# Patient Record
Sex: MALE | Race: WHITE | NOT HISPANIC OR LATINO | Employment: OTHER | URBAN - METROPOLITAN AREA
[De-identification: names, ages, dates, MRNs, and addresses within clinical notes are randomized per-mention and may not be internally consistent; named-entity substitution may affect disease eponyms.]

---

## 2022-08-09 ENCOUNTER — APPOINTMENT (OUTPATIENT)
Dept: GENERAL RADIOLOGY | Facility: HOSPITAL | Age: 57
End: 2022-08-09

## 2022-08-09 ENCOUNTER — HOSPITAL ENCOUNTER (EMERGENCY)
Facility: HOSPITAL | Age: 57
Discharge: HOME | End: 2022-08-09
Attending: EMERGENCY MEDICINE | Admitting: EMERGENCY MEDICINE

## 2022-08-09 VITALS
WEIGHT: 211.64 LBS | OXYGEN SATURATION: 92 % | DIASTOLIC BLOOD PRESSURE: 90 MMHG | BODY MASS INDEX: 33.22 KG/M2 | RESPIRATION RATE: 30 BRPM | TEMPERATURE: 98 F | HEART RATE: 114 BPM | HEIGHT: 67 IN | SYSTOLIC BLOOD PRESSURE: 179 MMHG

## 2022-08-09 DIAGNOSIS — J45.901 EXACERBATION OF ASTHMA, UNSPECIFIED ASTHMA SEVERITY, UNSPECIFIED WHETHER PERSISTENT: Primary | ICD-10-CM

## 2022-08-09 LAB
ALBUMIN SERPL-MCNC: 4.5 G/DL (ref 3.5–5.2)
ALBUMIN/GLOB SERPL: 1.4 G/DL
ALP SERPL-CCNC: 131 U/L (ref 39–117)
ALT SERPL W P-5'-P-CCNC: 31 U/L (ref 1–41)
ANION GAP SERPL CALCULATED.3IONS-SCNC: 10.2 MMOL/L (ref 5–15)
AST SERPL-CCNC: 25 U/L (ref 1–40)
BASOPHILS # BLD AUTO: 0.08 10*3/MM3 (ref 0–0.2)
BASOPHILS NFR BLD AUTO: 0.7 % (ref 0–1.5)
BILIRUB SERPL-MCNC: 0.4 MG/DL (ref 0–1.2)
BUN SERPL-MCNC: 21 MG/DL (ref 6–20)
BUN/CREAT SERPL: 16.9 (ref 7–25)
CALCIUM SPEC-SCNC: 9.6 MG/DL (ref 8.6–10.5)
CHLORIDE SERPL-SCNC: 106 MMOL/L (ref 98–107)
CO2 SERPL-SCNC: 24.8 MMOL/L (ref 22–29)
CREAT SERPL-MCNC: 1.24 MG/DL (ref 0.76–1.27)
DEPRECATED RDW RBC AUTO: 48.6 FL (ref 37–54)
EGFRCR SERPLBLD CKD-EPI 2021: 68.7 ML/MIN/1.73
EOSINOPHIL # BLD AUTO: 0.4 10*3/MM3 (ref 0–0.4)
EOSINOPHIL NFR BLD AUTO: 3.6 % (ref 0.3–6.2)
ERYTHROCYTE [DISTWIDTH] IN BLOOD BY AUTOMATED COUNT: 14.7 % (ref 12.3–15.4)
GLOBULIN UR ELPH-MCNC: 3.3 GM/DL
GLUCOSE SERPL-MCNC: 108 MG/DL (ref 65–99)
HCT VFR BLD AUTO: 46.8 % (ref 37.5–51)
HGB BLD-MCNC: 15.6 G/DL (ref 13–17.7)
HOLD SPECIMEN: NORMAL
HOLD SPECIMEN: NORMAL
IMM GRANULOCYTES # BLD AUTO: 0.04 10*3/MM3 (ref 0–0.05)
IMM GRANULOCYTES NFR BLD AUTO: 0.4 % (ref 0–0.5)
LYMPHOCYTES # BLD AUTO: 2.21 10*3/MM3 (ref 0.7–3.1)
LYMPHOCYTES NFR BLD AUTO: 20.1 % (ref 19.6–45.3)
MCH RBC QN AUTO: 30.3 PG (ref 26.6–33)
MCHC RBC AUTO-ENTMCNC: 33.3 G/DL (ref 31.5–35.7)
MCV RBC AUTO: 90.9 FL (ref 79–97)
MONOCYTES # BLD AUTO: 0.82 10*3/MM3 (ref 0.1–0.9)
MONOCYTES NFR BLD AUTO: 7.4 % (ref 5–12)
NEUTROPHILS NFR BLD AUTO: 67.8 % (ref 42.7–76)
NEUTROPHILS NFR BLD AUTO: 7.46 10*3/MM3 (ref 1.7–7)
NRBC BLD AUTO-RTO: 0 /100 WBC (ref 0–0.2)
NT-PROBNP SERPL-MCNC: 705 PG/ML (ref 0–900)
PLATELET # BLD AUTO: 273 10*3/MM3 (ref 140–450)
PMV BLD AUTO: 10.3 FL (ref 6–12)
POTASSIUM SERPL-SCNC: 4.3 MMOL/L (ref 3.5–5.2)
PROT SERPL-MCNC: 7.8 G/DL (ref 6–8.5)
QT INTERVAL: 348 MS
RBC # BLD AUTO: 5.15 10*6/MM3 (ref 4.14–5.8)
SODIUM SERPL-SCNC: 141 MMOL/L (ref 136–145)
TROPONIN T SERPL-MCNC: <0.01 NG/ML (ref 0–0.03)
WBC NRBC COR # BLD: 11.01 10*3/MM3 (ref 3.4–10.8)
WHOLE BLOOD HOLD COAG: NORMAL
WHOLE BLOOD HOLD SPECIMEN: NORMAL

## 2022-08-09 PROCEDURE — 85025 COMPLETE CBC W/AUTO DIFF WBC: CPT | Performed by: EMERGENCY MEDICINE

## 2022-08-09 PROCEDURE — 94799 UNLISTED PULMONARY SVC/PX: CPT

## 2022-08-09 PROCEDURE — 25010000002 METHYLPREDNISOLONE PER 125 MG

## 2022-08-09 PROCEDURE — 93010 ELECTROCARDIOGRAM REPORT: CPT | Performed by: INTERNAL MEDICINE

## 2022-08-09 PROCEDURE — 80053 COMPREHEN METABOLIC PANEL: CPT | Performed by: EMERGENCY MEDICINE

## 2022-08-09 PROCEDURE — 93005 ELECTROCARDIOGRAM TRACING: CPT | Performed by: EMERGENCY MEDICINE

## 2022-08-09 PROCEDURE — 71045 X-RAY EXAM CHEST 1 VIEW: CPT

## 2022-08-09 PROCEDURE — 36415 COLL VENOUS BLD VENIPUNCTURE: CPT | Performed by: EMERGENCY MEDICINE

## 2022-08-09 PROCEDURE — 96374 THER/PROPH/DIAG INJ IV PUSH: CPT

## 2022-08-09 PROCEDURE — 93005 ELECTROCARDIOGRAM TRACING: CPT

## 2022-08-09 PROCEDURE — 83880 ASSAY OF NATRIURETIC PEPTIDE: CPT | Performed by: EMERGENCY MEDICINE

## 2022-08-09 PROCEDURE — 84484 ASSAY OF TROPONIN QUANT: CPT | Performed by: EMERGENCY MEDICINE

## 2022-08-09 PROCEDURE — 94640 AIRWAY INHALATION TREATMENT: CPT

## 2022-08-09 PROCEDURE — 99283 EMERGENCY DEPT VISIT LOW MDM: CPT

## 2022-08-09 RX ORDER — IPRATROPIUM BROMIDE AND ALBUTEROL SULFATE 2.5; .5 MG/3ML; MG/3ML
3 SOLUTION RESPIRATORY (INHALATION)
Status: COMPLETED | OUTPATIENT
Start: 2022-08-09 | End: 2022-08-09

## 2022-08-09 RX ORDER — IPRATROPIUM BROMIDE AND ALBUTEROL SULFATE 2.5; .5 MG/3ML; MG/3ML
3 SOLUTION RESPIRATORY (INHALATION) ONCE
Status: DISCONTINUED | OUTPATIENT
Start: 2022-08-09 | End: 2022-08-09 | Stop reason: HOSPADM

## 2022-08-09 RX ORDER — ALBUTEROL SULFATE 90 UG/1
2 AEROSOL, METERED RESPIRATORY (INHALATION) EVERY 4 HOURS PRN
Qty: 90 G | Refills: 0 | Status: SHIPPED | OUTPATIENT
Start: 2022-08-09 | End: 2022-08-09 | Stop reason: SDUPTHER

## 2022-08-09 RX ORDER — METHYLPREDNISOLONE SODIUM SUCCINATE 125 MG/2ML
125 INJECTION, POWDER, LYOPHILIZED, FOR SOLUTION INTRAMUSCULAR; INTRAVENOUS ONCE
Status: COMPLETED | OUTPATIENT
Start: 2022-08-09 | End: 2022-08-09

## 2022-08-09 RX ORDER — CLONIDINE HYDROCHLORIDE 0.1 MG/1
0.1 TABLET ORAL ONCE
Status: COMPLETED | OUTPATIENT
Start: 2022-08-09 | End: 2022-08-09

## 2022-08-09 RX ORDER — ALBUTEROL SULFATE 0.63 MG/3ML
1 SOLUTION RESPIRATORY (INHALATION) EVERY 6 HOURS PRN
Qty: 30 EACH | Refills: 0 | Status: SHIPPED | OUTPATIENT
Start: 2022-08-09 | End: 2022-10-03

## 2022-08-09 RX ORDER — ALBUTEROL SULFATE 90 UG/1
2 AEROSOL, METERED RESPIRATORY (INHALATION) EVERY 4 HOURS PRN
Qty: 90 G | Refills: 0 | Status: SHIPPED | OUTPATIENT
Start: 2022-08-09 | End: 2022-10-03

## 2022-08-09 RX ORDER — SODIUM CHLORIDE 0.9 % (FLUSH) 0.9 %
10 SYRINGE (ML) INJECTION AS NEEDED
Status: DISCONTINUED | OUTPATIENT
Start: 2022-08-09 | End: 2022-08-09 | Stop reason: HOSPADM

## 2022-08-09 RX ORDER — ALBUTEROL SULFATE 0.63 MG/3ML
1 SOLUTION RESPIRATORY (INHALATION) EVERY 6 HOURS PRN
Qty: 30 EACH | Refills: 0 | Status: SHIPPED | OUTPATIENT
Start: 2022-08-09 | End: 2022-08-09 | Stop reason: SDUPTHER

## 2022-08-09 RX ORDER — PREDNISONE 20 MG/1
20 TABLET ORAL 2 TIMES DAILY
Qty: 10 TABLET | Refills: 0 | Status: SHIPPED | OUTPATIENT
Start: 2022-08-09 | End: 2022-08-14

## 2022-08-09 RX ORDER — PREDNISONE 20 MG/1
20 TABLET ORAL 2 TIMES DAILY
Qty: 10 TABLET | Refills: 0 | Status: SHIPPED | OUTPATIENT
Start: 2022-08-09 | End: 2022-08-09 | Stop reason: SDUPTHER

## 2022-08-09 RX ORDER — ALBUTEROL SULFATE 90 UG/1
2 AEROSOL, METERED RESPIRATORY (INHALATION) EVERY 4 HOURS PRN
COMMUNITY
End: 2022-08-09

## 2022-08-09 RX ADMIN — IPRATROPIUM BROMIDE AND ALBUTEROL SULFATE 3 ML: 2.5; .5 SOLUTION RESPIRATORY (INHALATION) at 11:35

## 2022-08-09 RX ADMIN — IPRATROPIUM BROMIDE AND ALBUTEROL SULFATE 3 ML: 2.5; .5 SOLUTION RESPIRATORY (INHALATION) at 11:30

## 2022-08-09 RX ADMIN — METHYLPREDNISOLONE SODIUM SUCCINATE 125 MG: 125 INJECTION, POWDER, FOR SOLUTION INTRAMUSCULAR; INTRAVENOUS at 11:49

## 2022-08-09 RX ADMIN — CLONIDINE HYDROCHLORIDE 0.1 MG: 0.1 TABLET ORAL at 14:24

## 2022-08-09 RX ADMIN — IPRATROPIUM BROMIDE AND ALBUTEROL SULFATE 3 ML: 2.5; .5 SOLUTION RESPIRATORY (INHALATION) at 11:40

## 2022-08-09 NOTE — DISCHARGE INSTRUCTIONS
Please follow-up with your primary care provider if you continue to feel ill and have wheezing.  Please return to the ER immediately if you develop any difficulty breathing, chest pain, chest tightness, short of air, or any other concerns surrounding your asthma.  Is also equally important that you take your blood pressure medication daily as prescribed.

## 2022-08-09 NOTE — ED PROVIDER NOTES
"Time: 11:19 AM EDT  Arrived by: SERGO  Chief Complaint: Asthma exacerbation  History provided by: Patient  History is limited by: N/A     History of Present Illness:  Patient is a 57 y.o. year old male who presents to the emergency department with shortness of air and wheezing secondary to asthma exacerbation.  States that approximately 2 days ago he started having some difficulty breathing.  He however has not had any of his asthma medications because he cannot locate them due to recently moving to this area.  He moved to [place] from [place] approximately 1-1/2 weeks ago.  He denies any pain, fever, nausea, vomiting, or diarrhea.    HPI    Similar Symptoms Previously: Yes  Recently seen: Unknown.      Patient Care Team  Primary Care Provider: Provider, No Known    Past Medical History:     No Known Allergies  Past Medical History:   Diagnosis Date   • Asthma      History reviewed. No pertinent surgical history.  History reviewed. No pertinent family history.    Home Medications:  Prior to Admission medications    Not on File        Social History:          Review of Systems:  Review of Systems   Constitutional: Negative for chills and fever.   HENT: Negative for congestion, ear pain and sore throat.    Eyes: Negative for pain.   Respiratory: Positive for shortness of breath and wheezing. Negative for cough and chest tightness.    Cardiovascular: Negative for chest pain.   Gastrointestinal: Negative for abdominal pain, diarrhea, nausea and vomiting.   Genitourinary: Negative for flank pain and hematuria.   Musculoskeletal: Negative for joint swelling.   Skin: Negative for pallor.   Neurological: Negative for seizures and headaches.   All other systems reviewed and are negative.       Physical Exam:  /90   Pulse 114   Temp 98 °F (36.7 °C)   Resp (!) 30   Ht 170.2 cm (67\")   Wt 96 kg (211 lb 10.3 oz)   SpO2 92%   BMI 33.15 kg/m²     Physical Exam  Vitals and nursing note reviewed.   Constitutional:       " General: He is not in acute distress.     Appearance: Normal appearance. He is not ill-appearing, toxic-appearing or diaphoretic.   HENT:      Head: Normocephalic and atraumatic.      Mouth/Throat:      Mouth: Mucous membranes are moist.   Eyes:      General: No scleral icterus.  Cardiovascular:      Rate and Rhythm: Regular rhythm. Tachycardia present.      Pulses: Normal pulses.      Heart sounds: Normal heart sounds. No murmur heard.  Pulmonary:      Effort: Pulmonary effort is normal. No respiratory distress.      Breath sounds: No stridor. Wheezing and rhonchi present. No rales.      Comments: There is some accessory muscle use noted and work of breathing is increased.  Patient can speak in full sentences.  There are no signs of cyanosis.  Abdominal:      General: Abdomen is flat.      Palpations: Abdomen is soft.      Tenderness: There is no abdominal tenderness.   Genitourinary:     Prostate: Normal.   Musculoskeletal:         General: No tenderness. Normal range of motion.      Cervical back: Normal range of motion and neck supple. No rigidity.   Skin:     General: Skin is warm and dry.      Coloration: Skin is not jaundiced or pale.      Findings: No bruising, erythema, lesion or rash.   Neurological:      Mental Status: He is alert and oriented to person, place, and time. Mental status is at baseline.      Cranial Nerves: No cranial nerve deficit.      Sensory: No sensory deficit.   Psychiatric:         Mood and Affect: Mood normal.         Behavior: Behavior normal.         Thought Content: Thought content normal.         Judgment: Judgment normal.                Medications in the Emergency Department:  Medications   sodium chloride 0.9 % flush 10 mL (has no administration in time range)   ipratropium-albuterol (DUO-NEB) nebulizer solution 3 mL (3 mL Nebulization Not Given 8/9/22 1130)   ipratropium-albuterol (DUO-NEB) nebulizer solution 3 mL (3 mL Nebulization Given 8/9/22 1140)   methylPREDNISolone  sodium succinate (SOLU-Medrol) injection 125 mg (125 mg Intravenous Given 8/9/22 1149)   cloNIDine (CATAPRES) tablet 0.1 mg (0.1 mg Oral Given 8/9/22 1424)        Labs  Lab Results (last 24 hours)     Procedure Component Value Units Date/Time    CBC & Differential [[id number]]  (Abnormal) Collected: 08/09/22 1045    Specimen: Blood Updated: 08/09/22 1101    Narrative:      The following orders were created for panel order CBC & Differential.  Procedure                               Abnormality         Status                     ---------                               -----------         ------                     CBC Auto Differential[[id number]]        Abnormal            Final result                 Please view results for these tests on the individual orders.    Comprehensive Metabolic Panel [[id number]]  (Abnormal) Collected: 08/09/22 1045    Specimen: Blood Updated: 08/09/22 1124     Glucose 108 mg/dL      BUN 21 mg/dL      Creatinine 1.24 mg/dL      Sodium 141 mmol/L      Potassium 4.3 mmol/L      Chloride 106 mmol/L      CO2 24.8 mmol/L      Calcium 9.6 mg/dL      Total Protein 7.8 g/dL      Albumin 4.50 g/dL      ALT (SGPT) 31 U/L      AST (SGOT) 25 U/L      Alkaline Phosphatase 131 U/L      Total Bilirubin 0.4 mg/dL      Globulin 3.3 gm/dL      A/G Ratio 1.4 g/dL      BUN/Creatinine Ratio 16.9     Anion Gap 10.2 mmol/L      eGFR 68.7 mL/min/1.73      Comment: National Kidney Foundation and American Society of Nephrology (ASN) Task Force recommended calculation based on the Chronic Kidney Disease Epidemiology Collaboration (CKD-EPI) equation refit without adjustment for race.       Narrative:      GFR Normal >60  Chronic Kidney Disease <60  Kidney Failure <15      BNP [[id number]]  (Normal) Collected: 08/09/22 1045    Specimen: Blood Updated: 08/09/22 1121     proBNP 705.0 pg/mL     Narrative:      Among patients with dyspnea, NT-proBNP is highly sensitive for the detection of acute congestive heart failure. In  addition NT-proBNP of <300 pg/ml effectively rules out acute congestive heart failure with 99% negative predictive value.    Results may be falsely decreased if patient taking Biotin.      Troponin [[id number]]  (Normal) Collected: 08/09/22 1045    Specimen: Blood Updated: 08/09/22 1124     Troponin T <0.010 ng/mL     Narrative:      Troponin T Reference Range:  <= 0.03 ng/mL-   Negative for AMI  >0.03 ng/mL-     Abnormal for myocardial necrosis.  Clinicians would have to utilize clinical acumen, EKG, Troponin and serial changes to determine if it is an Acute Myocardial Infarction or myocardial injury due to an underlying chronic condition.       Results may be falsely decreased if patient taking Biotin.      CBC Auto Differential [[id number]]  (Abnormal) Collected: 08/09/22 1045    Specimen: Blood Updated: 08/09/22 1101     WBC 11.01 10*3/mm3      RBC 5.15 10*6/mm3      Hemoglobin 15.6 g/dL      Hematocrit 46.8 %      MCV 90.9 fL      MCH 30.3 pg      MCHC 33.3 g/dL      RDW 14.7 %      RDW-SD 48.6 fl      MPV 10.3 fL      Platelets 273 10*3/mm3      Neutrophil % 67.8 %      Lymphocyte % 20.1 %      Monocyte % 7.4 %      Eosinophil % 3.6 %      Basophil % 0.7 %      Immature Grans % 0.4 %      Neutrophils, Absolute 7.46 10*3/mm3      Lymphocytes, Absolute 2.21 10*3/mm3      Monocytes, Absolute 0.82 10*3/mm3      Eosinophils, Absolute 0.40 10*3/mm3      Basophils, Absolute 0.08 10*3/mm3      Immature Grans, Absolute 0.04 10*3/mm3      nRBC 0.0 /100 WBC            Imaging:  XR Chest 1 View    Result Date: 8/9/2022  PROCEDURE: XR CHEST 1 VW  COMPARISON: None  INDICATIONS: asthma, short of breath  FINDINGS:  No focal or diffuse infiltrate is identified. No pleural effusion or pneumothorax. Heart size and mediastinal contour appear within normal limits.         No radiographic findings of acute cardiopulmonary abnormality.       ROBBY OSPINA MD       Electronically Signed and Approved By: ROBBY OSPINA MD on 8/09/2022 at  11:03               Procedures:  Procedures    Progress  ED Course as of 08/09/22 1645   Tue Aug 09, 2022   1129 Pharmacy tech was given patient's previous pharmacy info in [place].  They will be calling to get patient's medications. [MS]   1359 Patient states that he is feeling much better and can breathe better.  His breath sounds have improved.  He still has scattered wheezing however much better than initial assessment.  Patient states he is ready to go home.  His O2 sats are running between 92-93% on room air and he reports that that is his normal. Patient's blood pressure is noted to be elevated.  He states that he has not had his blood pressure medication today. [MS]      ED Course User Index  [MS] Sudha Santillan, PONCE                            Medical Decision Making:  MDM  Number of Diagnoses or Management Options  Exacerbation of asthma, unspecified asthma severity, unspecified whether persistent: new and does not require workup  Diagnosis management comments: Patient presented in mild respiratory distress.  He has a history of asthma but has not been able to locate his medications since moving to [place] 9 days ago.  He developed shortness of air starting 2 days ago that is progressively gotten worse.  Upon arrival accessory muscle use was noted and her breathing was slightly increased.  There was significant wheezing and rhonchi throughout.  Patient was given 3 breathing treatments and IV steroids and greatly improved.  Verbalized stating now I cannot breathe.  His chest x-ray was relatively within normal limits.  At time of discharge his sats were 92 to 93% patient confirms that that is what his sats normally are.  He was given prescriptions for an inhaler as well as neb treatments, and oral steroids.  Patient was given strict return instructions and verbalized understanding.  I have spoke with the patient and I have explained the patient´s condition, diagnoses and treatment plan based on the  information available to me at this time. I have answered all questions and addressed any concerns. The patient has a good understanding of the patient´s diagnosis, condition, and treatment plan as can be expected at this point. The vital signs have been stable. The patient´s condition is stable and appropriate for discharge from the emergency department.      The patient will pursue further outpatient evaluation with the primary care physician or other designated or consulting physician as outlined in the discharge instructions. They are agreeable to this plan of care and follow-up instructions have been explained in detail. The patient has received these instructions in written format and have expressed an understanding of the discharge instructions. The patient is aware that any significant change in condition or worsening of symptoms should prompt an immediate return to this or the closest emergency department or call to 911.         Amount and/or Complexity of Data Reviewed  Clinical lab tests: ordered and reviewed  Tests in the radiology section of CPT®: ordered and reviewed  Review and summarize past medical records: yes (I have attempted review patient's previous medical encounters however there are none available within the epic charting system.  )    Risk of Complications, Morbidity, and/or Mortality  Presenting problems: moderate  Diagnostic procedures: low  Management options: low         Final diagnoses:   Exacerbation of asthma, unspecified asthma severity, unspecified whether persistent        Disposition:  ED Disposition     ED Disposition   Discharge    Condition   Stable    Comment   --             This medical record created using voice recognition software.           Sudha Santillan, APRN  08/09/22 7567

## 2022-10-03 ENCOUNTER — APPOINTMENT (OUTPATIENT)
Dept: GENERAL RADIOLOGY | Facility: HOSPITAL | Age: 57
End: 2022-10-03

## 2022-10-03 ENCOUNTER — HOSPITAL ENCOUNTER (EMERGENCY)
Facility: HOSPITAL | Age: 57
Discharge: LEFT BEFORE DISCHARGE | End: 2022-10-03
Attending: STUDENT IN AN ORGANIZED HEALTH CARE EDUCATION/TRAINING PROGRAM | Admitting: STUDENT IN AN ORGANIZED HEALTH CARE EDUCATION/TRAINING PROGRAM

## 2022-10-03 VITALS
RESPIRATION RATE: 20 BRPM | DIASTOLIC BLOOD PRESSURE: 126 MMHG | SYSTOLIC BLOOD PRESSURE: 190 MMHG | TEMPERATURE: 98.7 F | BODY MASS INDEX: 36.49 KG/M2 | HEART RATE: 104 BPM | HEIGHT: 66 IN | WEIGHT: 227.07 LBS | OXYGEN SATURATION: 99 %

## 2022-10-03 DIAGNOSIS — J45.901 EXACERBATION OF ASTHMA, UNSPECIFIED ASTHMA SEVERITY, UNSPECIFIED WHETHER PERSISTENT: Primary | ICD-10-CM

## 2022-10-03 DIAGNOSIS — I10 HYPERTENSION, UNSPECIFIED TYPE: ICD-10-CM

## 2022-10-03 LAB
ALBUMIN SERPL-MCNC: 4.3 G/DL (ref 3.5–5.2)
ALBUMIN/GLOB SERPL: 1.5 G/DL
ALP SERPL-CCNC: 121 U/L (ref 39–117)
ALT SERPL W P-5'-P-CCNC: 27 U/L (ref 1–41)
ANION GAP SERPL CALCULATED.3IONS-SCNC: 7.7 MMOL/L (ref 5–15)
AST SERPL-CCNC: 27 U/L (ref 1–40)
BASOPHILS # BLD AUTO: 0.06 10*3/MM3 (ref 0–0.2)
BASOPHILS NFR BLD AUTO: 0.7 % (ref 0–1.5)
BILIRUB SERPL-MCNC: 0.2 MG/DL (ref 0–1.2)
BUN SERPL-MCNC: 22 MG/DL (ref 6–20)
BUN/CREAT SERPL: 15.5 (ref 7–25)
CALCIUM SPEC-SCNC: 9.5 MG/DL (ref 8.6–10.5)
CHLORIDE SERPL-SCNC: 102 MMOL/L (ref 98–107)
CO2 SERPL-SCNC: 29.3 MMOL/L (ref 22–29)
CREAT SERPL-MCNC: 1.42 MG/DL (ref 0.76–1.27)
DEPRECATED RDW RBC AUTO: 50.4 FL (ref 37–54)
EGFRCR SERPLBLD CKD-EPI 2021: 58.4 ML/MIN/1.73
EOSINOPHIL # BLD AUTO: 0.47 10*3/MM3 (ref 0–0.4)
EOSINOPHIL NFR BLD AUTO: 5.8 % (ref 0.3–6.2)
ERYTHROCYTE [DISTWIDTH] IN BLOOD BY AUTOMATED COUNT: 14.7 % (ref 12.3–15.4)
GLOBULIN UR ELPH-MCNC: 2.9 GM/DL
GLUCOSE SERPL-MCNC: 104 MG/DL (ref 65–99)
HCT VFR BLD AUTO: 44.5 % (ref 37.5–51)
HGB BLD-MCNC: 14.7 G/DL (ref 13–17.7)
HOLD SPECIMEN: NORMAL
HOLD SPECIMEN: NORMAL
IMM GRANULOCYTES # BLD AUTO: 0.04 10*3/MM3 (ref 0–0.05)
IMM GRANULOCYTES NFR BLD AUTO: 0.5 % (ref 0–0.5)
LYMPHOCYTES # BLD AUTO: 1.85 10*3/MM3 (ref 0.7–3.1)
LYMPHOCYTES NFR BLD AUTO: 22.8 % (ref 19.6–45.3)
MCH RBC QN AUTO: 30.7 PG (ref 26.6–33)
MCHC RBC AUTO-ENTMCNC: 33 G/DL (ref 31.5–35.7)
MCV RBC AUTO: 92.9 FL (ref 79–97)
MONOCYTES # BLD AUTO: 0.66 10*3/MM3 (ref 0.1–0.9)
MONOCYTES NFR BLD AUTO: 8.1 % (ref 5–12)
NEUTROPHILS NFR BLD AUTO: 5.04 10*3/MM3 (ref 1.7–7)
NEUTROPHILS NFR BLD AUTO: 62.1 % (ref 42.7–76)
NRBC BLD AUTO-RTO: 0 /100 WBC (ref 0–0.2)
NT-PROBNP SERPL-MCNC: 494.5 PG/ML (ref 0–900)
PLATELET # BLD AUTO: 222 10*3/MM3 (ref 140–450)
PMV BLD AUTO: 10.6 FL (ref 6–12)
POTASSIUM SERPL-SCNC: 4.4 MMOL/L (ref 3.5–5.2)
PROT SERPL-MCNC: 7.2 G/DL (ref 6–8.5)
RBC # BLD AUTO: 4.79 10*6/MM3 (ref 4.14–5.8)
SODIUM SERPL-SCNC: 139 MMOL/L (ref 136–145)
TROPONIN T SERPL-MCNC: <0.01 NG/ML (ref 0–0.03)
WBC NRBC COR # BLD: 8.12 10*3/MM3 (ref 3.4–10.8)
WHOLE BLOOD HOLD COAG: NORMAL
WHOLE BLOOD HOLD SPECIMEN: NORMAL

## 2022-10-03 PROCEDURE — 83880 ASSAY OF NATRIURETIC PEPTIDE: CPT

## 2022-10-03 PROCEDURE — 96374 THER/PROPH/DIAG INJ IV PUSH: CPT

## 2022-10-03 PROCEDURE — 99283 EMERGENCY DEPT VISIT LOW MDM: CPT

## 2022-10-03 PROCEDURE — 99284 EMERGENCY DEPT VISIT MOD MDM: CPT

## 2022-10-03 PROCEDURE — 85025 COMPLETE CBC W/AUTO DIFF WBC: CPT

## 2022-10-03 PROCEDURE — 71045 X-RAY EXAM CHEST 1 VIEW: CPT

## 2022-10-03 PROCEDURE — 93005 ELECTROCARDIOGRAM TRACING: CPT

## 2022-10-03 PROCEDURE — 93005 ELECTROCARDIOGRAM TRACING: CPT | Performed by: STUDENT IN AN ORGANIZED HEALTH CARE EDUCATION/TRAINING PROGRAM

## 2022-10-03 PROCEDURE — 84484 ASSAY OF TROPONIN QUANT: CPT

## 2022-10-03 PROCEDURE — 94799 UNLISTED PULMONARY SVC/PX: CPT

## 2022-10-03 PROCEDURE — 80053 COMPREHEN METABOLIC PANEL: CPT

## 2022-10-03 PROCEDURE — 25010000002 METHYLPREDNISOLONE PER 125 MG: Performed by: EMERGENCY MEDICINE

## 2022-10-03 PROCEDURE — 93010 ELECTROCARDIOGRAM REPORT: CPT | Performed by: INTERNAL MEDICINE

## 2022-10-03 PROCEDURE — 94640 AIRWAY INHALATION TREATMENT: CPT

## 2022-10-03 RX ORDER — SODIUM CHLORIDE 0.9 % (FLUSH) 0.9 %
10 SYRINGE (ML) INJECTION AS NEEDED
Status: DISCONTINUED | OUTPATIENT
Start: 2022-10-03 | End: 2022-10-03 | Stop reason: HOSPADM

## 2022-10-03 RX ORDER — IPRATROPIUM BROMIDE AND ALBUTEROL SULFATE 2.5; .5 MG/3ML; MG/3ML
6 SOLUTION RESPIRATORY (INHALATION) CONTINUOUS
Status: DISCONTINUED | OUTPATIENT
Start: 2022-10-03 | End: 2022-10-03 | Stop reason: HOSPADM

## 2022-10-03 RX ORDER — METHYLPREDNISOLONE SODIUM SUCCINATE 125 MG/2ML
125 INJECTION, POWDER, LYOPHILIZED, FOR SOLUTION INTRAMUSCULAR; INTRAVENOUS ONCE
Status: COMPLETED | OUTPATIENT
Start: 2022-10-03 | End: 2022-10-03

## 2022-10-03 RX ORDER — ALBUTEROL SULFATE 2.5 MG/3ML
2.5 SOLUTION RESPIRATORY (INHALATION) EVERY 4 HOURS PRN
Qty: 60 EACH | Refills: 0 | Status: SHIPPED | OUTPATIENT
Start: 2022-10-03 | End: 2022-11-02

## 2022-10-03 RX ORDER — IPRATROPIUM BROMIDE AND ALBUTEROL SULFATE 2.5; .5 MG/3ML; MG/3ML
3 SOLUTION RESPIRATORY (INHALATION) ONCE
Status: COMPLETED | OUTPATIENT
Start: 2022-10-03 | End: 2022-10-03

## 2022-10-03 RX ORDER — LISINOPRIL 10 MG/1
20 TABLET ORAL ONCE
Status: COMPLETED | OUTPATIENT
Start: 2022-10-03 | End: 2022-10-03

## 2022-10-03 RX ORDER — LISINOPRIL 20 MG/1
20 TABLET ORAL DAILY
Qty: 60 TABLET | Refills: 0 | Status: SHIPPED | OUTPATIENT
Start: 2022-10-03 | End: 2022-10-27 | Stop reason: SDUPTHER

## 2022-10-03 RX ORDER — PREDNISONE 20 MG/1
40 TABLET ORAL DAILY
Qty: 10 TABLET | Refills: 0 | Status: SHIPPED | OUTPATIENT
Start: 2022-10-03 | End: 2022-10-08

## 2022-10-03 RX ORDER — ALBUTEROL SULFATE 90 UG/1
2 AEROSOL, METERED RESPIRATORY (INHALATION) EVERY 4 HOURS PRN
Qty: 8 G | Refills: 0 | Status: SHIPPED | OUTPATIENT
Start: 2022-10-03 | End: 2022-10-27

## 2022-10-03 RX ADMIN — METHYLPREDNISOLONE SODIUM SUCCINATE 125 MG: 125 INJECTION, POWDER, FOR SOLUTION INTRAMUSCULAR; INTRAVENOUS at 13:05

## 2022-10-03 RX ADMIN — IPRATROPIUM BROMIDE AND ALBUTEROL SULFATE 3 ML: 2.5; .5 SOLUTION RESPIRATORY (INHALATION) at 13:04

## 2022-10-03 RX ADMIN — IPRATROPIUM BROMIDE AND ALBUTEROL SULFATE 6 ML: 2.5; .5 SOLUTION RESPIRATORY (INHALATION) at 14:20

## 2022-10-03 RX ADMIN — LISINOPRIL 20 MG: 10 TABLET ORAL at 14:35

## 2022-10-03 NOTE — DISCHARGE INSTRUCTIONS
Follow-up with your primary care doctor to have your asthma and high blood pressure better controlled.  Return to the emergency department if you feel lightheaded, passout, or have other concerning symptoms.

## 2022-10-03 NOTE — ED PROVIDER NOTES
Time: 1:45 PM EDT  Arrived by: private car  Chief Complaint: SOB and HTN  History provided by: patient  History is limited by: N/A     History of Present Illness:  Patient is a 57 y.o. year old male who presents to the emergency department with SOB and HTN. Pt reports SOB started last night. Pt has a history of asthma. Pt does not have an inhaler at home. Pt reports smoking occationally. Pt has not been on steroids. Pt has had one breathing treatment here at the ER today. Pt has been on Lisinopril for 6 months on and off but he has not been taking his medications recently.       Similar Symptoms Previously: no  Recently seen: no      Patient Care Team  Primary Care Provider: Provider, No Known    Past Medical History:     No Known Allergies  Past Medical History:   Diagnosis Date   • Asthma      No past surgical history on file.  No family history on file.    Home Medications:  Prior to Admission medications    Medication Sig Start Date End Date Taking? Authorizing Provider   albuterol (ACCUNEB) 0.63 MG/3ML nebulizer solution Take 3 mL by nebulization Every 6 (Six) Hours As Needed for Wheezing. 8/9/22   Sudha Santillan APRN   albuterol sulfate  (90 Base) MCG/ACT inhaler Inhale 2 puffs Every 4 (Four) Hours As Needed for Wheezing. 8/9/22   Sudha Santillan APRN        Social History:      Recent travel: not applicable     Review of Systems:  Review of Systems   Constitutional: Negative for chills and fever.   HENT: Negative for congestion, rhinorrhea and sore throat.    Eyes: Negative for pain and visual disturbance.   Respiratory: Positive for shortness of breath. Negative for apnea, cough and chest tightness.    Cardiovascular: Negative for chest pain and palpitations.   Gastrointestinal: Negative for abdominal pain, diarrhea, nausea and vomiting.   Genitourinary: Negative for difficulty urinating and dysuria.   Musculoskeletal: Negative for joint swelling and myalgias.   Skin: Negative for color  "change.   Neurological: Negative for seizures and headaches.   Psychiatric/Behavioral: Negative.    All other systems reviewed and are negative.       Physical Exam:  BP (!) 190/126   Pulse 104   Temp 98.7 °F (37.1 °C) (Oral)   Resp 20   Ht 167.6 cm (66\")   Wt 103 kg (227 lb 1.2 oz)   SpO2 99%   BMI 36.65 kg/m²     Physical Exam  Vitals and nursing note reviewed.   Constitutional:       General: He is not in acute distress.     Appearance: Normal appearance. He is not toxic-appearing.   HENT:      Head: Normocephalic and atraumatic.      Jaw: There is normal jaw occlusion.   Eyes:      General: Lids are normal.      Extraocular Movements: Extraocular movements intact.      Conjunctiva/sclera: Conjunctivae normal.      Pupils: Pupils are equal, round, and reactive to light.   Cardiovascular:      Rate and Rhythm: Normal rate and regular rhythm.      Pulses: Normal pulses.      Heart sounds: Normal heart sounds.   Pulmonary:      Effort: Pulmonary effort is normal. No respiratory distress.      Breath sounds: Wheezing (Severe) present. No rhonchi.      Comments: Slight conversational dyspnea.  Abdominal:      General: Abdomen is flat.      Palpations: Abdomen is soft.      Tenderness: There is no abdominal tenderness. There is no guarding or rebound.   Musculoskeletal:         General: Normal range of motion.      Cervical back: Normal range of motion and neck supple.      Right lower leg: No edema.      Left lower leg: No edema.   Skin:     General: Skin is warm and dry.   Neurological:      Mental Status: He is alert and oriented to person, place, and time. Mental status is at baseline.   Psychiatric:         Mood and Affect: Mood normal.                Medications in the Emergency Department:  Medications   sodium chloride 0.9 % flush 10 mL (has no administration in time range)   ipratropium-albuterol (DUO-NEB) nebulizer solution 6 mL (0 mL Nebulization Stopped 10/3/22 4319)   ipratropium-albuterol (DUO-NEB) " nebulizer solution 3 mL (3 mL Nebulization Given 10/3/22 1304)   methylPREDNISolone sodium succinate (SOLU-Medrol) injection 125 mg (125 mg Intravenous Given 10/3/22 1305)   lisinopril (PRINIVIL,ZESTRIL) tablet 20 mg (20 mg Oral Given 10/3/22 1435)        Labs  Lab Results (last 24 hours)     Procedure Component Value Units Date/Time    CBC & Differential [[id number]]  (Abnormal) Collected: 10/03/22 1225    Specimen: Blood Updated: 10/03/22 1234    Narrative:      The following orders were created for panel order CBC & Differential.  Procedure                               Abnormality         Status                     ---------                               -----------         ------                     CBC Auto Differential[[id number]]        Abnormal            Final result                 Please view results for these tests on the individual orders.    Comprehensive Metabolic Panel [[id number]]  (Abnormal) Collected: 10/03/22 1225    Specimen: Blood Updated: 10/03/22 1256     Glucose 104 mg/dL      BUN 22 mg/dL      Creatinine 1.42 mg/dL      Sodium 139 mmol/L      Potassium 4.4 mmol/L      Chloride 102 mmol/L      CO2 29.3 mmol/L      Calcium 9.5 mg/dL      Total Protein 7.2 g/dL      Albumin 4.30 g/dL      ALT (SGPT) 27 U/L      AST (SGOT) 27 U/L      Alkaline Phosphatase 121 U/L      Total Bilirubin 0.2 mg/dL      Globulin 2.9 gm/dL      A/G Ratio 1.5 g/dL      BUN/Creatinine Ratio 15.5     Anion Gap 7.7 mmol/L      eGFR 58.4 mL/min/1.73      Comment: National Kidney Foundation and American Society of Nephrology (ASN) Task Force recommended calculation based on the Chronic Kidney Disease Epidemiology Collaboration (CKD-EPI) equation refit without adjustment for race.       Narrative:      GFR Normal >60  Chronic Kidney Disease <60  Kidney Failure <15      BNP [[id number]]  (Normal) Collected: 10/03/22 1225    Specimen: Blood Updated: 10/03/22 1251     proBNP 494.5 pg/mL     Narrative:      Among patients with  dyspnea, NT-proBNP is highly sensitive for the detection of acute congestive heart failure. In addition NT-proBNP of <300 pg/ml effectively rules out acute congestive heart failure with 99% negative predictive value.    Results may be falsely decreased if patient taking Biotin.      Troponin [[id number]]  (Normal) Collected: 10/03/22 1225    Specimen: Blood Updated: 10/03/22 1254     Troponin T <0.010 ng/mL     Narrative:      Troponin T Reference Range:  <= 0.03 ng/mL-   Negative for AMI  >0.03 ng/mL-     Abnormal for myocardial necrosis.  Clinicians would have to utilize clinical acumen, EKG, Troponin and serial changes to determine if it is an Acute Myocardial Infarction or myocardial injury due to an underlying chronic condition.       Results may be falsely decreased if patient taking Biotin.      CBC Auto Differential [[id number]]  (Abnormal) Collected: 10/03/22 1225    Specimen: Blood Updated: 10/03/22 1234     WBC 8.12 10*3/mm3      RBC 4.79 10*6/mm3      Hemoglobin 14.7 g/dL      Hematocrit 44.5 %      MCV 92.9 fL      MCH 30.7 pg      MCHC 33.0 g/dL      RDW 14.7 %      RDW-SD 50.4 fl      MPV 10.6 fL      Platelets 222 10*3/mm3      Neutrophil % 62.1 %      Lymphocyte % 22.8 %      Monocyte % 8.1 %      Eosinophil % 5.8 %      Basophil % 0.7 %      Immature Grans % 0.5 %      Neutrophils, Absolute 5.04 10*3/mm3      Lymphocytes, Absolute 1.85 10*3/mm3      Monocytes, Absolute 0.66 10*3/mm3      Eosinophils, Absolute 0.47 10*3/mm3      Basophils, Absolute 0.06 10*3/mm3      Immature Grans, Absolute 0.04 10*3/mm3      nRBC 0.0 /100 WBC            Imaging:  XR Chest 1 View    Result Date: 10/3/2022  PROCEDURE: XR CHEST 1 VW  COMPARISON: [facility], , XR CHEST 1 VW, 8/09/2022, 10:56.  INDICATIONS: shortness of air  FINDINGS:  The heart is normal in size.  The lungs are well-expanded and free of infiltrates.  Bony structures appear intact.  Healed fracture of the right posterior 8th rib is again  seen.       No active disease is seen.       MIKAL HOPE MD       Electronically Signed and Approved By: MIKAL HOPE MD on 10/03/2022 at 12:47               Procedures:  Procedures    Progress  ED Course as of 10/03/22 1529   Mon Oct 03, 2022   1526 I reevaluated the patient.  Occasionally his oxygenation is dipping into the high 80s.  He still has significant wheezing and having some conversational dyspnea.  Patient's renal function is slightly worse as well.  Patient was written his lisinopril.  I did discuss with him that I would rather do more observation and breathing treatments but he would like to leave and understands the risk [LQ]      ED Course User Index  [LQ] Sabrina Simpsno MD                            Medical Decision Making:  MDM  Number of Diagnoses or Management Options  Exacerbation of asthma, unspecified asthma severity, unspecified whether persistent  Hypertension, unspecified type  Diagnosis management comments: ddx: Pneumonia, pneumothorax, ACS, PE, asthma, COPD    Patient has known asthma.  Chest x-ray shows no infiltrate.  Patient has CKD likely from his untreated hypertension.  Patient's breathing did improve with treatment though he ultimately did leave AGAINST MEDICAL ADVICE as he did not want a continue treatment and still was not oxygenating as well as I would have liked him to.  Patient was sent home with steroids albuterol treatments inhaler and also his lisinopril refilled.  We discussed follow-up and return precautions       Amount and/or Complexity of Data Reviewed  Clinical lab tests: reviewed  Tests in the radiology section of CPT®: reviewed  Tests in the medicine section of CPT®: reviewed         Final diagnoses:   Exacerbation of asthma, unspecified asthma severity, unspecified whether persistent   Hypertension, unspecified type        Disposition:  ED Disposition     ED Disposition   AMA    Condition   --    Comment   --             This medical record created using voice  recognition software.        Documentation assistance provided by Allison Godfrey acting as scribe for Sabrina Simpson MD. Information recorded by the scribe was done at my direction and has been verified and validated by me.          Allison Godfrey  10/03/22 1404       Allison Godfrey  10/03/22 1521       Sabrina Simpson MD  10/03/22 1530

## 2022-10-06 LAB — QT INTERVAL: 381 MS

## 2022-10-27 ENCOUNTER — OFFICE VISIT (OUTPATIENT)
Dept: INTERNAL MEDICINE | Facility: CLINIC | Age: 57
End: 2022-10-27

## 2022-10-27 VITALS
TEMPERATURE: 97.9 F | HEIGHT: 66 IN | BODY MASS INDEX: 37.32 KG/M2 | SYSTOLIC BLOOD PRESSURE: 142 MMHG | DIASTOLIC BLOOD PRESSURE: 70 MMHG | OXYGEN SATURATION: 95 % | WEIGHT: 232.2 LBS | HEART RATE: 112 BPM | RESPIRATION RATE: 18 BRPM

## 2022-10-27 DIAGNOSIS — Z12.5 SCREENING FOR PROSTATE CANCER: ICD-10-CM

## 2022-10-27 DIAGNOSIS — I10 ESSENTIAL (PRIMARY) HYPERTENSION: ICD-10-CM

## 2022-10-27 DIAGNOSIS — Z76.89 ENCOUNTER TO ESTABLISH CARE: Primary | ICD-10-CM

## 2022-10-27 DIAGNOSIS — F51.04 PSYCHOPHYSIOLOGICAL INSOMNIA: ICD-10-CM

## 2022-10-27 DIAGNOSIS — Z79.899 CONTROLLED SUBSTANCE AGREEMENT SIGNED: ICD-10-CM

## 2022-10-27 DIAGNOSIS — Z11.59 NEED FOR HEPATITIS C SCREENING TEST: ICD-10-CM

## 2022-10-27 DIAGNOSIS — J45.41 MODERATE PERSISTENT ASTHMA WITH ACUTE EXACERBATION: ICD-10-CM

## 2022-10-27 DIAGNOSIS — M25.50 POLYARTHRALGIA: ICD-10-CM

## 2022-10-27 DIAGNOSIS — Z13.220 SCREENING FOR LIPID DISORDERS: ICD-10-CM

## 2022-10-27 DIAGNOSIS — Z01.89 ROUTINE LAB DRAW: ICD-10-CM

## 2022-10-27 DIAGNOSIS — Z12.11 ENCOUNTER FOR SCREENING COLONOSCOPY: ICD-10-CM

## 2022-10-27 DIAGNOSIS — Z12.11 SCREENING FOR COLON CANCER: ICD-10-CM

## 2022-10-27 DIAGNOSIS — J30.1 SEASONAL ALLERGIC RHINITIS DUE TO POLLEN: ICD-10-CM

## 2022-10-27 DIAGNOSIS — F41.1 GAD (GENERALIZED ANXIETY DISORDER): ICD-10-CM

## 2022-10-27 PROCEDURE — 80305 DRUG TEST PRSMV DIR OPT OBS: CPT

## 2022-10-27 PROCEDURE — 99204 OFFICE O/P NEW MOD 45 MIN: CPT

## 2022-10-27 RX ORDER — CETIRIZINE HYDROCHLORIDE 10 MG/1
10 TABLET ORAL DAILY
Qty: 90 TABLET | Refills: 1 | Status: SHIPPED | OUTPATIENT
Start: 2022-10-27

## 2022-10-27 RX ORDER — FLUTICASONE FUROATE, UMECLIDINIUM BROMIDE AND VILANTEROL TRIFENATATE 200; 62.5; 25 UG/1; UG/1; UG/1
200 POWDER RESPIRATORY (INHALATION) DAILY
Qty: 28 EACH | Refills: 0 | COMMUNITY
Start: 2022-10-27 | End: 2022-12-06 | Stop reason: SDUPTHER

## 2022-10-27 RX ORDER — MONTELUKAST SODIUM 10 MG/1
10 TABLET ORAL NIGHTLY
Qty: 90 TABLET | Refills: 1 | Status: SHIPPED | OUTPATIENT
Start: 2022-10-27 | End: 2023-03-28

## 2022-10-27 RX ORDER — LISINOPRIL 30 MG/1
30 TABLET ORAL DAILY
Qty: 90 TABLET | Refills: 1 | Status: SHIPPED | OUTPATIENT
Start: 2022-10-27 | End: 2022-12-06 | Stop reason: SDUPTHER

## 2022-10-27 RX ORDER — PREDNISONE 20 MG/1
TABLET ORAL
Qty: 11 TABLET | Refills: 0 | Status: SHIPPED | OUTPATIENT
Start: 2022-10-27 | End: 2022-12-06

## 2022-10-27 RX ORDER — ALBUTEROL SULFATE 90 UG/1
2 AEROSOL, METERED RESPIRATORY (INHALATION) EVERY 4 HOURS PRN
Qty: 18 G | Refills: 5 | Status: SHIPPED | OUTPATIENT
Start: 2022-10-27 | End: 2023-02-08

## 2022-10-27 RX ORDER — QUETIAPINE FUMARATE 50 MG/1
50 TABLET, FILM COATED ORAL NIGHTLY
Qty: 60 TABLET | Refills: 1 | Status: SHIPPED | OUTPATIENT
Start: 2022-10-27 | End: 2022-12-07 | Stop reason: SDUPTHER

## 2022-10-27 NOTE — ASSESSMENT & PLAN NOTE
Patient's blood pressure has been elevated and his last couple of visits to the emergency room.  Patient is on lisinopril 20 mg daily.  Blood pressure is 142/70 today in the office.  Plan: We will check labs and increase patient's lisinopril to 30 mg daily.

## 2022-10-27 NOTE — PROGRESS NOTES
Chief Complaint  Establish Care (Pt states that he is being seen to establish care with Jailyn. ) and Medication Problem (Pt is wanting to see if he can be on another inhaler for his breathing. )    History of Present Illness  SUBJECTIVE  Asad Meyer presents to [facility] to establish care.    Medical problems include: HTN, Asthma.    Pt states that he just moved here a couple months ago.     Pt complains arthralgias.   Pt would like to see psych. Pt has lost his sister and one of his sons. Pt states he used to psychiatrist and would like to follow back up with one. Patient states he has a hard time sleeping at night, he states he is dreaming quite a bit. Pt states he used to be on seroquel. He has been on xanax for several years. He has been out of it.     He has been on several antidepressants and trazodone.      Patient was recently seen in the emergency department and given lisinopril for hypertension.     He has had a couple of asthma exacerbations as well. Recently moved from [place]. He is currently on albuterol for that.  Pt has been on symbicort and advair.      Colonoscopy-needs  COVID-19 vaccine-declines  Pneumonia vaccine-declines  Flu vaccine-counseled  Tobacco use-socially  Alcohol use-occasional    Pt is a brick/.    Denies cp, palpitations, nausea, vomiting, diarrhea. Admits to soa, wheezing    Past Medical History:   Diagnosis Date   • Asthma       History reviewed. No pertinent family history.   History reviewed. No pertinent surgical history.     Current Outpatient Medications:   •  albuterol (PROVENTIL) (2.5 MG/3ML) 0.083% nebulizer solution, Take 2.5 mg by nebulization Every 4 (Four) Hours As Needed for Wheezing for up to 30 days., Disp: 60 each, Rfl: 0  •  lisinopril (PRINIVIL,ZESTRIL) 30 MG tablet, Take 1 tablet by mouth Daily for 90 days., Disp: 90 tablet, Rfl: 1  •  albuterol sulfate  (90 Base) MCG/ACT inhaler, Inhale 2 puffs Every 4  "(Four) Hours As Needed for Wheezing or Shortness of Air., Disp: 18 g, Rfl: 5  •  cetirizine (zyrTEC) 10 MG tablet, Take 1 tablet by mouth Daily., Disp: 90 tablet, Rfl: 1  •  Fluticasone-Umeclidin-Vilant (Trelegy Ellipta) 200-62.5-25 MCG/ACT aerosol powder , Inhale 200 mcg Daily., Disp: 28 each, Rfl: 0  •  montelukast (Singulair) 10 MG tablet, Take 1 tablet by mouth Every Night., Disp: 90 tablet, Rfl: 1  •  predniSONE (DELTASONE) 20 MG tablet, Take 2 tablets daily for 3 days, then take 1 tablet daily for 3 days, then take 1/2 tablet daily for 4 days, Disp: 11 tablet, Rfl: 0  •  QUEtiapine (SEROquel) 50 MG tablet, Take 1 tablet by mouth Every Night., Disp: 60 tablet, Rfl: 1    OBJECTIVE  Vital Signs:   /70 (BP Location: Left arm)   Pulse 112   Temp 97.9 °F (36.6 °C) (Temporal)   Resp 18   Ht 167.6 cm (65.98\")   Wt 105 kg (232 lb 3.2 oz)   SpO2 95%   BMI 37.50 kg/m²    Estimated body mass index is 37.5 kg/m² as calculated from the following:    Height as of this encounter: 167.6 cm (65.98\").    Weight as of this encounter: 105 kg (232 lb 3.2 oz).     Wt Readings from Last 3 Encounters:   10/27/22 105 kg (232 lb 3.2 oz)   10/03/22 103 kg (227 lb 1.2 oz)   08/09/22 96 kg (211 lb 10.3 oz)     BP Readings from Last 3 Encounters:   10/27/22 142/70   10/03/22 (!) 190/126   08/09/22 179/90       Physical Exam  Vitals and nursing note reviewed.   Constitutional:       Appearance: Normal appearance.   HENT:      Head: Normocephalic.      Right Ear: A middle ear effusion is present.      Left Ear: A middle ear effusion is present.   Eyes:      Extraocular Movements: Extraocular movements intact.      Conjunctiva/sclera: Conjunctivae normal.   Cardiovascular:      Rate and Rhythm: Normal rate and regular rhythm.      Heart sounds: Normal heart sounds. No murmur heard.  Pulmonary:      Effort: Pulmonary effort is normal.      Breath sounds: Wheezing and rhonchi present. No rales.   Abdominal:      General: Bowel " sounds are normal. There is no distension.      Palpations: Abdomen is soft. There is no mass.      Tenderness: There is no abdominal tenderness. There is no right CVA tenderness, left CVA tenderness, guarding or rebound.      Hernia: No hernia is present.   Musculoskeletal:         General: No swelling. Normal range of motion.      Cervical back: Normal range of motion and neck supple.   Skin:     General: Skin is warm and dry.   Neurological:      General: No focal deficit present.      Mental Status: He is alert and oriented to person, place, and time. Mental status is at baseline.   Psychiatric:         Mood and Affect: Mood is anxious.         Behavior: Behavior normal.         Thought Content: Thought content normal.         Judgment: Judgment normal.          Result Review      XR Chest 1 View    Result Date: 10/3/2022   No active disease is seen.       MIKAL HOPE MD       Electronically Signed and Approved By: MIKAL HOPE MD on 10/03/2022 at 12:47             XR Chest 1 View    Result Date: 8/9/2022    No radiographic findings of acute cardiopulmonary abnormality.       ROBBY OSPINA MD       Electronically Signed and Approved By: ROBBY OSPINA MD on 8/09/2022 at 11:03                The above data has been reviewed by PONCE Valadez 10/27/2022 12:40 EDT.          Patient Care Team:  Jailyn Shepard APRN as PCP - General (Internal Medicine)        ASSESSMENT & PLAN    Diagnoses and all orders for this visit:    1. Encounter to establish care (Primary)    2. Moderate persistent asthma with acute exacerbation  Assessment & Plan:  Asthma is worsening.  The patient is experiencing frequent daytime asthma symptoms. He is experiencing frequent nighttime asthma symptoms.  Patient does have a history of asthma.  Patient does admit to some shortness of breath and wheezing.  Patient states since he has moved to [place] his asthma has worsened quite a bit.  Patient is on albuterol inhaler.  Patient states that  he has been on Symbicort and Advair in the past did not work well for him.  He has been in the emergency department twice in the last couple of months for asthma exacerbations.  Lungs, wheezes and rhonchi.  Will get patient started on Trelegy.  Also continue albuterol as needed.  Patient will start on Zyrtec and Dulera as well.  Prednisone taper also ordered.        3. Essential (primary) hypertension  Assessment & Plan:  Patient's blood pressure has been elevated and his last couple of visits to the emergency room.  Patient is on lisinopril 20 mg daily.  Blood pressure is 142/70 today in the office.  Plan: We will check labs and increase patient's lisinopril to 30 mg daily.      4. GREG (generalized anxiety disorder)  Assessment & Plan:  Patient admits to anxiety and insomnia.  Patient states that he did lose his sister and his oldest son and still struggles with that at times.  Patient reports that he has been seen by psych when he lived in [place].  Patient reports that he has been on multiple antipsychotics, antidepressants, and anxiolytics and they did not help seem to help him sleep.  Patient states that he was given Xanax to take 3 times a day as needed by his previous psychiatrist.  Patient states he has been out of it for a while.  He denies any depression or suicidal thoughts.  Patient is pretty jittery today in the office.  Patient states that he would just take the Xanax and Seroquel to help him sleep.  Plan: We will get patient set back up with psychiatry.  I will go ahead and refill the Seroquel to see if we can help patient's sleep. CARLOS reviewed; however, he has not lived in [place] long.       10/28/2022-After attempting to reach patients previous pharmacies, [facility] and [facility], there were no prescriptions for xanax filled for the patient. This was discussed with the patient. Also, we are awaiting medical records from previous pcp. UDS was completely negative for benzos. Pt denies suicidal ideations. At  this point, pt to follow up with psych for medications.        5. Psychophysiological insomnia  Assessment & Plan:  Patient states that he is having wild dreams and is having issues with sleeping.  Patient states that he used to be on medications for sleep but has been out since he moved to [place].  Patient has been here for couple of months.  Patient states that he has been on several different medications and the best thing that is worked for him would be Xanax and Seroquel.  Patient has been out of it and has had a lot of issues with sleep.  Plan: refer to plan under anxiety.       Orders:  -     Ambulatory Referral to Psychiatry    6. Polyarthralgia  Assessment & Plan:  Patient complains of multiple joint pain.  Patient does have osteoarthritis of multiple joints.  Plan: We will check labs if renal function is stable we will get patient started on something like Celebrex or Mobic.    Orders:  -     Sedimentation Rate; Future  -     C-reactive protein; Future  -     Uric acid; Future    7. Seasonal allergic rhinitis due to pollen    8. Screening for prostate cancer  -     PSA SCREENING; Future    9. Routine lab draw  -     CBC & Differential; Future  -     Comprehensive Metabolic Panel; Future  -     Lipid Panel; Future  -     TSH; Future  -     Urinalysis With Microscopic - Urine, Clean Catch; Future    10. Screening for lipid disorders  -     Lipid Panel; Future    11. Need for hepatitis C screening test  -     Hepatitis C antibody; Future    12. Encounter for screening colonoscopy    13. Screening for colon cancer  -     Ambulatory Referral For Screening Colonoscopy    14. Controlled substance agreement signed  -     POC Urine Drug Screen [facility]    Other orders  -     albuterol sulfate  (90 Base) MCG/ACT inhaler; Inhale 2 puffs Every 4 (Four) Hours As Needed for Wheezing or Shortness of Air.  Dispense: 18 g; Refill: 5  -     QUEtiapine (SEROquel) 50 MG tablet; Take 1 tablet by mouth Every  Night.  Dispense: 60 tablet; Refill: 1  -     predniSONE (DELTASONE) 20 MG tablet; Take 2 tablets daily for 3 days, then take 1 tablet daily for 3 days, then take 1/2 tablet daily for 4 days  Dispense: 11 tablet; Refill: 0  -     cetirizine (zyrTEC) 10 MG tablet; Take 1 tablet by mouth Daily.  Dispense: 90 tablet; Refill: 1  -     montelukast (Singulair) 10 MG tablet; Take 1 tablet by mouth Every Night.  Dispense: 90 tablet; Refill: 1  -     lisinopril (PRINIVIL,ZESTRIL) 30 MG tablet; Take 1 tablet by mouth Daily for 90 days.  Dispense: 90 tablet; Refill: 1  -     Fluticasone-Umeclidin-Vilant (Trelegy Ellipta) 200-62.5-25 MCG/ACT aerosol powder ; Inhale 200 mcg Daily.  Dispense: 28 each; Refill: 0       Tobacco Use: High Risk   • Smoking Tobacco Use: Every Day   • Smokeless Tobacco Use: Current   • Passive Exposure: Not on file       Follow Up     Return in about 2 weeks (around 11/10/2022) for Annual physical.    Please note that portions of this note were completed with a voice recognition program.    Patient was given instructions and counseling regarding his condition or for health maintenance advice. Please see specific information pulled into the AVS if appropriate.   I have reviewed information obtained and documented by others and I have confirmed the accuracy of this documented note.    PONCE Valadez

## 2022-10-27 NOTE — ASSESSMENT & PLAN NOTE
Asthma is worsening.  The patient is experiencing frequent daytime asthma symptoms. He is experiencing frequent nighttime asthma symptoms.  Patient does have a history of asthma.  Patient does admit to some shortness of breath and wheezing.  Patient states since he has moved to [place] his asthma has worsened quite a bit.  Patient is on albuterol inhaler.  Patient states that he has been on Symbicort and Advair in the past did not work well for him.  He has been in the emergency department twice in the last couple of months for asthma exacerbations.  Lungs, wheezes and rhonchi.  Will get patient started on Trelegy.  Also continue albuterol as needed.  Patient will start on Zyrtec and Dulera as well.  Prednisone taper also ordered.

## 2022-10-27 NOTE — ASSESSMENT & PLAN NOTE
Patient states that he is having wild dreams and is having issues with sleeping.  Patient states that he used to be on medications for sleep but has been out since he moved to [place].  Patient has been here for couple of months.  Patient states that he has been on several different medications and the best thing that is worked for him would be Xanax and Seroquel.  Patient has been out of it and has had a lot of issues with sleep.  Plan: refer to plan under anxiety.

## 2022-10-27 NOTE — ASSESSMENT & PLAN NOTE
Patient complains of multiple joint pain.  Patient does have osteoarthritis of multiple joints.  Plan: We will check labs if renal function is stable we will get patient started on something like Celebrex or Mobic.

## 2022-10-27 NOTE — ASSESSMENT & PLAN NOTE
Patient admits to anxiety and insomnia.  Patient states that he did lose his sister and his oldest son and still struggles with that at times.  Patient reports that he has been seen by psych when he lived in [place].  Patient reports that he has been on multiple antipsychotics, antidepressants, and anxiolytics and they did not help seem to help him sleep.  Patient states that he was given Xanax to take 3 times a day as needed by his previous psychiatrist.  Patient states he has been out of it for a while.  He denies any depression or suicidal thoughts.  Patient is pretty jittery today in the office.  Patient states that he would just take the Xanax and Seroquel to help him sleep.  Plan: We will get patient set back up with psychiatry.  I will go ahead and refill the Seroquel to see if we can help patient's sleep. CARLOS reviewed; however, he has not lived in [place] long.       10/28/2022-After attempting to reach patients previous pharmacies, [facility] and [facility], there were no prescriptions for xanax filled for the patient. This was discussed with the patient. Also, we are awaiting medical records from previous pcp. UDS was completely negative for benzos. Pt denies suicidal ideations. At this point, pt to follow up with psych for medications.

## 2022-10-28 RX ORDER — ALPRAZOLAM 0.5 MG/1
0.5 TABLET ORAL 2 TIMES DAILY PRN
Qty: 30 TABLET | Refills: 0 | Status: CANCELLED | OUTPATIENT
Start: 2022-10-28

## 2022-12-06 ENCOUNTER — OFFICE VISIT (OUTPATIENT)
Dept: INTERNAL MEDICINE | Facility: CLINIC | Age: 57
End: 2022-12-06

## 2022-12-06 VITALS
BODY MASS INDEX: 36.67 KG/M2 | OXYGEN SATURATION: 96 % | DIASTOLIC BLOOD PRESSURE: 149 MMHG | WEIGHT: 228.2 LBS | SYSTOLIC BLOOD PRESSURE: 207 MMHG | RESPIRATION RATE: 18 BRPM | HEART RATE: 95 BPM | HEIGHT: 66 IN | TEMPERATURE: 99.6 F

## 2022-12-06 DIAGNOSIS — J44.9 CHRONIC OBSTRUCTIVE PULMONARY DISEASE, UNSPECIFIED COPD TYPE: ICD-10-CM

## 2022-12-06 DIAGNOSIS — Z13.220 SCREENING FOR LIPID DISORDERS: ICD-10-CM

## 2022-12-06 DIAGNOSIS — Z01.89 ROUTINE LAB DRAW: ICD-10-CM

## 2022-12-06 DIAGNOSIS — G89.29 OTHER CHRONIC PAIN: ICD-10-CM

## 2022-12-06 DIAGNOSIS — M25.50 POLYARTHRALGIA: ICD-10-CM

## 2022-12-06 DIAGNOSIS — Z72.0 TOBACCO USE: ICD-10-CM

## 2022-12-06 DIAGNOSIS — Z12.5 SCREENING FOR PROSTATE CANCER: ICD-10-CM

## 2022-12-06 DIAGNOSIS — J45.41 MODERATE PERSISTENT ASTHMA WITH ACUTE EXACERBATION: ICD-10-CM

## 2022-12-06 DIAGNOSIS — I10 ESSENTIAL (PRIMARY) HYPERTENSION: ICD-10-CM

## 2022-12-06 DIAGNOSIS — F41.1 GAD (GENERALIZED ANXIETY DISORDER): ICD-10-CM

## 2022-12-06 DIAGNOSIS — F51.04 PSYCHOPHYSIOLOGICAL INSOMNIA: Primary | ICD-10-CM

## 2022-12-06 DIAGNOSIS — Z11.59 NEED FOR HEPATITIS C SCREENING TEST: ICD-10-CM

## 2022-12-06 LAB
ALBUMIN SERPL-MCNC: 4.4 G/DL (ref 3.5–5.2)
ALBUMIN/GLOB SERPL: 1.5 G/DL
ALP SERPL-CCNC: 133 U/L (ref 39–117)
ALT SERPL W P-5'-P-CCNC: 34 U/L (ref 1–41)
ANION GAP SERPL CALCULATED.3IONS-SCNC: 12.7 MMOL/L (ref 5–15)
AST SERPL-CCNC: 30 U/L (ref 1–40)
BASOPHILS # BLD AUTO: 0.1 10*3/MM3 (ref 0–0.2)
BASOPHILS NFR BLD AUTO: 0.9 % (ref 0–1.5)
BILIRUB SERPL-MCNC: 0.2 MG/DL (ref 0–1.2)
BUN SERPL-MCNC: 20 MG/DL (ref 6–20)
BUN/CREAT SERPL: 16.4 (ref 7–25)
CALCIUM SPEC-SCNC: 9.8 MG/DL (ref 8.6–10.5)
CHLORIDE SERPL-SCNC: 100 MMOL/L (ref 98–107)
CHOLEST SERPL-MCNC: 243 MG/DL (ref 0–200)
CO2 SERPL-SCNC: 26.3 MMOL/L (ref 22–29)
CREAT SERPL-MCNC: 1.22 MG/DL (ref 0.76–1.27)
CRP SERPL-MCNC: 0.43 MG/DL (ref 0–0.5)
DEPRECATED RDW RBC AUTO: 41.3 FL (ref 37–54)
EGFRCR SERPLBLD CKD-EPI 2021: 70 ML/MIN/1.73
EOSINOPHIL # BLD AUTO: 0.47 10*3/MM3 (ref 0–0.4)
EOSINOPHIL NFR BLD AUTO: 4.4 % (ref 0.3–6.2)
ERYTHROCYTE [DISTWIDTH] IN BLOOD BY AUTOMATED COUNT: 13 % (ref 12.3–15.4)
ERYTHROCYTE [SEDIMENTATION RATE] IN BLOOD: 24 MM/HR (ref 0–20)
GLOBULIN UR ELPH-MCNC: 2.9 GM/DL
GLUCOSE SERPL-MCNC: 85 MG/DL (ref 65–99)
HCT VFR BLD AUTO: 46.2 % (ref 37.5–51)
HCV AB SER DONR QL: NORMAL
HDLC SERPL-MCNC: 44 MG/DL (ref 40–60)
HGB BLD-MCNC: 16.2 G/DL (ref 13–17.7)
IMM GRANULOCYTES # BLD AUTO: 0.08 10*3/MM3 (ref 0–0.05)
IMM GRANULOCYTES NFR BLD AUTO: 0.8 % (ref 0–0.5)
LDLC SERPL CALC-MCNC: 150 MG/DL (ref 0–100)
LDLC/HDLC SERPL: 3.3 {RATIO}
LYMPHOCYTES # BLD AUTO: 2.77 10*3/MM3 (ref 0.7–3.1)
LYMPHOCYTES NFR BLD AUTO: 26.1 % (ref 19.6–45.3)
MCH RBC QN AUTO: 30.7 PG (ref 26.6–33)
MCHC RBC AUTO-ENTMCNC: 35.1 G/DL (ref 31.5–35.7)
MCV RBC AUTO: 87.7 FL (ref 79–97)
MONOCYTES # BLD AUTO: 0.9 10*3/MM3 (ref 0.1–0.9)
MONOCYTES NFR BLD AUTO: 8.5 % (ref 5–12)
NEUTROPHILS NFR BLD AUTO: 59.3 % (ref 42.7–76)
NEUTROPHILS NFR BLD AUTO: 6.3 10*3/MM3 (ref 1.7–7)
NRBC BLD AUTO-RTO: 0 /100 WBC (ref 0–0.2)
PLATELET # BLD AUTO: 260 10*3/MM3 (ref 140–450)
PMV BLD AUTO: 11.8 FL (ref 6–12)
POTASSIUM SERPL-SCNC: 4.3 MMOL/L (ref 3.5–5.2)
PROT SERPL-MCNC: 7.3 G/DL (ref 6–8.5)
PSA SERPL-MCNC: 0.62 NG/ML (ref 0–4)
RBC # BLD AUTO: 5.27 10*6/MM3 (ref 4.14–5.8)
SODIUM SERPL-SCNC: 139 MMOL/L (ref 136–145)
TRIGL SERPL-MCNC: 268 MG/DL (ref 0–150)
TSH SERPL DL<=0.05 MIU/L-ACNC: 1.71 UIU/ML (ref 0.27–4.2)
URATE SERPL-MCNC: 5 MG/DL (ref 3.4–7)
VLDLC SERPL-MCNC: 49 MG/DL (ref 5–40)
WBC NRBC COR # BLD: 10.62 10*3/MM3 (ref 3.4–10.8)

## 2022-12-06 PROCEDURE — 80053 COMPREHEN METABOLIC PANEL: CPT

## 2022-12-06 PROCEDURE — 80061 LIPID PANEL: CPT

## 2022-12-06 PROCEDURE — 85652 RBC SED RATE AUTOMATED: CPT

## 2022-12-06 PROCEDURE — 99214 OFFICE O/P EST MOD 30 MIN: CPT

## 2022-12-06 PROCEDURE — 86140 C-REACTIVE PROTEIN: CPT

## 2022-12-06 PROCEDURE — 84443 ASSAY THYROID STIM HORMONE: CPT

## 2022-12-06 PROCEDURE — 85025 COMPLETE CBC W/AUTO DIFF WBC: CPT

## 2022-12-06 PROCEDURE — 86803 HEPATITIS C AB TEST: CPT

## 2022-12-06 PROCEDURE — G0103 PSA SCREENING: HCPCS

## 2022-12-06 PROCEDURE — 84550 ASSAY OF BLOOD/URIC ACID: CPT

## 2022-12-06 RX ORDER — LISINOPRIL 40 MG/1
40 TABLET ORAL DAILY
Qty: 90 TABLET | Refills: 0 | Status: SHIPPED | OUTPATIENT
Start: 2022-12-06 | End: 2023-01-26

## 2022-12-06 RX ORDER — ALBUTEROL SULFATE 0.63 MG/3ML
3 SOLUTION RESPIRATORY (INHALATION) EVERY 6 HOURS PRN
COMMUNITY
Start: 2022-10-29 | End: 2023-03-28 | Stop reason: SDUPTHER

## 2022-12-06 RX ORDER — FLUTICASONE FUROATE, UMECLIDINIUM BROMIDE AND VILANTEROL TRIFENATATE 200; 62.5; 25 UG/1; UG/1; UG/1
200 POWDER RESPIRATORY (INHALATION) DAILY
Qty: 1 EACH | Refills: 5 | Status: SHIPPED | OUTPATIENT
Start: 2022-12-06 | End: 2022-12-28

## 2022-12-06 RX ORDER — AMLODIPINE BESYLATE 5 MG/1
5 TABLET ORAL DAILY
Qty: 30 TABLET | Refills: 1 | Status: SHIPPED | OUTPATIENT
Start: 2022-12-06 | End: 2023-01-05

## 2022-12-06 NOTE — PROGRESS NOTES
Chief Complaint  Follow-up (Pt states that he is being seen for a follow up and states overall he is doing okay. He is wanting a sample again of Trelegy. )    History of Present Illness  SUBJECTIVE  Asad Meyer presents to [facility] follow up.   Patient did not get labs completed yet.  Pt did really well on the trelegy.     Pt would like to schedule with Page Jesus for psych.  She denies any chest pain, nausea, vomiting, diarrhea, issues with bowel or bladder function.  Declines flu vaccine    Past Medical History:   Diagnosis Date   • Asthma       History reviewed. No pertinent family history.   History reviewed. No pertinent surgical history.     Current Outpatient Medications:   •  albuterol (ACCUNEB) 0.63 MG/3ML nebulizer solution, Take 3 mL by nebulization Every 6 (Six) Hours As Needed., Disp: , Rfl:   •  albuterol sulfate  (90 Base) MCG/ACT inhaler, Inhale 2 puffs Every 4 (Four) Hours As Needed for Wheezing or Shortness of Air., Disp: 18 g, Rfl: 5  •  cetirizine (zyrTEC) 10 MG tablet, Take 1 tablet by mouth Daily., Disp: 90 tablet, Rfl: 1  •  Fluticasone-Umeclidin-Vilant (Trelegy Ellipta) 200-62.5-25 MCG/ACT aerosol powder , Inhale 200 mcg Daily., Disp: 1 each, Rfl: 5  •  lisinopril (PRINIVIL,ZESTRIL) 40 MG tablet, Take 1 tablet by mouth Daily for 90 days., Disp: 90 tablet, Rfl: 0  •  montelukast (Singulair) 10 MG tablet, Take 1 tablet by mouth Every Night., Disp: 90 tablet, Rfl: 1  •  amLODIPine (NORVASC) 5 MG tablet, Take 1 tablet by mouth Daily., Disp: 30 tablet, Rfl: 1  •  atorvastatin (Lipitor) 10 MG tablet, Take 1 tablet by mouth Daily., Disp: 90 tablet, Rfl: 0  •  meloxicam (Mobic) 7.5 MG tablet, Take 1 tablet by mouth Daily., Disp: 30 tablet, Rfl: 0  •  QUEtiapine (SEROquel) 50 MG tablet, Take 1 tablet by mouth Every Night., Disp: 60 tablet, Rfl: 1    OBJECTIVE  Vital Signs:   BP (!) 207/149 (BP Location: Right arm)   Pulse 95   Temp 99.6 °F (37.6 °C)  "(Temporal)   Resp 18   Ht 167.6 cm (65.98\")   Wt 104 kg (228 lb 3.2 oz)   SpO2 96%   BMI 36.85 kg/m²    Estimated body mass index is 36.85 kg/m² as calculated from the following:    Height as of this encounter: 167.6 cm (65.98\").    Weight as of this encounter: 104 kg (228 lb 3.2 oz).     Wt Readings from Last 3 Encounters:   12/06/22 104 kg (228 lb 3.2 oz)   10/27/22 105 kg (232 lb 3.2 oz)   10/03/22 103 kg (227 lb 1.2 oz)     BP Readings from Last 3 Encounters:   12/06/22 (!) 207/149   10/27/22 142/70   10/03/22 (!) 190/126       Physical Exam  Constitutional:       Appearance: Normal appearance.   Eyes:      Extraocular Movements: Extraocular movements intact.      Conjunctiva/sclera: Conjunctivae normal.   Cardiovascular:      Rate and Rhythm: Normal rate.      Pulses: Normal pulses.      Heart sounds: Normal heart sounds.   Pulmonary:      Effort: Pulmonary effort is normal.      Breath sounds: Wheezing present.   Abdominal:      General: Bowel sounds are normal.      Palpations: Abdomen is soft.   Musculoskeletal:         General: Normal range of motion.      Cervical back: Normal range of motion and neck supple.   Skin:     General: Skin is warm and dry.   Neurological:      Mental Status: He is alert and oriented to person, place, and time.   Psychiatric:         Mood and Affect: Mood is anxious.         Behavior: Behavior normal.         Thought Content: Thought content normal.         Judgment: Judgment normal.          Result Review        XR Chest 1 View    Result Date: 10/3/2022   No active disease is seen.       MIKAL HOPE MD       Electronically Signed and Approved By: MIKAL HOPE MD on 10/03/2022 at 12:47             XR Chest 1 View    Result Date: 8/9/2022    No radiographic findings of acute cardiopulmonary abnormality.       ROBBY OSPINA MD       Electronically Signed and Approved By: ROBBY OSPINA MD on 8/09/2022 at 11:03                The above data has been reviewed by Jailyn " PONCE Shepard 12/06/2022 14:00 EST.          Patient Care Team:  Jailyn Shepard APRN as PCP - General (Internal Medicine)       ASSESSMENT & PLAN    Diagnoses and all orders for this visit:    1. Psychophysiological insomnia (Primary)  -     Ambulatory Referral to Psychiatry    2. Essential (primary) hypertension  Assessment & Plan:  Patient states that he is taking his lisinopril.  His blood pressure is uncontrolled.  Blood pressure when checked manually by myself was 170/100.  Patient does admit to smoking.  Plan: Increase lisinopril to milligrams daily and added amlodipine 5 mg daily.  Risk benefits discussed with patient.  Patient instructed to keep eat a low-sodium diet, monitor his blood pressures at home and follow-up with me in 2 weeks.  Discussed with patient the importance of getting blood pressure under control and the risks associated with not controlled blood pressure.  Patient acknowledges understanding.  Patient is also very anxious.  If patient notices any higher pressure readings at home he is to go to the emergency room.    Orders:  -     lisinopril (PRINIVIL,ZESTRIL) 40 MG tablet; Take 1 tablet by mouth Daily for 90 days.  Dispense: 90 tablet; Refill: 0    3. GREG (generalized anxiety disorder)  Assessment & Plan:  12/6/2022-patient is still pretty anxious.  He is currently on Seroquel.  Patient missed psychiatry appointment.  He would like to go back to see Page Lee and possibly get on something for his anxiety.  He was on Xanax before.  I was unable to corroborate this after calling pharmacies, reviewed and [facility] reports.  I will let him discuss this with psychiatry.  Patient denies any SI at this time.  He states he has been on several medications in the past.  We will hold off on adding any new medications at this time until patient sees psych.      10/30/2022-Patient admits to anxiety and insomnia.  Patient states that he did lose his sister and his oldest son and still struggles with  that at times.  Patient reports that he has been seen by psych when he lived in [place].  Patient reports that he has been on multiple antipsychotics, antidepressants, and anxiolytics and they did not help seem to help him sleep.  Patient states that he was given Xanax to take 3 times a day as needed by his previous psychiatrist.  Patient states he has been out of it for a while.  He denies any depression or suicidal thoughts.  Patient is pretty jittery today in the office.  Patient states that he would just take the Xanax and Seroquel to help him sleep.  Plan: We will get patient set back up with psychiatry.  I will go ahead and refill the Seroquel to see if we can help patient's sleep. CARLOS reviewed; however, he has not lived in [place] long.   10/28/2022-After attempting to reach patients previous pharmacies, [facility], there were no prescriptions for xanax filled for the patient. This was discussed with the patient. Also, we are awaiting medical records from previous pcp. UDS was completely negative for benzos. Pt denies suicidal ideations. At this point, pt to follow up with psych for medications.       Orders:  -     Ambulatory Referral to Psychiatry    4. Moderate persistent asthma with acute exacerbation  Assessment & Plan:  Patient states asthma was much improved with the Trelegy.  I will refill the Trelegy today.  Continue allergy regimen.  Sample of Trelegy given in the office and refills ordered.  Patient does admit to occasional wheezing.  He denies any chest pain.        5. Chronic obstructive pulmonary disease, unspecified COPD type (HCC)  -      CT Chest Low Dose Cancer Screening WO; Future  -     Ambulatory Referral to Pulmonology    6. Tobacco use  -      CT Chest Low Dose Cancer Screening WO; Future  -     Ambulatory Referral to Pulmonology    7. Polyarthralgia  -     Uric acid  -     C-reactive protein  -     Sedimentation Rate  -     meloxicam (Mobic) 7.5 MG tablet; Take 1 tablet by mouth  Daily.  Dispense: 30 tablet; Refill: 0    8. Screening for prostate cancer  -     PSA SCREENING    9. Need for hepatitis C screening test  -     Hepatitis C antibody    10. Routine lab draw  -     TSH  -     Lipid Panel  -     Comprehensive Metabolic Panel  -     CBC & Differential    11. Screening for lipid disorders  -     Lipid Panel    Other orders  -     Fluticasone-Umeclidin-Vilant (Trelegy Ellipta) 200-62.5-25 MCG/ACT aerosol powder ; Inhale 200 mcg Daily.  Dispense: 1 each; Refill: 5  -     amLODIPine (NORVASC) 5 MG tablet; Take 1 tablet by mouth Daily.  Dispense: 30 tablet; Refill: 1  -     atorvastatin (Lipitor) 10 MG tablet; Take 1 tablet by mouth Daily.  Dispense: 90 tablet; Refill: 0       Tobacco Use: High Risk   • Smoking Tobacco Use: Every Day   • Smokeless Tobacco Use: Current   • Passive Exposure: Not on file       Follow Up     Return in about 2 weeks (around 12/20/2022) for Recheck blood pressure.    Please note that portions of this note were completed with a voice recognition program.    Patient was given instructions and counseling regarding his condition or for health maintenance advice. Please see specific information pulled into the AVS if appropriate.   I have reviewed information obtained and documented by others and I have confirmed the accuracy of this documented note.    PONCE Valadez

## 2022-12-07 ENCOUNTER — TELEPHONE (OUTPATIENT)
Dept: INTERNAL MEDICINE | Facility: CLINIC | Age: 57
End: 2022-12-07

## 2022-12-07 RX ORDER — ATORVASTATIN CALCIUM 10 MG/1
10 TABLET, FILM COATED ORAL DAILY
Qty: 90 TABLET | Refills: 0 | Status: SHIPPED | OUTPATIENT
Start: 2022-12-07 | End: 2023-03-06

## 2022-12-07 RX ORDER — QUETIAPINE FUMARATE 50 MG/1
50 TABLET, FILM COATED ORAL NIGHTLY
Qty: 60 TABLET | Refills: 1 | Status: SHIPPED | OUTPATIENT
Start: 2022-12-07 | End: 2023-01-13

## 2022-12-07 RX ORDER — FLUTICASONE FUROATE, UMECLIDINIUM BROMIDE AND VILANTEROL TRIFENATATE 200; 62.5; 25 UG/1; UG/1; UG/1
200 POWDER RESPIRATORY (INHALATION) DAILY
Qty: 1 EACH | Refills: 5 | OUTPATIENT
Start: 2022-12-07

## 2022-12-07 NOTE — TELEPHONE ENCOUNTER
Caller: Asad Meyer    Relationship: Self    Best call back number: [phone]    What is the medical concern/diagnosis: PTSD, MAJOR DEPRESSION    What specialty or service is being requested: REFERRAL TO PSYCHIATRY    Any additional details: PATIENT IS REQUESTING A REFERRAL TO PSYCHIATRY. RONNA SOLIS IS HIS REQUESTED PROVIDER. PLEASE CALL TO ADVISE.

## 2022-12-07 NOTE — TELEPHONE ENCOUNTER
Caller: Asad Meyer    Relationship: Self    Best call back number: [phone]    Requested Prescriptions:   Requested Prescriptions     Pending Prescriptions Disp Refills   • Fluticasone-Umeclidin-Vilant (Trelegy Ellipta) 200-62.5-25 MCG/ACT aerosol powder  1 each 5     Sig: Inhale 200 mcg Daily.   • QUEtiapine (SEROquel) 50 MG tablet 60 tablet 1     Sig: Take 1 tablet by mouth Every Night.        Pharmacy where request should be sent: [id number] - [place], [place] - [address] AT [facility] [address] [address] /[facility] & [place] - [phone] [facility] [phone] FX     Additional details provided by patient: PATIENT STATED HE WAS ADVISED BY PHARMACY THE QUETIAPINE NEEDS TO BE AUTHORIZED BY PRESCRIBER    Does the patient have less than a 3 day supply:  [x] Yes  [] No    Would you like a call back once the refill request has been completed: [x] Yes [] No    If the office needs to give you a call back, can they leave a voicemail: [x] Yes [] No    Frieda Pearce Rep   12/07/22 11:46 EST

## 2022-12-08 RX ORDER — MELOXICAM 7.5 MG/1
7.5 TABLET ORAL DAILY
Qty: 30 TABLET | Refills: 0 | Status: SHIPPED | OUTPATIENT
Start: 2022-12-08 | End: 2023-01-04

## 2022-12-08 NOTE — ASSESSMENT & PLAN NOTE
12/6/2022-patient is still pretty anxious.  He is currently on Seroquel.  Patient missed psychiatry appointment.  He would like to go back to see Page Jesus and possibly get on something for his anxiety.  He was on Xanax before.  I was unable to corroborate this after calling pharmacies, reviewed and [facility] reports.  I will let him discuss this with psychiatry.  Patient denies any SI at this time.  He states he has been on several medications in the past.  We will hold off on adding any new medications at this time until patient sees psych.      10/30/2022-Patient admits to anxiety and insomnia.  Patient states that he did lose his sister and his oldest son and still struggles with that at times.  Patient reports that he has been seen by psych when he lived in [place].  Patient reports that he has been on multiple antipsychotics, antidepressants, and anxiolytics and they did not help seem to help him sleep.  Patient states that he was given Xanax to take 3 times a day as needed by his previous psychiatrist.  Patient states he has been out of it for a while.  He denies any depression or suicidal thoughts.  Patient is pretty jittery today in the office.  Patient states that he would just take the Xanax and Seroquel to help him sleep.  Plan: We will get patient set back up with psychiatry.  I will go ahead and refill the Seroquel to see if we can help patient's sleep. PETERSON reviewed; however, he has not lived in [place] long.   10/28/2022-After attempting to reach patients previous pharmacies, [facility] and [facility], there were no prescriptions for xanax filled for the patient. This was discussed with the patient. Also, we are awaiting medical records from previous pcp. UDS was completely negative for benzos. Pt denies suicidal ideations. At this point, pt to follow up with psych for medications.

## 2022-12-08 NOTE — ASSESSMENT & PLAN NOTE
Patient states that he is taking his lisinopril.  His blood pressure is uncontrolled.  Blood pressure when checked manually by myself was 170/100.  Patient does admit to smoking.  Plan: Increase lisinopril to milligrams daily and added amlodipine 5 mg daily.  Risk benefits discussed with patient.  Patient instructed to keep eat a low-sodium diet, monitor his blood pressures at home and follow-up with me in 2 weeks.  Discussed with patient the importance of getting blood pressure under control and the risks associated with not controlled blood pressure.  Patient acknowledges understanding.  Patient is also very anxious.  If patient notices any higher pressure readings at home he is to go to the emergency room.

## 2022-12-08 NOTE — ASSESSMENT & PLAN NOTE
Patient states asthma was much improved with the Trelegy.  I will refill the Trelegy today.  Continue allergy regimen.  Sample of Trelegy given in the office and refills ordered.  Patient does admit to occasional wheezing.  He denies any chest pain.

## 2022-12-28 ENCOUNTER — OFFICE VISIT (OUTPATIENT)
Dept: PULMONOLOGY | Facility: CLINIC | Age: 57
End: 2022-12-28

## 2022-12-28 VITALS
TEMPERATURE: 100.2 F | OXYGEN SATURATION: 93 % | SYSTOLIC BLOOD PRESSURE: 188 MMHG | RESPIRATION RATE: 18 BRPM | DIASTOLIC BLOOD PRESSURE: 119 MMHG | HEART RATE: 136 BPM | BODY MASS INDEX: 36.16 KG/M2 | WEIGHT: 225 LBS | HEIGHT: 66 IN

## 2022-12-28 DIAGNOSIS — J45.40 MODERATE PERSISTENT ASTHMA WITHOUT COMPLICATION: ICD-10-CM

## 2022-12-28 DIAGNOSIS — Z72.0 TOBACCO ABUSE: ICD-10-CM

## 2022-12-28 DIAGNOSIS — J44.1 COPD WITH ACUTE EXACERBATION: Primary | ICD-10-CM

## 2022-12-28 PROCEDURE — 99203 OFFICE O/P NEW LOW 30 MIN: CPT | Performed by: INTERNAL MEDICINE

## 2022-12-28 RX ORDER — PREDNISONE 20 MG/1
40 TABLET ORAL DAILY
Qty: 14 TABLET | Refills: 0 | Status: SHIPPED | OUTPATIENT
Start: 2022-12-28 | End: 2023-01-13

## 2022-12-28 RX ORDER — FLUTICASONE PROPIONATE AND SALMETEROL 250; 50 UG/1; UG/1
1 POWDER RESPIRATORY (INHALATION)
Qty: 60 EACH | Refills: 11 | Status: SHIPPED | OUTPATIENT
Start: 2022-12-28 | End: 2023-01-26

## 2022-12-28 RX ORDER — AMOXICILLIN 500 MG/1
500 CAPSULE ORAL 3 TIMES DAILY
Qty: 21 CAPSULE | Refills: 0 | Status: SHIPPED | OUTPATIENT
Start: 2022-12-28 | End: 2023-01-04

## 2022-12-28 NOTE — PROGRESS NOTES
Pulmonary Consultation    Jailyn Shepard APRN,    Thank you for asking me to see Asad Meyer for   Chief Complaint   Patient presents with   • COPD   .      History of Present Illness  Asad Meyer is a 57 y.o. male with a PMH significant for bronchial asthma and tobacco abuse presents for evaluation on account of breathlessness and cough with wheeze and mucopurulent sputum over the past 5 to 6 years  Patient denies any chest pain fever or hemoptysis  He presently takes albuterol inhaler along with nebulizer treatments and took some Trelegy but has ran out of it       Tobacco use history:  Type: cigarettes  Amount: 0.5 ppd  Duration: 30 years  Cessation: n   Willing to quit: Yes      Review of Systems: History obtained from chart review and the patient.  Review of Systems   Respiratory: Positive for cough, shortness of breath and wheezing.    All other systems reviewed and are negative.    As described in the HPI. Otherwise, remainder of ROS (14 systems) were negative.    Patient Active Problem List   Diagnosis   • Asthma   • Essential (primary) hypertension   • Psychophysiological insomnia   • GREG (generalized anxiety disorder)   • Polyarthralgia   • Chronic obstructive pulmonary disease (HCC)         Current Outpatient Medications:   •  albuterol (ACCUNEB) 0.63 MG/3ML nebulizer solution, Take 3 mL by nebulization Every 6 (Six) Hours As Needed., Disp: , Rfl:   •  albuterol sulfate  (90 Base) MCG/ACT inhaler, Inhale 2 puffs Every 4 (Four) Hours As Needed for Wheezing or Shortness of Air., Disp: 18 g, Rfl: 5  •  amLODIPine (NORVASC) 5 MG tablet, Take 1 tablet by mouth Daily., Disp: 30 tablet, Rfl: 1  •  atorvastatin (Lipitor) 10 MG tablet, Take 1 tablet by mouth Daily., Disp: 90 tablet, Rfl: 0  •  cetirizine (zyrTEC) 10 MG tablet, Take 1 tablet by mouth Daily., Disp: 90 tablet, Rfl: 1  •  lisinopril (PRINIVIL,ZESTRIL) 40 MG tablet, Take 1 tablet by mouth Daily for 90 days., Disp: 90 tablet, Rfl: 0  •   "meloxicam (Mobic) 7.5 MG tablet, Take 1 tablet by mouth Daily., Disp: 30 tablet, Rfl: 0  •  montelukast (Singulair) 10 MG tablet, Take 1 tablet by mouth Every Night., Disp: 90 tablet, Rfl: 1  •  QUEtiapine (SEROquel) 50 MG tablet, Take 1 tablet by mouth Every Night., Disp: 60 tablet, Rfl: 1  •  amoxicillin (AMOXIL) 500 MG capsule, Take 1 capsule by mouth 3 (Three) Times a Day for 7 days., Disp: 21 capsule, Rfl: 0  •  Fluticasone-Salmeterol (ADVAIR/WIXELA) 250-50 MCG/ACT DISKUS, Inhale 1 puff 2 (Two) Times a Day., Disp: 60 each, Rfl: 11  •  predniSONE (DELTASONE) 20 MG tablet, Take 2 tablets by mouth Daily., Disp: 14 tablet, Rfl: 0  •  tiotropium (SPIRIVA) 18 MCG per inhalation capsule, Place 1 capsule into inhaler and inhale Daily., Disp: 90 capsule, Rfl: 3    No Known Allergies    Past Medical History:   Diagnosis Date   • Asthma      History reviewed. No pertinent surgical history.  Social History     Socioeconomic History   • Marital status: Single   Tobacco Use   • Smoking status: Every Day     Packs/day: 0.25     Types: Cigarettes   • Smokeless tobacco: Current     Types: Snuff   Vaping Use   • Vaping Use: Never used   Substance and Sexual Activity   • Alcohol use: Yes     Comment: socially     History reviewed. No pertinent family history.    XR Chest 1 View    Result Date: 10/3/2022   No active disease is seen.       MIKAL HOPE MD       Electronically Signed and Approved By: MIKAL HOPE MD on 10/03/2022 at 12:47                   Objective     Blood pressure (!) 188/119, pulse (!) 136, temperature 100.2 °F (37.9 °C), temperature source Temporal, resp. rate 18, height 167.6 cm (66\"), weight 102 kg (225 lb), SpO2 93 %.  Physical Exam  Vitals and nursing note reviewed.   Constitutional:       Appearance: He is obese. He is ill-appearing.   HENT:      Head: Normocephalic and atraumatic.      Nose: Nose normal.      Mouth/Throat:      Mouth: Mucous membranes are moist.      Pharynx: Oropharynx is clear. "   Eyes:      Conjunctiva/sclera: Conjunctivae normal.      Pupils: Pupils are equal, round, and reactive to light.   Cardiovascular:      Rate and Rhythm: Normal rate and regular rhythm.      Pulses: Normal pulses.      Heart sounds: Normal heart sounds.   Pulmonary:      Effort: Pulmonary effort is normal.      Breath sounds: Wheezing and rhonchi present.   Abdominal:      General: Abdomen is flat. Bowel sounds are normal.      Palpations: Abdomen is soft.   Musculoskeletal:         General: Normal range of motion.      Cervical back: Normal range of motion and neck supple.   Skin:     General: Skin is warm.      Capillary Refill: Capillary refill takes less than 2 seconds.   Neurological:      General: No focal deficit present.      Mental Status: He is alert and oriented to person, place, and time.   Psychiatric:         Mood and Affect: Mood normal.         There is no immunization history on file for this patient.         Assessment & Plan     Diagnoses and all orders for this visit:    1. COPD with acute exacerbation (HCC) (Primary)  -     Full Pulmonary Function Test With Bronchodilator & ABG; Future  -     Alpha - 1 - Antitrypsin; Future  -     IgE Level; Future    2. Moderate persistent asthma without complication  -     Full Pulmonary Function Test With Bronchodilator & ABG; Future  -     Alpha - 1 - Antitrypsin; Future  -     IgE Level; Future    3. Tobacco abuse  -     Full Pulmonary Function Test With Bronchodilator & ABG; Future  -     Alpha - 1 - Antitrypsin; Future  -     IgE Level; Future    Other orders  -     tiotropium (SPIRIVA) 18 MCG per inhalation capsule; Place 1 capsule into inhaler and inhale Daily.  Dispense: 90 capsule; Refill: 3  -     Fluticasone-Salmeterol (ADVAIR/WIXELA) 250-50 MCG/ACT DISKUS; Inhale 1 puff 2 (Two) Times a Day.  Dispense: 60 each; Refill: 11  -     amoxicillin (AMOXIL) 500 MG capsule; Take 1 capsule by mouth 3 (Three) Times a Day for 7 days.  Dispense: 21 capsule;  Refill: 0  -     predniSONE (DELTASONE) 20 MG tablet; Take 2 tablets by mouth Daily.  Dispense: 14 tablet; Refill: 0         Discussion/ Recommendations:   Patient has COPD exacerbation with bronchial asthma and tobacco abuse  Patient is encouraged to quit smoking  Patient is advised PFT and IgE level along with alpha-1 antitrypsin level  Blood gas  Chest x-ray reviewed no infiltrates noted  Will start him on Advair along with Spiriva  Albuterol inhaler as needed  Amoxil 500 mg every 8 hours for 1 week  Prednisone 20 mg daily for 1 week  Advised to reduce weight  Discussed vaccination and recommended    Class 2 Severe Obesity (BMI >=35 and <=39.9). Obesity-related health conditions include the following: none and hypertension. Obesity is unchanged. BMI is is above average; BMI management plan is completed. We discussed low calorie, low carb based diet program, portion control and increasing exercise.           Return in about 3 months (around 3/28/2023).      Thank you for allowing me to participate in the care of Asad Meyer. Please do not hesitate to contact me with any questions.         This document has been electronically signed by Michael Haile MD on December 28, 2022 10:52 EST

## 2023-01-04 DIAGNOSIS — M25.50 POLYARTHRALGIA: ICD-10-CM

## 2023-01-04 RX ORDER — MELOXICAM 7.5 MG/1
7.5 TABLET ORAL DAILY
Qty: 30 TABLET | Refills: 0 | Status: SHIPPED | OUTPATIENT
Start: 2023-01-04 | End: 2023-01-17 | Stop reason: SDUPTHER

## 2023-01-05 RX ORDER — AMLODIPINE BESYLATE 5 MG/1
5 TABLET ORAL DAILY
Qty: 30 TABLET | Refills: 1 | Status: SHIPPED | OUTPATIENT
Start: 2023-01-05 | End: 2023-01-17 | Stop reason: SDUPTHER

## 2023-01-09 ENCOUNTER — HOSPITAL ENCOUNTER (OUTPATIENT)
Dept: RESPIRATORY THERAPY | Facility: HOSPITAL | Age: 58
Discharge: HOME | End: 2023-01-09
Admitting: INTERNAL MEDICINE
Payer: MEDICAID

## 2023-01-09 DIAGNOSIS — J44.1 COPD WITH ACUTE EXACERBATION: ICD-10-CM

## 2023-01-09 DIAGNOSIS — J45.40 MODERATE PERSISTENT ASTHMA WITHOUT COMPLICATION: ICD-10-CM

## 2023-01-09 DIAGNOSIS — Z72.0 TOBACCO ABUSE: ICD-10-CM

## 2023-01-09 PROCEDURE — 94726 PLETHYSMOGRAPHY LUNG VOLUMES: CPT | Performed by: INTERNAL MEDICINE

## 2023-01-09 PROCEDURE — 94729 DIFFUSING CAPACITY: CPT | Performed by: INTERNAL MEDICINE

## 2023-01-09 PROCEDURE — 94060 EVALUATION OF WHEEZING: CPT

## 2023-01-09 PROCEDURE — 94726 PLETHYSMOGRAPHY LUNG VOLUMES: CPT

## 2023-01-09 PROCEDURE — 94729 DIFFUSING CAPACITY: CPT

## 2023-01-09 PROCEDURE — 94060 EVALUATION OF WHEEZING: CPT | Performed by: INTERNAL MEDICINE

## 2023-01-09 RX ORDER — ALBUTEROL SULFATE 2.5 MG/3ML
2.5 SOLUTION RESPIRATORY (INHALATION) ONCE
Status: COMPLETED | OUTPATIENT
Start: 2023-01-09 | End: 2023-01-09

## 2023-01-09 RX ADMIN — ALBUTEROL SULFATE 2.5 MG: 2.5 SOLUTION RESPIRATORY (INHALATION) at 16:56

## 2023-01-11 ENCOUNTER — TELEPHONE (OUTPATIENT)
Dept: INTERNAL MEDICINE | Facility: CLINIC | Age: 58
End: 2023-01-11

## 2023-01-11 ENCOUNTER — TELEPHONE (OUTPATIENT)
Dept: SURGERY | Facility: CLINIC | Age: 58
End: 2023-01-11
Payer: MEDICAID

## 2023-01-11 NOTE — TELEPHONE ENCOUNTER
SPOKE TO RIGO AT [facility]'S OFFICE TO INFORM PT CX NEW PT APPT FROM 1/10 WITH Gabbie ONEAL/MS

## 2023-01-13 ENCOUNTER — OFFICE VISIT (OUTPATIENT)
Dept: BEHAVIORAL HEALTH | Facility: CLINIC | Age: 58
End: 2023-01-13
Payer: MEDICAID

## 2023-01-13 DIAGNOSIS — G47.00 INSOMNIA, UNSPECIFIED TYPE: ICD-10-CM

## 2023-01-13 DIAGNOSIS — F43.10 POST TRAUMATIC STRESS DISORDER (PTSD): ICD-10-CM

## 2023-01-13 DIAGNOSIS — F41.0 PANIC DISORDER: ICD-10-CM

## 2023-01-13 DIAGNOSIS — F41.1 GENERALIZED ANXIETY DISORDER: ICD-10-CM

## 2023-01-13 DIAGNOSIS — F51.5 NIGHTMARES: ICD-10-CM

## 2023-01-13 DIAGNOSIS — F33.2 SEVERE EPISODE OF RECURRENT MAJOR DEPRESSIVE DISORDER, WITHOUT PSYCHOTIC FEATURES: Primary | ICD-10-CM

## 2023-01-13 PROCEDURE — 90792 PSYCH DIAG EVAL W/MED SRVCS: CPT | Performed by: PHYSICIAN ASSISTANT

## 2023-01-13 RX ORDER — PRAZOSIN HYDROCHLORIDE 1 MG/1
1 CAPSULE ORAL NIGHTLY
Qty: 30 CAPSULE | Refills: 1 | Status: SHIPPED | OUTPATIENT
Start: 2023-01-13 | End: 2023-02-10

## 2023-01-13 RX ORDER — ZOLPIDEM TARTRATE 10 MG/1
TABLET ORAL
Qty: 30 TABLET | Refills: 0 | Status: SHIPPED | OUTPATIENT
Start: 2023-01-13 | End: 2023-02-10

## 2023-01-13 RX ORDER — ALPRAZOLAM 1 MG/1
1 TABLET ORAL 2 TIMES DAILY PRN
Qty: 60 TABLET | Refills: 0 | Status: SHIPPED | OUTPATIENT
Start: 2023-01-13 | End: 2023-02-10 | Stop reason: SDUPTHER

## 2023-01-13 RX ORDER — ARIPIPRAZOLE 2 MG/1
TABLET ORAL
Qty: 30 TABLET | Refills: 1 | Status: SHIPPED | OUTPATIENT
Start: 2023-01-13 | End: 2023-01-26 | Stop reason: SDDI

## 2023-01-13 NOTE — PROGRESS NOTES
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 tablet; Refill: 1    5. Insomnia, unspecified type  -     zolpidem (AMBIEN) 10 MG tablet; Take 0.5 to 1 tab PO QHS PRN sleep  Dispense: 30 tablet; Refill: 0    6. Nightmares  -     prazosin (MINIPRESS) 1 MG capsule; Take 1 capsule by mouth Every Night for 30 days.  Dispense: 30 capsule; Refill: 1    Presentation seems most consistent with MDD, GREG, panic disorder, PTSD, insomnia, nightmares.  We will discontinue Seroquel and start patient on Abilify for management depression, anxiety, and overall mood.  We will start patient on Ambien for management of sleep as needed.  We will start patient on prazosin for management of nightmares.  We will start patient on Xanax to take only as needed for severe anxiety/panic attacks.  I extensively counseled the patient on the risks including addiction, dependence, and misuse.  Counseled the patient to avoid taking this within 4 to 6 hours of Ambien.  Discussed that this will be decreased once obtaining further improvement of mood on maintenance medications.  Patient verbalized understanding and is agreeable to the plan.  Follow-up in 1 month.  Addressed all questions and concerns.    Visit Diagnoses:    ICD-10-CM ICD-9-CM   1. Severe episode of recurrent major depressive disorder, without psychotic features ([facility])  F33.2 296.33   2.  Generalized anxiety disorder  F41.1 300.02   3. Panic disorder  F41.0 300.01   4. Post traumatic stress disorder (PTSD)  F43.10 309.81   5. Insomnia, unspecified type  G47.00 780.52   6. Nightmares  F51.5 307.47       PLAN:  1. Safety: No acute safety concerns at this time.  2. Therapy: Patient declines referral for psychotherapy.  3. Risk Assessment: Risk of self-harm acutely is moderate to severe.  Risk factors include anxiety disorder, mood disorder, h/o suicide attempt in the past, and recent psychosocial stressors (pandemic). Protective factors include no family history, denies access to guns/weapons, no present SI, no history of self-harm in the past, minimal AODA, healthcare seeking, future orientation, willingness to engage in care.  Risk of self-harm chronically is also moderate to severe, but could be further elevated in the event of treatment noncompliance and/or AODA.  4. Labs/Diagnostics Ordered:   No orders of the defined types were placed in this encounter.    5. Medications:   New Medications Ordered This Visit   Medications   • ALPRAZolam (Xanax) 1 MG tablet     Sig: Take 1 tablet by mouth 2 (Two) Times a Day As Needed for Anxiety for up to 30 days.     Dispense:  60 tablet     Refill:  0   • zolpidem (AMBIEN) 10 MG tablet     Sig: Take 0.5 to 1 tab PO QHS PRN sleep     Dispense:  30 tablet     Refill:  0   • ARIPiprazole (Abilify) 2 MG tablet     Sig: Take 0.5 tab PO QD x 4 days, if tolerated take 1 tab PO QD     Dispense:  30 tablet     Refill:  1   • prazosin (MINIPRESS) 1 MG capsule     Sig: Take 1 capsule by mouth Every Night for 30 days.     Dispense:  30 capsule     Refill:  1       Discussed all risks, benefits, alternatives, and side effects of Aripiprazole, including but not limited to GI upset, headache, activating/sedating effects, dyslipidemia, extrapyramidal symptoms (dystonia, drug-induced parkinsonism, akathisia, tardive dyskinesia), lowering of seizure threshold, hematologic  abnormalities, hyperglycemia, impulse control disorders, increased mortality in elderly patients with dementia-related psychosis, neuroleptic malignant syndrome, sexual dysfunction, orthostatic hypotension, falls risk in older adults, and temperature dysregulation. Pt instructed to avoid driving and doing other tasks or actions that require to be alert until knowing how the drug affects them.  Pt educated on the need to practice safe sex while taking this med. Discussed the need for pt to immediately call the office for any new or worsening symptoms, such as worsening depression; feeling nervous or restless; suicidal thoughts or actions; or other changes changes in mood or behavior, and all other concerns. Pt educated on med compliance and the risks of suddenly stopping this medication or missing doses. Pt verbalized understanding and is agreeable to taking Aripiprazole. Addressed all questions and concerns.     Discussed all risks, benefits, alternatives, and side effects of Zolpidem including but not limited to headache, drowsiness, dizziness, ataxia, dose-dependent amnesia, hyperexcitability, nervousness, hallucinations, development of dependence or tolerance, risk of complex sleep behaviors, and GI upset.  Pt educated on the need to practice safe sex while taking this med. Instructed pt to avoid driving and doing all other tasks or actions after taking the med. Discussed the need for pt to immediately call the office for any new or worsening symptoms, such as complex sleep behavior, and all other concerns. Pt educated on med compliance. Pt verbalized understanding and is agreeable to taking Zolpidem. Addressed all questions and concerns.     Prazosin, Risks, benefits, alternatives, and side effects discussed with patient including sedation, dizziness/falls risk, hypotension, GI upset. After discussion of these risks and benefits, the patient voiced understanding and agreed to proceed.    Discussed all risks,  benefits, alternatives, and side effects of Xanax including but not limited to risks of abuse, misuse, and addiction, which can lead to overdose or death; risks of dependence and withdrawal reactions; drowsiness, sedation, fatigue, depression, dizziness, ataxia, weakness, confusion, forgetfulness, hypotension, falls risk, respiratory depression, anterograde amnesia, paradoxical reactions such as hyperactivity or aggressive behavior; and hallucinations. Pt educated on the need to practice safe sex while taking this med. Instructed pt to avoid performing tasks that require mental alertness such as driving or operating machinery. Discussed the need for pt to immediately call the office for any new or worsening symptoms, such as changes in mood or behavior, and all other concerns. Pt educated on med compliance, including the proper use and monitoring for signs and symptoms of abuse, misuse, and addiction. Pt verbalized understanding and is agreeable to taking Xanax. CARLOS obtained and USD ordered. Controlled substances agreement verbally signed. Addressed all questions and concerns.     6. Follow up:   F/u in 1 month.    TREATMENT PLAN/GOALS: Continue supportive psychotherapy efforts and medications as indicated. Treatment and medication options discussed during today's visit. Patient ackowledged and verbally consented to continue with current treatment plan and was educated on the importance of compliance with treatment and follow-up appointments.    MEDICATION ISSUES:  CARLOS reviewed as expected.  Discussed medication options and treatment plan of prescribed medication as well as the risks, benefits, and side effects including potential falls, possible impaired driving and metabolic adversities among others. Patient is agreeable to call the office with any worsening of symptoms or onset of side effects. Patient is agreeable to call 911 or go to the nearest ER should he/she begin having SI/HI. No medication side  effects or related complaints today.            This document has been electronically signed by Page Lee PA-C  January 13, 2023 16:52 EST      Part of this note may be an electronic transcription/translation of spoken language to printed text using the Dragon Dictation System.

## 2023-01-17 ENCOUNTER — TELEPHONE (OUTPATIENT)
Dept: INTERNAL MEDICINE | Facility: CLINIC | Age: 58
End: 2023-01-17
Payer: MEDICAID

## 2023-01-17 DIAGNOSIS — M25.50 POLYARTHRALGIA: ICD-10-CM

## 2023-01-17 RX ORDER — QUETIAPINE FUMARATE 50 MG/1
50 TABLET, FILM COATED ORAL NIGHTLY
Qty: 60 TABLET | Refills: 1 | Status: SHIPPED | OUTPATIENT
Start: 2023-01-17 | End: 2023-01-26 | Stop reason: SDUPTHER

## 2023-01-17 RX ORDER — MELOXICAM 7.5 MG/1
7.5 TABLET ORAL DAILY
Qty: 30 TABLET | Refills: 0 | Status: SHIPPED | OUTPATIENT
Start: 2023-01-17

## 2023-01-17 RX ORDER — AMLODIPINE BESYLATE 5 MG/1
5 TABLET ORAL DAILY
Qty: 30 TABLET | Refills: 1 | Status: SHIPPED | OUTPATIENT
Start: 2023-01-17 | End: 2023-02-24 | Stop reason: SDUPTHER

## 2023-01-17 NOTE — TELEPHONE ENCOUNTER
Caller: LARON MAHAN    Relationship: Emergency Contact    Best call back number: [phone]    Requested Prescriptions:   Requested Prescriptions     Pending Prescriptions Disp Refills   • meloxicam (MOBIC) 7.5 MG tablet 30 tablet 0     Sig: Take 1 tablet by mouth Daily.   • amLODIPine (NORVASC) 5 MG tablet 30 tablet 1     Sig: Take 1 tablet by mouth Daily.   • QUEtiapine (SEROquel) 50 MG tablet 60 tablet 1     Sig: Take 1 tablet by mouth Every Night.        Pharmacy where request should be sent: [id number] - [place], [place] - [address] AT [address] & [place] - [phone] [facility] [phone] FX       Does the patient have less than a 3 day supply:  [x] Yes  [] No    Would you like a call back once the refill request has been completed: [x] Yes [] No    If the office needs to give you a call back, can they leave a voicemail: [x] Yes [] No    Frieda Loco Rep   01/17/23 14:18 EST

## 2023-01-19 ENCOUNTER — TELEPHONE (OUTPATIENT)
Dept: PSYCHIATRY | Facility: CLINIC | Age: 58
End: 2023-01-19
Payer: MEDICAID

## 2023-01-20 NOTE — TELEPHONE ENCOUNTER
PRIOR AUTHORIZATION APPROVAL FOR ABILIFY 2MG FROM 01/18/2023 TO 01/17/2024.    BEHAVIORAL HEALTH - SCAN - [facility] APPROV[facility] (01/20/2023)

## 2023-01-26 ENCOUNTER — OFFICE VISIT (OUTPATIENT)
Dept: INTERNAL MEDICINE | Facility: CLINIC | Age: 58
End: 2023-01-26
Payer: MEDICAID

## 2023-01-26 VITALS
HEIGHT: 66 IN | BODY MASS INDEX: 38.54 KG/M2 | RESPIRATION RATE: 18 BRPM | SYSTOLIC BLOOD PRESSURE: 191 MMHG | TEMPERATURE: 98.6 F | WEIGHT: 239.8 LBS | OXYGEN SATURATION: 96 % | HEART RATE: 112 BPM | DIASTOLIC BLOOD PRESSURE: 126 MMHG

## 2023-01-26 DIAGNOSIS — F41.1 GAD (GENERALIZED ANXIETY DISORDER): ICD-10-CM

## 2023-01-26 DIAGNOSIS — E78.2 MIXED HYPERLIPIDEMIA: ICD-10-CM

## 2023-01-26 DIAGNOSIS — J44.9 CHRONIC OBSTRUCTIVE PULMONARY DISEASE, UNSPECIFIED COPD TYPE: ICD-10-CM

## 2023-01-26 DIAGNOSIS — F51.04 PSYCHOPHYSIOLOGICAL INSOMNIA: ICD-10-CM

## 2023-01-26 DIAGNOSIS — I10 ESSENTIAL (PRIMARY) HYPERTENSION: Primary | ICD-10-CM

## 2023-01-26 PROCEDURE — 99214 OFFICE O/P EST MOD 30 MIN: CPT

## 2023-01-26 RX ORDER — LISINOPRIL 30 MG/1
30 TABLET ORAL DAILY
COMMUNITY
Start: 2023-01-22 | End: 2023-01-26

## 2023-01-26 RX ORDER — AMLODIPINE BESYLATE AND BENAZEPRIL HYDROCHLORIDE 10; 40 MG/1; MG/1
1 CAPSULE ORAL DAILY
Qty: 30 CAPSULE | Refills: 1 | Status: SHIPPED | OUTPATIENT
Start: 2023-01-26 | End: 2023-03-22

## 2023-01-26 RX ORDER — QUETIAPINE FUMARATE 100 MG/1
100 TABLET, FILM COATED ORAL NIGHTLY
Qty: 90 TABLET | Refills: 1 | Status: SHIPPED | OUTPATIENT
Start: 2023-01-26 | End: 2023-02-10

## 2023-01-26 NOTE — ASSESSMENT & PLAN NOTE
Pressure is very elevated.  Patient states he is taking his medications as prescribed.  Patient is not taking the Minipress that was given to him by psych.  Plan: We will switch patient over to Lotrel 10-40 mg daily and patient was encouraged to also take the Minipress.  I discussed with him the importance of getting his blood pressure under control.  Discussed the importance of smoking cessation, healthier diet and exercise, sodium restriction to bring down his blood pressure.  Discussed with patient the adverse effects of not controlling blood pressure eluding but not limited to CVA or MI..  Denies any chest pain, headache or blurred vision.  We will follow-up with patient in 2 weeks to follow-up on blood pressure.  Patient was instructed to keep blood pressure log.

## 2023-01-26 NOTE — ASSESSMENT & PLAN NOTE
1/26/2023-patient was able to see psych.  He is currently on Xanax twice a day.  Patient states he ran out of Seroquel so he has not been taking it.  Patient would like to get back on that as it helps. Patient states he did not like the way Abilify made him take so he quit taking it.  We will refill his Seroquel.   It is improving.  Denies any SI.  Patient to discuss further with psych.    12/6/2022-patient is still pretty anxious.  He is currently on Seroquel.  Patient missed psychiatry appointment.  He would like to go back to see Page Lee and possibly get on something for his anxiety.  He was on Xanax before.  I was unable to corroborate this after calling pharmacies, reviewed and Carlos reports.  I will let him discuss this with psychiatry.  Patient denies any SI at this time.  He states he has been on several medications in the past.  We will hold off on adding any new medications at this time until patient sees psych.        10/30/2022-Patient admits to anxiety and insomnia.  Patient states that he did lose his sister and his oldest son and still struggles with that at times.  Patient reports that he has been seen by psych when he lived in [place].  Patient reports that he has been on multiple antipsychotics, antidepressants, and anxiolytics and they did not help seem to help him sleep.  Patient states that he was given Xanax to take 3 times a day as needed by his previous psychiatrist.  Patient states he has been out of it for a while.  He denies any depression or suicidal thoughts.  Patient is pretty jittery today in the office.  Patient states that he would just take the Xanax and Seroquel to help him sleep.  Plan: We will get patient set back up with psychiatry.  I will go ahead and refill the Seroquel to see if we can help patient's sleep. CARLOS reviewed; however, he has not lived in [place] long.   10/28/2022-After attempting to reach patients previous pharmacies, [facility] and [facility], there were no  prescriptions for xanax filled for the patient. This was discussed with the patient. Also, we are awaiting medical records from previous pcp. UDS was completely negative for benzos. Pt denies suicidal ideations. At this point, pt to follow up with psych for medications.

## 2023-01-26 NOTE — PROGRESS NOTES
Chief Complaint  Follow-up (Pt states that he is being seen for a follow up and is overall doing very well. )    History of Present Illness  SUBJECTIVE  Asad Meyer presents to [facility] follow up on labs.  Pt is doing well overall.    Patient is seeing pulm, pain management and psych.    Pt did not tolerate abilify. Would like refill on seroquel.     Pt states the trelegy was really helping.       Past Medical History:   Diagnosis Date   • Anxiety    • Asthma    • Chronic pain disorder    • Depression    • PTSD (post-traumatic stress disorder)    • Schizoaffective disorder (HCC)    • Self-injurious behavior    • Suicide attempt (HCC)       Family History   Problem Relation Age of Onset   • Alcohol abuse Father       Past Surgical History:   Procedure Laterality Date   • ANKLE SURGERY Right         Current Outpatient Medications:   •  albuterol (ACCUNEB) 0.63 MG/3ML nebulizer solution, Take 3 mL by nebulization Every 6 (Six) Hours As Needed., Disp: , Rfl:   •  albuterol sulfate  (90 Base) MCG/ACT inhaler, Inhale 2 puffs Every 4 (Four) Hours As Needed for Wheezing or Shortness of Air., Disp: 18 g, Rfl: 5  •  ALPRAZolam (Xanax) 1 MG tablet, Take 1 tablet by mouth 2 (Two) Times a Day As Needed for Anxiety for up to 30 days., Disp: 60 tablet, Rfl: 0  •  amLODIPine (NORVASC) 5 MG tablet, Take 1 tablet by mouth Daily., Disp: 30 tablet, Rfl: 1  •  atorvastatin (Lipitor) 10 MG tablet, Take 1 tablet by mouth Daily., Disp: 90 tablet, Rfl: 0  •  cetirizine (zyrTEC) 10 MG tablet, Take 1 tablet by mouth Daily., Disp: 90 tablet, Rfl: 1  •  meloxicam (MOBIC) 7.5 MG tablet, Take 1 tablet by mouth Daily., Disp: 30 tablet, Rfl: 0  •  montelukast (Singulair) 10 MG tablet, Take 1 tablet by mouth Every Night., Disp: 90 tablet, Rfl: 1  •  prazosin (MINIPRESS) 1 MG capsule, Take 1 capsule by mouth Every Night for 30 days., Disp: 30 capsule, Rfl: 1  •  QUEtiapine (SEROquel) 100 MG tablet, Take 1  "tablet by mouth Every Night., Disp: 90 tablet, Rfl: 1  •  tiotropium (SPIRIVA) 18 MCG per inhalation capsule, Place 1 capsule into inhaler and inhale Daily., Disp: 90 capsule, Rfl: 3  •  zolpidem (AMBIEN) 10 MG tablet, Take 0.5 to 1 tab PO QHS PRN sleep, Disp: 30 tablet, Rfl: 0  •  amLODIPine-benazepril (Lotrel) 10-40 MG per capsule, Take 1 capsule by mouth Daily., Disp: 30 capsule, Rfl: 1  •  Fluticasone-Umeclidin-Vilant 200-62.5-25 MCG/ACT aerosol powder , Inhale 200 mg Daily., Disp: 60 each, Rfl: 2    OBJECTIVE  Vital Signs:   BP (!) 191/126 (BP Location: Right arm)   Pulse 112   Temp 98.6 °F (37 °C) (Temporal)   Resp 18   Ht 167.6 cm (65.98\")   Wt 109 kg (239 lb 12.8 oz)   SpO2 96%   BMI 38.72 kg/m²    Estimated body mass index is 38.72 kg/m² as calculated from the following:    Height as of this encounter: 167.6 cm (65.98\").    Weight as of this encounter: 109 kg (239 lb 12.8 oz).     Wt Readings from Last 3 Encounters:   01/26/23 109 kg (239 lb 12.8 oz)   12/28/22 102 kg (225 lb)   12/06/22 104 kg (228 lb 3.2 oz)     BP Readings from Last 3 Encounters:   01/26/23 (!) 191/126   12/28/22 (!) 188/119   12/06/22 (!) 207/149       Physical Exam  Vitals and nursing note reviewed.   Constitutional:       Appearance: Normal appearance. He is obese.   HENT:      Head: Normocephalic.   Eyes:      Extraocular Movements: Extraocular movements intact.      Conjunctiva/sclera: Conjunctivae normal.   Cardiovascular:      Rate and Rhythm: Normal rate and regular rhythm.      Heart sounds: Normal heart sounds. No murmur heard.  Pulmonary:      Effort: Pulmonary effort is normal.      Breath sounds: Wheezing present.   Abdominal:      General: Bowel sounds are normal.      Palpations: Abdomen is soft.      Tenderness: There is no abdominal tenderness. There is no guarding.   Musculoskeletal:         General: No swelling. Normal range of motion.   Skin:     General: Skin is warm and dry.   Neurological:      General: No " focal deficit present.      Mental Status: He is alert. Mental status is at baseline.   Psychiatric:         Mood and Affect: Mood normal.         Behavior: Behavior normal.         Thought Content: Thought content normal.         Judgment: Judgment normal.          Result Review    CMP    CMP 8/9/22 10/3/22 12/6/22   Glucose 108 (A) 104 (A) 85   BUN 21 (A) 22 (A) 20   Creatinine 1.24 1.42 (A) 1.22   eGFR 68.7 58.4 (A) 70.0   Sodium 141 139 139   Potassium 4.3 4.4 4.3   Chloride 106 102 100   Calcium 9.6 9.5 9.8   Total Protein 7.8 7.2 7.3   Albumin 4.50 4.30 4.40   Globulin 3.3 2.9 2.9   Total Bilirubin 0.4 0.2 0.2   Alkaline Phosphatase 131 (A) 121 (A) 133 (A)   AST (SGOT) 25 27 30   ALT (SGPT) 31 27 34   Albumin/Globulin Ratio 1.4 1.5 1.5   BUN/Creatinine Ratio 16.9 15.5 16.4   Anion Gap 10.2 7.7 12.7   (A) Abnormal value       Comments are available for some flowsheets but are not being displayed.           CBC w/diff    CBC w/Diff 8/9/22 10/3/22 12/6/22   WBC 11.01 (A) 8.12 10.62   RBC 5.15 4.79 5.27   Hemoglobin 15.6 14.7 16.2   Hematocrit 46.8 44.5 46.2   MCV 90.9 92.9 87.7   MCH 30.3 30.7 30.7   MCHC 33.3 33.0 35.1   RDW 14.7 14.7 13.0   Platelets 273 222 260   Neutrophil Rel % 67.8 62.1 59.3   Immature Granulocyte Rel % 0.4 0.5 0.8 (A)   Lymphocyte Rel % 20.1 22.8 26.1   Monocyte Rel % 7.4 8.1 8.5   Eosinophil Rel % 3.6 5.8 4.4   Basophil Rel % 0.7 0.7 0.9   (A) Abnormal value            Lipid Panel    Lipid Panel 12/6/22   Total Cholesterol 243 (A)   Triglycerides 268 (A)   HDL Cholesterol 44   VLDL Cholesterol 49 (A)   LDL Cholesterol  150 (A)   LDL/HDL Ratio 3.30   (A) Abnormal value            TSH    TSH 12/6/22   TSH 1.710                        No results found for: RBCUA, WBCUA, BACTERIA, SQUAMEPIUA, HYALCASTU, METHODOLOGY, COLORU, CLARITYU, PHUR, SPECGRAVUR, GLUCOSEU, KETONESU, BILIRUBINUR, BLOODU, PROTEINUA, LEUKOCYTESUR, NITRITEU, UROBILINOGEN        XR Chest 1 View    Result Date: 10/3/2022   No  active disease is seen.       MIKAL HOPE MD       Electronically Signed and Approved By: MIKAL HOPE MD on 10/03/2022 at 12:47                The above data has been reviewed by PONCE Valadez 01/26/2023 11:59 EST.          Patient Care Team:  Jailyn Shepard APRN as PCP - General (Internal Medicine)      ASSESSMENT & PLAN    Diagnoses and all orders for this visit:    1. Essential (primary) hypertension (Primary)  Assessment & Plan:  Pressure is very elevated.  Patient states he is taking his medications as prescribed.  Patient is not taking the Minipress that was given to him by psych.  Plan: We will switch patient over to Lotrel 10-40 mg daily and patient was encouraged to also take the Minipress.  I discussed with him the importance of getting his blood pressure under control.  Discussed the importance of smoking cessation, healthier diet and exercise, sodium restriction to bring down his blood pressure.  Discussed with patient the adverse effects of not controlling blood pressure eluding but not limited to CVA or MI..  Denies any chest pain, headache or blurred vision.  We will follow-up with patient in 2 weeks to follow-up on blood pressure.  Patient was instructed to keep blood pressure log.    Orders:  -     CBC & Differential; Future  -     Comprehensive Metabolic Panel; Future  -     Lipid Panel; Future    2. Psychophysiological insomnia    3. GREG (generalized anxiety disorder)  Assessment & Plan:  1/26/2023-patient was able to see psych.  He is currently on Xanax twice a day.  Patient states he ran out of Seroquel so he has not been taking it.  Patient would like to get back on that as it helps. Patient states he did not like the way Abilify made him take so he quit taking it.  We will refill his Seroquel.   It is improving.  Denies any SI.  Patient to discuss further with psych.    12/6/2022-patient is still pretty anxious.  He is currently on Seroquel.  Patient missed psychiatry appointment.  He  would like to go back to see Page Lee and possibly get on something for his anxiety.  He was on Xanax before.  I was unable to corroborate this after calling pharmacies, reviewed and Carlos reports.  I will let him discuss this with psychiatry.  Patient denies any SI at this time.  He states he has been on several medications in the past.  We will hold off on adding any new medications at this time until patient sees psych.        10/30/2022-Patient admits to anxiety and insomnia.  Patient states that he did lose his sister and his oldest son and still struggles with that at times.  Patient reports that he has been seen by psych when he lived in [place].  Patient reports that he has been on multiple antipsychotics, antidepressants, and anxiolytics and they did not help seem to help him sleep.  Patient states that he was given Xanax to take 3 times a day as needed by his previous psychiatrist.  Patient states he has been out of it for a while.  He denies any depression or suicidal thoughts.  Patient is pretty jittery today in the office.  Patient states that he would just take the Xanax and Seroquel to help him sleep.  Plan: We will get patient set back up with psychiatry.  I will go ahead and refill the Seroquel to see if we can help patient's sleep. CAROLS reviewed; however, he has not lived in [place] long.   10/28/2022-After attempting to reach patients previous pharmacies, [facility] and [facility], there were no prescriptions for xanax filled for the patient. This was discussed with the patient. Also, we are awaiting medical records from previous pcp. UDS was completely negative for benzos. Pt denies suicidal ideations. At this point, pt to follow up with psych for medications.      4. Chronic obstructive pulmonary disease, unspecified COPD type ([facility])  Assessment & Plan:  Patient states his COPD was much improved with Trelegy.  He reports he was switched to Advair and Spiriva.  Patient states that that does not work  well for him at all and he is having to use his albuterol inhaler frequently.  Patient does have wheezes present to auscultation.  Patient was given a sample of Trelegy today.      5. Mixed hyperlipidemia  Assessment & Plan:  Cholesterol is elevated on last labs.  Patient states he is taking his cholesterol pill.  Continue extra medications and recheck labs in 3 months.        Other orders  -     amLODIPine-benazepril (Lotrel) 10-40 MG per capsule; Take 1 capsule by mouth Daily.  Dispense: 30 capsule; Refill: 1  -     QUEtiapine (SEROquel) 100 MG tablet; Take 1 tablet by mouth Every Night.  Dispense: 90 tablet; Refill: 1  -     Fluticasone-Umeclidin-Vilant 200-62.5-25 MCG/ACT aerosol powder ; Inhale 200 mg Daily.  Dispense: 60 each; Refill: 2       Tobacco Use: High Risk   • Smoking Tobacco Use: Every Day   • Smokeless Tobacco Use: Current   • Passive Exposure: Not on file       Follow Up     Return in about 2 weeks (around 2/9/2023).    Please note that portions of this note were completed with a voice recognition program.    Patient was given instructions and counseling regarding his condition or for health maintenance advice. Please see specific information pulled into the AVS if appropriate.   I have reviewed information obtained and documented by others and I have confirmed the accuracy of this documented note.    PONCE Valadez

## 2023-01-26 NOTE — ASSESSMENT & PLAN NOTE
Patient states his COPD was much improved with Trelegy.  He reports he was switched to Advair and Spiriva.  Patient states that that does not work well for him at all and he is having to use his albuterol inhaler frequently.  Patient does have wheezes present to auscultation.  Patient was given a sample of Trelegy today.

## 2023-01-26 NOTE — ASSESSMENT & PLAN NOTE
Cholesterol is elevated on last labs.  Patient states he is taking his cholesterol pill.  Continue extra medications and recheck labs in 3 months.

## 2023-01-27 ENCOUNTER — HOSPITAL ENCOUNTER (OUTPATIENT)
Dept: CT IMAGING | Facility: HOSPITAL | Age: 58
Discharge: HOME | End: 2023-01-27
Payer: MEDICAID

## 2023-01-27 DIAGNOSIS — Z72.0 TOBACCO USE: ICD-10-CM

## 2023-01-27 DIAGNOSIS — J44.9 CHRONIC OBSTRUCTIVE PULMONARY DISEASE, UNSPECIFIED COPD TYPE: ICD-10-CM

## 2023-01-27 PROCEDURE — 71271 CT THORAX LUNG CANCER SCR C-: CPT

## 2023-01-30 ENCOUNTER — TRANSCRIBE ORDERS (OUTPATIENT)
Dept: ADMINISTRATIVE | Facility: HOSPITAL | Age: 58
End: 2023-01-30
Payer: MEDICAID

## 2023-01-30 ENCOUNTER — TELEPHONE (OUTPATIENT)
Dept: INTERNAL MEDICINE | Facility: CLINIC | Age: 58
End: 2023-01-30
Payer: MEDICAID

## 2023-01-30 ENCOUNTER — HOSPITAL ENCOUNTER (OUTPATIENT)
Dept: GENERAL RADIOLOGY | Facility: HOSPITAL | Age: 58
Discharge: HOME | End: 2023-01-30
Payer: MEDICAID

## 2023-01-30 DIAGNOSIS — M54.16 LUMBAR RADICULOPATHY: ICD-10-CM

## 2023-01-30 DIAGNOSIS — N28.89 RENAL MASS: Primary | ICD-10-CM

## 2023-01-30 DIAGNOSIS — M16.9 HIP ARTHROSIS: ICD-10-CM

## 2023-01-30 DIAGNOSIS — M17.0 OSTEOARTHRITIS OF BOTH KNEES, UNSPECIFIED OSTEOARTHRITIS TYPE: ICD-10-CM

## 2023-01-30 DIAGNOSIS — R93.89 ABNORMAL CHEST CT: ICD-10-CM

## 2023-01-30 DIAGNOSIS — Z72.0 TOBACCO USE: ICD-10-CM

## 2023-01-30 DIAGNOSIS — M17.0 OSTEOARTHRITIS OF BOTH KNEES, UNSPECIFIED OSTEOARTHRITIS TYPE: Primary | ICD-10-CM

## 2023-01-30 PROCEDURE — 73562 X-RAY EXAM OF KNEE 3: CPT

## 2023-01-30 PROCEDURE — 73522 X-RAY EXAM HIPS BI 3-4 VIEWS: CPT

## 2023-01-30 PROCEDURE — 72100 X-RAY EXAM L-S SPINE 2/3 VWS: CPT

## 2023-01-31 DIAGNOSIS — M25.50 POLYARTHRALGIA: ICD-10-CM

## 2023-01-31 RX ORDER — MELOXICAM 7.5 MG/1
7.5 TABLET ORAL DAILY
Qty: 30 TABLET | Refills: 0 | OUTPATIENT
Start: 2023-01-31

## 2023-02-08 RX ORDER — ALBUTEROL SULFATE 90 UG/1
AEROSOL, METERED RESPIRATORY (INHALATION)
Qty: 18 G | Refills: 5 | Status: SHIPPED | OUTPATIENT
Start: 2023-02-08 | End: 2023-03-28

## 2023-02-09 ENCOUNTER — TELEMEDICINE (OUTPATIENT)
Dept: INTERNAL MEDICINE | Facility: CLINIC | Age: 58
End: 2023-02-09
Payer: MEDICAID

## 2023-02-09 DIAGNOSIS — J44.9 CHRONIC OBSTRUCTIVE PULMONARY DISEASE, UNSPECIFIED COPD TYPE: ICD-10-CM

## 2023-02-09 DIAGNOSIS — I10 ESSENTIAL (PRIMARY) HYPERTENSION: Primary | ICD-10-CM

## 2023-02-09 PROCEDURE — 99213 OFFICE O/P EST LOW 20 MIN: CPT

## 2023-02-09 RX ORDER — QUETIAPINE FUMARATE 50 MG/1
50 TABLET, FILM COATED ORAL NIGHTLY
COMMUNITY
Start: 2023-02-04 | End: 2023-02-10

## 2023-02-09 NOTE — PROGRESS NOTES
Mode of Visit: Video  Location of patient: home  You have chosen to receive care through a telehealth visit.  Does the patient consent to use a video/audio connection for your medical care today? Yes  The visit included audio and video interaction. No technical issues occurred during this visit.     Chief Complaint  Follow-up (Pt states that he is being seen for a follow up and is overall doing well. He states that the inhaler is working great for him. )    Subjective          Asad Meyer presents to [facility]  History of Present Illness   Follow up on chronic disease management.    Patient is doing well overall.    No issues or complaints at this time.    Patient has a follow-up with psych tomorrow, pulmonology next month and has an appointment with pain management in a couple of weeks.  Objective   Vital Signs:   There were no vitals taken for this visit.    Virtual Visit Physical Exam  Result Review :                 Assessment and Plan    Diagnoses and all orders for this visit:    1. Essential (primary) hypertension (Primary)  Assessment & Plan:  2/9/2023-patient has been to follow-up on blood pressure today.  Patient admits to not taking his blood pressure or treatment eating a lot like he was supposed to.  He is taking his blood pressure medications.  I discussed with patient the importance of monitoring his blood pressure.  He is post to keep a log and bring it in to the office within 2 weeks.  I reiterated the possible adverse effects of not controlling blood pressure including but not limited to a stroke or heart attack.  Patient knowledges understanding.  Patient denies any chest pain, headaches or changes in vision.  Also encourage smoking cessation healthier diet and exercise.    1/26/2023-Pressure is very elevated.  Patient states he is taking his medications as prescribed.  Patient is not taking the Minipress that was given to him by psych.  Plan: We will switch  patient over to Lotrel 10-40 mg daily and patient was encouraged to also take the Minipress.  I discussed with him the importance of getting his blood pressure under control.  Discussed the importance of smoking cessation, healthier diet and exercise, sodium restriction to bring down his blood pressure.  Discussed with patient the adverse effects of not controlling blood pressure eluding but not limited to CVA or MI..  Denies any chest pain, headache or blurred vision.  We will follow-up with patient in 2 weeks to follow-up on blood pressure.  Patient was instructed to keep blood pressure log.      2. Chronic obstructive pulmonary disease, unspecified COPD type (HCC)  Assessment & Plan:  Breathing is very good per patient.  He really likes the Trelegy.  He states that he does not have to use his albuterol as often.  We will continue Trelegy.  Follow-up with pulmonology.          Follow Up   Return in about 2 weeks (around 2/23/2023).  Patient was given instructions and counseling regarding his condition or for health maintenance advice. Please see specific information pulled into the AVS if appropriate.

## 2023-02-09 NOTE — ASSESSMENT & PLAN NOTE
2/9/2023-patient has been to follow-up on blood pressure today.  Patient admits to not taking his blood pressure or treatment eating a lot like he was supposed to.  He is taking his blood pressure medications.  I discussed with patient the importance of monitoring his blood pressure.  He is post to keep a log and bring it in to the office within 2 weeks.  I reiterated the possible adverse effects of not controlling blood pressure including but not limited to a stroke or heart attack.  Patient knowledges understanding.  Patient denies any chest pain, headaches or changes in vision.  Also encourage smoking cessation healthier diet and exercise.    1/26/2023-Pressure is very elevated.  Patient states he is taking his medications as prescribed.  Patient is not taking the Minipress that was given to him by psych.  Plan: We will switch patient over to Lotrel 10-40 mg daily and patient was encouraged to also take the Minipress.  I discussed with him the importance of getting his blood pressure under control.  Discussed the importance of smoking cessation, healthier diet and exercise, sodium restriction to bring down his blood pressure.  Discussed with patient the adverse effects of not controlling blood pressure eluding but not limited to CVA or MI..  Denies any chest pain, headache or blurred vision.  We will follow-up with patient in 2 weeks to follow-up on blood pressure.  Patient was instructed to keep blood pressure log.

## 2023-02-09 NOTE — ASSESSMENT & PLAN NOTE
Breathing is very good per patient.  He really likes the Trelegy.  He states that he does not have to use his albuterol as often.  We will continue Trelegy.  Follow-up with pulmonology.

## 2023-02-10 ENCOUNTER — OFFICE VISIT (OUTPATIENT)
Dept: BEHAVIORAL HEALTH | Facility: CLINIC | Age: 58
End: 2023-02-10
Payer: MEDICAID

## 2023-02-10 VITALS
HEART RATE: 90 BPM | HEIGHT: 66 IN | BODY MASS INDEX: 38.41 KG/M2 | DIASTOLIC BLOOD PRESSURE: 92 MMHG | SYSTOLIC BLOOD PRESSURE: 142 MMHG | WEIGHT: 239 LBS

## 2023-02-10 DIAGNOSIS — F41.1 GENERALIZED ANXIETY DISORDER: ICD-10-CM

## 2023-02-10 DIAGNOSIS — G47.00 INSOMNIA, UNSPECIFIED TYPE: ICD-10-CM

## 2023-02-10 DIAGNOSIS — F33.2 SEVERE EPISODE OF RECURRENT MAJOR DEPRESSIVE DISORDER, WITHOUT PSYCHOTIC FEATURES: Primary | ICD-10-CM

## 2023-02-10 DIAGNOSIS — F43.10 POST TRAUMATIC STRESS DISORDER (PTSD): ICD-10-CM

## 2023-02-10 DIAGNOSIS — F41.0 PANIC DISORDER: ICD-10-CM

## 2023-02-10 DIAGNOSIS — F51.5 NIGHTMARES: ICD-10-CM

## 2023-02-10 PROCEDURE — 99214 OFFICE O/P EST MOD 30 MIN: CPT | Performed by: PHYSICIAN ASSISTANT

## 2023-02-10 RX ORDER — CLONIDINE HYDROCHLORIDE 0.1 MG/1
0.1 TABLET ORAL NIGHTLY
Qty: 30 TABLET | Refills: 1 | Status: SHIPPED | OUTPATIENT
Start: 2023-02-10 | End: 2023-02-24 | Stop reason: SDUPTHER

## 2023-02-10 RX ORDER — ARIPIPRAZOLE 5 MG/1
5 TABLET ORAL DAILY
Qty: 30 TABLET | Refills: 1 | Status: SHIPPED | OUTPATIENT
Start: 2023-02-10 | End: 2023-03-13

## 2023-02-10 RX ORDER — ZOLPIDEM TARTRATE 12.5 MG/1
12.5 TABLET, FILM COATED, EXTENDED RELEASE ORAL NIGHTLY PRN
Qty: 5 TABLET | Refills: 0 | Status: SHIPPED | OUTPATIENT
Start: 2023-02-10 | End: 2023-02-15

## 2023-02-10 RX ORDER — ALPRAZOLAM 1 MG/1
1 TABLET ORAL 2 TIMES DAILY PRN
Qty: 60 TABLET | Refills: 0 | Status: SHIPPED | OUTPATIENT
Start: 2023-02-10 | End: 2023-03-13 | Stop reason: SDUPTHER

## 2023-02-10 NOTE — PROGRESS NOTES
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 by mouth Every Night for 30 days.  Dispense: 30 tablet; Refill: 1    Presentation seems most consistent with MDD, GREG, panic disorder, PTSD, insomnia, nightmares.  Extensively discussed the need to taper and discontinue off Seroquel as previously instructed due to drug interactions and risks with being on too antipsychotics.  Patient verbalized understanding and is agreeable to the plan.  We will discontinue Seroquel.  Will increase Abilify for management depression, anxiety, and overall mood.  We will discontinue Ambien 10 mg and start on Ambien 12.5 mg controlled release for sleep as needed.  We will discontinue prazosin and start on clonidine for nightmares.  We will continue Xanax only as needed for severe panic attacks.  Extensively counseled the patient on the risks including addiction, dependence, misuse.  Counseled the patient on the need to avoid taking this medication within 4 to 6 hours of Ambien.  Patient verbalized understanding and is agreeable to the plan.  Follow-up in 1 month.  Addressed all questions and concerns.    Visit Diagnoses:    ICD-10-CM ICD-9-CM   1. Severe episode of recurrent major depressive disorder, without psychotic features (HCC)  F33.2 296.33   2. Panic disorder  F41.0 300.01   3. Generalized anxiety disorder  F41.1 300.02   4. Post traumatic stress disorder (PTSD)  F43.10  309.81   5. Insomnia, unspecified type  G47.00 780.52   6. Nightmares  F51.5 307.47       PLAN:  1. Safety: No acute safety concerns at this time.  2. Therapy: Patient declines referral for psychotherapy.  3. Risk Assessment: Risk of self-harm acutely is moderate to severe.  Risk factors include anxiety disorder, mood disorder, h/o suicide attempt in the past, and recent psychosocial stressors (pandemic). Protective factors include no family history, denies access to guns/weapons, no present SI, no history of self-harm in the past, minimal AODA, healthcare seeking, future orientation, willingness to engage in care.  Risk of self-harm chronically is also moderate to severe, but could be further elevated in the event of treatment noncompliance and/or AODA.  4. Labs/Diagnostics Ordered:   No orders of the defined types were placed in this encounter.    5. Medications:   New Medications Ordered This Visit   Medications   • zolpidem CR (Ambien CR) 12.5 MG CR tablet     Sig: Take 1 tablet by mouth At Night As Needed for Sleep for up to 5 days.     Dispense:  5 tablet     Refill:  0   • ARIPiprazole (ABILIFY) 5 MG tablet     Sig: Take 1 tablet by mouth Daily for 30 days.     Dispense:  30 tablet     Refill:  1   • cloNIDine (Catapres) 0.1 MG tablet     Sig: Take 1 tablet by mouth Every Night for 30 days.     Dispense:  30 tablet     Refill:  1   • ALPRAZolam (Xanax) 1 MG tablet     Sig: Take 1 tablet by mouth 2 (Two) Times a Day As Needed for Anxiety for up to 30 days.     Dispense:  60 tablet     Refill:  0       Discussed all risks, benefits, alternatives, and side effects of Aripiprazole, including but not limited to GI upset, headache, activating/sedating effects, dyslipidemia, extrapyramidal symptoms (dystonia, drug-induced parkinsonism, akathisia, tardive dyskinesia), lowering of seizure threshold, hematologic abnormalities, hyperglycemia, impulse control disorders, increased mortality in elderly patients with  dementia-related psychosis, neuroleptic malignant syndrome, sexual dysfunction, orthostatic hypotension, falls risk in older adults, and temperature dysregulation. Pt instructed to avoid driving and doing other tasks or actions that require to be alert until knowing how the drug affects them.  Pt educated on the need to practice safe sex while taking this med. Discussed the need for pt to immediately call the office for any new or worsening symptoms, such as worsening depression; feeling nervous or restless; suicidal thoughts or actions; or other changes changes in mood or behavior, and all other concerns. Pt educated on med compliance and the risks of suddenly stopping this medication or missing doses. Pt verbalized understanding and is agreeable to taking Aripiprazole. Addressed all questions and concerns.     Discussed all risks, benefits, alternatives, and side effects of Zolpidem including but not limited to headache, drowsiness, dizziness, ataxia, dose-dependent amnesia, hyperexcitability, nervousness, hallucinations, development of dependence or tolerance, risk of complex sleep behaviors, and GI upset.  Pt educated on the need to practice safe sex while taking this med. Instructed pt to avoid driving and doing all other tasks or actions after taking the med. Discussed the need for pt to immediately call the office for any new or worsening symptoms, such as complex sleep behavior, and all other concerns. Pt educated on med compliance. Pt verbalized understanding and is agreeable to taking Zolpidem. Addressed all questions and concerns.     Clonidine, Risks, benefits, alternatives, and side effects discussed with patient including sedation, dizziness/falls risk, hypotension, GI upset. After discussion of these risks and benefits, the patient voiced understanding and agreed to proceed.    Discussed all risks, benefits, alternatives, and side effects of Xanax including but not limited to risks of abuse, misuse,  and addiction, which can lead to overdose or death; risks of dependence and withdrawal reactions; drowsiness, sedation, fatigue, depression, dizziness, ataxia, weakness, confusion, forgetfulness, hypotension, falls risk, respiratory depression, anterograde amnesia, paradoxical reactions such as hyperactivity or aggressive behavior; and hallucinations. Pt educated on the need to practice safe sex while taking this med. Instructed pt to avoid performing tasks that require mental alertness such as driving or operating machinery. Discussed the need for pt to immediately call the office for any new or worsening symptoms, such as changes in mood or behavior, and all other concerns. Pt educated on med compliance, including the proper use and monitoring for signs and symptoms of abuse, misuse, and addiction. Pt verbalized understanding and is agreeable to taking Xanax. CARLOS obtained and USD ordered. Controlled substances agreement verbally signed. Addressed all questions and concerns.     6. Follow up:   F/u in 1 month.    TREATMENT PLAN/GOALS: Continue supportive psychotherapy efforts and medications as indicated. Treatment and medication options discussed during today's visit. Patient ackowledged and verbally consented to continue with current treatment plan and was educated on the importance of compliance with treatment and follow-up appointments.    MEDICATION ISSUES:  CARLOS reviewed as expected.  Discussed medication options and treatment plan of prescribed medication as well as the risks, benefits, and side effects including potential falls, possible impaired driving and metabolic adversities among others. Patient is agreeable to call the office with any worsening of symptoms or onset of side effects. Patient is agreeable to call 911 or go to the nearest ER should he/she begin having SI/HI. No medication side effects or related complaints today.            This document has been electronically signed by Page  AMANDO Lee  February 10, 2023 10:37 EST      Part of this note may be an electronic transcription/translation of spoken language to printed text using the Dragon Dictation System.

## 2023-02-15 ENCOUNTER — PRIOR AUTHORIZATION (OUTPATIENT)
Dept: PSYCHIATRY | Facility: CLINIC | Age: 58
End: 2023-02-15
Payer: MEDICAID

## 2023-02-15 NOTE — TELEPHONE ENCOUNTER
Prior authorization for ZOLPIDEM TARTRATE ER 12.5MG processed and DENIED.     BEHAVIORAL HEALTH - SCAN - ZOLPIDEM TRACI ROSS, 02/14/2023 (02/14/2023)    Please review and advise.

## 2023-02-24 ENCOUNTER — OFFICE VISIT (OUTPATIENT)
Dept: INTERNAL MEDICINE | Facility: CLINIC | Age: 58
End: 2023-02-24
Payer: MEDICAID

## 2023-02-24 VITALS
OXYGEN SATURATION: 95 % | TEMPERATURE: 99.1 F | HEART RATE: 102 BPM | DIASTOLIC BLOOD PRESSURE: 124 MMHG | BODY MASS INDEX: 38.89 KG/M2 | SYSTOLIC BLOOD PRESSURE: 187 MMHG | WEIGHT: 242 LBS | HEIGHT: 66 IN | RESPIRATION RATE: 18 BRPM

## 2023-02-24 DIAGNOSIS — F51.5 NIGHTMARES: ICD-10-CM

## 2023-02-24 DIAGNOSIS — M51.35 DDD (DEGENERATIVE DISC DISEASE), THORACOLUMBAR: ICD-10-CM

## 2023-02-24 DIAGNOSIS — I10 ESSENTIAL (PRIMARY) HYPERTENSION: Primary | ICD-10-CM

## 2023-02-24 DIAGNOSIS — M15.9 OSTEOARTHRITIS OF MULTIPLE JOINTS, UNSPECIFIED OSTEOARTHRITIS TYPE: ICD-10-CM

## 2023-02-24 DIAGNOSIS — Z79.899 CONTROLLED SUBSTANCE AGREEMENT SIGNED: ICD-10-CM

## 2023-02-24 LAB
AMPHET+METHAMPHET UR QL: NEGATIVE
AMPHETAMINE INTERNAL CONTROL: ABNORMAL
AMPHETAMINES UR QL: NEGATIVE
BARBITURATE INTERNAL CONTROL: ABNORMAL
BARBITURATES UR QL SCN: NEGATIVE
BENZODIAZ UR QL SCN: POSITIVE
BENZODIAZEPINE INTERNAL CONTROL: ABNORMAL
BUPRENORPHINE INTERNAL CONTROL: ABNORMAL
BUPRENORPHINE SERPL-MCNC: NEGATIVE NG/ML
CANNABINOIDS SERPL QL: NEGATIVE
COCAINE INTERNAL CONTROL: ABNORMAL
COCAINE UR QL: NEGATIVE
EXPIRATION DATE: ABNORMAL
Lab: ABNORMAL
MDMA (ECSTASY) INTERNAL CONTROL: ABNORMAL
MDMA UR QL SCN: NEGATIVE
METHADONE INTERNAL CONTROL: ABNORMAL
METHADONE UR QL SCN: NEGATIVE
METHAMPHETAMINE INTERNAL CONTROL: ABNORMAL
OPIATES INTERNAL CONTROL: ABNORMAL
OPIATES UR QL: NEGATIVE
OXYCODONE INTERNAL CONTROL: ABNORMAL
OXYCODONE UR QL SCN: NEGATIVE
PCP UR QL SCN: NEGATIVE
PHENCYCLIDINE INTERNAL CONTROL: ABNORMAL
THC INTERNAL CONTROL: ABNORMAL

## 2023-02-24 PROCEDURE — 99214 OFFICE O/P EST MOD 30 MIN: CPT

## 2023-02-24 PROCEDURE — 96372 THER/PROPH/DIAG INJ SC/IM: CPT

## 2023-02-24 PROCEDURE — 80305 DRUG TEST PRSMV DIR OPT OBS: CPT

## 2023-02-24 RX ORDER — FLUTICASONE PROPIONATE AND SALMETEROL 50; 250 UG/1; UG/1
2 POWDER RESPIRATORY (INHALATION) AS NEEDED
COMMUNITY
Start: 2023-02-23 | End: 2023-03-28

## 2023-02-24 RX ORDER — AMLODIPINE BESYLATE 10 MG/1
10 TABLET ORAL DAILY
Qty: 90 TABLET | Refills: 1 | Status: SHIPPED | OUTPATIENT
Start: 2023-02-24 | End: 2023-02-24

## 2023-02-24 RX ORDER — CLONIDINE HYDROCHLORIDE 0.1 MG/1
0.1 TABLET ORAL NIGHTLY
Qty: 30 TABLET | Refills: 1 | Status: SHIPPED | OUTPATIENT
Start: 2023-02-24 | End: 2023-03-13

## 2023-02-24 RX ORDER — KETOROLAC TROMETHAMINE 30 MG/ML
30 INJECTION, SOLUTION INTRAMUSCULAR; INTRAVENOUS EVERY 6 HOURS PRN
Status: SHIPPED | OUTPATIENT
Start: 2023-02-24 | End: 2023-03-01

## 2023-02-24 RX ADMIN — KETOROLAC TROMETHAMINE 30 MG: 30 INJECTION, SOLUTION INTRAMUSCULAR; INTRAVENOUS at 13:16

## 2023-02-24 NOTE — ASSESSMENT & PLAN NOTE
Does not want to see pain management.  Patient states he has had issues with [facility].  Patient is in quite a bit of pain.  He is a  and has pretty significant arthritis.  We will see if we can get patient set up with a different pain specialist.  Discussed with him that I typically would defer pain management to pain management specialist.  We will give him some tramadol to try.

## 2023-02-24 NOTE — ASSESSMENT & PLAN NOTE
2/24/2023-patient's blood pressure still elevated.  Patient states he is taking his medications as prescribed.  I discussed with patient that I want him to take Lotrel and the clonidine.  He has not been taking amlodipine.  He has not been taking clonidine from psych either.  We will have patient start taking both of those.  Patient is in quite a bit of pain and discomfort.  Patient declines cardiology referral.  Discussed with him the importance of getting blood pressure under control.  Discussed the importance of weight loss, healthy diet and increased exercise to help bring down his blood pressure.    2/9/2023-patient has been to follow-up on blood pressure today.  Patient admits to not taking his blood pressure or treatment eating a lot like he was supposed to.  He is taking his blood pressure medications.  I discussed with patient the importance of monitoring his blood pressure.  He is post to keep a log and bring it in to the office within 2 weeks.  I reiterated the possible adverse effects of not controlling blood pressure including but not limited to a stroke or heart attack.  Patient knowledges understanding.  Patient denies any chest pain, headaches or changes in vision.  Also encourage smoking cessation healthier diet and exercise.     1/26/2023-Pressure is very elevated.  Patient states he is taking his medications as prescribed.  Patient is not taking the Minipress that was given to him by psych.  Plan: We will switch patient over to Lotrel 10-40 mg daily and patient was encouraged to also take the Minipress.  I discussed with him the importance of getting his blood pressure under control.  Discussed the importance of smoking cessation, healthier diet and exercise, sodium restriction to bring down his blood pressure.  Discussed with patient the adverse effects of not controlling blood pressure eluding but not limited to CVA or MI..  Denies any chest pain, headache or blurred vision.  We will follow-up  with patient in 2 weeks to follow-up on blood pressure.  Patient was instructed to keep blood pressure log

## 2023-02-24 NOTE — PROGRESS NOTES
Chief Complaint  Follow-up (Pt states that he is being seen for a follow up and states that he is in a lot of Pain. He states that he went to [facility] and they did not do anything for him. )    History of Present Illness  SUBJECTIVE  Asad Meyer presents to [facility]  Follow up on chronic pain and hypertension.   Patient went to [facility]. Patient states that he did not have good experience at [facility].      Denies any chest pain, worsening shortness of breath, nausea, vomiting, diarrhea, issues with bowel or bladder function.  Patient is doing really well with Trelegy.      Past Medical History:   Diagnosis Date   • Anxiety    • Asthma    • Chronic pain disorder    • Depression    • PTSD (post-traumatic stress disorder)    • Schizoaffective disorder (HCC)    • Self-injurious behavior    • Suicide attempt (HCC)       Family History   Problem Relation Age of Onset   • Alcohol abuse Father       Past Surgical History:   Procedure Laterality Date   • ANKLE SURGERY Right         Current Outpatient Medications:   •  Advair Diskus 250-50 MCG/ACT DISKUS, Inhale 2 puffs As Needed., Disp: , Rfl:   •  albuterol (ACCUNEB) 0.63 MG/3ML nebulizer solution, Take 3 mL by nebulization Every 6 (Six) Hours As Needed., Disp: , Rfl:   •  ALPRAZolam (Xanax) 1 MG tablet, Take 1 tablet by mouth 2 (Two) Times a Day As Needed for Anxiety for up to 30 days., Disp: 60 tablet, Rfl: 0  •  amLODIPine-benazepril (Lotrel) 10-40 MG per capsule, Take 1 capsule by mouth Daily., Disp: 30 capsule, Rfl: 1  •  ARIPiprazole (ABILIFY) 5 MG tablet, Take 1 tablet by mouth Daily for 30 days., Disp: 30 tablet, Rfl: 1  •  atorvastatin (Lipitor) 10 MG tablet, Take 1 tablet by mouth Daily., Disp: 90 tablet, Rfl: 0  •  cetirizine (zyrTEC) 10 MG tablet, Take 1 tablet by mouth Daily., Disp: 90 tablet, Rfl: 1  •  cloNIDine (Catapres) 0.1 MG tablet, Take 1 tablet by mouth Every Night for 30 days.,  "Disp: 30 tablet, Rfl: 1  •  Fluticasone-Umeclidin-Vilant 200-62.5-25 MCG/ACT aerosol powder , Inhale 200 mg Daily., Disp: 60 each, Rfl: 2  •  meloxicam (MOBIC) 7.5 MG tablet, Take 1 tablet by mouth Daily., Disp: 30 tablet, Rfl: 0  •  montelukast (Singulair) 10 MG tablet, Take 1 tablet by mouth Every Night., Disp: 90 tablet, Rfl: 1  •  tiotropium (SPIRIVA) 18 MCG per inhalation capsule, Place 1 capsule into inhaler and inhale Daily., Disp: 90 capsule, Rfl: 3  •  Ventolin  (90 Base) MCG/ACT inhaler, INHALE 2 PUFFS BY MOUTH EVERY 4 HOURS AS NEEDED FOR WHEEZING OR SHORTNESS OF AIR, Disp: 18 g, Rfl: 5  •  traMADol-acetaminophen (Ultracet) 37.5-325 MG per tablet, Take 1 tablet by mouth Every 6 (Six) Hours As Needed for Moderate Pain., Disp: 60 tablet, Rfl: 0    Current Facility-Administered Medications:   •  ketorolac (TORADOL) injection 30 mg, 30 mg, Intramuscular, Q6H PRN, [place], Jailyn, APRN, 30 mg at 02/24/23 1316    OBJECTIVE  Vital Signs:   BP (!) 187/124 (BP Location: Left arm)   Pulse 102   Temp 99.1 °F (37.3 °C) (Temporal)   Resp 18   Ht 167.6 cm (65.98\")   Wt 110 kg (242 lb)   SpO2 95%   BMI 39.08 kg/m²    Estimated body mass index is 39.08 kg/m² as calculated from the following:    Height as of this encounter: 167.6 cm (65.98\").    Weight as of this encounter: 110 kg (242 lb).     Wt Readings from Last 3 Encounters:   02/24/23 110 kg (242 lb)   02/10/23 108 kg (239 lb)   01/26/23 109 kg (239 lb 12.8 oz)     BP Readings from Last 3 Encounters:   02/24/23 (!) 187/124   02/10/23 142/92   01/26/23 (!) 191/126       Physical Exam  Vitals and nursing note reviewed.   Constitutional:       Appearance: Normal appearance.   HENT:      Head: Normocephalic and atraumatic.   Eyes:      Extraocular Movements: Extraocular movements intact.      Conjunctiva/sclera: Conjunctivae normal.   Cardiovascular:      Rate and Rhythm: Normal rate and regular rhythm.      Heart sounds: Normal heart sounds.   Pulmonary:      " Effort: Pulmonary effort is normal.      Breath sounds: Wheezing present.   Abdominal:      General: Abdomen is flat. Bowel sounds are normal.      Palpations: Abdomen is soft.   Musculoskeletal:         General: Normal range of motion.      Cervical back: Normal range of motion and neck supple.   Skin:     General: Skin is warm and dry.   Neurological:      General: No focal deficit present.      Mental Status: He is alert and oriented to person, place, and time. Mental status is at baseline.   Psychiatric:         Mood and Affect: Mood normal.         Behavior: Behavior normal.         Thought Content: Thought content normal.         Judgment: Judgment normal.          Result Review        XR Spine Lumbar 2 or 3 View    Result Date: 1/30/2023   Mild lumbar spondylosis without acute osseous abnormality.     SANDY NAVARRO MD       Electronically Signed and Approved By: SANDY NAVARRO MD on 1/30/2023 at 16:42             XR Knee 3 View Bilateral    Result Date: 1/30/2023    Left knee.  Moderate tricompartmental osteoarthritis.  Right knee.  Minimal patellofemoral degenerative change.      SANDY NAVARRO MD       Electronically Signed and Approved By: SANDY NAVARRO MD on 1/30/2023 at 17:18             XR Hips Bilateral With or Without Pelvis 3-4 View    Result Date: 1/30/2023   Mild degenerative changes of both hips, right slightly greater than left.      SANDY NAVARRO MD       Electronically Signed and Approved By: SANDY NAVARRO MD on 1/30/2023 at 17:17                The above data has been reviewed by PONCE Valadez 02/24/2023 11:33 EST.          Patient Care Team:  Jailyn Shepard APRN as PCP - General (Internal Medicine)        ASSESSMENT & PLAN    Diagnoses and all orders for this visit:    1. Essential (primary) hypertension (Primary)  Assessment & Plan:  2/24/2023-patient's blood pressure still elevated.  Patient states he is taking his medications as prescribed.  I discussed with patient that I want him  to take Lotrel and the clonidine.  He has not been taking amlodipine.  He has not been taking clonidine from psych either.  We will have patient start taking both of those.  Patient is in quite a bit of pain and discomfort.  Patient declines cardiology referral.  Discussed with him the importance of getting blood pressure under control.  Discussed the importance of weight loss, healthy diet and increased exercise to help bring down his blood pressure.    2/9/2023-patient has been to follow-up on blood pressure today.  Patient admits to not taking his blood pressure or treatment eating a lot like he was supposed to.  He is taking his blood pressure medications.  I discussed with patient the importance of monitoring his blood pressure.  He is post to keep a log and bring it in to the office within 2 weeks.  I reiterated the possible adverse effects of not controlling blood pressure including but not limited to a stroke or heart attack.  Patient knowledges understanding.  Patient denies any chest pain, headaches or changes in vision.  Also encourage smoking cessation healthier diet and exercise.     1/26/2023-Pressure is very elevated.  Patient states he is taking his medications as prescribed.  Patient is not taking the Minipress that was given to him by psych.  Plan: We will switch patient over to Lotrel 10-40 mg daily and patient was encouraged to also take the Minipress.  I discussed with him the importance of getting his blood pressure under control.  Discussed the importance of smoking cessation, healthier diet and exercise, sodium restriction to bring down his blood pressure.  Discussed with patient the adverse effects of not controlling blood pressure eluding but not limited to CVA or MI..  Denies any chest pain, headache or blurred vision.  We will follow-up with patient in 2 weeks to follow-up on blood pressure.  Patient was instructed to keep blood pressure log      2. Nightmares  -     cloNIDine (Catapres)  0.1 MG tablet; Take 1 tablet by mouth Every Night for 30 days.  Dispense: 30 tablet; Refill: 1    3. DDD (degenerative disc disease), thoracolumbar  -     traMADol-acetaminophen (Ultracet) 37.5-325 MG per tablet; Take 1 tablet by mouth Every 6 (Six) Hours As Needed for Moderate Pain.  Dispense: 60 tablet; Refill: 0  -     ketorolac (TORADOL) injection 30 mg    4. Osteoarthritis of multiple joints, unspecified osteoarthritis type  Assessment & Plan:  Does not want to see pain management.  Patient states he has had issues with [employer].  Patient is in quite a bit of pain.  He is a  and has pretty significant arthritis.  We will see if we can get patient set up with a different pain specialist.  Discussed with him that I typically would defer pain management to pain management specialist.  We will give him some tramadol to try.    Orders:  -     traMADol-acetaminophen (Ultracet) 37.5-325 MG per tablet; Take 1 tablet by mouth Every 6 (Six) Hours As Needed for Moderate Pain.  Dispense: 60 tablet; Refill: 0  -     ketorolac (TORADOL) injection 30 mg    5. Controlled substance agreement signed  -     POC Urine Drug Screen [facility]    Other orders  -     Discontinue: amLODIPine (NORVASC) 10 MG tablet; Take 1 tablet by mouth Daily.  Dispense: 90 tablet; Refill: 1       Tobacco Use: High Risk   • Smoking Tobacco Use: Every Day   • Smokeless Tobacco Use: Current   • Passive Exposure: Not on file       Follow Up     Return in about 3 months (around 5/24/2023).    Please note that portions of this note were completed with a voice recognition program.    Patient was given instructions and counseling regarding his condition or for health maintenance advice. Please see specific information pulled into the AVS if appropriate.   I have reviewed information obtained and documented by others and I have confirmed the accuracy of this documented note.    PONCE Valadez

## 2023-02-28 ENCOUNTER — HOSPITAL ENCOUNTER (OUTPATIENT)
Dept: ULTRASOUND IMAGING | Facility: HOSPITAL | Age: 58
Discharge: HOME | End: 2023-02-28
Payer: MEDICAID

## 2023-02-28 ENCOUNTER — HOSPITAL ENCOUNTER (OUTPATIENT)
Dept: CT IMAGING | Facility: HOSPITAL | Age: 58
Discharge: HOME | End: 2023-02-28
Payer: MEDICAID

## 2023-02-28 DIAGNOSIS — Z72.0 TOBACCO USE: ICD-10-CM

## 2023-02-28 DIAGNOSIS — N28.89 RENAL MASS: ICD-10-CM

## 2023-02-28 DIAGNOSIS — R93.89 ABNORMAL CHEST CT: ICD-10-CM

## 2023-02-28 LAB
CREAT BLDA-MCNC: 1.3 MG/DL
EGFRCR SERPLBLD CKD-EPI 2021: 64.9 ML/MIN/1.73

## 2023-02-28 PROCEDURE — 0 IOHEXOL 300 MG/ML SOLUTION

## 2023-02-28 PROCEDURE — 82565 ASSAY OF CREATININE: CPT

## 2023-02-28 PROCEDURE — 71260 CT THORAX DX C+: CPT

## 2023-02-28 PROCEDURE — 76775 US EXAM ABDO BACK WALL LIM: CPT

## 2023-02-28 RX ADMIN — IOHEXOL 50 ML: 300 INJECTION, SOLUTION INTRAVENOUS at 09:10

## 2023-03-04 DIAGNOSIS — I10 ESSENTIAL (PRIMARY) HYPERTENSION: ICD-10-CM

## 2023-03-06 RX ORDER — ATORVASTATIN CALCIUM 10 MG/1
10 TABLET, FILM COATED ORAL DAILY
Qty: 90 TABLET | Refills: 0 | Status: SHIPPED | OUTPATIENT
Start: 2023-03-06

## 2023-03-06 RX ORDER — LISINOPRIL 40 MG/1
TABLET ORAL
Qty: 90 TABLET | Refills: 0 | Status: SHIPPED | OUTPATIENT
Start: 2023-03-06

## 2023-03-09 DIAGNOSIS — F51.5 NIGHTMARES: ICD-10-CM

## 2023-03-09 RX ORDER — PRAZOSIN HYDROCHLORIDE 1 MG/1
CAPSULE ORAL
Qty: 30 CAPSULE | Refills: 1 | Status: SHIPPED | OUTPATIENT
Start: 2023-03-09 | End: 2023-03-13

## 2023-03-13 ENCOUNTER — OFFICE VISIT (OUTPATIENT)
Dept: BEHAVIORAL HEALTH | Facility: CLINIC | Age: 58
End: 2023-03-13
Payer: MEDICAID

## 2023-03-13 VITALS
WEIGHT: 235.6 LBS | BODY MASS INDEX: 37.86 KG/M2 | SYSTOLIC BLOOD PRESSURE: 186 MMHG | HEART RATE: 99 BPM | DIASTOLIC BLOOD PRESSURE: 120 MMHG | HEIGHT: 66 IN

## 2023-03-13 DIAGNOSIS — F43.10 POST TRAUMATIC STRESS DISORDER (PTSD): ICD-10-CM

## 2023-03-13 DIAGNOSIS — F41.0 PANIC DISORDER: ICD-10-CM

## 2023-03-13 DIAGNOSIS — F41.1 GENERALIZED ANXIETY DISORDER: ICD-10-CM

## 2023-03-13 DIAGNOSIS — F51.5 NIGHTMARES: ICD-10-CM

## 2023-03-13 DIAGNOSIS — G47.00 INSOMNIA, UNSPECIFIED TYPE: ICD-10-CM

## 2023-03-13 DIAGNOSIS — F33.2 SEVERE EPISODE OF RECURRENT MAJOR DEPRESSIVE DISORDER, WITHOUT PSYCHOTIC FEATURES: Primary | ICD-10-CM

## 2023-03-13 PROCEDURE — 3080F DIAST BP >= 90 MM HG: CPT | Performed by: PHYSICIAN ASSISTANT

## 2023-03-13 PROCEDURE — 1160F RVW MEDS BY RX/DR IN RCRD: CPT | Performed by: PHYSICIAN ASSISTANT

## 2023-03-13 PROCEDURE — 1159F MED LIST DOCD IN RCRD: CPT | Performed by: PHYSICIAN ASSISTANT

## 2023-03-13 PROCEDURE — 99214 OFFICE O/P EST MOD 30 MIN: CPT | Performed by: PHYSICIAN ASSISTANT

## 2023-03-13 PROCEDURE — 3077F SYST BP >= 140 MM HG: CPT | Performed by: PHYSICIAN ASSISTANT

## 2023-03-13 RX ORDER — ARIPIPRAZOLE 2 MG/1
TABLET ORAL
COMMUNITY
Start: 2023-02-17 | End: 2023-03-13

## 2023-03-13 RX ORDER — CLONIDINE HYDROCHLORIDE 0.2 MG/1
0.2 TABLET ORAL
Qty: 30 TABLET | Refills: 1 | Status: SHIPPED | OUTPATIENT
Start: 2023-03-13 | End: 2023-04-12

## 2023-03-13 RX ORDER — PREGABALIN 75 MG/1
75 CAPSULE ORAL 3 TIMES DAILY
Qty: 90 CAPSULE | Refills: 1 | Status: SHIPPED | OUTPATIENT
Start: 2023-03-13 | End: 2023-03-17

## 2023-03-13 RX ORDER — ALPRAZOLAM 1 MG/1
1 TABLET ORAL 2 TIMES DAILY PRN
Qty: 60 TABLET | Refills: 0 | Status: SHIPPED | OUTPATIENT
Start: 2023-03-13 | End: 2023-04-12

## 2023-03-13 RX ORDER — QUETIAPINE FUMARATE 200 MG/1
TABLET, FILM COATED ORAL
Qty: 45 TABLET | Refills: 1 | Status: SHIPPED | OUTPATIENT
Start: 2023-03-13

## 2023-03-13 RX ORDER — PREGABALIN 100 MG/1
100 CAPSULE ORAL 3 TIMES DAILY
Qty: 90 CAPSULE | Refills: 2 | Status: CANCELLED | OUTPATIENT
Start: 2023-03-13 | End: 2024-03-12

## 2023-03-13 RX ORDER — AMLODIPINE BESYLATE 10 MG/1
1 TABLET ORAL DAILY
COMMUNITY
Start: 2023-02-24 | End: 2023-03-30

## 2023-03-13 RX ORDER — QUETIAPINE FUMARATE 100 MG/1
100 TABLET, FILM COATED ORAL NIGHTLY
Status: CANCELLED | OUTPATIENT
Start: 2023-03-13

## 2023-03-13 NOTE — PROGRESS NOTES
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 08/09/2022 2.21  0.70 - 3.10 10*3/mm3 Final   • Monocytes, Absolute 08/09/2022 0.82  0.10 - 0.90 10*3/mm3 Final   • Eosinophils, Absolute 08/09/2022 0.40  0.00 - 0.40 10*3/mm3 Final   • Basophils, Absolute 08/09/2022 0.08  0.00 - 0.20 10*3/mm3 Final   • Immature Grans, Absolute 08/09/2022 0.04  0.00 - 0.05 10*3/mm3 Final   • nRBC 08/09/2022 0.0  0.0 - 0.2 /100 WBC Final       EKG Results:  No orders to display       Imaging Results:  XR Chest 1 View    Result Date: 10/3/2022   No active disease is seen.       MIKAL HOPE MD       Electronically Signed and Approved By: MIKAL HOPE MD on 10/03/2022 at 12:47                 Assessment & Plan   Diagnoses and all orders for this visit:    1. Severe episode of recurrent major depressive disorder, without psychotic features (HCC) (Primary)  -     QUEtiapine (SEROquel) 200 MG tablet; Take 1 to 1.5 tab PO QHS PRN sleep  Dispense: 45 tablet; Refill: 1    2. Panic disorder  -     ALPRAZolam (Xanax) 1 MG tablet; Take 1 tablet by mouth 2 (Two) Times a Day As Needed for Anxiety for up to 30 days.  Dispense: 60 tablet; Refill: 0  -     pregabalin (Lyrica) 75 MG capsule; Take 1 capsule by mouth 3 (Three) Times a Day for 30 days.  Dispense: 90 capsule; Refill: 1  -     QUEtiapine (SEROquel) 200 MG tablet; Take 1 to 1.5 tab PO QHS PRN sleep  Dispense: 45 tablet; Refill:  1    3. Generalized anxiety disorder  -     pregabalin (Lyrica) 75 MG capsule; Take 1 capsule by mouth 3 (Three) Times a Day for 30 days.  Dispense: 90 capsule; Refill: 1  -     QUEtiapine (SEROquel) 200 MG tablet; Take 1 to 1.5 tab PO QHS PRN sleep  Dispense: 45 tablet; Refill: 1    4. Post traumatic stress disorder (PTSD)  -     QUEtiapine (SEROquel) 200 MG tablet; Take 1 to 1.5 tab PO QHS PRN sleep  Dispense: 45 tablet; Refill: 1    5. Insomnia, unspecified type  -     QUEtiapine (SEROquel) 200 MG tablet; Take 1 to 1.5 tab PO QHS PRN sleep  Dispense: 45 tablet; Refill: 1    6. Nightmares  -     cloNIDine (Catapres) 0.2 MG tablet; Take 1 tablet by mouth every night at bedtime for 30 days.  Dispense: 30 tablet; Refill: 1    Presentation seems most consistent with MDD, GREG, panic disorder, PTSD, insomnia, nightmares.  Patient has continued taking Seroquel, we will discontinue Abilify.  We will increase Seroquel for management of anxiety, depression, insomnia, and overall mood.  We will increase clonidine for management of nightmares.  We will continue Xanax only as needed for severe panic attacks.  I extensively counseled the patient on the risks including addiction, dependence, and misuse.  Patient has tried and failed gabapentin we will start on Lyrica for management of anxiety and overall mood.  Follow-up in 1 month.  Addressed all questions and concerns.    Visit Diagnoses:    ICD-10-CM ICD-9-CM   1. Severe episode of recurrent major depressive disorder, without psychotic features (HCC)  F33.2 296.33   2. Panic disorder  F41.0 300.01   3. Generalized anxiety disorder  F41.1 300.02   4. Post traumatic stress disorder (PTSD)  F43.10 309.81   5. Insomnia, unspecified type  G47.00 780.52   6. Nightmares  F51.5 307.47       PLAN:  1. Safety: No acute safety concerns at this time.  2. Therapy: Patient declines referral for psychotherapy.  3. Risk Assessment: Risk of self-harm acutely is moderate to severe.  Risk  factors include anxiety disorder, mood disorder, h/o suicide attempt in the past, and recent psychosocial stressors (pandemic). Protective factors include no family history, denies access to guns/weapons, no present SI, no history of self-harm in the past, minimal AODA, healthcare seeking, future orientation, willingness to engage in care.  Risk of self-harm chronically is also moderate to severe, but could be further elevated in the event of treatment noncompliance and/or AODA.  4. Labs/Diagnostics Ordered:   No orders of the defined types were placed in this encounter.    5. Medications:   New Medications Ordered This Visit   Medications   • ALPRAZolam (Xanax) 1 MG tablet     Sig: Take 1 tablet by mouth 2 (Two) Times a Day As Needed for Anxiety for up to 30 days.     Dispense:  60 tablet     Refill:  0   • cloNIDine (Catapres) 0.2 MG tablet     Sig: Take 1 tablet by mouth every night at bedtime for 30 days.     Dispense:  30 tablet     Refill:  1   • pregabalin (Lyrica) 75 MG capsule     Sig: Take 1 capsule by mouth 3 (Three) Times a Day for 30 days.     Dispense:  90 capsule     Refill:  1   • QUEtiapine (SEROquel) 200 MG tablet     Sig: Take 1 to 1.5 tab PO QHS PRN sleep     Dispense:  45 tablet     Refill:  1       Clonidine, Risks, benefits, alternatives, and side effects discussed with patient including sedation, dizziness/falls risk, hypotension, GI upset. After discussion of these risks and benefits, the patient voiced understanding and agreed to proceed.    Discussed all risks, benefits, alternatives, and side effects of Xanax including but not limited to risks of abuse, misuse, and addiction, which can lead to overdose or death; risks of dependence and withdrawal reactions; drowsiness, sedation, fatigue, depression, dizziness, ataxia, weakness, confusion, forgetfulness, hypotension, falls risk, respiratory depression, anterograde amnesia, paradoxical reactions such as hyperactivity or aggressive behavior;  and hallucinations. Pt educated on the need to practice safe sex while taking this med. Instructed pt to avoid performing tasks that require mental alertness such as driving or operating machinery. Discussed the need for pt to immediately call the office for any new or worsening symptoms, such as changes in mood or behavior, and all other concerns. Pt educated on med compliance, including the proper use and monitoring for signs and symptoms of abuse, misuse, and addiction. Pt verbalized understanding and is agreeable to taking Xanax. CARLOS obtained and USD ordered. Controlled substances agreement verbally signed. Addressed all questions and concerns.     Discussed all risks, benefits, alternatives, and side effects of Lyrica including but not limited to sedation, dizziness, GI upset, dry mouth, and weight gain. Pt instructed to avoid driving and doing other tasks or actions that require to be alert until knowing how the drug affects them.  Pt educated on the need to practice safe sex while taking this med. Discussed the need for pt to immediately call the office for any new or worsening symptoms, such as worsening depression; feeling nervous or restless; suicidal thoughts or actions; or other changes changes in mood or behavior, and all other concerns. Pt educated on med compliance and the risks of suddenly stopping this medication or missing doses. Pt verbalized understanding and is agreeable to taking Lyrica. Addressed all questions and concerns.  Will order UDS and obtain CARLOS report. Pt verbally signed controlled substances agreement.    6. Follow up:   F/u in 1 month.    TREATMENT PLAN/GOALS: Continue supportive psychotherapy efforts and medications as indicated. Treatment and medication options discussed during today's visit. Patient ackowledged and verbally consented to continue with current treatment plan and was educated on the importance of compliance with treatment and follow-up  appointments.    MEDICATION ISSUES:  CARLOS reviewed as expected.  Discussed medication options and treatment plan of prescribed medication as well as the risks, benefits, and side effects including potential falls, possible impaired driving and metabolic adversities among others. Patient is agreeable to call the office with any worsening of symptoms or onset of side effects. Patient is agreeable to call 911 or go to the nearest ER should he/she begin having SI/HI. No medication side effects or related complaints today.            This document has been electronically signed by Page Lee PA-C  March 13, 2023 11:48 EDT      Part of this note may be an electronic transcription/translation of spoken language to printed text using the Dragon Dictation System.

## 2023-03-14 ENCOUNTER — PRIOR AUTHORIZATION (OUTPATIENT)
Dept: PSYCHIATRY | Facility: CLINIC | Age: 58
End: 2023-03-14
Payer: MEDICAID

## 2023-03-14 DIAGNOSIS — F41.1 GENERALIZED ANXIETY DISORDER: Primary | ICD-10-CM

## 2023-03-14 DIAGNOSIS — F41.0 PANIC DISORDER: ICD-10-CM

## 2023-03-14 NOTE — TELEPHONE ENCOUNTER
Prior authorization for PREGABALIN 75MG CAPSULES initiated and processed. Waiting for response from insurance.     Processed via [facility]  Key: [id number]

## 2023-03-15 NOTE — TELEPHONE ENCOUNTER
Prior authorization for PREGABALIN 75MG CAPSULES processed and DENIED.     BEHAVIORAL HEALTH - SCAN - PREGABALIN [facility], 03/14/2023 (03/15/2023)    Please review and advise.

## 2023-03-17 RX ORDER — GABAPENTIN 400 MG/1
400 CAPSULE ORAL 3 TIMES DAILY
Qty: 90 CAPSULE | Refills: 1 | Status: SHIPPED | OUTPATIENT
Start: 2023-03-17 | End: 2023-04-16

## 2023-03-17 NOTE — TELEPHONE ENCOUNTER
Attempted to reach pt again, no answer but lvm to give the office a call back about his medications at [phone]

## 2023-03-21 ENCOUNTER — TELEPHONE (OUTPATIENT)
Dept: PSYCHIATRY | Facility: CLINIC | Age: 58
End: 2023-03-21
Payer: MEDICAID

## 2023-03-21 NOTE — TELEPHONE ENCOUNTER
ATTEMPTED TO CONTACT PT(PATIENT)      NO ANSWER      LEFT VOICEMAIL WITH INSTRUCTIONS TO RETURN CALL TO OFFICE AT [phone]

## 2023-03-21 NOTE — TELEPHONE ENCOUNTER
Patient called and stated that when he last seen his provider that his medication was not sent in.     Patient is requesting medication be sent in.

## 2023-03-22 DIAGNOSIS — F43.10 POST TRAUMATIC STRESS DISORDER (PTSD): ICD-10-CM

## 2023-03-22 DIAGNOSIS — F41.1 GENERALIZED ANXIETY DISORDER: ICD-10-CM

## 2023-03-22 DIAGNOSIS — F41.0 PANIC DISORDER: ICD-10-CM

## 2023-03-22 DIAGNOSIS — F33.2 SEVERE EPISODE OF RECURRENT MAJOR DEPRESSIVE DISORDER, WITHOUT PSYCHOTIC FEATURES: ICD-10-CM

## 2023-03-22 RX ORDER — ARIPIPRAZOLE 2 MG/1
TABLET ORAL
Qty: 30 TABLET | Refills: 1 | OUTPATIENT
Start: 2023-03-22

## 2023-03-22 RX ORDER — AMLODIPINE BESYLATE AND BENAZEPRIL HYDROCHLORIDE 10; 40 MG/1; MG/1
1 CAPSULE ORAL DAILY
Qty: 30 CAPSULE | Refills: 1 | Status: SHIPPED | OUTPATIENT
Start: 2023-03-22 | End: 2024-03-21

## 2023-03-22 NOTE — TELEPHONE ENCOUNTER
Requested: ARIPIPRAZOLE 2MG TABLETS      The original prescription was discontinued on 1/26/2023 by Jailyn Shepard APRN for the following reason: Non-compliance. Renewing this prescription may not be appropriate.    NEXT APPT 04/14/2023    LAST REFILL: 02/17/2023

## 2023-03-23 ENCOUNTER — TELEPHONE (OUTPATIENT)
Dept: PSYCHIATRY | Facility: CLINIC | Age: 58
End: 2023-03-23
Payer: MEDICAID

## 2023-03-23 NOTE — TELEPHONE ENCOUNTER
Called and spoke with pt to clarify on which medication he was needing refilled, He states he never got to  his prescription for the XANAX.   Called pt's pharmacy the last time the prescription was filled was on 02/14.  Please advise..

## 2023-03-23 NOTE — TELEPHONE ENCOUNTER
"Pt left message with [facility] to report \"NEEDING A REFILL ON THEIR PRILOZAM; REQ CALL BACK\"    Pt appears to be taking Alprazolam 1mg  ALPRAZolam (Xanax) 1 MG tablet (03/13/2023)    Pt does not need a refill on this medication at this time, unless pt was trying to specify a different medication.     Please call pt back to notify refill last sent on 03/13, or clarify which medication he needs refill on.   "

## 2023-03-28 ENCOUNTER — LAB (OUTPATIENT)
Dept: LAB | Facility: HOSPITAL | Age: 58
End: 2023-03-28
Payer: MEDICAID

## 2023-03-28 ENCOUNTER — HOSPITAL ENCOUNTER (OUTPATIENT)
Dept: GENERAL RADIOLOGY | Facility: HOSPITAL | Age: 58
Discharge: HOME | End: 2023-03-28
Payer: MEDICAID

## 2023-03-28 ENCOUNTER — OFFICE VISIT (OUTPATIENT)
Dept: PULMONOLOGY | Facility: CLINIC | Age: 58
End: 2023-03-28
Payer: MEDICAID

## 2023-03-28 VITALS
RESPIRATION RATE: 19 BRPM | SYSTOLIC BLOOD PRESSURE: 142 MMHG | HEART RATE: 87 BPM | WEIGHT: 246 LBS | BODY MASS INDEX: 37.28 KG/M2 | TEMPERATURE: 98.9 F | OXYGEN SATURATION: 88 % | DIASTOLIC BLOOD PRESSURE: 86 MMHG | HEIGHT: 68 IN

## 2023-03-28 DIAGNOSIS — J44.1 COPD WITH ACUTE EXACERBATION: Primary | ICD-10-CM

## 2023-03-28 DIAGNOSIS — J44.1 COPD WITH ACUTE EXACERBATION: ICD-10-CM

## 2023-03-28 DIAGNOSIS — Z72.0 TOBACCO ABUSE: ICD-10-CM

## 2023-03-28 DIAGNOSIS — J18.9 PNEUMONIA OF RIGHT LOWER LOBE DUE TO INFECTIOUS ORGANISM: ICD-10-CM

## 2023-03-28 LAB
BASOPHILS # BLD AUTO: 0.06 10*3/MM3 (ref 0–0.2)
BASOPHILS NFR BLD AUTO: 0.6 % (ref 0–1.5)
DEPRECATED RDW RBC AUTO: 45.3 FL (ref 37–54)
EOSINOPHIL # BLD AUTO: 0.28 10*3/MM3 (ref 0–0.4)
EOSINOPHIL NFR BLD AUTO: 2.8 % (ref 0.3–6.2)
ERYTHROCYTE [DISTWIDTH] IN BLOOD BY AUTOMATED COUNT: 13.4 % (ref 12.3–15.4)
HCT VFR BLD AUTO: 42.2 % (ref 37.5–51)
HGB BLD-MCNC: 14.4 G/DL (ref 13–17.7)
IMM GRANULOCYTES # BLD AUTO: 0.06 10*3/MM3 (ref 0–0.05)
IMM GRANULOCYTES NFR BLD AUTO: 0.6 % (ref 0–0.5)
LYMPHOCYTES # BLD AUTO: 1.97 10*3/MM3 (ref 0.7–3.1)
LYMPHOCYTES NFR BLD AUTO: 19.6 % (ref 19.6–45.3)
MCH RBC QN AUTO: 31.2 PG (ref 26.6–33)
MCHC RBC AUTO-ENTMCNC: 34.1 G/DL (ref 31.5–35.7)
MCV RBC AUTO: 91.5 FL (ref 79–97)
MONOCYTES # BLD AUTO: 0.68 10*3/MM3 (ref 0.1–0.9)
MONOCYTES NFR BLD AUTO: 6.8 % (ref 5–12)
NEUTROPHILS NFR BLD AUTO: 6.98 10*3/MM3 (ref 1.7–7)
NEUTROPHILS NFR BLD AUTO: 69.6 % (ref 42.7–76)
NRBC BLD AUTO-RTO: 0 /100 WBC (ref 0–0.2)
PLATELET # BLD AUTO: 246 10*3/MM3 (ref 140–450)
PMV BLD AUTO: 11.6 FL (ref 6–12)
RBC # BLD AUTO: 4.61 10*6/MM3 (ref 4.14–5.8)
WBC NRBC COR # BLD: 10.03 10*3/MM3 (ref 3.4–10.8)

## 2023-03-28 PROCEDURE — 3077F SYST BP >= 140 MM HG: CPT | Performed by: INTERNAL MEDICINE

## 2023-03-28 PROCEDURE — 36415 COLL VENOUS BLD VENIPUNCTURE: CPT

## 2023-03-28 PROCEDURE — 3079F DIAST BP 80-89 MM HG: CPT | Performed by: INTERNAL MEDICINE

## 2023-03-28 PROCEDURE — 87205 SMEAR GRAM STAIN: CPT

## 2023-03-28 PROCEDURE — 71046 X-RAY EXAM CHEST 2 VIEWS: CPT

## 2023-03-28 PROCEDURE — 1160F RVW MEDS BY RX/DR IN RCRD: CPT | Performed by: INTERNAL MEDICINE

## 2023-03-28 PROCEDURE — 87070 CULTURE OTHR SPECIMN AEROBIC: CPT

## 2023-03-28 PROCEDURE — 1159F MED LIST DOCD IN RCRD: CPT | Performed by: INTERNAL MEDICINE

## 2023-03-28 PROCEDURE — 85025 COMPLETE CBC W/AUTO DIFF WBC: CPT

## 2023-03-28 PROCEDURE — 99214 OFFICE O/P EST MOD 30 MIN: CPT | Performed by: INTERNAL MEDICINE

## 2023-03-28 RX ORDER — AMOXICILLIN AND CLAVULANATE POTASSIUM 875; 125 MG/1; MG/1
1 TABLET, FILM COATED ORAL 2 TIMES DAILY
Qty: 14 TABLET | Refills: 0 | Status: SHIPPED | OUTPATIENT
Start: 2023-03-28 | End: 2023-04-04

## 2023-03-28 RX ORDER — PREDNISONE 10 MG/1
TABLET ORAL
Qty: 31 TABLET | Refills: 0 | Status: SHIPPED | OUTPATIENT
Start: 2023-03-28

## 2023-03-28 RX ORDER — ALBUTEROL SULFATE 0.63 MG/3ML
3 SOLUTION RESPIRATORY (INHALATION) EVERY 6 HOURS PRN
Qty: 300 EACH | Refills: 5 | Status: SHIPPED | OUTPATIENT
Start: 2023-03-28

## 2023-03-28 RX ORDER — ALBUTEROL SULFATE 90 UG/1
2 AEROSOL, METERED RESPIRATORY (INHALATION) EVERY 4 HOURS PRN
Qty: 1 G | Refills: 5 | Status: SHIPPED | OUTPATIENT
Start: 2023-03-28

## 2023-03-28 NOTE — H&P (VIEW-ONLY)
Pulmonary Office Follow-up    Subjective     Asad Meyer is seen today at the office for   Chief Complaint   Patient presents with   • COPD   • Asthma   • Follow-up     3 Month    • Shortness of Breath   • Wheezing     Some         HPI  Asad Meyer is a 58 y.o. male with a PMH significant for COPD presents for follow-up patient has been having cough with wheeze and chest congestion patient has been having mucopurulent sputum he continues to smoke but is cutting back patient denies any fever chest pain or hemoptysis      Tobacco use history:  Type: cigarettes  Amount: 1 ppd  Duration: 20 years  Cessation: n   Willing to quit: Yes      Patient Active Problem List   Diagnosis   • Asthma   • Essential (primary) hypertension   • Psychophysiological insomnia   • GREG (generalized anxiety disorder)   • Polyarthralgia   • Chronic obstructive pulmonary disease (HCC)   • Mixed hyperlipidemia   • DDD (degenerative disc disease), thoracolumbar   • Osteoarthritis of multiple joints       Review of Systems  Review of Systems   Respiratory: Positive for cough, shortness of breath and wheezing.    All other systems reviewed and are negative.    As described in the HPI. Otherwise, remainder of ROS (14 systems) were negative.    Medications, Allergies, Social, and Family Histories reviewed as per EMR.    Objective     Vitals:    03/28/23 1112   BP: 142/86   Pulse: 87   Resp: 19   Temp: 98.9 °F (37.2 °C)   SpO2: (!) 88%         03/28/23  1112   Weight: 112 kg (246 lb)       Physical Exam  Vitals and nursing note reviewed.   Constitutional:       Appearance: He is ill-appearing.   HENT:      Head: Normocephalic and atraumatic.      Nose: Nose normal.      Mouth/Throat:      Mouth: Mucous membranes are moist.   Eyes:      Conjunctiva/sclera: Conjunctivae normal.      Pupils: Pupils are equal, round, and reactive to light.   Cardiovascular:      Rate and Rhythm: Normal rate and regular rhythm.      Pulses: Normal pulses.      Heart  sounds: Normal heart sounds.   Pulmonary:      Effort: Pulmonary effort is normal.      Breath sounds: Wheezing and rhonchi present.   Abdominal:      General: Abdomen is flat. Bowel sounds are normal.      Palpations: Abdomen is soft.   Musculoskeletal:         General: Normal range of motion.      Cervical back: Normal range of motion and neck supple.   Skin:     General: Skin is warm.      Capillary Refill: Capillary refill takes less than 2 seconds.   Neurological:      General: No focal deficit present.      Mental Status: He is alert and oriented to person, place, and time.   Psychiatric:         Mood and Affect: Mood normal.         XR Spine Lumbar 2 or 3 View    Result Date: 1/30/2023   Mild lumbar spondylosis without acute osseous abnormality.     SANDY NAVARRO MD       Electronically Signed and Approved By: SANDY NAVARRO MD on 1/30/2023 at 16:42             CT Chest With Contrast Diagnostic    Result Date: 2/28/2023     1. Interval development of extensive peribronchial ground-glass opacities and reticular nodular densities throughout the right lung, greatest in the right upper lobe and right lower lobe, as well as peribronchial thickening.  The rapid interval development suggests an infectious or inflammatory process.  Correlate clinically.  Follow-up evaluation may be of value to document resolution.  2. Pulmonary emphysematous changes and scarring.  3. Previously seen partial opacification of left upper lobe and lower lobe bronchi is no longer evident, and may have been due to mucous plugging.     JERALD JALLOH MD       Electronically Signed and Approved By: JERALD JALLOH MD on 2/28/2023 at 13:09             XR Knee 3 View Bilateral    Result Date: 1/30/2023    Left knee.  Moderate tricompartmental osteoarthritis.  Right knee.  Minimal patellofemoral degenerative change.      SANDY NAVARRO MD       Electronically Signed and Approved By: SANDY NAVARRO MD on 1/30/2023 at 17:18             US Renal  Bilateral    Result Date: 2/28/2023    Bilateral renal ultrasound demonstrating benign-appearing renal cysts.  No solid renal mass is seen.      MIKAL HOPE MD       Electronically Signed and Approved By: MIKAL HOPE MD on 2/28/2023 at 10:58             XR Hips Bilateral With or Without Pelvis 3-4 View    Result Date: 1/30/2023   Mild degenerative changes of both hips, right slightly greater than left.      SANDY NAVARRO MD       Electronically Signed and Approved By: SANDY NAVARRO MD on 1/30/2023 at 17:17                Assessment & Plan     Diagnoses and all orders for this visit:    1. COPD with acute exacerbation (HCC) (Primary)  -     CBC & Differential; Future  -     Gram Stain With / Sputum Cult Rflx ([facility]) - Sputum, Trachea; Future    2. Pneumonia of right lower lobe due to infectious organism  -     CBC & Differential; Future  -     Gram Stain With / Sputum Cult Rflx ([facility]) - Sputum, Trachea; Future    3. Tobacco abuse  -     CBC & Differential; Future  -     Gram Stain With / Sputum Cult Rflx ([facility]) - Sputum, Trachea; Future    Other orders  -     amoxicillin-clavulanate (AUGMENTIN) 875-125 MG per tablet; Take 1 tablet by mouth 2 (Two) Times a Day for 7 days.  Dispense: 14 tablet; Refill: 0  -     predniSONE (DELTASONE) 10 MG tablet; Take 4 tabs daily x 3 days, then take 3 tabs daily x 3 days, then take 2 tabs daily x 3 days, then take 1 tab daily x 3 days  Dispense: 31 tablet; Refill: 0  -     albuterol (ACCUNEB) 0.63 MG/3ML nebulizer solution; Take 3 mL by nebulization Every 6 (Six) Hours As Needed for Wheezing.  Dispense: 300 each; Refill: 5  -     albuterol sulfate HFA (Ventolin HFA) 108 (90 Base) MCG/ACT inhaler; Inhale 2 puffs Every 4 (Four) Hours As Needed for Wheezing or Shortness of Air.  Dispense: 1 g; Refill: 5         Discussion/ Recommendations:   Patient is advised to stop smoking and he is going to work on it  CT scan reviewed shows evidence of interstitial and reticular  infiltrates right lung peribronchial we will start him on Augmentin along with prednisone  Advised to continue Trelegy daily  Albuterol inhaler and neb treatments as needed  Follow-up in 1 month with a repeat chest x-ray  Vaccinations discussed and recommended    Class 2 Severe Obesity (BMI >=35 and <=39.9). Obesity-related health conditions include the following: none. Obesity is unchanged. BMI is is above average; BMI management plan is completed. We discussed low calorie, low carb based diet program, portion control and increasing exercise.        Return in about 1 month (around 4/28/2023).          This document has been electronically signed by Michael Haile MD on March 28, 2023 11:23 EDT

## 2023-03-30 ENCOUNTER — OFFICE VISIT (OUTPATIENT)
Dept: INTERNAL MEDICINE | Facility: CLINIC | Age: 58
End: 2023-03-30
Payer: MEDICAID

## 2023-03-30 ENCOUNTER — TRANSCRIBE ORDERS (OUTPATIENT)
Dept: ADMINISTRATIVE | Facility: HOSPITAL | Age: 58
End: 2023-03-30
Payer: MEDICAID

## 2023-03-30 VITALS
HEART RATE: 90 BPM | TEMPERATURE: 98.8 F | HEIGHT: 68 IN | BODY MASS INDEX: 37.31 KG/M2 | DIASTOLIC BLOOD PRESSURE: 118 MMHG | SYSTOLIC BLOOD PRESSURE: 189 MMHG | WEIGHT: 246.2 LBS | RESPIRATION RATE: 18 BRPM | OXYGEN SATURATION: 94 %

## 2023-03-30 DIAGNOSIS — I10 ESSENTIAL (PRIMARY) HYPERTENSION: Primary | ICD-10-CM

## 2023-03-30 DIAGNOSIS — M54.16 LUMBAR RADICULOPATHY: Primary | ICD-10-CM

## 2023-03-30 DIAGNOSIS — F17.210 CIGARETTE NICOTINE DEPENDENCE WITHOUT COMPLICATION: ICD-10-CM

## 2023-03-30 DIAGNOSIS — R60.9 FLUID RETENTION: ICD-10-CM

## 2023-03-30 DIAGNOSIS — J44.9 CHRONIC OBSTRUCTIVE PULMONARY DISEASE, UNSPECIFIED COPD TYPE: ICD-10-CM

## 2023-03-30 RX ORDER — FUROSEMIDE 20 MG/1
20 TABLET ORAL 2 TIMES DAILY
Qty: 60 TABLET | Refills: 0 | Status: SHIPPED | OUTPATIENT
Start: 2023-03-30

## 2023-03-30 RX ORDER — NICOTINE 21 MG/24HR
1 PATCH, TRANSDERMAL 24 HOURS TRANSDERMAL EVERY 24 HOURS
Qty: 28 EACH | Refills: 0 | Status: SHIPPED | OUTPATIENT
Start: 2023-03-30

## 2023-03-30 NOTE — ASSESSMENT & PLAN NOTE
Patient does admit to smoking.  He does have COPD.  Pneumonia currently.  Discussed with him the importance of smoking cessation.  He states he is willing to try the patches.  Discussed with patient that he cannot smoke with patches on.

## 2023-03-30 NOTE — ASSESSMENT & PLAN NOTE
Improving with Trelegy.  He is also being treated for pneumonia at this time.  Continue with Trelegy.    He has DuoNebs as well.  Recommend that he stop smoking.  Continue with current medication regimen.  Finish antibiotics and prednisone from pulmonology.

## 2023-03-30 NOTE — ASSESSMENT & PLAN NOTE
3/30/2023-patient is here today to discuss blood pressure medications.  Patient's blood pressure is elevated today in the office.  He has been sick and is being treated with steroids right now.  His blood pressure when he was at the pulmonology office was much better earlier this week.  Patient mitts drinking several beers last evening.  Patient does have bilateral lower extremity edema.  I discussed with him this could be the amlodipine.  I am going to add in Lasix 20 mg twice daily.  We will check BMP next week.  Also recommend sodium restriction.  Discussed with the patient the importance of getting his blood pressure down.  Smoking cessation and alcohol cessation highly encouraged.    2/24/2023-patient's blood pressure still elevated.  Patient states he is taking his medications as prescribed.  I discussed with patient that I want him to take Lotrel and the clonidine.  He has not been taking amlodipine.  He has not been taking clonidine from psych either.  We will have patient start taking both of those.  Patient is in quite a bit of pain and discomfort.  Patient declines cardiology referral.  Discussed with him the importance of getting blood pressure under control.  Discussed the importance of weight loss, healthy diet and increased exercise to help bring down his blood pressure.     2/9/2023-patient has been to follow-up on blood pressure today.  Patient admits to not taking his blood pressure or treatment eating a lot like he was supposed to.  He is taking his blood pressure medications.  I discussed with patient the importance of monitoring his blood pressure.  He is post to keep a log and bring it in to the office within 2 weeks.  I reiterated the possible adverse effects of not controlling blood pressure including but not limited to a stroke or heart attack.  Patient knowledges understanding.  Patient denies any chest pain, headaches or changes in vision.  Also encourage smoking cessation healthier diet  and exercise.     1/26/2023-Pressure is very elevated.  Patient states he is taking his medications as prescribed.  Patient is not taking the Minipress that was given to him by psych.  Plan: We will switch patient over to Lotrel 10-40 mg daily and patient was encouraged to also take the Minipress.  I discussed with him the importance of getting his blood pressure under control.  Discussed the importance of smoking cessation, healthier diet and exercise, sodium restriction to bring down his blood pressure.  Discussed with patient the adverse effects of not controlling blood pressure eluding but not limited to CVA or MI..  Denies any chest pain, headache or blurred vision.  We will follow-up with patient in 2 weeks to follow-up on blood pressure.  Patient was instructed to keep blood pressure log

## 2023-03-30 NOTE — PROGRESS NOTES
Chief Complaint  Follow-up (Pt states that he is being seen for a follow up and he states that he is retaining water. He is wanting to see about the right medication for him. He states that he does have to prop his feet up at night. )    History of Present Illness  SUBJECTIVE  Asad Meyer presents to [facility] to discuss fluid retention.     He is being treated for pneumonia. Feeling much better.    Patient states he is retaining quite a bit of fluid. He states that he is trying to lose weight and eating smaller meals.   He has noticed that he is retaining fluid. He states sometimes it is worse in the evening. Denies worsening shortness of breath.     Denies any chest pain, shortness of breath, palpitations, nausea, vomiting, diarrhea, issues with bowel or bladder function.       Past Medical History:   Diagnosis Date   • Anxiety    • Asthma    • Asthma, extrinsic    • Chronic pain disorder    • Depression    • Emphysema of lung ([facility]ly [facility])    • PTSD (post-traumatic stress disorder)    • Rheumatoid arthritis ([facility]ly [facility])    • Schizoaffective disorder (Formerly [facility])    • Self-injurious behavior    • Suicide attempt ([facility]ly [facility])       Family History   Problem Relation Age of Onset   • Alcohol abuse Father    • Asthma Father    • Emphysema Father    • Heart failure Father       Past Surgical History:   Procedure Laterality Date   • ANKLE SURGERY Right         Current Outpatient Medications:   •  albuterol (ACCUNEB) 0.63 MG/3ML nebulizer solution, Take 3 mL by nebulization Every 6 (Six) Hours As Needed for Wheezing., Disp: 300 each, Rfl: 5  •  albuterol sulfate HFA (Ventolin HFA) 108 (90 Base) MCG/ACT inhaler, Inhale 2 puffs Every 4 (Four) Hours As Needed for Wheezing or Shortness of Air., Disp: 1 g, Rfl: 5  •  ALPRAZolam (Xanax) 1 MG tablet, Take 1 tablet by mouth 2 (Two) Times a Day As Needed for Anxiety for up to 30 days., Disp: 60 tablet, Rfl: 0  •  amLODIPine-benazepril (LOTREL) 10-40 MG per capsule, TAKE  "1 CAPSULE BY MOUTH DAILY, Disp: 30 capsule, Rfl: 1  •  amoxicillin-clavulanate (AUGMENTIN) 875-125 MG per tablet, Take 1 tablet by mouth 2 (Two) Times a Day for 7 days., Disp: 14 tablet, Rfl: 0  •  atorvastatin (LIPITOR) 10 MG tablet, TAKE 1 TABLET BY MOUTH DAILY, Disp: 90 tablet, Rfl: 0  •  cetirizine (zyrTEC) 10 MG tablet, Take 1 tablet by mouth Daily., Disp: 90 tablet, Rfl: 1  •  cloNIDine (Catapres) 0.2 MG tablet, Take 1 tablet by mouth every night at bedtime for 30 days., Disp: 30 tablet, Rfl: 1  •  Fluticasone-Umeclidin-Vilant 200-62.5-25 MCG/ACT aerosol powder , Inhale 200 mg Daily., Disp: 60 each, Rfl: 2  •  gabapentin (Neurontin) 400 MG capsule, Take 1 capsule by mouth 3 (Three) Times a Day for 30 days., Disp: 90 capsule, Rfl: 1  •  lisinopril (PRINIVIL,ZESTRIL) 40 MG tablet, TAKE 1 TABLET BY MOUTH DAILY, Disp: 90 tablet, Rfl: 0  •  meloxicam (MOBIC) 7.5 MG tablet, Take 1 tablet by mouth Daily., Disp: 30 tablet, Rfl: 0  •  predniSONE (DELTASONE) 10 MG tablet, Take 4 tabs daily x 3 days, then take 3 tabs daily x 3 days, then take 2 tabs daily x 3 days, then take 1 tab daily x 3 days, Disp: 31 tablet, Rfl: 0  •  QUEtiapine (SEROquel) 200 MG tablet, Take 1 to 1.5 tab PO QHS PRN sleep, Disp: 45 tablet, Rfl: 1  •  tiotropium (SPIRIVA) 18 MCG per inhalation capsule, Place 1 capsule into inhaler and inhale Daily., Disp: 90 capsule, Rfl: 3  •  traMADol-acetaminophen (Ultracet) 37.5-325 MG per tablet, Take 1 tablet by mouth Every 6 (Six) Hours As Needed for Moderate Pain., Disp: 60 tablet, Rfl: 0  •  furosemide (LASIX) 20 MG tablet, Take 1 tablet by mouth 2 (Two) Times a Day., Disp: 60 tablet, Rfl: 0  •  nicotine (Nicoderm CQ) 21 MG/24HR patch, Place 1 patch on the skin as directed by provider Daily. Do not smoke with nicotine patches on, Disp: 28 each, Rfl: 0    OBJECTIVE  Vital Signs:   BP (!) 189/118 (BP Location: Right arm)   Pulse 90   Temp 98.8 °F (37.1 °C) (Temporal)   Resp 18   Ht 173.7 cm (68.39\")   Wt " "112 kg (246 lb 3.2 oz)   SpO2 94%   BMI 37.01 kg/m²    Estimated body mass index is 37.01 kg/m² as calculated from the following:    Height as of this encounter: 173.7 cm (68.39\").    Weight as of this encounter: 112 kg (246 lb 3.2 oz).     Wt Readings from Last 3 Encounters:   03/30/23 112 kg (246 lb 3.2 oz)   03/28/23 112 kg (246 lb)   03/13/23 107 kg (235 lb 9.6 oz)     BP Readings from Last 3 Encounters:   03/30/23 (!) 189/118   03/28/23 142/86   03/13/23 (!) 186/120       Physical Exam  Vitals and nursing note reviewed.   Constitutional:       Appearance: Normal appearance.   HENT:      Head: Normocephalic.   Eyes:      Extraocular Movements: Extraocular movements intact.      Conjunctiva/sclera: Conjunctivae normal.   Cardiovascular:      Rate and Rhythm: Normal rate and regular rhythm.      Heart sounds: Normal heart sounds. No murmur heard.  Pulmonary:      Effort: Pulmonary effort is normal.      Breath sounds: Wheezing and rhonchi present. No rales.   Abdominal:      General: Bowel sounds are normal.      Palpations: Abdomen is soft.      Tenderness: There is no abdominal tenderness. There is no guarding.   Musculoskeletal:         General: Swelling present. Normal range of motion.      Right lower leg: Edema (2+ pitting) present.      Left lower leg: Edema (2+ pitting) present.   Skin:     General: Skin is warm and dry.   Neurological:      General: No focal deficit present.      Mental Status: He is alert. Mental status is at baseline.   Psychiatric:         Mood and Affect: Mood normal.         Thought Content: Thought content normal.          Result Review        XR Chest 2 View    Result Date: 3/28/2023   Minimal airspace disease in the left lung base, most likely in the lingula.     Ayush Holbrook MD       Electronically Signed and Approved By: Ayush Holbrook MD on 3/28/2023 at 15:10             XR Spine Lumbar 2 or 3 View    Result Date: 1/30/2023   Mild lumbar spondylosis without acute osseous " abnormality.     SANDY NAVARRO MD       Electronically Signed and Approved By: SANDY NAVARRO MD on 1/30/2023 at 16:42             CT Chest With Contrast Diagnostic    Result Date: 2/28/2023     1. Interval development of extensive peribronchial ground-glass opacities and reticular nodular densities throughout the right lung, greatest in the right upper lobe and right lower lobe, as well as peribronchial thickening.  The rapid interval development suggests an infectious or inflammatory process.  Correlate clinically.  Follow-up evaluation may be of value to document resolution.  2. Pulmonary emphysematous changes and scarring.  3. Previously seen partial opacification of left upper lobe and lower lobe bronchi is no longer evident, and may have been due to mucous plugging.     JERALD JALLOH MD       Electronically Signed and Approved By: JERALD JALLOH MD on 2/28/2023 at 13:09             XR Knee 3 View Bilateral    Result Date: 1/30/2023    Left knee.  Moderate tricompartmental osteoarthritis.  Right knee.  Minimal patellofemoral degenerative change.      SANDY NAVARRO MD       Electronically Signed and Approved By: SNADY NAVARRO MD on 1/30/2023 at 17:18             US Renal Bilateral    Result Date: 2/28/2023    Bilateral renal ultrasound demonstrating benign-appearing renal cysts.  No solid renal mass is seen.      MIKAL HOPE MD       Electronically Signed and Approved By: MIKAL HOPE MD on 2/28/2023 at 10:58             XR Hips Bilateral With or Without Pelvis 3-4 View    Result Date: 1/30/2023   Mild degenerative changes of both hips, right slightly greater than left.      SANDY NAVARRO MD       Electronically Signed and Approved By: SANDY NAVARRO MD on 1/30/2023 at 17:17                The above data has been reviewed by PONCE Valadez 03/30/2023 11:05 EDT.          Patient Care Team:  Jailyn Shepard APRN as PCP - General (Internal Medicine)       ASSESSMENT & PLAN    Diagnoses and all orders for this  visit:    1. Essential (primary) hypertension (Primary)  Assessment & Plan:  3/30/2023-patient is here today to discuss blood pressure medications.  Patient's blood pressure is elevated today in the office.  He has been sick and is being treated with steroids right now.  His blood pressure when he was at the pulmonology office was much better earlier this week.  Patient mitts drinking several beers last evening.  Patient does have bilateral lower extremity edema.  I discussed with him this could be the amlodipine.  I am going to add in Lasix 20 mg twice daily.  We will check BMP next week.  Also recommend sodium restriction.  Discussed with the patient the importance of getting his blood pressure down.  Smoking cessation and alcohol cessation highly encouraged.    2/24/2023-patient's blood pressure still elevated.  Patient states he is taking his medications as prescribed.  I discussed with patient that I want him to take Lotrel and the clonidine.  He has not been taking amlodipine.  He has not been taking clonidine from psych either.  We will have patient start taking both of those.  Patient is in quite a bit of pain and discomfort.  Patient declines cardiology referral.  Discussed with him the importance of getting blood pressure under control.  Discussed the importance of weight loss, healthy diet and increased exercise to help bring down his blood pressure.     2/9/2023-patient has been to follow-up on blood pressure today.  Patient admits to not taking his blood pressure or treatment eating a lot like he was supposed to.  He is taking his blood pressure medications.  I discussed with patient the importance of monitoring his blood pressure.  He is post to keep a log and bring it in to the office within 2 weeks.  I reiterated the possible adverse effects of not controlling blood pressure including but not limited to a stroke or heart attack.  Patient knowledges understanding.  Patient denies any chest pain,  headaches or changes in vision.  Also encourage smoking cessation healthier diet and exercise.     1/26/2023-Pressure is very elevated.  Patient states he is taking his medications as prescribed.  Patient is not taking the Minipress that was given to him by psych.  Plan: We will switch patient over to Lotrel 10-40 mg daily and patient was encouraged to also take the Minipress.  I discussed with him the importance of getting his blood pressure under control.  Discussed the importance of smoking cessation, healthier diet and exercise, sodium restriction to bring down his blood pressure.  Discussed with patient the adverse effects of not controlling blood pressure eluding but not limited to CVA or MI..  Denies any chest pain, headache or blurred vision.  We will follow-up with patient in 2 weeks to follow-up on blood pressure.  Patient was instructed to keep blood pressure log    Orders:  -     furosemide (LASIX) 20 MG tablet; Take 1 tablet by mouth 2 (Two) Times a Day.  Dispense: 60 tablet; Refill: 0  -     Basic metabolic panel; Future  -     Duplex Renal Artery - Bilateral Complete CAR; Future    2. Chronic obstructive pulmonary disease, unspecified COPD type (HCC)  Assessment & Plan:  Improving with Trelegy.  He is also being treated for pneumonia at this time.  Continue with Trelegy.    He has DuoNebs as well.  Recommend that he stop smoking.  Continue with current medication regimen.  Finish antibiotics and prednisone from pulmonology.      3. Fluid retention  -     proBNP; Future    4. Cigarette nicotine dependence without complication  Assessment & Plan:  Patient does admit to smoking.  He does have COPD.  Pneumonia currently.  Discussed with him the importance of smoking cessation.  He states he is willing to try the patches.  Discussed with patient that he cannot smoke with patches on.      Other orders  -     nicotine (Nicoderm CQ) 21 MG/24HR patch; Place 1 patch on the skin as directed by provider Daily. Do  not smoke with nicotine patches on  Dispense: 28 each; Refill: 0       Tobacco Use: High Risk   • Smoking Tobacco Use: Every Day   • Smokeless Tobacco Use: Current   • Passive Exposure: Not on file       Follow Up     Return in about 4 weeks (around 4/27/2023).    Please note that portions of this note were completed with a voice recognition program.    Patient was given instructions and counseling regarding his condition or for health maintenance advice. Please see specific information pulled into the AVS if appropriate.   I have reviewed information obtained and documented by others and I have confirmed the accuracy of this documented note.    PONCE Valadez

## 2023-03-31 LAB
BACTERIA SPEC RESP CULT: NORMAL
GRAM STN SPEC: NORMAL

## 2023-04-05 DIAGNOSIS — F33.2 SEVERE EPISODE OF RECURRENT MAJOR DEPRESSIVE DISORDER, WITHOUT PSYCHOTIC FEATURES: ICD-10-CM

## 2023-04-05 DIAGNOSIS — F43.10 POST TRAUMATIC STRESS DISORDER (PTSD): ICD-10-CM

## 2023-04-05 DIAGNOSIS — F41.0 PANIC DISORDER: ICD-10-CM

## 2023-04-05 DIAGNOSIS — F41.1 GENERALIZED ANXIETY DISORDER: ICD-10-CM

## 2023-04-05 RX ORDER — ARIPIPRAZOLE 5 MG/1
TABLET ORAL
Qty: 30 TABLET | Refills: 1 | OUTPATIENT
Start: 2023-04-05

## 2023-04-05 NOTE — TELEPHONE ENCOUNTER
The original prescription was discontinued on 3/13/2023 by Page Lee PA-C. Renewing this prescription may not be appropriate.    NEXT APPT WITH PROVIDER  Appointment with Page Lee PA-C (04/14/2023)    PROVIDER PLEASE ADVISE

## 2023-04-06 ENCOUNTER — TELEPHONE (OUTPATIENT)
Dept: PULMONOLOGY | Facility: CLINIC | Age: 58
End: 2023-04-06
Payer: MEDICAID

## 2023-04-06 ENCOUNTER — TELEPHONE (OUTPATIENT)
Dept: INTERNAL MEDICINE | Facility: CLINIC | Age: 58
End: 2023-04-06

## 2023-04-06 ENCOUNTER — OFFICE VISIT (OUTPATIENT)
Dept: INTERNAL MEDICINE | Facility: CLINIC | Age: 58
End: 2023-04-06
Payer: MEDICAID

## 2023-04-06 VITALS
DIASTOLIC BLOOD PRESSURE: 99 MMHG | HEART RATE: 96 BPM | TEMPERATURE: 98.2 F | RESPIRATION RATE: 18 BRPM | BODY MASS INDEX: 36.74 KG/M2 | HEIGHT: 68 IN | WEIGHT: 242.4 LBS | SYSTOLIC BLOOD PRESSURE: 157 MMHG | OXYGEN SATURATION: 95 %

## 2023-04-06 DIAGNOSIS — R91.8 GROUND GLASS OPACITY PRESENT ON IMAGING OF LUNG: Primary | ICD-10-CM

## 2023-04-06 DIAGNOSIS — J44.9 CHRONIC OBSTRUCTIVE PULMONARY DISEASE, UNSPECIFIED COPD TYPE: ICD-10-CM

## 2023-04-06 DIAGNOSIS — J18.9 PNEUMONIA OF RIGHT LOWER LOBE DUE TO INFECTIOUS ORGANISM: ICD-10-CM

## 2023-04-06 DIAGNOSIS — I10 ESSENTIAL (PRIMARY) HYPERTENSION: Primary | ICD-10-CM

## 2023-04-06 PROCEDURE — 99214 OFFICE O/P EST MOD 30 MIN: CPT

## 2023-04-06 PROCEDURE — 1160F RVW MEDS BY RX/DR IN RCRD: CPT

## 2023-04-06 PROCEDURE — 3080F DIAST BP >= 90 MM HG: CPT

## 2023-04-06 PROCEDURE — 3077F SYST BP >= 140 MM HG: CPT

## 2023-04-06 PROCEDURE — 1159F MED LIST DOCD IN RCRD: CPT

## 2023-04-06 RX ORDER — LEVOFLOXACIN 750 MG/1
750 TABLET ORAL DAILY
Qty: 7 TABLET | Refills: 0 | Status: SHIPPED | OUTPATIENT
Start: 2023-04-06

## 2023-04-06 RX ORDER — PRAZOSIN HYDROCHLORIDE 1 MG/1
1 CAPSULE ORAL DAILY
COMMUNITY
Start: 2023-04-05 | End: 2023-04-20

## 2023-04-06 NOTE — PROGRESS NOTES
Chief Complaint  Follow-up (Pt states that he is being seen for a follow up on pneumonia. He states that he is still wheezing and hard for him to breath. )    History of Present Illness  SUBJECTIVE  Asad Meyer presents to [facility] follow up on pneumonia.   Patient is still on prednisone and augmentin.    Continues to have productive cough, congestion, wheezing and shortness of breath.   Denies fever, chills, myalgias.       Past Medical History:   Diagnosis Date   • Anxiety    • Asthma    • Asthma, extrinsic    • Chronic pain disorder    • Depression    • Emphysema of lung    • Pneumonia of right lower lobe due to infectious organism 4/6/2023   • PTSD (post-traumatic stress disorder)    • Rheumatoid arthritis    • Schizoaffective disorder    • Self-injurious behavior    • Suicide attempt       Family History   Problem Relation Age of Onset   • Alcohol abuse Father    • Asthma Father    • Emphysema Father    • Heart failure Father       Past Surgical History:   Procedure Laterality Date   • ANKLE SURGERY Right         Current Outpatient Medications:   •  albuterol (ACCUNEB) 0.63 MG/3ML nebulizer solution, Take 3 mL by nebulization Every 6 (Six) Hours As Needed for Wheezing., Disp: 300 each, Rfl: 5  •  albuterol sulfate HFA (Ventolin HFA) 108 (90 Base) MCG/ACT inhaler, Inhale 2 puffs Every 4 (Four) Hours As Needed for Wheezing or Shortness of Air., Disp: 1 g, Rfl: 5  •  ALPRAZolam (Xanax) 1 MG tablet, Take 1 tablet by mouth 2 (Two) Times a Day As Needed for Anxiety for up to 30 days., Disp: 60 tablet, Rfl: 0  •  amLODIPine-benazepril (LOTREL) 10-40 MG per capsule, TAKE 1 CAPSULE BY MOUTH DAILY, Disp: 30 capsule, Rfl: 1  •  atorvastatin (LIPITOR) 10 MG tablet, TAKE 1 TABLET BY MOUTH DAILY, Disp: 90 tablet, Rfl: 0  •  cetirizine (zyrTEC) 10 MG tablet, Take 1 tablet by mouth Daily., Disp: 90 tablet, Rfl: 1  •  cloNIDine (Catapres) 0.2 MG tablet, Take 1 tablet by mouth every night  "at bedtime for 30 days., Disp: 30 tablet, Rfl: 1  •  Fluticasone-Umeclidin-Vilant 200-62.5-25 MCG/ACT aerosol powder , Inhale 200 mg Daily., Disp: 60 each, Rfl: 2  •  furosemide (LASIX) 20 MG tablet, Take 1 tablet by mouth 2 (Two) Times a Day., Disp: 60 tablet, Rfl: 0  •  gabapentin (Neurontin) 400 MG capsule, Take 1 capsule by mouth 3 (Three) Times a Day for 30 days., Disp: 90 capsule, Rfl: 1  •  lisinopril (PRINIVIL,ZESTRIL) 40 MG tablet, TAKE 1 TABLET BY MOUTH DAILY, Disp: 90 tablet, Rfl: 0  •  meloxicam (MOBIC) 7.5 MG tablet, Take 1 tablet by mouth Daily., Disp: 30 tablet, Rfl: 0  •  nicotine (Nicoderm CQ) 21 MG/24HR patch, Place 1 patch on the skin as directed by provider Daily. Do not smoke with nicotine patches on, Disp: 28 each, Rfl: 0  •  prazosin (MINIPRESS) 1 MG capsule, Take 1 capsule by mouth Daily., Disp: , Rfl:   •  predniSONE (DELTASONE) 10 MG tablet, Take 4 tabs daily x 3 days, then take 3 tabs daily x 3 days, then take 2 tabs daily x 3 days, then take 1 tab daily x 3 days, Disp: 31 tablet, Rfl: 0  •  QUEtiapine (SEROquel) 200 MG tablet, Take 1 to 1.5 tab PO QHS PRN sleep, Disp: 45 tablet, Rfl: 1  •  tiotropium (SPIRIVA) 18 MCG per inhalation capsule, Place 1 capsule into inhaler and inhale Daily., Disp: 90 capsule, Rfl: 3  •  traMADol-acetaminophen (Ultracet) 37.5-325 MG per tablet, Take 1 tablet by mouth Every 6 (Six) Hours As Needed for Moderate Pain., Disp: 60 tablet, Rfl: 0  •  levoFLOXacin (Levaquin) 750 MG tablet, Take 1 tablet by mouth Daily., Disp: 7 tablet, Rfl: 0    OBJECTIVE  Vital Signs:   /99 (BP Location: Left arm)   Pulse 96   Temp 98.2 °F (36.8 °C) (Temporal)   Resp 18   Ht 173.7 cm (68.39\")   Wt 110 kg (242 lb 6.4 oz)   SpO2 95%   BMI 36.44 kg/m²    Estimated body mass index is 36.44 kg/m² as calculated from the following:    Height as of this encounter: 173.7 cm (68.39\").    Weight as of this encounter: 110 kg (242 lb 6.4 oz).     Wt Readings from Last 3 Encounters: "   04/06/23 110 kg (242 lb 6.4 oz)   03/30/23 112 kg (246 lb 3.2 oz)   03/28/23 112 kg (246 lb)     BP Readings from Last 3 Encounters:   04/06/23 157/99   03/30/23 (!) 189/118   03/28/23 142/86       Physical Exam  Vitals and nursing note reviewed.   Constitutional:       Appearance: Normal appearance.   HENT:      Head: Normocephalic and atraumatic.   Eyes:      Extraocular Movements: Extraocular movements intact.      Conjunctiva/sclera: Conjunctivae normal.   Cardiovascular:      Rate and Rhythm: Normal rate and regular rhythm.      Heart sounds: Normal heart sounds.   Pulmonary:      Effort: Pulmonary effort is normal.      Breath sounds: Wheezing, rhonchi and rales present.   Abdominal:      General: Abdomen is flat. Bowel sounds are normal. There is no distension.      Palpations: Abdomen is soft. There is no mass.      Tenderness: There is no abdominal tenderness. There is no right CVA tenderness, left CVA tenderness, guarding or rebound.      Hernia: No hernia is present.   Musculoskeletal:         General: Normal range of motion.      Cervical back: Normal range of motion.   Skin:     General: Skin is warm and dry.   Neurological:      General: No focal deficit present.      Mental Status: He is alert and oriented to person, place, and time. Mental status is at baseline.   Psychiatric:         Mood and Affect: Mood normal.         Behavior: Behavior normal.         Thought Content: Thought content normal.         Judgment: Judgment normal.          Result Review      XR Chest 2 View    Result Date: 3/28/2023   Minimal airspace disease in the left lung base, most likely in the lingula.     Ayush Holbrook MD       Electronically Signed and Approved By: Ayush Holbrook MD on 3/28/2023 at 15:10             XR Spine Lumbar 2 or 3 View    Result Date: 1/30/2023   Mild lumbar spondylosis without acute osseous abnormality.     SANDY NAVARRO MD       Electronically Signed and Approved By: SANDY NAVARRO MD on  1/30/2023 at 16:42             CT Chest With Contrast Diagnostic    Result Date: 2/28/2023     1. Interval development of extensive peribronchial ground-glass opacities and reticular nodular densities throughout the right lung, greatest in the right upper lobe and right lower lobe, as well as peribronchial thickening.  The rapid interval development suggests an infectious or inflammatory process.  Correlate clinically.  Follow-up evaluation may be of value to document resolution.  2. Pulmonary emphysematous changes and scarring.  3. Previously seen partial opacification of left upper lobe and lower lobe bronchi is no longer evident, and may have been due to mucous plugging.     JERALD JALLOH MD       Electronically Signed and Approved By: JERALD JALLOH MD on 2/28/2023 at 13:09             XR Knee 3 View Bilateral    Result Date: 1/30/2023    Left knee.  Moderate tricompartmental osteoarthritis.  Right knee.  Minimal patellofemoral degenerative change.      SANDY NAVARRO MD       Electronically Signed and Approved By: SANDY NAVARRO MD on 1/30/2023 at 17:18             US Renal Bilateral    Result Date: 2/28/2023    Bilateral renal ultrasound demonstrating benign-appearing renal cysts.  No solid renal mass is seen.      MIKAL HOPE MD       Electronically Signed and Approved By: MIKAL HOPE MD on 2/28/2023 at 10:58             XR Hips Bilateral With or Without Pelvis 3-4 View    Result Date: 1/30/2023   Mild degenerative changes of both hips, right slightly greater than left.      SANDY NAVARRO MD       Electronically Signed and Approved By: SANDY NAVARRO MD on 1/30/2023 at 17:17                The above data has been reviewed by PONCE Valadez 04/06/2023 15:52 EDT.          Patient Care Team:  Jailyn Shepard APRN as PCP - General (Internal Medicine)       ASSESSMENT & PLAN    Diagnoses and all orders for this visit:    1. Essential (primary) hypertension (Primary)  Assessment & Plan:  Blood pressure is  actually improving for patient.  He states he is trying to quit smoking.  He has not been drinking near as much alcohol.  Continue with current medication regimen.  He feels like the Lasix is helping some pleural fluid.  Continue with current medication regimen.  I would increase Lasix but patient has not had his labs completed.  We will have patient get his BMP and BNP to rule in sure renal stable.  Recommend low-sodium, smoking cessation altogether, and continue avoiding alcohol.      2. Chronic obstructive pulmonary disease, unspecified COPD type  Assessment & Plan:  COPD is exacerbated by recent pneumonia.  Patient was diagnosed by his pulmonologist with pneumonia and treated with Augmentin and steroids.  I saw patient shortly after initiation of treatment and he was starting to improve a little bit.  Patient is in my office today with persistent complaints of productive cough, congestion, shortness of breath, wheezing.  He denies any chest pain, nausea, vomiting, fever.  Patient's O2 saturation today in the office is 95%.  He also had a sputum culture and it appears negative.  Patient did have a CT of the chest towards the end of February and there was groundglass opacities, pulmonary emphysema changes and scarring felt to be infectious or inflammatory.  At this point, I think patient would benefit from a bronchoscopy.  I am going to also add in Levaquin.  Recommend that he do his DuoNebs every 4-6 hours, continue with Trelegy.  My office reach out to pulmonology and they are going to see if he can get patient set up with a bronchoscopy.  Highly encourage smoking cessation completely with patient.      3. Pneumonia of right lower lobe due to infectious organism    Other orders  -     levoFLOXacin (Levaquin) 750 MG tablet; Take 1 tablet by mouth Daily.  Dispense: 7 tablet; Refill: 0       Tobacco Use: High Risk   • Smoking Tobacco Use: Every Day   • Smokeless Tobacco Use: Current   • Passive Exposure: Not on  file       Follow Up     No follow-ups on file.    Please note that portions of this note were completed with a voice recognition program.    Patient was given instructions and counseling regarding his condition or for health maintenance advice. Please see specific information pulled into the AVS if appropriate.   I have reviewed information obtained and documented by others and I have confirmed the accuracy of this documented note.    PONCE Valadez

## 2023-04-06 NOTE — TELEPHONE ENCOUNTER
Caller: Asad Meyer    Relationship to patient: Self    Best call back number: [phone]    Patient is needing: PATIENT STATES PONCE AGRAWAL TOLD HIM TO COME INTO THE OFFICE ANY DAY THIS WEEK SO SHE CAN CHECK HIM. PATIENT STATES HE WILL BE IN TODAY ABOUT 1PM

## 2023-04-06 NOTE — TELEPHONE ENCOUNTER
Talked to Greg . Dr. Copeland is reviewing to see when we can put bronch on for next week.   Criss at Dr. Shepard's office can be reached at [phone]. Please contact Dr. Shepard's office after this has been reviewed.

## 2023-04-06 NOTE — ASSESSMENT & PLAN NOTE
Blood pressure is actually improving for patient.  He states he is trying to quit smoking.  He has not been drinking near as much alcohol.  Continue with current medication regimen.  He feels like the Lasix is helping some pleural fluid.  Continue with current medication regimen.  I would increase Lasix but patient has not had his labs completed.  We will have patient get his BMP and BNP to rule in sure renal stable.  Recommend low-sodium, smoking cessation altogether, and continue avoiding alcohol.

## 2023-04-06 NOTE — ASSESSMENT & PLAN NOTE
COPD is exacerbated by recent pneumonia.  Patient was diagnosed by his pulmonologist with pneumonia and treated with Augmentin and steroids.  I saw patient shortly after initiation of treatment and he was starting to improve a little bit.  Patient is in my office today with persistent complaints of productive cough, congestion, shortness of breath, wheezing.  He denies any chest pain, nausea, vomiting, fever.  Patient's O2 saturation today in the office is 95%.  He also had a sputum culture and it appears negative.  Patient did have a CT of the chest towards the end of February and there was groundglass opacities, pulmonary emphysema changes and scarring felt to be infectious or inflammatory.  At this point, I think patient would benefit from a bronchoscopy.  I am going to also add in Levaquin.  Recommend that he do his DuoNebs every 4-6 hours, continue with Trelegy.  My office reach out to pulmonology and they are going to see if he can get patient set up with a bronchoscopy.  Highly encourage smoking cessation completely with patient.

## 2023-04-06 NOTE — TELEPHONE ENCOUNTER
Lita with Jailyn Shepard's office called stating that Mr. Meyer is feeling no better. she put him on   prednisone, but feels he needs bronchoscopy . Please call her at [phone]

## 2023-04-07 ENCOUNTER — ANESTHESIA EVENT (OUTPATIENT)
Dept: GASTROENTEROLOGY | Facility: HOSPITAL | Age: 58
End: 2023-04-07
Payer: MEDICAID

## 2023-04-07 PROBLEM — R91.8 GROUND GLASS OPACITY PRESENT ON IMAGING OF LUNG: Status: ACTIVE | Noted: 2023-04-07

## 2023-04-07 NOTE — TELEPHONE ENCOUNTER
Spoke with Dr. Copeland, he is placing a case request for a bronch to be done one patient next week.  Please make Criss aware of this.   Kendy- please contact patient and make him aware of bronch appointment once scheduled.

## 2023-04-09 DIAGNOSIS — F51.5 NIGHTMARES: ICD-10-CM

## 2023-04-10 RX ORDER — CLONIDINE HYDROCHLORIDE 0.1 MG/1
TABLET ORAL
Qty: 30 TABLET | Refills: 1 | OUTPATIENT
Start: 2023-04-10

## 2023-04-10 NOTE — TELEPHONE ENCOUNTER
The original prescription was discontinued on 2/24/2023 by Jailyn Shepard APRN for the following reason: Reorder. Renewing this prescription may not be appropriate.    NEXT APPT WITH PROVIDER  Appointment with Page Lee PA-C (04/14/2023)    PROVIDER PLEASE ADVISE

## 2023-04-11 ENCOUNTER — HOSPITAL ENCOUNTER (OUTPATIENT)
Facility: HOSPITAL | Age: 58
Setting detail: HOSPITAL OUTPATIENT SURGERY
Discharge: HOME | End: 2023-04-11
Attending: INTERNAL MEDICINE | Admitting: INTERNAL MEDICINE
Payer: MEDICAID

## 2023-04-11 ENCOUNTER — ANESTHESIA (OUTPATIENT)
Dept: GASTROENTEROLOGY | Facility: HOSPITAL | Age: 58
End: 2023-04-11
Payer: MEDICAID

## 2023-04-11 VITALS
WEIGHT: 235.01 LBS | OXYGEN SATURATION: 91 % | BODY MASS INDEX: 36.89 KG/M2 | DIASTOLIC BLOOD PRESSURE: 96 MMHG | HEART RATE: 106 BPM | RESPIRATION RATE: 18 BRPM | SYSTOLIC BLOOD PRESSURE: 152 MMHG | HEIGHT: 67 IN | TEMPERATURE: 99.7 F

## 2023-04-11 DIAGNOSIS — R91.8 GROUND GLASS OPACITY PRESENT ON IMAGING OF LUNG: ICD-10-CM

## 2023-04-11 LAB
ACB CMPLX DNA BAL NAA+NON-PRB-NCNCRNG: NOT DETECTED
BLACTX-M ISLT/SPM QL: NORMAL
BLAIMP ISLT/SPM QL: NORMAL
BLAKPC ISLT/SPM QL: NORMAL
BLAOXA-48-LIKE ISLT/SPM QL: NORMAL
BLAVIM ISLT/SPM QL: NORMAL
C PNEUM DNA NPH QL NAA+NON-PROBE: NOT DETECTED
E CLOAC COMP DNA BAL NAA+NON-PRB-NCNCRNG: NOT DETECTED
E COLI DNA BAL NAA+NON-PRB-NCNCRNG: NOT DETECTED
FLUAV SUBTYP SPEC NAA+PROBE: NOT DETECTED
FLUBV RNA ISLT QL NAA+PROBE: NOT DETECTED
GP B STREP DNA BAL NAA+NON-PRB-NCNCRNG: NOT DETECTED
HADV DNA SPEC NAA+PROBE: NOT DETECTED
HAEM INFLU DNA BAL NAA+NON-PRB-NCNCRNG: NOT DETECTED
HCOV RNA LOWER RESP QL NAA+NON-PROBE: NOT DETECTED
HMPV RNA NPH QL NAA+NON-PROBE: NOT DETECTED
HPIV RNA LOWER RESP QL NAA+NON-PROBE: NOT DETECTED
K AEROGENES DNA BAL NAA+NON-PRB-NCNCRNG: NOT DETECTED
K OXYTOCA DNA BAL NAA+NON-PRB-NCNCRNG: NOT DETECTED
K PNEU GRP DNA BAL NAA+NON-PRB-NCNCRNG: NOT DETECTED
L PNEUMO DNA LOWER RESP QL NAA+NON-PROBE: NOT DETECTED
M CATARRHALIS DNA BAL NAA+NON-PRB-NCNCRNG: NOT DETECTED
M PNEUMO IGG SER IA-ACNC: NOT DETECTED
MECA+MECC ISLT/SPM QL: NORMAL
NDM GENE: NORMAL
NEUTROPHILS NFR FLD MANUAL: 100 %
P AERUGINOSA DNA BAL NAA+NON-PRB-NCNCRNG: NOT DETECTED
PROTEUS SP DNA BAL NAA+NON-PRB-NCNCRNG: NOT DETECTED
RHINOVIRUS RNA SPEC NAA+PROBE: NOT DETECTED
RSV RNA NPH QL NAA+NON-PROBE: NOT DETECTED
S AUREUS DNA BAL NAA+NON-PRB-NCNCRNG: NOT DETECTED
S MARCESCENS DNA BAL NAA+NON-PRB-NCNCRNG: NOT DETECTED
S PNEUM DNA BAL NAA+NON-PRB-NCNCRNG: NOT DETECTED
S PYO DNA BAL NAA+NON-PRB-NCNCRNG: NOT DETECTED
VISUAL PRESENCE OF BLOOD: NORMAL

## 2023-04-11 PROCEDURE — 87102 FUNGUS ISOLATION CULTURE: CPT | Performed by: INTERNAL MEDICINE

## 2023-04-11 PROCEDURE — 88108 CYTOPATH CONCENTRATE TECH: CPT | Performed by: INTERNAL MEDICINE

## 2023-04-11 PROCEDURE — 87206 SMEAR FLUORESCENT/ACID STAI: CPT | Performed by: INTERNAL MEDICINE

## 2023-04-11 PROCEDURE — 87633 RESP VIRUS 12-25 TARGETS: CPT | Performed by: INTERNAL MEDICINE

## 2023-04-11 PROCEDURE — 88312 SPECIAL STAINS GROUP 1: CPT | Performed by: INTERNAL MEDICINE

## 2023-04-11 PROCEDURE — 87116 MYCOBACTERIA CULTURE: CPT | Performed by: INTERNAL MEDICINE

## 2023-04-11 PROCEDURE — 25010000002 PROPOFOL 10 MG/ML EMULSION: Performed by: NURSE ANESTHETIST, CERTIFIED REGISTERED

## 2023-04-11 PROCEDURE — 89051 BODY FLUID CELL COUNT: CPT | Performed by: INTERNAL MEDICINE

## 2023-04-11 PROCEDURE — 87071 CULTURE AEROBIC QUANT OTHER: CPT | Performed by: INTERNAL MEDICINE

## 2023-04-11 PROCEDURE — 87205 SMEAR GRAM STAIN: CPT | Performed by: INTERNAL MEDICINE

## 2023-04-11 PROCEDURE — 31624 DX BRONCHOSCOPE/LAVAGE: CPT | Performed by: INTERNAL MEDICINE

## 2023-04-11 RX ORDER — PROPOFOL 10 MG/ML
VIAL (ML) INTRAVENOUS AS NEEDED
Status: DISCONTINUED | OUTPATIENT
Start: 2023-04-11 | End: 2023-04-11 | Stop reason: SURG

## 2023-04-11 RX ORDER — SODIUM CHLORIDE, SODIUM LACTATE, POTASSIUM CHLORIDE, CALCIUM CHLORIDE 600; 310; 30; 20 MG/100ML; MG/100ML; MG/100ML; MG/100ML
30 INJECTION, SOLUTION INTRAVENOUS CONTINUOUS
Status: DISCONTINUED | OUTPATIENT
Start: 2023-04-11 | End: 2023-04-11 | Stop reason: HOSPADM

## 2023-04-11 RX ORDER — LIDOCAINE HYDROCHLORIDE 40 MG/ML
INJECTION, SOLUTION RETROBULBAR; TOPICAL AS NEEDED
Status: DISCONTINUED | OUTPATIENT
Start: 2023-04-11 | End: 2023-04-11 | Stop reason: HOSPADM

## 2023-04-11 RX ORDER — LIDOCAINE HYDROCHLORIDE 20 MG/ML
INJECTION, SOLUTION EPIDURAL; INFILTRATION; INTRACAUDAL; PERINEURAL AS NEEDED
Status: DISCONTINUED | OUTPATIENT
Start: 2023-04-11 | End: 2023-04-11 | Stop reason: SURG

## 2023-04-11 RX ORDER — MAGNESIUM HYDROXIDE 1200 MG/15ML
LIQUID ORAL AS NEEDED
Status: DISCONTINUED | OUTPATIENT
Start: 2023-04-11 | End: 2023-04-11 | Stop reason: HOSPADM

## 2023-04-11 RX ADMIN — PROPOFOL 50 MG: 10 INJECTION, EMULSION INTRAVENOUS at 09:29

## 2023-04-11 RX ADMIN — PROPOFOL 250 MCG/KG/MIN: 10 INJECTION, EMULSION INTRAVENOUS at 09:29

## 2023-04-11 RX ADMIN — SODIUM CHLORIDE, POTASSIUM CHLORIDE, SODIUM LACTATE AND CALCIUM CHLORIDE 30 ML/HR: 600; 310; 30; 20 INJECTION, SOLUTION INTRAVENOUS at 08:55

## 2023-04-11 RX ADMIN — LIDOCAINE HYDROCHLORIDE 100 MG: 20 INJECTION, SOLUTION EPIDURAL; INFILTRATION; INTRACAUDAL; PERINEURAL at 09:29

## 2023-04-11 NOTE — OP NOTE
Procedure name bronchoscopy with bronchoalveolar lavage     Indication recurrent pneumonia with mucous plugging     Sedation-IV MAC per anesthesia service        Procedure details  Patient was brought back to the bronchoscopy suite a bite block was placed in the oral cavity, and a therapeutic bronchoscope was then used to intubate the trachea, the vocal cords inspected and appeared to have normal motion with inhalation and ventilation, inspection was performed of the left tracheobronchial tree and there were no endobronchial lesion seen to the segmental level, inspection of the right tracheobronchial tree showed no endobronchial lesions to the segmental level.  The entire airway was erythematous with evidence of bronchitis throughout the entire tracheobronchial tree with scattered clear mucus seen on both the right and left tracheobronchial tree A bronchoalveolar lavage was obtained from right upper lobe bronchus           estimated blood loss  none     postoperative diagnosis  Diffuse bronchitis  Mild mucous plugging    Patient tolerated procedure well no complications        Plan  Patient is to followup in my office as scheduled to go the results of the pathology/cytology, and microbiology    Electronically signed by Taiwo Copeland DO, 04/11/23, 9:42 AM EDT.

## 2023-04-11 NOTE — ANESTHESIA POSTPROCEDURE EVALUATION
Patient: Asad Meyer    Procedure Summary     Date: 04/11/23 Room / Location: [facility] / [facility]    Anesthesia Start: 0924 Anesthesia Stop: 0955    Procedure: BRONCHOSCOPY WITH BRONCHOALVEOLAR LAVAGE (Bronchus) Diagnosis:       Ground glass opacity present on imaging of lung      (Ground glass opacity present on imaging of lung [R91.8])    Surgeons: Taiwo Copeland DO Provider: Clint Treadwell MD    Anesthesia Type: general ASA Status: 3          Anesthesia Type: general    Vitals  Vitals Value Taken Time   /96 04/11/23 1009   Temp 37.6 °C (99.7 °F) 04/11/23 1009   Pulse 102 04/11/23 1011   Resp 18 04/11/23 1009   SpO2 92 % 04/11/23 1011   Vitals shown include unvalidated device data.        Post Anesthesia Care and Evaluation    Patient location during evaluation: bedside  Patient participation: complete - patient participated  Level of consciousness: awake  Pain management: adequate    Airway patency: patent  PONV Status: none  Cardiovascular status: acceptable and stable  Respiratory status: acceptable  Hydration status: acceptable    Comments: An Anesthesiologist personally participated in the most demanding procedures (including induction and emergence if applicable) in the anesthesia plan, monitored the course of anesthesia administration at frequent intervals and remained physically present and available for immediate diagnosis and treatment of emergencies.

## 2023-04-11 NOTE — ANESTHESIA PREPROCEDURE EVALUATION
Anesthesia Evaluation     Patient summary reviewed and Nursing notes reviewed   no history of anesthetic complications:  NPO Solid Status: > 8 hours  NPO Liquid Status: > 2 hours           Airway   Mallampati: II  TM distance: >3 FB  Neck ROM: full  No difficulty expected  Dental    (+) poor dentition        Pulmonary    (+) pneumonia stable , a smoker Current, COPD, asthma,rhonchi, decreased breath sounds,   Cardiovascular - normal exam  Exercise tolerance: good (4-7 METS)    Rhythm: regular  Rate: normal    (+) hypertension, hyperlipidemia,       Neuro/Psych  (+) psychiatric history Schizophrenia and PTSD,    GI/Hepatic/Renal/Endo    (+) obesity,       Musculoskeletal     Abdominal    Substance History      OB/GYN          Other        ROS/Med Hx Other: PAT Nursing Notes unavailable.                   Anesthesia Plan    ASA 3     general   total IV anesthesia  (Patient understands anesthesia not responsible for dental damage.)  intravenous induction     Anesthetic plan, risks, benefits, and alternatives have been provided, discussed and informed consent has been obtained with: patient.    Plan discussed with CRNA.        CODE STATUS:

## 2023-04-11 NOTE — INTERVAL H&P NOTE
We will proceed with bronchoscopy today due to recurrent pneumonia and persistent symptoms      Risk versus benefits of bronchoscopy explained to the patient including death, respiratory failure requiring intubation mechanical ventilation, pneumothorax requiring chest tube placement, bleeding, patient voices understanding of the risk of the procedure and wishes to proceed.  H&P reviewed. The patient was examined and there are no changes to the H&P.      No Known Allergies     Electronically signed by Taiwo Copeland DO, 04/11/23, 9:37 AM EDT.

## 2023-04-13 ENCOUNTER — HOSPITAL ENCOUNTER (OUTPATIENT)
Dept: MRI IMAGING | Facility: HOSPITAL | Age: 58
Discharge: HOME | End: 2023-04-13
Admitting: NURSE PRACTITIONER
Payer: MEDICAID

## 2023-04-13 DIAGNOSIS — M54.16 LUMBAR RADICULOPATHY: ICD-10-CM

## 2023-04-13 LAB
BACTERIA SPEC AEROBE CULT: NORMAL
CYTO UR: NORMAL
GRAM STN SPEC: NORMAL
LAB AP CASE REPORT: NORMAL
LAB AP CLINICAL INFORMATION: NORMAL
LAB AP DIAGNOSIS COMMENT: NORMAL
LAB AP SPECIAL STAINS: NORMAL
PATH REPORT.FINAL DX SPEC: NORMAL
PATH REPORT.GROSS SPEC: NORMAL

## 2023-04-13 PROCEDURE — 72148 MRI LUMBAR SPINE W/O DYE: CPT

## 2023-04-14 ENCOUNTER — PRIOR AUTHORIZATION (OUTPATIENT)
Dept: PSYCHIATRY | Facility: CLINIC | Age: 58
End: 2023-04-14
Payer: MEDICAID

## 2023-04-14 DIAGNOSIS — M51.35 DDD (DEGENERATIVE DISC DISEASE), THORACOLUMBAR: ICD-10-CM

## 2023-04-14 DIAGNOSIS — F41.0 PANIC DISORDER: ICD-10-CM

## 2023-04-14 DIAGNOSIS — F43.10 POST TRAUMATIC STRESS DISORDER (PTSD): ICD-10-CM

## 2023-04-14 DIAGNOSIS — G47.00 INSOMNIA, UNSPECIFIED TYPE: ICD-10-CM

## 2023-04-14 DIAGNOSIS — F41.1 GENERALIZED ANXIETY DISORDER: ICD-10-CM

## 2023-04-14 DIAGNOSIS — M15.9 OSTEOARTHRITIS OF MULTIPLE JOINTS, UNSPECIFIED OSTEOARTHRITIS TYPE: ICD-10-CM

## 2023-04-14 DIAGNOSIS — F51.5 NIGHTMARES: ICD-10-CM

## 2023-04-14 DIAGNOSIS — F41.0 PANIC DISORDER: Primary | ICD-10-CM

## 2023-04-14 DIAGNOSIS — F33.2 SEVERE EPISODE OF RECURRENT MAJOR DEPRESSIVE DISORDER, WITHOUT PSYCHOTIC FEATURES: ICD-10-CM

## 2023-04-14 RX ORDER — QUETIAPINE FUMARATE 200 MG/1
TABLET, FILM COATED ORAL
Qty: 45 TABLET | Refills: 1 | Status: SHIPPED | OUTPATIENT
Start: 2023-04-14 | End: 2023-04-20 | Stop reason: SDUPTHER

## 2023-04-14 RX ORDER — ALPRAZOLAM 1 MG/1
1 TABLET ORAL 2 TIMES DAILY
Qty: 60 TABLET | Refills: 0 | Status: SHIPPED | OUTPATIENT
Start: 2023-04-14 | End: 2023-04-20 | Stop reason: SDUPTHER

## 2023-04-14 RX ORDER — GABAPENTIN 400 MG/1
400 CAPSULE ORAL 3 TIMES DAILY
Qty: 90 CAPSULE | Refills: 1 | Status: SHIPPED | OUTPATIENT
Start: 2023-04-14 | End: 2023-04-20

## 2023-04-14 RX ORDER — PREDNISONE 10 MG/1
TABLET ORAL
Qty: 31 TABLET | Refills: 0 | Status: CANCELLED | OUTPATIENT
Start: 2023-04-14

## 2023-04-14 RX ORDER — CLONIDINE HYDROCHLORIDE 0.2 MG/1
0.2 TABLET ORAL
Qty: 30 TABLET | Refills: 1 | Status: SHIPPED | OUTPATIENT
Start: 2023-04-14 | End: 2023-04-20 | Stop reason: SDUPTHER

## 2023-04-14 RX ORDER — PRAZOSIN HYDROCHLORIDE 1 MG/1
1 CAPSULE ORAL DAILY
Status: CANCELLED | OUTPATIENT
Start: 2023-04-14

## 2023-04-14 NOTE — TELEPHONE ENCOUNTER
Prior authorization APPROVED from 04/14/2023 to 10/13/2023. Waiting for letter communication via fax.

## 2023-04-14 NOTE — TELEPHONE ENCOUNTER
Prior authorization for ALPRAZOLAM 1MG TABLETS initiated and processed. Waiting for response from insurance.     Processed via [facility]  Key: [id number]

## 2023-04-18 LAB
MYCOBACTERIUM SPEC CULT: NORMAL
NIGHT BLUE STAIN TISS: NORMAL
NIGHT BLUE STAIN TISS: NORMAL

## 2023-04-19 LAB — FUNGUS WND CULT: ABNORMAL

## 2023-04-20 ENCOUNTER — OFFICE VISIT (OUTPATIENT)
Dept: BEHAVIORAL HEALTH | Facility: CLINIC | Age: 58
End: 2023-04-20
Payer: MEDICAID

## 2023-04-20 ENCOUNTER — TELEPHONE (OUTPATIENT)
Dept: PSYCHIATRY | Facility: CLINIC | Age: 58
End: 2023-04-20

## 2023-04-20 VITALS
SYSTOLIC BLOOD PRESSURE: 145 MMHG | HEIGHT: 67 IN | DIASTOLIC BLOOD PRESSURE: 90 MMHG | WEIGHT: 240.8 LBS | BODY MASS INDEX: 37.79 KG/M2

## 2023-04-20 DIAGNOSIS — G47.00 INSOMNIA, UNSPECIFIED TYPE: ICD-10-CM

## 2023-04-20 DIAGNOSIS — F41.0 PANIC DISORDER: ICD-10-CM

## 2023-04-20 DIAGNOSIS — F43.10 POST TRAUMATIC STRESS DISORDER (PTSD): ICD-10-CM

## 2023-04-20 DIAGNOSIS — F51.5 NIGHTMARES: ICD-10-CM

## 2023-04-20 DIAGNOSIS — F33.2 SEVERE EPISODE OF RECURRENT MAJOR DEPRESSIVE DISORDER, WITHOUT PSYCHOTIC FEATURES: ICD-10-CM

## 2023-04-20 DIAGNOSIS — F41.1 GENERALIZED ANXIETY DISORDER: Primary | ICD-10-CM

## 2023-04-20 RX ORDER — CLONIDINE HYDROCHLORIDE 0.2 MG/1
0.2 TABLET ORAL
Qty: 30 TABLET | Refills: 1 | Status: SHIPPED | OUTPATIENT
Start: 2023-04-20 | End: 2023-05-20

## 2023-04-20 RX ORDER — ALPRAZOLAM 1 MG/1
1 TABLET ORAL 2 TIMES DAILY
Qty: 60 TABLET | Refills: 0 | Status: SHIPPED | OUTPATIENT
Start: 2023-04-20 | End: 2023-05-20

## 2023-04-20 RX ORDER — QUETIAPINE FUMARATE 200 MG/1
TABLET, FILM COATED ORAL
Qty: 45 TABLET | Refills: 1 | Status: SHIPPED | OUTPATIENT
Start: 2023-04-20

## 2023-04-20 RX ORDER — FLUTICASONE PROPIONATE AND SALMETEROL 50; 250 UG/1; UG/1
POWDER RESPIRATORY (INHALATION)
COMMUNITY
Start: 2023-04-19

## 2023-04-20 RX ORDER — GABAPENTIN 800 MG/1
800 TABLET ORAL 3 TIMES DAILY
Qty: 90 TABLET | Refills: 1 | Status: SHIPPED | OUTPATIENT
Start: 2023-04-20 | End: 2023-05-20

## 2023-04-20 NOTE — PROGRESS NOTES
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 10/27/2022 Negative  Negative Final   • BENZODIAZEPINE INTERNAL CONTROL 10/27/2022 Passed  Passed Final   • Cocaine Screen, Urine 10/27/2022 Negative  Negative Final   • COCAINE INTERNAL CONTROL 10/27/2022 Passed  Passed Final   • MDMA (ECSTASY) 10/27/2022 Negative  Negative Final   • MDMA (ECSTASY) INTERNAL CONTROL 10/27/2022 Passed  Passed Final   • Methamphetamine, Ur 10/27/2022 Negative  Negative Final   • METHAMPHETAMINE INTERNAL CONTROL 10/27/2022 Passed  Passed Final   • Methadone Screen, Urine 10/27/2022 Negative  Negative Final   • METHADONE INTERNAL CONTROL 10/27/2022 Passed  Passed Final   • Opiate Screen 10/27/2022 Negative  Negative Final   • OPIATES INTERNAL CONTROL 10/27/2022 Passed  Passed Final   • Oxycodone Screen, Urine 10/27/2022 Negative  Negative Final   • OXYCODONE INTERNAL CONTROL 10/27/2022 Passed  Passed Final   • Phencyclidine (PCP), Urine 10/27/2022 Negative  Negative Final   • PHENCYCLIDINE INTERNAL CONTROL 10/27/2022 Passed  Passed Final   • THC, Screen, Urine 10/27/2022 Negative  Negative Final   • THC INTERNAL CONTROL 10/27/2022 Passed  Passed Final   • Lot Number 10/27/2022 [id number]   Final   • Expiration Date 10/27/2022 09/01/2023   Final   Admission on 10/03/2022, Discharged on 10/03/2022   Component Date Value Ref Range Status   • QT Interval  10/03/2022 381  ms Final   • Glucose 10/03/2022 104 (H)  65 - 99 mg/dL Final   • BUN 10/03/2022 22 (H)  6 - 20 mg/dL Final   • Creatinine 10/03/2022 1.42 (H)  0.76 - 1.27 mg/dL Final   • Sodium 10/03/2022 139  136 - 145 mmol/L Final   • Potassium 10/03/2022 4.4  3.5 - 5.2 mmol/L Final   • Chloride 10/03/2022 102  98 - 107 mmol/L Final   • CO2 10/03/2022 29.3 (H)  22.0 - 29.0 mmol/L Final   • Calcium 10/03/2022 9.5  8.6 - 10.5 mg/dL Final   • Total Protein 10/03/2022 7.2  6.0 - 8.5 g/dL Final   • Albumin 10/03/2022 4.30  3.50 - 5.20 g/dL Final   • ALT (SGPT) 10/03/2022 27  1 - 41 U/L Final   • AST (SGOT) 10/03/2022 27  1 - 40 U/L Final   • Alkaline Phosphatase 10/03/2022 121 (H)  39 - 117 U/L Final   • Total Bilirubin 10/03/2022 0.2  0.0 - 1.2 mg/dL Final   • Globulin 10/03/2022 2.9  gm/dL Final   • A/G Ratio 10/03/2022 1.5  g/dL Final   • BUN/Creatinine Ratio 10/03/2022 15.5  7.0 - 25.0 Final   • Anion Gap 10/03/2022 7.7  5.0 - 15.0 mmol/L Final   • eGFR 10/03/2022 58.4 (L)  >60.0 mL/min/1.73 Final    National Kidney Foundation and American Society of Nephrology (ASN) Task Force recommended calculation based on the Chronic Kidney Disease Epidemiology Collaboration (CKD-EPI) equation refit without adjustment for race.   • proBNP 10/03/2022 494.5  0.0 - 900.0 pg/mL Final   • Troponin T 10/03/2022 <0.010  0.000 - 0.030 ng/mL Final   • Extra Tube 10/03/2022 Hold for add-ons.   Final    Auto resulted.   • Extra Tube 10/03/2022 hold for add-on   Final    Auto resulted   • Extra Tube 10/03/2022 Hold for add-ons.   Final    Auto resulted.   • Extra Tube 10/03/2022 Hold for add-ons.   Final    Auto resulted   • WBC 10/03/2022 8.12  3.40 - 10.80 10*3/mm3 Final   • RBC 10/03/2022 4.79  4.14 - 5.80 10*6/mm3 Final   • Hemoglobin 10/03/2022 14.7  13.0 - 17.7 g/dL Final   • Hematocrit 10/03/2022 44.5  37.5 - 51.0 % Final   • MCV 10/03/2022 92.9  79.0 - 97.0 fL Final   • MCH 10/03/2022 30.7  26.6 - 33.0 pg Final   • MCHC 10/03/2022  33.0  31.5 - 35.7 g/dL Final   • RDW 10/03/2022 14.7  12.3 - 15.4 % Final   • RDW-SD 10/03/2022 50.4  37.0 - 54.0 fl Final   • MPV 10/03/2022 10.6  6.0 - 12.0 fL Final   • Platelets 10/03/2022 222  140 - 450 10*3/mm3 Final   • Neutrophil % 10/03/2022 62.1  42.7 - 76.0 % Final   • Lymphocyte % 10/03/2022 22.8  19.6 - 45.3 % Final   • Monocyte % 10/03/2022 8.1  5.0 - 12.0 % Final   • Eosinophil % 10/03/2022 5.8  0.3 - 6.2 % Final   • Basophil % 10/03/2022 0.7  0.0 - 1.5 % Final   • Immature Grans % 10/03/2022 0.5  0.0 - 0.5 % Final   • Neutrophils, Absolute 10/03/2022 5.04  1.70 - 7.00 10*3/mm3 Final   • Lymphocytes, Absolute 10/03/2022 1.85  0.70 - 3.10 10*3/mm3 Final   • Monocytes, Absolute 10/03/2022 0.66  0.10 - 0.90 10*3/mm3 Final   • Eosinophils, Absolute 10/03/2022 0.47 (H)  0.00 - 0.40 10*3/mm3 Final   • Basophils, Absolute 10/03/2022 0.06  0.00 - 0.20 10*3/mm3 Final   • Immature Grans, Absolute 10/03/2022 0.04  0.00 - 0.05 10*3/mm3 Final   • nRBC 10/03/2022 0.0  0.0 - 0.2 /100 WBC Final   Admission on 08/09/2022, Discharged on 08/09/2022   Component Date Value Ref Range Status   • QT Interval 08/09/2022 348  ms Final   • Glucose 08/09/2022 108 (H)  65 - 99 mg/dL Final   • BUN 08/09/2022 21 (H)  6 - 20 mg/dL Final   • Creatinine 08/09/2022 1.24  0.76 - 1.27 mg/dL Final   • Sodium 08/09/2022 141  136 - 145 mmol/L Final   • Potassium 08/09/2022 4.3  3.5 - 5.2 mmol/L Final   • Chloride 08/09/2022 106  98 - 107 mmol/L Final   • CO2 08/09/2022 24.8  22.0 - 29.0 mmol/L Final   • Calcium 08/09/2022 9.6  8.6 - 10.5 mg/dL Final   • Total Protein 08/09/2022 7.8  6.0 - 8.5 g/dL Final   • Albumin 08/09/2022 4.50  3.50 - 5.20 g/dL Final   • ALT (SGPT) 08/09/2022 31  1 - 41 U/L Final   • AST (SGOT) 08/09/2022 25  1 - 40 U/L Final   • Alkaline Phosphatase 08/09/2022 131 (H)  39 - 117 U/L Final   • Total Bilirubin 08/09/2022 0.4  0.0 - 1.2 mg/dL Final   • Globulin 08/09/2022 3.3  gm/dL Final   • A/G Ratio 08/09/2022 1.4  g/dL  Final   • BUN/Creatinine Ratio 08/09/2022 16.9  7.0 - 25.0 Final   • Anion Gap 08/09/2022 10.2  5.0 - 15.0 mmol/L Final   • eGFR 08/09/2022 68.7  >60.0 mL/min/1.73 Final    National Kidney Foundation and American Society of Nephrology (ASN) Task Force recommended calculation based on the Chronic Kidney Disease Epidemiology Collaboration (CKD-EPI) equation refit without adjustment for race.   • proBNP 08/09/2022 705.0  0.0 - 900.0 pg/mL Final   • Troponin T 08/09/2022 <0.010  0.000 - 0.030 ng/mL Final   • Extra Tube 08/09/2022 Hold for add-ons.   Final    Auto resulted.   • Extra Tube 08/09/2022 hold for add-on   Final    Auto resulted   • Extra Tube 08/09/2022 Hold for add-ons.   Final    Auto resulted.   • Extra Tube 08/09/2022 Hold for add-ons.   Final    Auto resulted   • WBC 08/09/2022 11.01 (H)  3.40 - 10.80 10*3/mm3 Final   • RBC 08/09/2022 5.15  4.14 - 5.80 10*6/mm3 Final   • Hemoglobin 08/09/2022 15.6  13.0 - 17.7 g/dL Final   • Hematocrit 08/09/2022 46.8  37.5 - 51.0 % Final   • MCV 08/09/2022 90.9  79.0 - 97.0 fL Final   • MCH 08/09/2022 30.3  26.6 - 33.0 pg Final   • MCHC 08/09/2022 33.3  31.5 - 35.7 g/dL Final   • RDW 08/09/2022 14.7  12.3 - 15.4 % Final   • RDW-SD 08/09/2022 48.6  37.0 - 54.0 fl Final   • MPV 08/09/2022 10.3  6.0 - 12.0 fL Final   • Platelets 08/09/2022 273  140 - 450 10*3/mm3 Final   • Neutrophil % 08/09/2022 67.8  42.7 - 76.0 % Final   • Lymphocyte % 08/09/2022 20.1  19.6 - 45.3 % Final   • Monocyte % 08/09/2022 7.4  5.0 - 12.0 % Final   • Eosinophil % 08/09/2022 3.6  0.3 - 6.2 % Final   • Basophil % 08/09/2022 0.7  0.0 - 1.5 % Final   • Immature Grans % 08/09/2022 0.4  0.0 - 0.5 % Final   • Neutrophils, Absolute 08/09/2022 7.46 (H)  1.70 - 7.00 10*3/mm3 Final   • Lymphocytes, Absolute 08/09/2022 2.21  0.70 - 3.10 10*3/mm3 Final   • Monocytes, Absolute 08/09/2022 0.82  0.10 - 0.90 10*3/mm3 Final   • Eosinophils, Absolute 08/09/2022 0.40  0.00 - 0.40 10*3/mm3 Final   • Basophils,  Absolute 08/09/2022 0.08  0.00 - 0.20 10*3/mm3 Final   • Immature Grans, Absolute 08/09/2022 0.04  0.00 - 0.05 10*3/mm3 Final   • nRBC 08/09/2022 0.0  0.0 - 0.2 /100 WBC Final       EKG Results:  No orders to display       Imaging Results:  XR Chest 1 View    Result Date: 10/3/2022   No active disease is seen.       MIKAL HOPE MD       Electronically Signed and Approved By: MIKAL HOPE MD on 10/03/2022 at 12:47                 Assessment & Plan   Diagnoses and all orders for this visit:    1. Generalized anxiety disorder (Primary)  -     QUEtiapine (SEROquel) 200 MG tablet; Take 1 to 1.5 tab PO QHS PRN sleep  Dispense: 45 tablet; Refill: 1  -     gabapentin (Neurontin) 800 MG tablet; Take 1 tablet by mouth 3 (Three) Times a Day for 30 days.  Dispense: 90 tablet; Refill: 1    2. Panic disorder  -     ALPRAZolam (Xanax) 1 MG tablet; Take 1 tablet by mouth 2 (Two) Times a Day for 30 days.  Dispense: 60 tablet; Refill: 0  -     QUEtiapine (SEROquel) 200 MG tablet; Take 1 to 1.5 tab PO QHS PRN sleep  Dispense: 45 tablet; Refill: 1  -     gabapentin (Neurontin) 800 MG tablet; Take 1 tablet by mouth 3 (Three) Times a Day for 30 days.  Dispense: 90 tablet; Refill: 1    3. Nightmares  -     cloNIDine (Catapres) 0.2 MG tablet; Take 1 tablet by mouth every night at bedtime for 30 days.  Dispense: 30 tablet; Refill: 1    4. Severe episode of recurrent major depressive disorder, without psychotic features  -     QUEtiapine (SEROquel) 200 MG tablet; Take 1 to 1.5 tab PO QHS PRN sleep  Dispense: 45 tablet; Refill: 1    5. Post traumatic stress disorder (PTSD)  -     QUEtiapine (SEROquel) 200 MG tablet; Take 1 to 1.5 tab PO QHS PRN sleep  Dispense: 45 tablet; Refill: 1    6. Insomnia, unspecified type  -     QUEtiapine (SEROquel) 200 MG tablet; Take 1 to 1.5 tab PO QHS PRN sleep  Dispense: 45 tablet; Refill: 1    Presentation seems most consistent with MDD, GREG, panic disorder, PTSD, insomnia, nightmares.  Will continue  Seroquel for management of anxiety, depression, insomnia, and overall mood.  We will continue clonidine for management of nightmares.  We will continue Xanax only as needed for severe panic attacks.  I extensively counseled the patient on the risks including addiction, dependence, and misuse.  We will increase gabapentin for management of anxiety and overall mood.  Follow-up in 1 month.  Addressed all questions and concerns.    Visit Diagnoses:    ICD-10-CM ICD-9-CM   1. Generalized anxiety disorder  F41.1 300.02   2. Panic disorder  F41.0 300.01   3. Nightmares  F51.5 307.47   4. Severe episode of recurrent major depressive disorder, without psychotic features  F33.2 296.33   5. Post traumatic stress disorder (PTSD)  F43.10 309.81   6. Insomnia, unspecified type  G47.00 780.52       PLAN:  1. Safety: No acute safety concerns at this time.  2. Therapy: Patient declines referral for psychotherapy.  3. Risk Assessment: Risk of self-harm acutely is moderate to severe.  Risk factors include anxiety disorder, mood disorder, h/o suicide attempt in the past, and recent psychosocial stressors (pandemic). Protective factors include no family history, denies access to guns/weapons, no present SI, no history of self-harm in the past, minimal AODA, healthcare seeking, future orientation, willingness to engage in care.  Risk of self-harm chronically is also moderate to severe, but could be further elevated in the event of treatment noncompliance and/or AODA.  4. Labs/Diagnostics Ordered:   No orders of the defined types were placed in this encounter.    5. Medications:   New Medications Ordered This Visit   Medications   • ALPRAZolam (Xanax) 1 MG tablet     Sig: Take 1 tablet by mouth 2 (Two) Times a Day for 30 days.     Dispense:  60 tablet     Refill:  0   • cloNIDine (Catapres) 0.2 MG tablet     Sig: Take 1 tablet by mouth every night at bedtime for 30 days.     Dispense:  30 tablet     Refill:  1   • QUEtiapine (SEROquel)  200 MG tablet     Sig: Take 1 to 1.5 tab PO QHS PRN sleep     Dispense:  45 tablet     Refill:  1   • gabapentin (Neurontin) 800 MG tablet     Sig: Take 1 tablet by mouth 3 (Three) Times a Day for 30 days.     Dispense:  90 tablet     Refill:  1       Clonidine, Risks, benefits, alternatives, and side effects discussed with patient including sedation, dizziness/falls risk, hypotension, GI upset. After discussion of these risks and benefits, the patient voiced understanding and agreed to proceed.    Discussed all risks, benefits, alternatives, and side effects of Xanax including but not limited to risks of abuse, misuse, and addiction, which can lead to overdose or death; risks of dependence and withdrawal reactions; drowsiness, sedation, fatigue, depression, dizziness, ataxia, weakness, confusion, forgetfulness, hypotension, falls risk, respiratory depression, anterograde amnesia, paradoxical reactions such as hyperactivity or aggressive behavior; and hallucinations. Pt educated on the need to practice safe sex while taking this med. Instructed pt to avoid performing tasks that require mental alertness such as driving or operating machinery. Discussed the need for pt to immediately call the office for any new or worsening symptoms, such as changes in mood or behavior, and all other concerns. Pt educated on med compliance, including the proper use and monitoring for signs and symptoms of abuse, misuse, and addiction. Pt verbalized understanding and is agreeable to taking Xanax. CARLOS obtained and USD ordered. Controlled substances agreement verbally signed. Addressed all questions and concerns.     Discussed all risks, benefits, alternatives, and side effects of Lyrica including but not limited to sedation, dizziness, GI upset, dry mouth, and weight gain. Pt instructed to avoid driving and doing other tasks or actions that require to be alert until knowing how the drug affects them.  Pt educated on the need to  practice safe sex while taking this med. Discussed the need for pt to immediately call the office for any new or worsening symptoms, such as worsening depression; feeling nervous or restless; suicidal thoughts or actions; or other changes changes in mood or behavior, and all other concerns. Pt educated on med compliance and the risks of suddenly stopping this medication or missing doses. Pt verbalized understanding and is agreeable to taking Lyrica. Addressed all questions and concerns.  Will order UDS and obtain CARLOS report. Pt verbally signed controlled substances agreement.    6. Follow up:   F/u in 1 month.    TREATMENT PLAN/GOALS: Continue supportive psychotherapy efforts and medications as indicated. Treatment and medication options discussed during today's visit. Patient ackowledged and verbally consented to continue with current treatment plan and was educated on the importance of compliance with treatment and follow-up appointments.    MEDICATION ISSUES:  CARLOS reviewed as expected.  Discussed medication options and treatment plan of prescribed medication as well as the risks, benefits, and side effects including potential falls, possible impaired driving and metabolic adversities among others. Patient is agreeable to call the office with any worsening of symptoms or onset of side effects. Patient is agreeable to call 911 or go to the nearest ER should he/she begin having SI/HI. No medication side effects or related complaints today.            This document has been electronically signed by Page Lee PA-C  April 20, 2023 13:33 EDT      Part of this note may be an electronic transcription/translation of spoken language to printed text using the Dragon Dictation System.

## 2023-04-20 NOTE — TELEPHONE ENCOUNTER
Pt pharmacy called to report they received order for Gabapentin 800mg, however pt recently picked up refill for 400mg on 04/15.    gabapentin (Neurontin) 400 MG capsule (04/14/2023)    gabapentin (Neurontin) 800 MG tablet (04/20/2023)    Pt pharmacy needs to know if this was a dosage adjustment, or if provider is wanting him to take both strengths. Please review and advise.

## 2023-04-24 RX ORDER — MONTELUKAST SODIUM 10 MG/1
10 TABLET ORAL NIGHTLY
Qty: 90 TABLET | Refills: 1 | OUTPATIENT
Start: 2023-04-24

## 2023-04-24 RX ORDER — FLUTICASONE FUROATE, UMECLIDINIUM BROMIDE AND VILANTEROL TRIFENATATE 200; 62.5; 25 UG/1; UG/1; UG/1
POWDER RESPIRATORY (INHALATION)
Qty: 60 EACH | Refills: 2 | Status: SHIPPED | OUTPATIENT
Start: 2023-04-24

## 2023-04-24 RX ORDER — LISINOPRIL 30 MG/1
TABLET ORAL
Qty: 90 TABLET | Refills: 1 | OUTPATIENT
Start: 2023-04-24

## 2023-04-25 ENCOUNTER — OFFICE VISIT (OUTPATIENT)
Dept: INTERNAL MEDICINE | Facility: CLINIC | Age: 58
End: 2023-04-25
Payer: MEDICAID

## 2023-04-25 VITALS
RESPIRATION RATE: 18 BRPM | HEART RATE: 88 BPM | BODY MASS INDEX: 37.73 KG/M2 | TEMPERATURE: 98.6 F | DIASTOLIC BLOOD PRESSURE: 90 MMHG | HEIGHT: 67 IN | OXYGEN SATURATION: 96 % | SYSTOLIC BLOOD PRESSURE: 146 MMHG | WEIGHT: 240.4 LBS

## 2023-04-25 DIAGNOSIS — I10 ESSENTIAL (PRIMARY) HYPERTENSION: ICD-10-CM

## 2023-04-25 DIAGNOSIS — M15.9 OSTEOARTHRITIS OF MULTIPLE JOINTS, UNSPECIFIED OSTEOARTHRITIS TYPE: ICD-10-CM

## 2023-04-25 DIAGNOSIS — R60.9 FLUID RETENTION: ICD-10-CM

## 2023-04-25 DIAGNOSIS — R91.8 GROUND GLASS OPACITY PRESENT ON IMAGING OF LUNG: Primary | ICD-10-CM

## 2023-04-25 LAB
ANION GAP SERPL CALCULATED.3IONS-SCNC: 11.2 MMOL/L (ref 5–15)
BASOPHILS # BLD AUTO: 0.07 10*3/MM3 (ref 0–0.2)
BASOPHILS NFR BLD AUTO: 0.9 % (ref 0–1.5)
BUN SERPL-MCNC: 19 MG/DL (ref 6–20)
BUN/CREAT SERPL: 13.8 (ref 7–25)
CALCIUM SPEC-SCNC: 9.6 MG/DL (ref 8.6–10.5)
CHLORIDE SERPL-SCNC: 101 MMOL/L (ref 98–107)
CO2 SERPL-SCNC: 23.8 MMOL/L (ref 22–29)
CREAT SERPL-MCNC: 1.38 MG/DL (ref 0.76–1.27)
DEPRECATED RDW RBC AUTO: 44.2 FL (ref 37–54)
EGFRCR SERPLBLD CKD-EPI 2021: 60 ML/MIN/1.73
EOSINOPHIL # BLD AUTO: 0.32 10*3/MM3 (ref 0–0.4)
EOSINOPHIL NFR BLD AUTO: 4.1 % (ref 0.3–6.2)
ERYTHROCYTE [DISTWIDTH] IN BLOOD BY AUTOMATED COUNT: 13.2 % (ref 12.3–15.4)
GLUCOSE SERPL-MCNC: 102 MG/DL (ref 65–99)
HCT VFR BLD AUTO: 42.9 % (ref 37.5–51)
HGB BLD-MCNC: 14.8 G/DL (ref 13–17.7)
IMM GRANULOCYTES # BLD AUTO: 0.03 10*3/MM3 (ref 0–0.05)
IMM GRANULOCYTES NFR BLD AUTO: 0.4 % (ref 0–0.5)
LYMPHOCYTES # BLD AUTO: 1.59 10*3/MM3 (ref 0.7–3.1)
LYMPHOCYTES NFR BLD AUTO: 20.6 % (ref 19.6–45.3)
MCH RBC QN AUTO: 31.5 PG (ref 26.6–33)
MCHC RBC AUTO-ENTMCNC: 34.5 G/DL (ref 31.5–35.7)
MCV RBC AUTO: 91.3 FL (ref 79–97)
MONOCYTES # BLD AUTO: 0.6 10*3/MM3 (ref 0.1–0.9)
MONOCYTES NFR BLD AUTO: 7.8 % (ref 5–12)
MYCOBACTERIUM SPEC CULT: NORMAL
NEUTROPHILS NFR BLD AUTO: 5.12 10*3/MM3 (ref 1.7–7)
NEUTROPHILS NFR BLD AUTO: 66.2 % (ref 42.7–76)
NIGHT BLUE STAIN TISS: NORMAL
NIGHT BLUE STAIN TISS: NORMAL
NRBC BLD AUTO-RTO: 0 /100 WBC (ref 0–0.2)
NT-PROBNP SERPL-MCNC: 35.1 PG/ML (ref 0–900)
PLATELET # BLD AUTO: 298 10*3/MM3 (ref 140–450)
PMV BLD AUTO: 11.3 FL (ref 6–12)
POTASSIUM SERPL-SCNC: 4.4 MMOL/L (ref 3.5–5.2)
RBC # BLD AUTO: 4.7 10*6/MM3 (ref 4.14–5.8)
SODIUM SERPL-SCNC: 136 MMOL/L (ref 136–145)
WBC NRBC COR # BLD: 7.73 10*3/MM3 (ref 3.4–10.8)

## 2023-04-25 PROCEDURE — 85025 COMPLETE CBC W/AUTO DIFF WBC: CPT

## 2023-04-25 PROCEDURE — 80048 BASIC METABOLIC PNL TOTAL CA: CPT

## 2023-04-25 PROCEDURE — 83880 ASSAY OF NATRIURETIC PEPTIDE: CPT

## 2023-04-25 RX ORDER — FAMOTIDINE 20 MG/1
20 TABLET, FILM COATED ORAL 2 TIMES DAILY
Qty: 60 TABLET | Refills: 5 | Status: SHIPPED | OUTPATIENT
Start: 2023-04-25

## 2023-04-25 RX ORDER — FUROSEMIDE 20 MG/1
20 TABLET ORAL 2 TIMES DAILY
Qty: 60 TABLET | Refills: 0 | Status: SHIPPED | OUTPATIENT
Start: 2023-04-25

## 2023-04-25 NOTE — ASSESSMENT & PLAN NOTE
Patient would like to try diclofenac.  Mobic is ineffective.  Patient does have multiple joint pain and arthritis.  He did have an MRI of his lumbar spine that showed multilevel degenerative disc disease.  Patient does have follow-up appoint with pain management this week.

## 2023-04-25 NOTE — ASSESSMENT & PLAN NOTE
Improving since he had his bronchoscopy.  Denies significant shortness of breath.  He is still coughing.  He does still have wheezing.  He states he is using his Trelegy.  He has follow-up with pulmonology tomorrow.  Recommend he keep that appointment.

## 2023-04-25 NOTE — PROGRESS NOTES
Chief Complaint  Follow-up (Pt states that he is being seen for a follow up on Pneumonia he states that he is feeling better than what he was and his breathing is a lot better. /) and Medication Problem (Pt states that he is wanting to see if Jailyn will prescribe him voltaren gel or tablet or his left knee. )    History of Present Illness  SUBJECTIVE  Asad Meyer presents to [facility] follow up on pneumonia.  He is feeling much better overall.    Would like to discuss getting on voltaren for his osteoarthritis.     Patient had multilevel degenerative disease of the lumbar spine-significant mass effect on the exiting nerve root. Patient sees pain management this week t follow up on MRI and come up with plan.  Denies any chest pain, worsening shortness of breath, fever, chills, palpitations.      Past Medical History:   Diagnosis Date   • Anxiety    • Asthma    • Asthma, extrinsic    • Chronic pain disorder    • Depression    • Emphysema of lung    • Pneumonia of right lower lobe due to infectious organism 4/6/2023   • PTSD (post-traumatic stress disorder)    • Rheumatoid arthritis    • Schizoaffective disorder    • Self-injurious behavior    • Suicide attempt       Family History   Problem Relation Age of Onset   • Alcohol abuse Father    • Asthma Father    • Emphysema Father    • Heart failure Father       Past Surgical History:   Procedure Laterality Date   • ANKLE SURGERY Right    • BRONCHOSCOPY N/A 4/11/2023    Procedure: BRONCHOSCOPY WITH BRONCHOALVEOLAR LAVAGE;  Surgeon: Taiwo Copeland DO;  Location: [facility] ENDOSCOPY;  Service: Pulmonary;  Laterality: N/A;  DIFFUSE BRONCHITIS        Current Outpatient Medications:   •  Advair Diskus 250-50 MCG/ACT DISKUS, , Disp: , Rfl:   •  albuterol (ACCUNEB) 0.63 MG/3ML nebulizer solution, Take 3 mL by nebulization Every 6 (Six) Hours As Needed for Wheezing., Disp: 300 each, Rfl: 5  •  albuterol sulfate HFA (Ventolin HFA) 108  (90 Base) MCG/ACT inhaler, Inhale 2 puffs Every 4 (Four) Hours As Needed for Wheezing or Shortness of Air., Disp: 1 g, Rfl: 5  •  ALPRAZolam (Xanax) 1 MG tablet, Take 1 tablet by mouth 2 (Two) Times a Day for 30 days., Disp: 60 tablet, Rfl: 0  •  amLODIPine-benazepril (LOTREL) 10-40 MG per capsule, TAKE 1 CAPSULE BY MOUTH DAILY, Disp: 30 capsule, Rfl: 1  •  atorvastatin (LIPITOR) 10 MG tablet, TAKE 1 TABLET BY MOUTH DAILY, Disp: 90 tablet, Rfl: 0  •  cetirizine (zyrTEC) 10 MG tablet, Take 1 tablet by mouth Daily., Disp: 90 tablet, Rfl: 1  •  cloNIDine (Catapres) 0.2 MG tablet, Take 1 tablet by mouth every night at bedtime for 30 days., Disp: 30 tablet, Rfl: 1  •  furosemide (LASIX) 20 MG tablet, Take 1 tablet by mouth 2 (Two) Times a Day., Disp: 60 tablet, Rfl: 0  •  gabapentin (Neurontin) 800 MG tablet, Take 1 tablet by mouth 3 (Three) Times a Day for 30 days., Disp: 90 tablet, Rfl: 1  •  nicotine (Nicoderm CQ) 21 MG/24HR patch, Place 1 patch on the skin as directed by provider Daily. Do not smoke with nicotine patches on, Disp: 28 each, Rfl: 0  •  QUEtiapine (SEROquel) 200 MG tablet, Take 1 to 1.5 tab PO QHS PRN sleep, Disp: 45 tablet, Rfl: 1  •  tiotropium (SPIRIVA) 18 MCG per inhalation capsule, Place 1 capsule into inhaler and inhale Daily., Disp: 90 capsule, Rfl: 3  •  traMADol-acetaminophen (Ultracet) 37.5-325 MG per tablet, Take 1 tablet by mouth Every 6 (Six) Hours As Needed for Moderate Pain., Disp: 60 tablet, Rfl: 0  •  Trelegy Ellipta 200-62.5-25 MCG/ACT aerosol powder , INHALE 1 PUFF BY MOUTH EVERY DAY, Disp: 60 each, Rfl: 2  •  diclofenac (VOLTAREN) 50 MG EC tablet, Take 1 tablet by mouth 2 (Two) Times a Day As Needed (joint pain)., Disp: 60 tablet, Rfl: 5  •  famotidine (Pepcid) 20 MG tablet, Take 1 tablet by mouth 2 (Two) Times a Day., Disp: 60 tablet, Rfl: 5    OBJECTIVE  Vital Signs:   /90 (BP Location: Left arm)   Pulse 88   Temp 98.6 °F (37 °C) (Temporal)   Resp 18   Ht 170.2 cm  "(67.01\")   Wt 109 kg (240 lb 6.4 oz)   SpO2 96%   BMI 37.64 kg/m²    Estimated body mass index is 37.64 kg/m² as calculated from the following:    Height as of this encounter: 170.2 cm (67.01\").    Weight as of this encounter: 109 kg (240 lb 6.4 oz).     Wt Readings from Last 3 Encounters:   04/25/23 109 kg (240 lb 6.4 oz)   04/20/23 109 kg (240 lb 12.8 oz)   04/11/23 107 kg (235 lb 0.2 oz)     BP Readings from Last 3 Encounters:   04/25/23 146/90   04/20/23 145/90   04/11/23 152/96       Physical Exam  Vitals and nursing note reviewed.   Constitutional:       Appearance: Normal appearance.   HENT:      Head: Normocephalic.   Eyes:      Extraocular Movements: Extraocular movements intact.      Conjunctiva/sclera: Conjunctivae normal.   Cardiovascular:      Rate and Rhythm: Normal rate and regular rhythm.      Heart sounds: Normal heart sounds. No murmur heard.  Pulmonary:      Effort: Pulmonary effort is normal.      Breath sounds: Wheezing and rales present.   Abdominal:      General: Bowel sounds are normal.      Palpations: Abdomen is soft.      Tenderness: There is no abdominal tenderness. There is no guarding.   Musculoskeletal:         General: No swelling. Normal range of motion.      Cervical back: Normal range of motion and neck supple.   Skin:     General: Skin is warm and dry.   Neurological:      General: No focal deficit present.      Mental Status: He is alert and oriented to person, place, and time. Mental status is at baseline.   Psychiatric:         Mood and Affect: Mood normal.         Behavior: Behavior normal.         Thought Content: Thought content normal.         Judgment: Judgment normal.          Result Review        XR Chest 2 View    Result Date: 3/28/2023   Minimal airspace disease in the left lung base, most likely in the lingula.     Ayush Holbrook MD       Electronically Signed and Approved By: Ayush Holbrook MD on 3/28/2023 at 15:10             XR Spine Lumbar 2 or 3 View    Result " Date: 1/30/2023   Mild lumbar spondylosis without acute osseous abnormality.     SANDY NAVARRO MD       Electronically Signed and Approved By: SANDY NAVARRO MD on 1/30/2023 at 16:42             CT Chest With Contrast Diagnostic    Result Date: 2/28/2023     1. Interval development of extensive peribronchial ground-glass opacities and reticular nodular densities throughout the right lung, greatest in the right upper lobe and right lower lobe, as well as peribronchial thickening.  The rapid interval development suggests an infectious or inflammatory process.  Correlate clinically.  Follow-up evaluation may be of value to document resolution.  2. Pulmonary emphysematous changes and scarring.  3. Previously seen partial opacification of left upper lobe and lower lobe bronchi is no longer evident, and may have been due to mucous plugging.     JERALD JALLOH MD       Electronically Signed and Approved By: JERALD JALLOH MD on 2/28/2023 at 13:09             MRI Lumbar Spine Without Contrast    Result Date: 4/13/2023   Multilevel degenerative disease of the lumbar spine.  The most abnormal level is the L4-5 level where a lateralizing disc results in significant mass effect on the exiting nerve root.  Please see the above discussion.   JERALD IZAGUIRRE MD       Electronically Signed and Approved By: JERALD IZAGUIRRE MD on 4/13/2023 at 16:49             XR Knee 3 View Bilateral    Result Date: 1/30/2023    Left knee.  Moderate tricompartmental osteoarthritis.  Right knee.  Minimal patellofemoral degenerative change.      SANDY NAVARRO MD       Electronically Signed and Approved By: SANDY NAVARRO MD on 1/30/2023 at 17:18             US Renal Bilateral    Result Date: 2/28/2023    Bilateral renal ultrasound demonstrating benign-appearing renal cysts.  No solid renal mass is seen.      MIKAL HOPE MD       Electronically Signed and Approved By: MIKAL HOPE MD on 2/28/2023 at 10:58             XR Hips Bilateral With or Without Pelvis  3-4 View    Result Date: 1/30/2023   Mild degenerative changes of both hips, right slightly greater than left.      SANDY NAVARRO MD       Electronically Signed and Approved By: SANDY NAVARRO MD on 1/30/2023 at 17:17                The above data has been reviewed by PONCE Valadez 04/25/2023 10:57 EDT.          Patient Care Team:  Jailyn Shepard APRN as PCP - General (Internal Medicine)        ASSESSMENT & PLAN    Diagnoses and all orders for this visit:    1. Ground glass opacity present on imaging of lung (Primary)  Overview:  Added automatically from request for surgery [id number]    Assessment & Plan:  Improving since he had his bronchoscopy.  Denies significant shortness of breath.  He is still coughing.  He does still have wheezing.  He states he is using his Trelegy.  He has follow-up with pulmonology tomorrow.  Recommend he keep that appointment.      2. Osteoarthritis of multiple joints, unspecified osteoarthritis type  Assessment & Plan:  Patient would like to try diclofenac.  Mobic is ineffective.  Patient does have multiple joint pain and arthritis.  He did have an MRI of his lumbar spine that showed multilevel degenerative disc disease.  Patient does have follow-up appoint with pain management this week.      3. Essential (primary) hypertension  Assessment & Plan:  Patient was unsure if he was to be taking the diuretic still.  Recommend that he continues with Lasix.  We will check a BMP today.  Continue clonidine 0.2 mg nightly, Lotrel and furosemide.  Keep blood pressure log and report elevated readings.    Orders:  -     furosemide (LASIX) 20 MG tablet; Take 1 tablet by mouth 2 (Two) Times a Day.  Dispense: 60 tablet; Refill: 0  -     Basic metabolic panel; Future  -     Basic metabolic panel  -     CBC & Differential    4. Fluid retention  -     proBNP    Other orders  -     diclofenac (VOLTAREN) 50 MG EC tablet; Take 1 tablet by mouth 2 (Two) Times a Day As Needed (joint pain).  Dispense: 60  tablet; Refill: 5  -     famotidine (Pepcid) 20 MG tablet; Take 1 tablet by mouth 2 (Two) Times a Day.  Dispense: 60 tablet; Refill: 5       Tobacco Use: High Risk   • Smoking Tobacco Use: Every Day   • Smokeless Tobacco Use: Current   • Passive Exposure: Not on file       Follow Up     Return for Next scheduled follow up.    Please note that portions of this note were completed with a voice recognition program.    Patient was given instructions and counseling regarding his condition or for health maintenance advice. Please see specific information pulled into the AVS if appropriate.   I have reviewed information obtained and documented by others and I have confirmed the accuracy of this documented note.    PONCE Valadez

## 2023-04-25 NOTE — ASSESSMENT & PLAN NOTE
Patient was unsure if he was to be taking the diuretic still.  Recommend that he continues with Lasix.  We will check a BMP today.  Continue clonidine 0.2 mg nightly, Lotrel and furosemide.  Keep blood pressure log and report elevated readings.

## 2023-04-26 ENCOUNTER — OFFICE VISIT (OUTPATIENT)
Dept: PULMONOLOGY | Facility: CLINIC | Age: 58
End: 2023-04-26
Payer: MEDICAID

## 2023-04-26 VITALS
SYSTOLIC BLOOD PRESSURE: 163 MMHG | HEART RATE: 95 BPM | OXYGEN SATURATION: 90 % | BODY MASS INDEX: 37.35 KG/M2 | HEIGHT: 67 IN | WEIGHT: 238 LBS | DIASTOLIC BLOOD PRESSURE: 97 MMHG | RESPIRATION RATE: 19 BRPM | TEMPERATURE: 98.9 F

## 2023-04-26 DIAGNOSIS — I10 ESSENTIAL (PRIMARY) HYPERTENSION: ICD-10-CM

## 2023-04-26 DIAGNOSIS — Z72.0 TOBACCO ABUSE: ICD-10-CM

## 2023-04-26 DIAGNOSIS — J18.9 PNEUMONIA OF RIGHT LOWER LOBE DUE TO INFECTIOUS ORGANISM: ICD-10-CM

## 2023-04-26 DIAGNOSIS — J41.8 MIXED SIMPLE AND MUCOPURULENT CHRONIC BRONCHITIS: Primary | ICD-10-CM

## 2023-04-26 DIAGNOSIS — E66.01 MORBID (SEVERE) OBESITY DUE TO EXCESS CALORIES: ICD-10-CM

## 2023-04-26 RX ORDER — FUROSEMIDE 20 MG/1
TABLET ORAL
Qty: 60 TABLET | Refills: 0 | OUTPATIENT
Start: 2023-04-26

## 2023-04-26 NOTE — PROGRESS NOTES
Pulmonary Office Follow-up    Subjective     Asad Meyer is seen today at the office for   Chief Complaint   Patient presents with   • COPD   • Follow-up     1 month fu         HPI  Asad Meyer is a 58 y.o. male with a PMH significant for COPD and tobacco abuse with pneumonia right presents for follow-up patient appears to be doing well and feels a lot of improvement he denies any shortness of breath cough or expectoration at this time and is cutting back on smoking      Tobacco use history:  Type: cigarettes  Amount: 0.5 ppd  Duration: 30 years  Cessation: n   Willing to quit: Yes      Patient Active Problem List   Diagnosis   • Asthma   • Essential (primary) hypertension   • Psychophysiological insomnia   • GREG (generalized anxiety disorder)   • Polyarthralgia   • Chronic obstructive pulmonary disease   • Mixed hyperlipidemia   • DDD (degenerative disc disease), thoracolumbar   • Osteoarthritis of multiple joints   • Cigarette nicotine dependence without complication   • Pneumonia of right lower lobe due to infectious organism   • Ground glass opacity present on imaging of lung       Review of Systems  Review of Systems   Respiratory: Positive for cough, shortness of breath and wheezing.    All other systems reviewed and are negative.    As described in the HPI. Otherwise, remainder of ROS (14 systems) were negative.    Medications, Allergies, Social, and Family Histories reviewed as per EMR.    Objective     Vitals:    04/26/23 1045   BP: 163/97   Pulse: 95   Resp: 19   Temp: 98.9 °F (37.2 °C)   SpO2: 90%         04/26/23  1045   Weight: 108 kg (238 lb)       Physical Exam  Vitals and nursing note reviewed.   Constitutional:       Appearance: He is obese.   HENT:      Head: Normocephalic and atraumatic.      Nose: Nose normal.      Mouth/Throat:      Mouth: Mucous membranes are moist.   Eyes:      Conjunctiva/sclera: Conjunctivae normal.      Pupils: Pupils are equal, round, and reactive to light.    Cardiovascular:      Rate and Rhythm: Normal rate and regular rhythm.      Pulses: Normal pulses.      Heart sounds: Normal heart sounds.   Pulmonary:      Effort: Pulmonary effort is normal.      Breath sounds: Rhonchi present.   Abdominal:      General: Abdomen is flat. Bowel sounds are normal.      Palpations: Abdomen is soft.   Musculoskeletal:         General: Normal range of motion.      Cervical back: Normal range of motion and neck supple.   Skin:     General: Skin is warm.      Capillary Refill: Capillary refill takes less than 2 seconds.   Neurological:      General: No focal deficit present.      Mental Status: He is alert and oriented to person, place, and time.   Psychiatric:         Mood and Affect: Mood normal.         XR Chest 2 View    Result Date: 3/28/2023   Minimal airspace disease in the left lung base, most likely in the lingula.     Ayush Holbrook MD       Electronically Signed and Approved By: Ayush Holbrook MD on 3/28/2023 at 15:10             XR Spine Lumbar 2 or 3 View    Result Date: 1/30/2023   Mild lumbar spondylosis without acute osseous abnormality.     SANDY NAVARRO MD       Electronically Signed and Approved By: SANDY NAVARRO MD on 1/30/2023 at 16:42             CT Chest With Contrast Diagnostic    Result Date: 2/28/2023     1. Interval development of extensive peribronchial ground-glass opacities and reticular nodular densities throughout the right lung, greatest in the right upper lobe and right lower lobe, as well as peribronchial thickening.  The rapid interval development suggests an infectious or inflammatory process.  Correlate clinically.  Follow-up evaluation may be of value to document resolution.  2. Pulmonary emphysematous changes and scarring.  3. Previously seen partial opacification of left upper lobe and lower lobe bronchi is no longer evident, and may have been due to mucous plugging.     JERALD JALLOH MD       Electronically Signed and Approved By: JERALD JALLOH MD  on 2/28/2023 at 13:09             MRI Lumbar Spine Without Contrast    Result Date: 4/13/2023   Multilevel degenerative disease of the lumbar spine.  The most abnormal level is the L4-5 level where a lateralizing disc results in significant mass effect on the exiting nerve root.  Please see the above discussion.   JERALD IZAGUIRRE MD       Electronically Signed and Approved By: JERALD IZAGUIRRE MD on 4/13/2023 at 16:49             XR Knee 3 View Bilateral    Result Date: 1/30/2023    Left knee.  Moderate tricompartmental osteoarthritis.  Right knee.  Minimal patellofemoral degenerative change.      SANDY NAVARRO MD       Electronically Signed and Approved By: SANDY NAVARRO MD on 1/30/2023 at 17:18             US Renal Bilateral    Result Date: 2/28/2023    Bilateral renal ultrasound demonstrating benign-appearing renal cysts.  No solid renal mass is seen.      MIKAL HOPE MD       Electronically Signed and Approved By: MIKAL HOPE MD on 2/28/2023 at 10:58             XR Hips Bilateral With or Without Pelvis 3-4 View    Result Date: 1/30/2023   Mild degenerative changes of both hips, right slightly greater than left.      SANDY NAVARRO MD       Electronically Signed and Approved By: SANDY NAVARRO MD on 1/30/2023 at 17:17                Assessment & Plan     Diagnoses and all orders for this visit:    1. Mixed simple and mucopurulent chronic bronchitis (Primary)    2. Pneumonia of right lower lobe due to infectious organism    3. Tobacco abuse         Discussion/ Recommendations:   Patient is advised to stop smoking and he is working on it  His breathing has improved significantly  Advised to continue his present medication including Advair and albuterol  Regular exercise  Advised to reduce weight his BMI is 37.28  Vaccinations discussed and recommended    Class 2 Severe Obesity (BMI >=35 and <=39.9). Obesity-related health conditions include the following: none. Obesity is unchanged. BMI is is above average; BMI  management plan is completed. We discussed low calorie, low carb based diet program, portion control and increasing exercise.        Return in about 4 months (around 8/26/2023).          This document has been electronically signed by Michael Haile MD on April 26, 2023 10:52 EDT

## 2023-05-02 LAB
MYCOBACTERIUM SPEC CULT: NORMAL
NIGHT BLUE STAIN TISS: NORMAL
NIGHT BLUE STAIN TISS: NORMAL

## 2023-05-03 DIAGNOSIS — F51.5 NIGHTMARES: ICD-10-CM

## 2023-05-03 RX ORDER — PRAZOSIN HYDROCHLORIDE 1 MG/1
CAPSULE ORAL
Qty: 30 CAPSULE | Refills: 1 | OUTPATIENT
Start: 2023-05-03

## 2023-05-03 NOTE — TELEPHONE ENCOUNTER
Medication refill request for Prazosin 1mg.     prazosin (MINIPRESS) 1 MG capsule (04/05/2023)    Medication appears to have possibly been discontinued.     Pt has upcoming appt  Appointment with Page Lee PA-C (06/15/2023)    Please review and approve/refuse.

## 2023-05-08 LAB — FUNGUS WND CULT: ABNORMAL

## 2023-05-08 RX ORDER — ALBUTEROL SULFATE 90 UG/1
AEROSOL, METERED RESPIRATORY (INHALATION)
Qty: 1 G | Refills: 5 | Status: SHIPPED | OUTPATIENT
Start: 2023-05-08

## 2023-05-09 LAB
MYCOBACTERIUM SPEC CULT: NORMAL
NIGHT BLUE STAIN TISS: NORMAL
NIGHT BLUE STAIN TISS: NORMAL

## 2023-05-16 LAB
MYCOBACTERIUM SPEC CULT: NORMAL
NIGHT BLUE STAIN TISS: NORMAL
NIGHT BLUE STAIN TISS: NORMAL

## 2023-05-17 RX ORDER — AMLODIPINE BESYLATE AND BENAZEPRIL HYDROCHLORIDE 10; 40 MG/1; MG/1
1 CAPSULE ORAL DAILY
Qty: 30 CAPSULE | Refills: 1 | Status: SHIPPED | OUTPATIENT
Start: 2023-05-17 | End: 2024-05-16

## 2023-05-23 DIAGNOSIS — I10 ESSENTIAL (PRIMARY) HYPERTENSION: ICD-10-CM

## 2023-05-23 LAB
MYCOBACTERIUM SPEC CULT: NORMAL
NIGHT BLUE STAIN TISS: NORMAL
NIGHT BLUE STAIN TISS: NORMAL

## 2023-05-23 RX ORDER — FUROSEMIDE 20 MG/1
TABLET ORAL
Qty: 60 TABLET | Refills: 2 | Status: SHIPPED | OUTPATIENT
Start: 2023-05-23

## 2023-05-25 ENCOUNTER — OFFICE VISIT (OUTPATIENT)
Dept: INTERNAL MEDICINE | Facility: CLINIC | Age: 58
End: 2023-05-25
Payer: MEDICAID

## 2023-05-25 VITALS
HEART RATE: 79 BPM | SYSTOLIC BLOOD PRESSURE: 154 MMHG | TEMPERATURE: 98.4 F | WEIGHT: 237.4 LBS | HEIGHT: 67 IN | BODY MASS INDEX: 37.26 KG/M2 | RESPIRATION RATE: 18 BRPM | DIASTOLIC BLOOD PRESSURE: 97 MMHG | OXYGEN SATURATION: 93 %

## 2023-05-25 DIAGNOSIS — Z00.00 ANNUAL PHYSICAL EXAM: ICD-10-CM

## 2023-05-25 DIAGNOSIS — I10 ESSENTIAL (PRIMARY) HYPERTENSION: ICD-10-CM

## 2023-05-25 DIAGNOSIS — J44.9 CHRONIC OBSTRUCTIVE PULMONARY DISEASE, UNSPECIFIED COPD TYPE: ICD-10-CM

## 2023-05-25 DIAGNOSIS — N28.9 RENAL INSUFFICIENCY: Primary | ICD-10-CM

## 2023-05-25 LAB
ANION GAP SERPL CALCULATED.3IONS-SCNC: 9.2 MMOL/L (ref 5–15)
BUN SERPL-MCNC: 24 MG/DL (ref 6–20)
BUN/CREAT SERPL: 17.9 (ref 7–25)
CALCIUM SPEC-SCNC: 10 MG/DL (ref 8.6–10.5)
CHLORIDE SERPL-SCNC: 102 MMOL/L (ref 98–107)
CO2 SERPL-SCNC: 28.8 MMOL/L (ref 22–29)
CREAT SERPL-MCNC: 1.34 MG/DL (ref 0.76–1.27)
EGFRCR SERPLBLD CKD-EPI 2021: 62.2 ML/MIN/1.73
GLUCOSE SERPL-MCNC: 97 MG/DL (ref 65–99)
POTASSIUM SERPL-SCNC: 4.4 MMOL/L (ref 3.5–5.2)
SODIUM SERPL-SCNC: 140 MMOL/L (ref 136–145)

## 2023-05-25 PROCEDURE — 80048 BASIC METABOLIC PNL TOTAL CA: CPT

## 2023-05-25 RX ORDER — AZITHROMYCIN 250 MG/1
TABLET, FILM COATED ORAL
Qty: 6 TABLET | Refills: 0 | Status: SHIPPED | OUTPATIENT
Start: 2023-05-25

## 2023-05-25 RX ORDER — PREDNISONE 20 MG/1
TABLET ORAL
Qty: 11 TABLET | Refills: 0 | Status: SHIPPED | OUTPATIENT
Start: 2023-05-25

## 2023-05-25 NOTE — PROGRESS NOTES
Chief Complaint  Follow-up (Pt states that he is being seen for a follow up on his blood pressure. He states that yesterday it was running 118/80. )    History of Present Illness  SUBJECTIVE  Asad Meyer presents to [facility] for his annual physical exam.    He is being seen my pulmonology.  He is doing well.   He states that over the last 4-5 days ago he has felt more congested, shortness of breath.    Denies any chest pain, myalgias, nausea, vomiting, diarrhea, palpitations.    Past Medical History:   Diagnosis Date   • Anxiety    • Asthma    • Asthma, extrinsic    • Chronic pain disorder    • Depression    • Emphysema of lung    • Pneumonia of right lower lobe due to infectious organism 4/6/2023   • PTSD (post-traumatic stress disorder)    • Rheumatoid arthritis    • Schizoaffective disorder    • Self-injurious behavior    • Suicide attempt       Family History   Problem Relation Age of Onset   • Alcohol abuse Father    • Asthma Father    • Emphysema Father    • Heart failure Father       Past Surgical History:   Procedure Laterality Date   • ANKLE SURGERY Right    • BRONCHOSCOPY N/A 4/11/2023    Procedure: BRONCHOSCOPY WITH BRONCHOALVEOLAR LAVAGE;  Surgeon: Taiwo Copeland DO;  Location: [facility];  Service: Pulmonary;  Laterality: N/A;  DIFFUSE BRONCHITIS        Current Outpatient Medications:   •  Advair Diskus 250-50 MCG/ACT DISKUS, , Disp: , Rfl:   •  albuterol (ACCUNEB) 0.63 MG/3ML nebulizer solution, Take 3 mL by nebulization Every 6 (Six) Hours As Needed for Wheezing., Disp: 300 each, Rfl: 5  •  amLODIPine-benazepril (LOTREL) 10-40 MG per capsule, TAKE 1 CAPSULE BY MOUTH DAILY, Disp: 30 capsule, Rfl: 1  •  atorvastatin (LIPITOR) 10 MG tablet, TAKE 1 TABLET BY MOUTH DAILY, Disp: 90 tablet, Rfl: 0  •  cetirizine (zyrTEC) 10 MG tablet, Take 1 tablet by mouth Daily., Disp: 90 tablet, Rfl: 1  •  cloNIDine (Catapres) 0.2 MG tablet, Take 1 tablet by mouth  "every night at bedtime for 30 days., Disp: 30 tablet, Rfl: 1  •  diclofenac (VOLTAREN) 50 MG EC tablet, Take 1 tablet by mouth 2 (Two) Times a Day As Needed (joint pain)., Disp: 60 tablet, Rfl: 5  •  famotidine (Pepcid) 20 MG tablet, Take 1 tablet by mouth 2 (Two) Times a Day., Disp: 60 tablet, Rfl: 5  •  furosemide (LASIX) 20 MG tablet, TAKE 1 TABLET BY MOUTH TWICE DAILY, Disp: 60 tablet, Rfl: 2  •  nicotine (Nicoderm CQ) 21 MG/24HR patch, Place 1 patch on the skin as directed by provider Daily. Do not smoke with nicotine patches on, Disp: 28 each, Rfl: 0  •  QUEtiapine (SEROquel) 200 MG tablet, Take 1 to 1.5 tab PO QHS PRN sleep, Disp: 45 tablet, Rfl: 1  •  tiotropium (SPIRIVA) 18 MCG per inhalation capsule, Place 1 capsule into inhaler and inhale Daily., Disp: 90 capsule, Rfl: 3  •  traMADol-acetaminophen (Ultracet) 37.5-325 MG per tablet, Take 1 tablet by mouth Every 6 (Six) Hours As Needed for Moderate Pain., Disp: 60 tablet, Rfl: 0  •  Trelegy Ellipta 200-62.5-25 MCG/ACT aerosol powder , INHALE 1 PUFF BY MOUTH EVERY DAY, Disp: 60 each, Rfl: 2  •  Ventolin  (90 Base) MCG/ACT inhaler, INHALE 2 PUFFS BY MOUTH EVERY 4 HOURS AS NEEDED FOR WHEEZING OR SHORTNESS OF BREATH, Disp: 1 g, Rfl: 5  •  azithromycin (Zithromax Z-Jono) 250 MG tablet, Take 2 tablets by mouth on day 1, then 1 tablet daily on days 2-5, Disp: 6 tablet, Rfl: 0  •  gabapentin (Neurontin) 800 MG tablet, Take 1 tablet by mouth 3 (Three) Times a Day for 30 days., Disp: 90 tablet, Rfl: 1  •  predniSONE (DELTASONE) 20 MG tablet, Take 2 tablets daily for 3 days, then take 1 tablet daily for 3 days, then take 1/2 tablet daily for 4 days, Disp: 11 tablet, Rfl: 0    OBJECTIVE  Vital Signs:   /97 (BP Location: Left arm)   Pulse 79   Temp 98.4 °F (36.9 °C) (Temporal)   Resp 18   Ht 170.2 cm (67.01\")   Wt 108 kg (237 lb 6.4 oz)   SpO2 93%   BMI 37.17 kg/m²    Estimated body mass index is 37.17 kg/m² as calculated from the following:    Height " "as of this encounter: 170.2 cm (67.01\").    Weight as of this encounter: 108 kg (237 lb 6.4 oz).     Wt Readings from Last 3 Encounters:   05/25/23 108 kg (237 lb 6.4 oz)   04/26/23 108 kg (238 lb)   04/25/23 109 kg (240 lb 6.4 oz)     BP Readings from Last 3 Encounters:   05/25/23 154/97   04/26/23 163/97   04/25/23 146/90       Physical Exam  Vitals and nursing note reviewed.   Constitutional:       Appearance: Normal appearance.   HENT:      Head: Normocephalic.   Eyes:      Extraocular Movements: Extraocular movements intact.      Conjunctiva/sclera: Conjunctivae normal.   Cardiovascular:      Rate and Rhythm: Normal rate and regular rhythm.      Heart sounds: Normal heart sounds. No murmur heard.  Pulmonary:      Effort: Pulmonary effort is normal.      Breath sounds: Wheezing and rales present.   Abdominal:      General: Bowel sounds are normal.      Palpations: Abdomen is soft.      Tenderness: There is no abdominal tenderness. There is no guarding.   Musculoskeletal:      Right lower leg: Edema (1+ pitting) present.      Left lower leg: Edema (1+ pitting) present.   Skin:     General: Skin is warm and dry.   Neurological:      General: No focal deficit present.      Mental Status: He is alert. Mental status is at baseline.   Psychiatric:         Mood and Affect: Mood normal.         Thought Content: Thought content normal.          Result Review    [facility]        12/6/2022    14:31 2/28/2023    09:00 4/25/2023    11:22   CMP   Glucose 85    102     BUN 20    19     Creatinine 1.22   1.30   1.38     EGFR 70.0   64.9   60.0     Sodium 139    136     Potassium 4.3    4.4     Chloride 100    101     Calcium 9.8    9.6     Total Protein 7.3       Albumin 4.40       Globulin 2.9       Total Bilirubin 0.2       Alkaline Phosphatase 133       AST (SGOT) 30       ALT (SGPT) 34       Albumin/Globulin Ratio 1.5       BUN/Creatinine Ratio 16.4    13.8     Anion Gap 12.7    11.2         XR Chest 2 View    Result Date: " 3/28/2023   Minimal airspace disease in the left lung base, most likely in the lingula.     Ayush Holbrook MD       Electronically Signed and Approved By: Ayush Holbrook MD on 3/28/2023 at 15:10             XR Spine Lumbar 2 or 3 View    Result Date: 1/30/2023   Mild lumbar spondylosis without acute osseous abnormality.     SANDY NAVARRO MD       Electronically Signed and Approved By: SANDY NAVARRO MD on 1/30/2023 at 16:42             CT Chest With Contrast Diagnostic    Result Date: 2/28/2023     1. Interval development of extensive peribronchial ground-glass opacities and reticular nodular densities throughout the right lung, greatest in the right upper lobe and right lower lobe, as well as peribronchial thickening.  The rapid interval development suggests an infectious or inflammatory process.  Correlate clinically.  Follow-up evaluation may be of value to document resolution.  2. Pulmonary emphysematous changes and scarring.  3. Previously seen partial opacification of left upper lobe and lower lobe bronchi is no longer evident, and may have been due to mucous plugging.     JERALD JALLOH MD       Electronically Signed and Approved By: JERALD JALLOH MD on 2/28/2023 at 13:09             MRI Lumbar Spine Without Contrast    Result Date: 4/13/2023   Multilevel degenerative disease of the lumbar spine.  The most abnormal level is the L4-5 level where a lateralizing disc results in significant mass effect on the exiting nerve root.  Please see the above discussion.   JERALD IZAGUIRRE MD       Electronically Signed and Approved By: JERALD IZAGUIRRE MD on 4/13/2023 at 16:49             XR Knee 3 View Bilateral    Result Date: 1/30/2023    Left knee.  Moderate tricompartmental osteoarthritis.  Right knee.  Minimal patellofemoral degenerative change.      SANDY NAVARRO MD       Electronically Signed and Approved By: SANDY NAVARRO MD on 1/30/2023 at 17:18             US Renal Bilateral    Result Date: 2/28/2023    Bilateral renal  ultrasound demonstrating benign-appearing renal cysts.  No solid renal mass is seen.      MIKAL HOPE MD       Electronically Signed and Approved By: MIKAL HOPE MD on 2/28/2023 at 10:58             XR Hips Bilateral With or Without Pelvis 3-4 View    Result Date: 1/30/2023   Mild degenerative changes of both hips, right slightly greater than left.      SANDY NAVARRO MD       Electronically Signed and Approved By: SANDY NAVARRO MD on 1/30/2023 at 17:17                The above data has been reviewed by PONCE Valadez 05/25/2023 10:15 EDT.          Patient Care Team:  Jailyn Shepard APRN as PCP - General (Internal Medicine)           ASSESSMENT & PLAN    Diagnoses and all orders for this visit:    1. Renal insufficiency (Primary)  -     Basic metabolic panel; Future  -     Basic metabolic panel  -     CBC & Differential; Future  -     Comprehensive Metabolic Panel; Future  -     Lipid Panel; Future  -     TSH Rfx On Abnormal To Free T4; Future  -     Microalbumin / Creatinine Urine Ratio - Urine, Clean Catch; Future    2. Annual physical exam  Assessment & Plan:  Colon Cancer screening-needs, pt declines today.  PSA-12/2022  Declines covid/flu vaccine.  Patient does smoke and drink alcohol-recommend smoking cessation.  Does not exercise much. Recommend healthy diet, regular exercise.  PNA vaccine-patient states he will get it.   shingrex-counseled.  Recommend regular dental/eye exams.  Highly recommend weight loss-healthy diet, regular exercise      3. Essential (primary) hypertension  Assessment & Plan:  Patient states that his blood pressure is better when he is not in the doctor office. He states that it was 118/80.  Patient states that his breathing is more restricted as well.   Patient will monitor his BP at home and keep a log. He is coming into the office in 2 weeks to get PNA vaccine so we will see how his BP is running then.   Discussed with him the importance of smoking cessation, healthy diet and  exercise.  If no improvement, may add in toprol    Orders:  -     CBC & Differential; Future  -     Comprehensive Metabolic Panel; Future  -     Lipid Panel; Future  -     TSH Rfx On Abnormal To Free T4; Future  -     Microalbumin / Creatinine Urine Ratio - Urine, Clean Catch; Future    4. Chronic obstructive pulmonary disease, unspecified COPD type  Assessment & Plan:  COPD is unchanged.  Worsening over the last few days.  Patient states he was outside at night over the weekend and is now having increased wheezing and soa. He denies myalgias, fever, nausea, vomiting, or other general sick symptoms.  He has been using his inhalers.  Recommend he use his duonebs every 4 hours.  02 sat is 93%, he does have wheezes and crackles.  Will add in steroid and zpak. If no improvement, recommend follow up with pulm asap.  Seek medical attn if symptoms worsen        Other orders  -     predniSONE (DELTASONE) 20 MG tablet; Take 2 tablets daily for 3 days, then take 1 tablet daily for 3 days, then take 1/2 tablet daily for 4 days  Dispense: 11 tablet; Refill: 0  -     azithromycin (Zithromax Z-Jono) 250 MG tablet; Take 2 tablets by mouth on day 1, then 1 tablet daily on days 2-5  Dispense: 6 tablet; Refill: 0       Tobacco Use: High Risk   • Smoking Tobacco Use: Every Day   • Smokeless Tobacco Use: Current   • Passive Exposure: Current       Follow Up     Return in about 3 months (around 8/25/2023).    Please note that portions of this note were completed with a voice recognition program.    Patient was given instructions and counseling regarding his condition or for health maintenance advice. Please see specific information pulled into the AVS if appropriate.   I have reviewed information obtained and documented by others and I have confirmed the accuracy of this documented note.    PONCE Valadez

## 2023-05-25 NOTE — ASSESSMENT & PLAN NOTE
COPD is unchanged.  Worsening over the last few days.  Patient states he was outside at night over the weekend and is now having increased wheezing and soa. He denies myalgias, fever, nausea, vomiting, or other general sick symptoms.  He has been using his inhalers.  Recommend he use his duonebs every 4 hours.  02 sat is 93%, he does have wheezes and crackles.  Will add in steroid and zpak. If no improvement, recommend follow up with pulm asap.  Seek medical attn if symptoms worsen

## 2023-05-25 NOTE — ASSESSMENT & PLAN NOTE
Patient states that his blood pressure is better when he is not in the doctor office. He states that it was 118/80.  Patient states that his breathing is more restricted as well.   Patient will monitor his BP at home and keep a log. He is coming into the office in 2 weeks to get PNA vaccine so we will see how his BP is running then.   Discussed with him the importance of smoking cessation, healthy diet and exercise.  If no improvement, may add in toprol

## 2023-05-25 NOTE — ASSESSMENT & PLAN NOTE
Colon Cancer screening-needs, pt declines today.  PSA-12/2022  Declines covid/flu vaccine.  Patient does smoke and drink alcohol-recommend smoking cessation.  Does not exercise much. Recommend healthy diet, regular exercise.  PNA vaccine-patient states he will get it.   shingrex-counseled.  Recommend regular dental/eye exams.  Highly recommend weight loss-healthy diet, regular exercise

## 2023-06-01 DIAGNOSIS — I10 ESSENTIAL (PRIMARY) HYPERTENSION: ICD-10-CM

## 2023-06-01 RX ORDER — LISINOPRIL 40 MG/1
TABLET ORAL
Qty: 90 TABLET | Refills: 0 | Status: SHIPPED | OUTPATIENT
Start: 2023-06-01

## 2023-06-01 RX ORDER — ATORVASTATIN CALCIUM 10 MG/1
10 TABLET, FILM COATED ORAL DAILY
Qty: 90 TABLET | Refills: 0 | Status: SHIPPED | OUTPATIENT
Start: 2023-06-01

## 2023-06-11 DIAGNOSIS — F51.5 NIGHTMARES: ICD-10-CM

## 2023-06-12 RX ORDER — CLONIDINE HYDROCHLORIDE 0.2 MG/1
TABLET ORAL
Qty: 30 TABLET | Refills: 0 | Status: SHIPPED | OUTPATIENT
Start: 2023-06-12 | End: 2023-06-15 | Stop reason: SDUPTHER

## 2023-06-12 NOTE — TELEPHONE ENCOUNTER
REFILL REQUEST FOR CATAPRES 0.2 MG TABLET.    FOLLOW UP APPT IS ON 6-15-23.    cloNIDine (Catapres) 0.2 MG tablet (04/20/2023)     ORDER PENDED. PLEASE REVIEW.

## 2023-06-15 ENCOUNTER — OFFICE VISIT (OUTPATIENT)
Dept: BEHAVIORAL HEALTH | Facility: CLINIC | Age: 58
End: 2023-06-15
Payer: MEDICAID

## 2023-06-15 VITALS
WEIGHT: 239 LBS | SYSTOLIC BLOOD PRESSURE: 130 MMHG | BODY MASS INDEX: 37.51 KG/M2 | HEIGHT: 67 IN | DIASTOLIC BLOOD PRESSURE: 74 MMHG

## 2023-06-15 DIAGNOSIS — F51.5 NIGHTMARES: ICD-10-CM

## 2023-06-15 DIAGNOSIS — F41.0 PANIC DISORDER: ICD-10-CM

## 2023-06-15 DIAGNOSIS — F43.10 POST TRAUMATIC STRESS DISORDER (PTSD): ICD-10-CM

## 2023-06-15 DIAGNOSIS — F33.2 SEVERE EPISODE OF RECURRENT MAJOR DEPRESSIVE DISORDER, WITHOUT PSYCHOTIC FEATURES: ICD-10-CM

## 2023-06-15 DIAGNOSIS — F41.1 GENERALIZED ANXIETY DISORDER: Primary | ICD-10-CM

## 2023-06-15 DIAGNOSIS — G47.00 INSOMNIA, UNSPECIFIED TYPE: ICD-10-CM

## 2023-06-15 DIAGNOSIS — Z79.899 MEDICATION MANAGEMENT: ICD-10-CM

## 2023-06-15 DIAGNOSIS — R45.1 AGITATION: ICD-10-CM

## 2023-06-15 RX ORDER — QUETIAPINE FUMARATE 200 MG/1
TABLET, FILM COATED ORAL
Qty: 45 TABLET | Refills: 1 | Status: SHIPPED | OUTPATIENT
Start: 2023-06-15

## 2023-06-15 RX ORDER — DIVALPROEX SODIUM 250 MG/1
TABLET, EXTENDED RELEASE ORAL
Qty: 60 TABLET | Refills: 1 | Status: SHIPPED | OUTPATIENT
Start: 2023-06-15

## 2023-06-15 RX ORDER — GABAPENTIN 800 MG/1
800 TABLET ORAL 3 TIMES DAILY
Qty: 90 TABLET | Refills: 1 | Status: SHIPPED | OUTPATIENT
Start: 2023-06-15 | End: 2023-07-15

## 2023-06-15 RX ORDER — AMLODIPINE BESYLATE 10 MG/1
1 TABLET ORAL DAILY
COMMUNITY
Start: 2023-05-22

## 2023-06-15 RX ORDER — CLONIDINE HYDROCHLORIDE 0.2 MG/1
0.2 TABLET ORAL
Qty: 30 TABLET | Refills: 1 | Status: SHIPPED | OUTPATIENT
Start: 2023-06-15 | End: 2023-07-15

## 2023-06-15 RX ORDER — ALPRAZOLAM 1 MG/1
1 TABLET ORAL 2 TIMES DAILY PRN
Qty: 60 TABLET | Refills: 0 | Status: SHIPPED | OUTPATIENT
Start: 2023-06-15 | End: 2023-07-15

## 2023-06-15 NOTE — PROGRESS NOTES
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    Chloride 12/06/2022 100  98 - 107 mmol/L Final    CO2 12/06/2022 26.3  22.0 - 29.0 mmol/L Final    Calcium 12/06/2022 9.8  8.6 - 10.5 mg/dL Final    Total Protein 12/06/2022 7.3  6.0 - 8.5 g/dL Final    Albumin 12/06/2022 4.40  3.50 - 5.20 g/dL Final    ALT (SGPT) 12/06/2022 34  1 - 41 U/L Final    AST (SGOT) 12/06/2022 30  1 - 40 U/L Final    Alkaline Phosphatase 12/06/2022 133 (H)  39 - 117 U/L Final    Total Bilirubin 12/06/2022 0.2  0.0 - 1.2 mg/dL Final    Globulin 12/06/2022 2.9  gm/dL Final    A/G Ratio 12/06/2022 1.5  g/dL Final    BUN/Creatinine Ratio 12/06/2022 16.4  7.0 - 25.0 Final    Anion Gap 12/06/2022 12.7  5.0 - 15.0 mmol/L Final    eGFR 12/06/2022 70.0  >60.0 mL/min/1.73 Final    National Kidney Foundation and American Society of Nephrology (ASN) Task Force recommended calculation based on the Chronic Kidney Disease Epidemiology Collaboration (CKD-EPI) equation refit without adjustment for race.    WBC 12/06/2022 10.62  3.40 - 10.80 10*3/mm3 Final    RBC 12/06/2022 5.27  4.14 - 5.80 10*6/mm3 Final    Hemoglobin 12/06/2022 16.2  13.0 - 17.7 g/dL Final    Hematocrit 12/06/2022 46.2  37.5 - 51.0 % Final    MCV 12/06/2022 87.7  79.0 - 97.0 fL Final    MCH 12/06/2022 30.7  26.6 - 33.0 pg Final    MCHC 12/06/2022 35.1  31.5 - 35.7 g/dL Final    RDW  12/06/2022 13.0  12.3 - 15.4 % Final    RDW-SD 12/06/2022 41.3  37.0 - 54.0 fl Final    MPV 12/06/2022 11.8  6.0 - 12.0 fL Final    Platelets 12/06/2022 260  140 - 450 10*3/mm3 Final    Neutrophil % 12/06/2022 59.3  42.7 - 76.0 % Final    Lymphocyte % 12/06/2022 26.1  19.6 - 45.3 % Final    Monocyte % 12/06/2022 8.5  5.0 - 12.0 % Final    Eosinophil % 12/06/2022 4.4  0.3 - 6.2 % Final    Basophil % 12/06/2022 0.9  0.0 - 1.5 % Final    Immature Grans % 12/06/2022 0.8 (H)  0.0 - 0.5 % Final    Neutrophils, Absolute 12/06/2022 6.30  1.70 - 7.00 10*3/mm3 Final    Lymphocytes, Absolute 12/06/2022 2.77  0.70 - 3.10 10*3/mm3 Final    Monocytes, Absolute 12/06/2022 0.90  0.10 - 0.90 10*3/mm3 Final    Eosinophils, Absolute 12/06/2022 0.47 (H)  0.00 - 0.40 10*3/mm3 Final    Basophils, Absolute 12/06/2022 0.10  0.00 - 0.20 10*3/mm3 Final    Immature Grans, Absolute 12/06/2022 0.08 (H)  0.00 - 0.05 10*3/mm3 Final    nRBC 12/06/2022 0.0  0.0 - 0.2 /100 WBC Final   Office Visit on 10/27/2022   Component Date Value Ref Range Status    Amphetamine Screen, Urine 10/27/2022 Negative  Negative Final    AMP INTERNAL CONTROL 10/27/2022 Passed  Passed Final    Barbiturates Screen, Urine 10/27/2022 Negative  Negative Final    BARBITURATE INTERNAL CONTROL 10/27/2022 Passed  Passed Final    Buprenorphine, Screen, Urine 10/27/2022 Negative  Negative Final    BUPRENORPHINE INTERNAL CONTROL 10/27/2022 Passed  Passed Final    Benzodiazepine Screen, Urine 10/27/2022 Negative  Negative Final    BENZODIAZEPINE INTERNAL CONTROL 10/27/2022 Passed  Passed Final    Cocaine Screen, Urine 10/27/2022 Negative  Negative Final    COCAINE INTERNAL CONTROL 10/27/2022 Passed  Passed Final    MDMA (ECSTASY) 10/27/2022 Negative  Negative Final    MDMA (ECSTASY) INTERNAL CONTROL 10/27/2022 Passed  Passed Final    Methamphetamine, Ur 10/27/2022 Negative  Negative Final    METHAMPHETAMINE INTERNAL CONTROL 10/27/2022 Passed  Passed Final    Methadone Screen, Urine  10/27/2022 Negative  Negative Final    METHADONE INTERNAL CONTROL 10/27/2022 Passed  Passed Final    Opiate Screen 10/27/2022 Negative  Negative Final    OPIATES INTERNAL CONTROL 10/27/2022 Passed  Passed Final    Oxycodone Screen, Urine 10/27/2022 Negative  Negative Final    OXYCODONE INTERNAL CONTROL 10/27/2022 Passed  Passed Final    Phencyclidine (PCP), Urine 10/27/2022 Negative  Negative Final    PHENCYCLIDINE INTERNAL CONTROL 10/27/2022 Passed  Passed Final    THC, Screen, Urine 10/27/2022 Negative  Negative Final    THC INTERNAL CONTROL 10/27/2022 Passed  Passed Final    Lot Number 10/27/2022 [id number]   Final    Expiration Date 10/27/2022 09/01/2023   Final   Admission on 10/03/2022, Discharged on 10/03/2022   Component Date Value Ref Range Status    QT Interval 10/03/2022 381  ms Final    Glucose 10/03/2022 104 (H)  65 - 99 mg/dL Final    BUN 10/03/2022 22 (H)  6 - 20 mg/dL Final    Creatinine 10/03/2022 1.42 (H)  0.76 - 1.27 mg/dL Final    Sodium 10/03/2022 139  136 - 145 mmol/L Final    Potassium 10/03/2022 4.4  3.5 - 5.2 mmol/L Final    Chloride 10/03/2022 102  98 - 107 mmol/L Final    CO2 10/03/2022 29.3 (H)  22.0 - 29.0 mmol/L Final    Calcium 10/03/2022 9.5  8.6 - 10.5 mg/dL Final    Total Protein 10/03/2022 7.2  6.0 - 8.5 g/dL Final    Albumin 10/03/2022 4.30  3.50 - 5.20 g/dL Final    ALT (SGPT) 10/03/2022 27  1 - 41 U/L Final    AST (SGOT) 10/03/2022 27  1 - 40 U/L Final    Alkaline Phosphatase 10/03/2022 121 (H)  39 - 117 U/L Final    Total Bilirubin 10/03/2022 0.2  0.0 - 1.2 mg/dL Final    Globulin 10/03/2022 2.9  gm/dL Final    A/G Ratio 10/03/2022 1.5  g/dL Final    BUN/Creatinine Ratio 10/03/2022 15.5  7.0 - 25.0 Final    Anion Gap 10/03/2022 7.7  5.0 - 15.0 mmol/L Final    eGFR 10/03/2022 58.4 (L)  >60.0 mL/min/1.73 Final    National Kidney Foundation and American Society of Nephrology (ASN) Task Force recommended calculation based on the Chronic Kidney Disease Epidemiology Collaboration  (CKD-EPI) equation refit without adjustment for race.    proBNP 10/03/2022 494.5  0.0 - 900.0 pg/mL Final    Troponin T 10/03/2022 <0.010  0.000 - 0.030 ng/mL Final    Extra Tube 10/03/2022 Hold for add-ons.   Final    Auto resulted.    Extra Tube 10/03/2022 hold for add-on   Final    Auto resulted    Extra Tube 10/03/2022 Hold for add-ons.   Final    Auto resulted.    Extra Tube 10/03/2022 Hold for add-ons.   Final    Auto resulted    WBC 10/03/2022 8.12  3.40 - 10.80 10*3/mm3 Final    RBC 10/03/2022 4.79  4.14 - 5.80 10*6/mm3 Final    Hemoglobin 10/03/2022 14.7  13.0 - 17.7 g/dL Final    Hematocrit 10/03/2022 44.5  37.5 - 51.0 % Final    MCV 10/03/2022 92.9  79.0 - 97.0 fL Final    MCH 10/03/2022 30.7  26.6 - 33.0 pg Final    MCHC 10/03/2022 33.0  31.5 - 35.7 g/dL Final    RDW 10/03/2022 14.7  12.3 - 15.4 % Final    RDW-SD 10/03/2022 50.4  37.0 - 54.0 fl Final    MPV 10/03/2022 10.6  6.0 - 12.0 fL Final    Platelets 10/03/2022 222  140 - 450 10*3/mm3 Final    Neutrophil % 10/03/2022 62.1  42.7 - 76.0 % Final    Lymphocyte % 10/03/2022 22.8  19.6 - 45.3 % Final    Monocyte % 10/03/2022 8.1  5.0 - 12.0 % Final    Eosinophil % 10/03/2022 5.8  0.3 - 6.2 % Final    Basophil % 10/03/2022 0.7  0.0 - 1.5 % Final    Immature Grans % 10/03/2022 0.5  0.0 - 0.5 % Final    Neutrophils, Absolute 10/03/2022 5.04  1.70 - 7.00 10*3/mm3 Final    Lymphocytes, Absolute 10/03/2022 1.85  0.70 - 3.10 10*3/mm3 Final    Monocytes, Absolute 10/03/2022 0.66  0.10 - 0.90 10*3/mm3 Final    Eosinophils, Absolute 10/03/2022 0.47 (H)  0.00 - 0.40 10*3/mm3 Final    Basophils, Absolute 10/03/2022 0.06  0.00 - 0.20 10*3/mm3 Final    Immature Grans, Absolute 10/03/2022 0.04  0.00 - 0.05 10*3/mm3 Final    nRBC 10/03/2022 0.0  0.0 - 0.2 /100 WBC Final   Admission on 08/09/2022, Discharged on 08/09/2022   Component Date Value Ref Range Status    QT Interval 08/09/2022 348  ms Final    Glucose 08/09/2022 108 (H)  65 - 99 mg/dL Final    BUN 08/09/2022 21  (H)  6 - 20 mg/dL Final    Creatinine 08/09/2022 1.24  0.76 - 1.27 mg/dL Final    Sodium 08/09/2022 141  136 - 145 mmol/L Final    Potassium 08/09/2022 4.3  3.5 - 5.2 mmol/L Final    Chloride 08/09/2022 106  98 - 107 mmol/L Final    CO2 08/09/2022 24.8  22.0 - 29.0 mmol/L Final    Calcium 08/09/2022 9.6  8.6 - 10.5 mg/dL Final    Total Protein 08/09/2022 7.8  6.0 - 8.5 g/dL Final    Albumin 08/09/2022 4.50  3.50 - 5.20 g/dL Final    ALT (SGPT) 08/09/2022 31  1 - 41 U/L Final    AST (SGOT) 08/09/2022 25  1 - 40 U/L Final    Alkaline Phosphatase 08/09/2022 131 (H)  39 - 117 U/L Final    Total Bilirubin 08/09/2022 0.4  0.0 - 1.2 mg/dL Final    Globulin 08/09/2022 3.3  gm/dL Final    A/G Ratio 08/09/2022 1.4  g/dL Final    BUN/Creatinine Ratio 08/09/2022 16.9  7.0 - 25.0 Final    Anion Gap 08/09/2022 10.2  5.0 - 15.0 mmol/L Final    eGFR 08/09/2022 68.7  >60.0 mL/min/1.73 Final    National Kidney Foundation and American Society of Nephrology (ASN) Task Force recommended calculation based on the Chronic Kidney Disease Epidemiology Collaboration (CKD-EPI) equation refit without adjustment for race.    proBNP 08/09/2022 705.0  0.0 - 900.0 pg/mL Final    Troponin T 08/09/2022 <0.010  0.000 - 0.030 ng/mL Final    Extra Tube 08/09/2022 Hold for add-ons.   Final    Auto resulted.    Extra Tube 08/09/2022 hold for add-on   Final    Auto resulted    Extra Tube 08/09/2022 Hold for add-ons.   Final    Auto resulted.    Extra Tube 08/09/2022 Hold for add-ons.   Final    Auto resulted    WBC 08/09/2022 11.01 (H)  3.40 - 10.80 10*3/mm3 Final    RBC 08/09/2022 5.15  4.14 - 5.80 10*6/mm3 Final    Hemoglobin 08/09/2022 15.6  13.0 - 17.7 g/dL Final    Hematocrit 08/09/2022 46.8  37.5 - 51.0 % Final    MCV 08/09/2022 90.9  79.0 - 97.0 fL Final    MCH 08/09/2022 30.3  26.6 - 33.0 pg Final    MCHC 08/09/2022 33.3  31.5 - 35.7 g/dL Final    RDW 08/09/2022 14.7  12.3 - 15.4 % Final    RDW-SD 08/09/2022 48.6  37.0 - 54.0 fl Final    MPV  08/09/2022 10.3  6.0 - 12.0 fL Final    Platelets 08/09/2022 273  140 - 450 10*3/mm3 Final    Neutrophil % 08/09/2022 67.8  42.7 - 76.0 % Final    Lymphocyte % 08/09/2022 20.1  19.6 - 45.3 % Final    Monocyte % 08/09/2022 7.4  5.0 - 12.0 % Final    Eosinophil % 08/09/2022 3.6  0.3 - 6.2 % Final    Basophil % 08/09/2022 0.7  0.0 - 1.5 % Final    Immature Grans % 08/09/2022 0.4  0.0 - 0.5 % Final    Neutrophils, Absolute 08/09/2022 7.46 (H)  1.70 - 7.00 10*3/mm3 Final    Lymphocytes, Absolute 08/09/2022 2.21  0.70 - 3.10 10*3/mm3 Final    Monocytes, Absolute 08/09/2022 0.82  0.10 - 0.90 10*3/mm3 Final    Eosinophils, Absolute 08/09/2022 0.40  0.00 - 0.40 10*3/mm3 Final    Basophils, Absolute 08/09/2022 0.08  0.00 - 0.20 10*3/mm3 Final    Immature Grans, Absolute 08/09/2022 0.04  0.00 - 0.05 10*3/mm3 Final    nRBC 08/09/2022 0.0  0.0 - 0.2 /100 WBC Final   There may be more visits with results that are not included.       EKG Results:  No orders to display       Imaging Results:  XR Chest 1 View    Result Date: 10/3/2022   No active disease is seen.       MIKAL HOPE MD       Electronically Signed and Approved By: MIKAL HOPE MD on 10/03/2022 at 12:47                 Assessment & Plan   Diagnoses and all orders for this visit:    1. Generalized anxiety disorder (Primary)  -     gabapentin (Neurontin) 800 MG tablet; Take 1 tablet by mouth 3 (Three) Times a Day for 30 days.  Dispense: 90 tablet; Refill: 1  -     QUEtiapine (SEROquel) 200 MG tablet; Take 1 to 1.5 tab PO QHS PRN sleep  Dispense: 45 tablet; Refill: 1    2. Panic disorder  -     gabapentin (Neurontin) 800 MG tablet; Take 1 tablet by mouth 3 (Three) Times a Day for 30 days.  Dispense: 90 tablet; Refill: 1  -     ALPRAZolam (Xanax) 1 MG tablet; Take 1 tablet by mouth 2 (Two) Times a Day As Needed for Anxiety for up to 30 days.  Dispense: 60 tablet; Refill: 0  -     QUEtiapine (SEROquel) 200 MG tablet; Take 1 to 1.5 tab PO QHS PRN sleep  Dispense: 45  tablet; Refill: 1    3. Post traumatic stress disorder (PTSD)  -     QUEtiapine (SEROquel) 200 MG tablet; Take 1 to 1.5 tab PO QHS PRN sleep  Dispense: 45 tablet; Refill: 1    4. Agitation  -     divalproex (Depakote ER) 250 MG 24 hr tablet; Take 1 tab PO QD x 1 week, if tolerated can increase to 2 tab PO QD  Dispense: 60 tablet; Refill: 1  -     QUEtiapine (SEROquel) 200 MG tablet; Take 1 to 1.5 tab PO QHS PRN sleep  Dispense: 45 tablet; Refill: 1    5. Severe episode of recurrent major depressive disorder, without psychotic features  -     QUEtiapine (SEROquel) 200 MG tablet; Take 1 to 1.5 tab PO QHS PRN sleep  Dispense: 45 tablet; Refill: 1    6. Insomnia, unspecified type  -     QUEtiapine (SEROquel) 200 MG tablet; Take 1 to 1.5 tab PO QHS PRN sleep  Dispense: 45 tablet; Refill: 1    7. Nightmares  -     cloNIDine (CATAPRES) 0.2 MG tablet; Take 1 tablet by mouth every night at bedtime for 30 days.  Dispense: 30 tablet; Refill: 1    8. Medication management  -     Valproic Acid Level, Total    Patient screened positive for depression based on a PHQ-9 score of 8 on 4/20/2023. Follow-up recommendations include: Prescribed antidepressant medication treatment, Suicide Risk Assessment performed, and see assessment below .    Presentation seems most consistent with MDD, GREG, panic disorder, PTSD, insomnia, nightmares.  We will start patient on Depakote for management of agitation and overall mood.  Patient declines any other med changes and would like to reassess other meds at next office visit.  Discussed needing to obtain Depakote levels 5 days after likely increasing the dose to 500 mg.  Will continue Seroquel for management of anxiety, depression, insomnia, and overall mood.  We will continue clonidine for management of nightmares.  We will continue Xanax only as needed for severe panic attacks. I extensively counseled the patient on the risks including addiction, dependence, and misuse.  We will continue  gabapentin for management of anxiety and overall mood.  Follow-up in 1 month.  Addressed all questions and concerns.    Visit Diagnoses:    ICD-10-CM ICD-9-CM   1. Generalized anxiety disorder  F41.1 300.02   2. Panic disorder  F41.0 300.01   3. Post traumatic stress disorder (PTSD)  F43.10 309.81   4. Agitation  R45.1 307.9   5. Severe episode of recurrent major depressive disorder, without psychotic features  F33.2 296.33   6. Insomnia, unspecified type  G47.00 780.52   7. Nightmares  F51.5 307.47   8. Medication management  Z79.899 V58.69       PLAN:  Safety: No acute safety concerns at this time.  Therapy: Patient declines referral for psychotherapy.  Risk Assessment: Risk of self-harm acutely is moderate to severe.  Risk factors include anxiety disorder, mood disorder, h/o suicide attempt in the past, and recent psychosocial stressors (pandemic). Protective factors include no family history, denies access to guns/weapons, no present SI, no history of self-harm in the past, minimal AODA, healthcare seeking, future orientation, willingness to engage in care.  Risk of self-harm chronically is also moderate to severe, but could be further elevated in the event of treatment noncompliance and/or AODA.  Labs/Diagnostics Ordered:   Orders Placed This Encounter   Procedures    Valproic Acid Level, Total     Medications:   New Medications Ordered This Visit   Medications    divalproex (Depakote ER) 250 MG 24 hr tablet     Sig: Take 1 tab PO QD x 1 week, if tolerated can increase to 2 tab PO QD     Dispense:  60 tablet     Refill:  1    gabapentin (Neurontin) 800 MG tablet     Sig: Take 1 tablet by mouth 3 (Three) Times a Day for 30 days.     Dispense:  90 tablet     Refill:  1    ALPRAZolam (Xanax) 1 MG tablet     Sig: Take 1 tablet by mouth 2 (Two) Times a Day As Needed for Anxiety for up to 30 days.     Dispense:  60 tablet     Refill:  0    QUEtiapine (SEROquel) 200 MG tablet     Sig: Take 1 to 1.5 tab PO QHS PRN  sleep     Dispense:  45 tablet     Refill:  1    cloNIDine (CATAPRES) 0.2 MG tablet     Sig: Take 1 tablet by mouth every night at bedtime for 30 days.     Dispense:  30 tablet     Refill:  1       Clonidine, Risks, benefits, alternatives, and side effects discussed with patient including sedation, dizziness/falls risk, hypotension, GI upset. After discussion of these risks and benefits, the patient voiced understanding and agreed to proceed.    Discussed all risks, benefits, alternatives, and side effects of Xanax including but not limited to risks of abuse, misuse, and addiction, which can lead to overdose or death; risks of dependence and withdrawal reactions; drowsiness, sedation, fatigue, depression, dizziness, ataxia, weakness, confusion, forgetfulness, hypotension, falls risk, respiratory depression, anterograde amnesia, paradoxical reactions such as hyperactivity or aggressive behavior; and hallucinations. Pt educated on the need to practice safe sex while taking this med. Instructed pt to avoid performing tasks that require mental alertness such as driving or operating machinery. Discussed the need for pt to immediately call the office for any new or worsening symptoms, such as changes in mood or behavior, and all other concerns. Pt educated on med compliance, including the proper use and monitoring for signs and symptoms of abuse, misuse, and addiction. Pt verbalized understanding and is agreeable to taking Xanax. CARLOS obtained and USD ordered. Controlled substances agreement verbally signed. Addressed all questions and concerns.     Discussed all risks, benefits, alternatives, and side effects of Gabapentin including but not limited to sedation, dizziness, GI upset, dry mouth, and weight gain. Pt instructed to avoid driving and doing other tasks or actions that require to be alert until knowing how the drug affects them.  Pt educated on the need to practice safe sex while taking this med. Discussed the  need for pt to immediately call the office for any new or worsening symptoms, such as worsening depression; feeling nervous or restless; suicidal thoughts or actions; or other changes changes in mood or behavior, and all other concerns. Pt educated on med compliance and the risks of suddenly stopping this medication or missing doses. Pt verbalized understanding and is agreeable to taking Gabapentin. Addressed all questions and concerns.  Will order UDS and obtain CARLOS report. Pt verbally signed controlled substances agreement.    Depakote, Risks, benefits, alternatives discussed with patient including nausea and vomiting, GI upset, sedation, dizziness/falls risk, increased appetite. Use care when operating vehicle, vessel, or machine. After discussion of these risks and benefits, the patient voiced understanding and agreed to proceed. Patient also informed of the need to take as prescribed, and for periodic labwork.      Follow up:   F/u in 1 month.    TREATMENT PLAN/GOALS: Continue supportive psychotherapy efforts and medications as indicated. Treatment and medication options discussed during today's visit. Patient ackowledged and verbally consented to continue with current treatment plan and was educated on the importance of compliance with treatment and follow-up appointments.    MEDICATION ISSUES:  CARLOS reviewed as expected.  Discussed medication options and treatment plan of prescribed medication as well as the risks, benefits, and side effects including potential falls, possible impaired driving and metabolic adversities among others. Patient is agreeable to call the office with any worsening of symptoms or onset of side effects. Patient is agreeable to call 911 or go to the nearest ER should he/she begin having SI/HI. No medication side effects or related complaints today.            This document has been electronically signed by Page Lee PA-C  Mary 15, 2023 12:25 EDT      Part of this note may be  an electronic transcription/translation of spoken language to printed text using the Dragon Dictation System.

## 2023-06-28 ENCOUNTER — TELEPHONE (OUTPATIENT)
Dept: INTERNAL MEDICINE | Facility: CLINIC | Age: 58
End: 2023-06-28

## 2023-06-28 NOTE — TELEPHONE ENCOUNTER
Tried to call PT - no answer- PT needs appt ASAP with any of the providers in office or go to ER - [facility] is out until Monday

## 2023-06-28 NOTE — TELEPHONE ENCOUNTER
Caller: Asad Meyer    Relationship to patient: Self    Best call back number: [phone]    Patient is needing: PATIENT CALLED STATING HE IS CURRENTLY HAVING VERY BAD SWELLING IN BOTH HIS LEGS AND THAT THE LASIX DOES NOT SEEM TO BE WORKING WELL. PATIENT STATES IT IS PAINFUL AND IS HARD TO WALK. PATIENT IS WANTING TO SEE WHAT PONCE AGRAWAL RECOMMENDS TO HELP WITH SYMPTOMS.

## 2023-07-11 PROBLEM — N28.9 RENAL INSUFFICIENCY: Status: ACTIVE | Noted: 2023-07-11

## 2023-07-11 PROBLEM — M25.561 ACUTE PAIN OF RIGHT KNEE: Status: ACTIVE | Noted: 2023-07-11

## 2023-07-24 ENCOUNTER — TELEPHONE (OUTPATIENT)
Dept: PSYCHIATRY | Facility: CLINIC | Age: 58
End: 2023-07-24
Payer: MEDICAID

## 2023-07-24 RX ORDER — QUETIAPINE FUMARATE 100 MG/1
100 TABLET, FILM COATED ORAL NIGHTLY
Qty: 90 TABLET | Refills: 1 | OUTPATIENT
Start: 2023-07-24

## 2023-07-24 NOTE — TELEPHONE ENCOUNTER
A PRIOR AUTHORIZATION HAS BEEN INITIATED, PROCESSED AND SENT TO PLAN FOR THIS PATIENT.  AWAITING RESPONSE FROM INSURANCE.

## 2023-07-25 ENCOUNTER — LAB (OUTPATIENT)
Dept: INTERNAL MEDICINE | Facility: CLINIC | Age: 58
End: 2023-07-25
Payer: MEDICAID

## 2023-07-25 DIAGNOSIS — N28.9 RENAL INSUFFICIENCY: ICD-10-CM

## 2023-07-25 PROCEDURE — 36415 COLL VENOUS BLD VENIPUNCTURE: CPT

## 2023-07-25 PROCEDURE — 80053 COMPREHEN METABOLIC PANEL: CPT

## 2023-07-25 RX ORDER — FLUTICASONE FUROATE, UMECLIDINIUM BROMIDE AND VILANTEROL TRIFENATATE 200; 62.5; 25 UG/1; UG/1; UG/1
POWDER RESPIRATORY (INHALATION)
Qty: 60 EACH | Refills: 2 | Status: SHIPPED | OUTPATIENT
Start: 2023-07-25

## 2023-07-25 NOTE — TELEPHONE ENCOUNTER
[place] APPR[place] FOR QUVIVIQ 50 MG TABLET RECEIVED.    APPROVAL DATES ARE 07/24/23-01/23-24.    BEHAVIORAL HEALTH - SCAN - [place]PR[place]AL QUVIVIQ' MEDIMPACT; 07/24/23 (07/25/2023)

## 2023-07-26 LAB
ALBUMIN SERPL-MCNC: 4.4 G/DL (ref 3.5–5.2)
ALBUMIN/GLOB SERPL: 2 G/DL
ALP SERPL-CCNC: 106 U/L (ref 39–117)
ALT SERPL W P-5'-P-CCNC: 23 U/L (ref 1–41)
ANION GAP SERPL CALCULATED.3IONS-SCNC: 11 MMOL/L (ref 5–15)
AST SERPL-CCNC: 17 U/L (ref 1–40)
BILIRUB SERPL-MCNC: 0.2 MG/DL (ref 0–1.2)
BUN SERPL-MCNC: 28 MG/DL (ref 6–20)
BUN/CREAT SERPL: 18.7 (ref 7–25)
CALCIUM SPEC-SCNC: 9.6 MG/DL (ref 8.6–10.5)
CHLORIDE SERPL-SCNC: 103 MMOL/L (ref 98–107)
CO2 SERPL-SCNC: 32 MMOL/L (ref 22–29)
CREAT SERPL-MCNC: 1.5 MG/DL (ref 0.76–1.27)
EGFRCR SERPLBLD CKD-EPI 2021: 54.3 ML/MIN/1.73
GLOBULIN UR ELPH-MCNC: 2.2 GM/DL
GLUCOSE SERPL-MCNC: 81 MG/DL (ref 65–99)
POTASSIUM SERPL-SCNC: 3.9 MMOL/L (ref 3.5–5.2)
PROT SERPL-MCNC: 6.6 G/DL (ref 6–8.5)
SODIUM SERPL-SCNC: 146 MMOL/L (ref 136–145)

## 2023-07-29 ENCOUNTER — APPOINTMENT (OUTPATIENT)
Dept: CT IMAGING | Facility: HOSPITAL | Age: 58
DRG: 871 | End: 2023-07-29
Payer: MEDICAID

## 2023-07-29 ENCOUNTER — HOSPITAL ENCOUNTER (INPATIENT)
Facility: HOSPITAL | Age: 58
LOS: 1 days | Discharge: LEFT BEFORE DISCHARGE | DRG: 871 | End: 2023-07-30
Attending: EMERGENCY MEDICINE | Admitting: INTERNAL MEDICINE
Payer: MEDICAID

## 2023-07-29 ENCOUNTER — APPOINTMENT (OUTPATIENT)
Dept: GENERAL RADIOLOGY | Facility: HOSPITAL | Age: 58
DRG: 871 | End: 2023-07-29
Payer: MEDICAID

## 2023-07-29 DIAGNOSIS — R41.82 ALTERED MENTAL STATUS, UNSPECIFIED ALTERED MENTAL STATUS TYPE: Primary | ICD-10-CM

## 2023-07-29 DIAGNOSIS — J96.02 ACUTE RESPIRATORY FAILURE WITH HYPERCAPNIA: ICD-10-CM

## 2023-07-29 PROBLEM — R65.20 SEVERE SEPSIS: Status: ACTIVE | Noted: 2023-07-29

## 2023-07-29 PROBLEM — A41.9 SEVERE SEPSIS: Status: ACTIVE | Noted: 2023-07-29

## 2023-07-29 LAB
ALBUMIN SERPL-MCNC: 4.1 G/DL (ref 3.5–5.2)
ALBUMIN/GLOB SERPL: 1.4 G/DL
ALP SERPL-CCNC: 124 U/L (ref 39–117)
ALT SERPL W P-5'-P-CCNC: 23 U/L (ref 1–41)
AMPHET+METHAMPHET UR QL: NEGATIVE
ANION GAP SERPL CALCULATED.3IONS-SCNC: 7.4 MMOL/L (ref 5–15)
APAP SERPL-MCNC: <5 MCG/ML (ref 0–30)
ARTERIAL PATENCY WRIST A: POSITIVE
ARTERIAL PATENCY WRIST A: POSITIVE
AST SERPL-CCNC: 18 U/L (ref 1–40)
BARBITURATES UR QL SCN: NEGATIVE
BASE EXCESS BLDA CALC-SCNC: -3.1 MMOL/L (ref -2–2)
BASE EXCESS BLDA CALC-SCNC: 1.1 MMOL/L (ref -2–2)
BASE EXCESS BLDV CALC-SCNC: -0.3 MMOL/L (ref -2–2)
BASOPHILS # BLD AUTO: 0.04 10*3/MM3 (ref 0–0.2)
BASOPHILS NFR BLD AUTO: 0.2 % (ref 0–1.5)
BDY SITE: ABNORMAL
BENZODIAZ UR QL SCN: POSITIVE
BILIRUB SERPL-MCNC: 0.3 MG/DL (ref 0–1.2)
BILIRUB UR QL STRIP: NEGATIVE
BUN SERPL-MCNC: 35 MG/DL (ref 6–20)
BUN/CREAT SERPL: 17.2 (ref 7–25)
CA-I BLDA-SCNC: 1.18 MMOL/L (ref 1.13–1.32)
CA-I BLDA-SCNC: 1.21 MMOL/L (ref 1.13–1.32)
CALCIUM SPEC-SCNC: 9.2 MG/DL (ref 8.6–10.5)
CANNABINOIDS SERPL QL: POSITIVE
CHLORIDE BLDA-SCNC: 105 MMOL/L (ref 98–106)
CHLORIDE BLDV-SCNC: 103 MMOL/L (ref 98–106)
CHLORIDE SERPL-SCNC: 105 MMOL/L (ref 98–107)
CLARITY UR: CLEAR
CO2 SERPL-SCNC: 28.6 MMOL/L (ref 22–29)
COCAINE UR QL: NEGATIVE
COHGB MFR BLD: 3 % (ref 0–1.5)
COHGB MFR BLD: 3.7 % (ref 0–1.5)
COHGB MFR BLD: 4.1 % (ref 0–1.5)
COLOR UR: YELLOW
CREAT SERPL-MCNC: 2.03 MG/DL (ref 0.76–1.27)
D-LACTATE SERPL-SCNC: 0.7 MMOL/L (ref 0.5–2)
D-LACTATE SERPL-SCNC: 0.8 MMOL/L (ref 0.5–2)
DEPRECATED RDW RBC AUTO: 59.8 FL (ref 37–54)
EGFRCR SERPLBLD CKD-EPI 2021: 37.8 ML/MIN/1.73
EOSINOPHIL # BLD AUTO: 0.15 10*3/MM3 (ref 0–0.4)
EOSINOPHIL NFR BLD AUTO: 0.9 % (ref 0.3–6.2)
ERYTHROCYTE [DISTWIDTH] IN BLOOD BY AUTOMATED COUNT: 16.4 % (ref 12.3–15.4)
ETHANOL BLD-MCNC: <10 MG/DL (ref 0–10)
ETHANOL UR QL: <0.01 %
FENTANYL UR-MCNC: NEGATIVE NG/ML
FHHB: 56.5 % (ref 0–5)
FHHB: 6.1 % (ref 0–5)
FHHB: 7.4 % (ref 0–5)
GAS FLOW AIRWAY: 2 LPM
GAS FLOW AIRWAY: ABNORMAL L/MIN
GLOBULIN UR ELPH-MCNC: 3 GM/DL
GLUCOSE BLDA-MCNC: 109 MG/DL (ref 70–99)
GLUCOSE BLDA-MCNC: 94 MG/DL (ref 70–99)
GLUCOSE BLDC GLUCOMTR-MCNC: 229 MG/DL (ref 70–99)
GLUCOSE BLDC GLUCOMTR-MCNC: 75 MG/DL (ref 70–99)
GLUCOSE BLDC GLUCOMTR-MCNC: 80 MG/DL (ref 70–130)
GLUCOSE BLDC GLUCOMTR-MCNC: 80 MG/DL (ref 70–99)
GLUCOSE SERPL-MCNC: 102 MG/DL (ref 65–99)
GLUCOSE UR STRIP-MCNC: NEGATIVE MG/DL
HCO3 BLDA-SCNC: 26.3 MMOL/L (ref 22–26)
HCO3 BLDA-SCNC: 27.7 MMOL/L (ref 22–26)
HCO3 BLDV-SCNC: 28.8 MMOL/L (ref 22–26)
HCT VFR BLD AUTO: 45.1 % (ref 37.5–51)
HGB BLD-MCNC: 14.2 G/DL (ref 13–17.7)
HGB BLDA-MCNC: 13.6 G/DL (ref 13.8–16.4)
HGB BLDA-MCNC: 14.5 G/DL (ref 13.8–16.4)
HGB BLDA-MCNC: 14.6 G/DL (ref 13.8–16.4)
HGB UR QL STRIP.AUTO: NEGATIVE
HOLD SPECIMEN: NORMAL
HOLD SPECIMEN: NORMAL
IMM GRANULOCYTES # BLD AUTO: 0.19 10*3/MM3 (ref 0–0.05)
IMM GRANULOCYTES NFR BLD AUTO: 1.2 % (ref 0–0.5)
INHALED O2 CONCENTRATION: 21 %
INHALED O2 CONCENTRATION: 28 %
INHALED O2 CONCENTRATION: 28 %
KETONES UR QL STRIP: NEGATIVE
LACTATE BLDA-SCNC: 1.3 MMOL/L (ref 0.5–2)
LACTATE BLDA-SCNC: 1.38 MMOL/L (ref 0.5–2)
LEUKOCYTE ESTERASE UR QL STRIP.AUTO: NEGATIVE
LYMPHOCYTES # BLD AUTO: 0.98 10*3/MM3 (ref 0.7–3.1)
LYMPHOCYTES NFR BLD AUTO: 6 % (ref 19.6–45.3)
MCH RBC QN AUTO: 31.1 PG (ref 26.6–33)
MCHC RBC AUTO-ENTMCNC: 31.5 G/DL (ref 31.5–35.7)
MCV RBC AUTO: 98.7 FL (ref 79–97)
METHADONE UR QL SCN: NEGATIVE
METHGB BLD QL: 0 % (ref 0–1.5)
METHGB BLD QL: 0.2 % (ref 0–1.5)
METHGB BLD QL: 0.3 % (ref 0–1.5)
MODALITY: ABNORMAL
MONOCYTES # BLD AUTO: 0.91 10*3/MM3 (ref 0.1–0.9)
MONOCYTES NFR BLD AUTO: 5.6 % (ref 5–12)
NEUTROPHILS NFR BLD AUTO: 14.02 10*3/MM3 (ref 1.7–7)
NEUTROPHILS NFR BLD AUTO: 86.1 % (ref 42.7–76)
NITRITE UR QL STRIP: NEGATIVE
NOTE: ABNORMAL
NRBC BLD AUTO-RTO: 0 /100 WBC (ref 0–0.2)
NT-PROBNP SERPL-MCNC: 109.6 PG/ML (ref 0–900)
OPIATES UR QL: NEGATIVE
OXYCODONE UR QL SCN: NEGATIVE
OXYHGB MFR BLDV: 39.1 % (ref 94–99)
OXYHGB MFR BLDV: 88.7 % (ref 94–99)
OXYHGB MFR BLDV: 90.9 % (ref 94–99)
PCO2 BLDA: 52.1 MM HG (ref 35–45)
PCO2 BLDA: 67.7 MM HG (ref 35–45)
PCO2 BLDV: 68 MM HG (ref 41–51)
PH BLDA: 7.21 PH UNITS (ref 7.35–7.45)
PH BLDA: 7.34 PH UNITS (ref 7.35–7.45)
PH BLDV: 7.25 PH UNITS (ref 7.31–7.41)
PH UR STRIP.AUTO: 5.5 [PH] (ref 5–8)
PLATELET # BLD AUTO: 223 10*3/MM3 (ref 140–450)
PMV BLD AUTO: 10.8 FL (ref 6–12)
PO2 BLD: 247 MM[HG] (ref 0–500)
PO2 BLD: 251 MM[HG] (ref 0–500)
PO2 BLDA: 69.2 MM HG (ref 80–100)
PO2 BLDA: 70.2 MM HG (ref 80–100)
PO2 BLDV: 22.8 MM HG (ref 35–42)
POTASSIUM BLDA-SCNC: 4.99 MMOL/L (ref 3.5–5)
POTASSIUM BLDV-SCNC: 7 MMOL/L (ref 3.5–5)
POTASSIUM SERPL-SCNC: 6.8 MMOL/L (ref 3.5–5.2)
PROCALCITONIN SERPL-MCNC: 0.15 NG/ML (ref 0–0.25)
PROT SERPL-MCNC: 7.1 G/DL (ref 6–8.5)
PROT UR QL STRIP: ABNORMAL
RBC # BLD AUTO: 4.57 10*6/MM3 (ref 4.14–5.8)
SALICYLATES SERPL-MCNC: <0.3 MG/DL
SAO2 % BLDCOA: 92.3 % (ref 95–99)
SAO2 % BLDCOA: 93.7 % (ref 95–99)
SAO2 % BLDCOV: 40.9 % (ref 45–75)
SODIUM BLDA-SCNC: 140.6 MMOL/L (ref 136–146)
SODIUM BLDV-SCNC: 140.2 MMOL/L (ref 136–146)
SODIUM SERPL-SCNC: 141 MMOL/L (ref 136–145)
SP GR UR STRIP: 1.02 (ref 1–1.03)
TROPONIN T SERPL HS-MCNC: 22 NG/L
UROBILINOGEN UR QL STRIP: ABNORMAL
VALPROATE SERPL-MCNC: 19.3 MCG/ML (ref 50–125)
WBC NRBC COR # BLD: 16.29 10*3/MM3 (ref 3.4–10.8)
WHOLE BLOOD HOLD COAG: NORMAL
WHOLE BLOOD HOLD SPECIMEN: NORMAL

## 2023-07-29 PROCEDURE — 25010000002 HEPARIN (PORCINE) PER 1000 UNITS: Performed by: FAMILY MEDICINE

## 2023-07-29 PROCEDURE — P9612 CATHETERIZE FOR URINE SPEC: HCPCS

## 2023-07-29 PROCEDURE — 82805 BLOOD GASES W/O2 SATURATION: CPT

## 2023-07-29 PROCEDURE — 80307 DRUG TEST PRSMV CHEM ANLYZR: CPT | Performed by: EMERGENCY MEDICINE

## 2023-07-29 PROCEDURE — 82948 REAGENT STRIP/BLOOD GLUCOSE: CPT

## 2023-07-29 PROCEDURE — 25010000002 CALCIUM GLUCONATE-NACL 1-0.675 GM/50ML-% SOLUTION: Performed by: EMERGENCY MEDICINE

## 2023-07-29 PROCEDURE — 81003 URINALYSIS AUTO W/O SCOPE: CPT | Performed by: EMERGENCY MEDICINE

## 2023-07-29 PROCEDURE — 93005 ELECTROCARDIOGRAM TRACING: CPT | Performed by: EMERGENCY MEDICINE

## 2023-07-29 PROCEDURE — 80143 DRUG ASSAY ACETAMINOPHEN: CPT | Performed by: EMERGENCY MEDICINE

## 2023-07-29 PROCEDURE — 82077 ASSAY SPEC XCP UR&BREATH IA: CPT | Performed by: EMERGENCY MEDICINE

## 2023-07-29 PROCEDURE — 94799 UNLISTED PULMONARY SVC/PX: CPT

## 2023-07-29 PROCEDURE — 63710000001 INSULIN REGULAR HUMAN PER 5 UNITS: Performed by: EMERGENCY MEDICINE

## 2023-07-29 PROCEDURE — 70450 CT HEAD/BRAIN W/O DYE: CPT

## 2023-07-29 PROCEDURE — 36600 WITHDRAWAL OF ARTERIAL BLOOD: CPT | Performed by: EMERGENCY MEDICINE

## 2023-07-29 PROCEDURE — 25010000002 NALOXONE HCL 2 MG/2ML SOLUTION PREFILLED SYRINGE: Performed by: EMERGENCY MEDICINE

## 2023-07-29 PROCEDURE — 85025 COMPLETE CBC W/AUTO DIFF WBC: CPT | Performed by: EMERGENCY MEDICINE

## 2023-07-29 PROCEDURE — 25010000002 PIPERACILLIN SOD-TAZOBACTAM PER 1 G: Performed by: FAMILY MEDICINE

## 2023-07-29 PROCEDURE — 36415 COLL VENOUS BLD VENIPUNCTURE: CPT

## 2023-07-29 PROCEDURE — 36600 WITHDRAWAL OF ARTERIAL BLOOD: CPT

## 2023-07-29 PROCEDURE — 82375 ASSAY CARBOXYHB QUANT: CPT | Performed by: EMERGENCY MEDICINE

## 2023-07-29 PROCEDURE — 94660 CPAP INITIATION&MGMT: CPT

## 2023-07-29 PROCEDURE — 84145 PROCALCITONIN (PCT): CPT | Performed by: FAMILY MEDICINE

## 2023-07-29 PROCEDURE — 94640 AIRWAY INHALATION TREATMENT: CPT

## 2023-07-29 PROCEDURE — 83050 HGB METHEMOGLOBIN QUAN: CPT

## 2023-07-29 PROCEDURE — 82375 ASSAY CARBOXYHB QUANT: CPT

## 2023-07-29 PROCEDURE — 93010 ELECTROCARDIOGRAM REPORT: CPT | Performed by: INTERNAL MEDICINE

## 2023-07-29 PROCEDURE — 83605 ASSAY OF LACTIC ACID: CPT | Performed by: EMERGENCY MEDICINE

## 2023-07-29 PROCEDURE — 93005 ELECTROCARDIOGRAM TRACING: CPT

## 2023-07-29 PROCEDURE — 71045 X-RAY EXAM CHEST 1 VIEW: CPT

## 2023-07-29 PROCEDURE — 80053 COMPREHEN METABOLIC PANEL: CPT | Performed by: EMERGENCY MEDICINE

## 2023-07-29 PROCEDURE — 80179 DRUG ASSAY SALICYLATE: CPT | Performed by: EMERGENCY MEDICINE

## 2023-07-29 PROCEDURE — 25010000002 VANCOMYCIN 5 G RECONSTITUTED SOLUTION: Performed by: EMERGENCY MEDICINE

## 2023-07-29 PROCEDURE — 83605 ASSAY OF LACTIC ACID: CPT | Performed by: FAMILY MEDICINE

## 2023-07-29 PROCEDURE — 80164 ASSAY DIPROPYLACETIC ACD TOT: CPT | Performed by: EMERGENCY MEDICINE

## 2023-07-29 PROCEDURE — 84484 ASSAY OF TROPONIN QUANT: CPT | Performed by: EMERGENCY MEDICINE

## 2023-07-29 PROCEDURE — 83880 ASSAY OF NATRIURETIC PEPTIDE: CPT | Performed by: EMERGENCY MEDICINE

## 2023-07-29 PROCEDURE — 94761 N-INVAS EAR/PLS OXIMETRY MLT: CPT

## 2023-07-29 PROCEDURE — 99285 EMERGENCY DEPT VISIT HI MDM: CPT

## 2023-07-29 PROCEDURE — 83050 HGB METHEMOGLOBIN QUAN: CPT | Performed by: EMERGENCY MEDICINE

## 2023-07-29 PROCEDURE — 82805 BLOOD GASES W/O2 SATURATION: CPT | Performed by: EMERGENCY MEDICINE

## 2023-07-29 PROCEDURE — 82820 HEMOGLOBIN-OXYGEN AFFINITY: CPT | Performed by: EMERGENCY MEDICINE

## 2023-07-29 PROCEDURE — 87040 BLOOD CULTURE FOR BACTERIA: CPT | Performed by: EMERGENCY MEDICINE

## 2023-07-29 RX ORDER — SODIUM CHLORIDE 0.9 % (FLUSH) 0.9 %
10 SYRINGE (ML) INJECTION EVERY 12 HOURS SCHEDULED
Status: DISCONTINUED | OUTPATIENT
Start: 2023-07-29 | End: 2023-07-30 | Stop reason: HOSPADM

## 2023-07-29 RX ORDER — SODIUM CHLORIDE 0.9 % (FLUSH) 0.9 %
10 SYRINGE (ML) INJECTION AS NEEDED
Status: DISCONTINUED | OUTPATIENT
Start: 2023-07-29 | End: 2023-07-30 | Stop reason: HOSPADM

## 2023-07-29 RX ORDER — AMOXICILLIN 250 MG
2 CAPSULE ORAL 2 TIMES DAILY
Status: DISCONTINUED | OUTPATIENT
Start: 2023-07-29 | End: 2023-07-30 | Stop reason: HOSPADM

## 2023-07-29 RX ORDER — ALBUTEROL SULFATE 2.5 MG/3ML
2.5 SOLUTION RESPIRATORY (INHALATION) ONCE
Status: COMPLETED | OUTPATIENT
Start: 2023-07-29 | End: 2023-07-29

## 2023-07-29 RX ORDER — BISACODYL 10 MG
10 SUPPOSITORY, RECTAL RECTAL DAILY PRN
Status: DISCONTINUED | OUTPATIENT
Start: 2023-07-29 | End: 2023-07-30 | Stop reason: HOSPADM

## 2023-07-29 RX ORDER — NALOXONE HYDROCHLORIDE 1 MG/ML
2 INJECTION INTRAMUSCULAR; INTRAVENOUS; SUBCUTANEOUS ONCE
Status: COMPLETED | OUTPATIENT
Start: 2023-07-29 | End: 2023-07-29

## 2023-07-29 RX ORDER — NITROGLYCERIN 0.4 MG/1
0.4 TABLET SUBLINGUAL
Status: DISCONTINUED | OUTPATIENT
Start: 2023-07-29 | End: 2023-07-30 | Stop reason: HOSPADM

## 2023-07-29 RX ORDER — POLYETHYLENE GLYCOL 3350 17 G/17G
17 POWDER, FOR SOLUTION ORAL DAILY PRN
Status: DISCONTINUED | OUTPATIENT
Start: 2023-07-29 | End: 2023-07-30 | Stop reason: HOSPADM

## 2023-07-29 RX ORDER — HEPARIN SODIUM 5000 [USP'U]/ML
5000 INJECTION, SOLUTION INTRAVENOUS; SUBCUTANEOUS EVERY 12 HOURS SCHEDULED
Status: DISCONTINUED | OUTPATIENT
Start: 2023-07-29 | End: 2023-07-30 | Stop reason: HOSPADM

## 2023-07-29 RX ORDER — BISACODYL 5 MG/1
5 TABLET, DELAYED RELEASE ORAL DAILY PRN
Status: DISCONTINUED | OUTPATIENT
Start: 2023-07-29 | End: 2023-07-30 | Stop reason: HOSPADM

## 2023-07-29 RX ORDER — IPRATROPIUM BROMIDE AND ALBUTEROL SULFATE 2.5; .5 MG/3ML; MG/3ML
3 SOLUTION RESPIRATORY (INHALATION)
Status: COMPLETED | OUTPATIENT
Start: 2023-07-29 | End: 2023-07-29

## 2023-07-29 RX ORDER — SODIUM CHLORIDE 9 MG/ML
40 INJECTION, SOLUTION INTRAVENOUS AS NEEDED
Status: DISCONTINUED | OUTPATIENT
Start: 2023-07-29 | End: 2023-07-30 | Stop reason: HOSPADM

## 2023-07-29 RX ORDER — DEXTROSE MONOHYDRATE 25 G/50ML
25 INJECTION, SOLUTION INTRAVENOUS ONCE
Status: COMPLETED | OUTPATIENT
Start: 2023-07-29 | End: 2023-07-29

## 2023-07-29 RX ORDER — CALCIUM GLUCONATE 20 MG/ML
1000 INJECTION, SOLUTION INTRAVENOUS ONCE
Status: COMPLETED | OUTPATIENT
Start: 2023-07-29 | End: 2023-07-29

## 2023-07-29 RX ADMIN — NALOXONE HYDROCHLORIDE 2 MG: 1 INJECTION PARENTERAL at 16:07

## 2023-07-29 RX ADMIN — SODIUM CHLORIDE, POTASSIUM CHLORIDE, SODIUM LACTATE AND CALCIUM CHLORIDE 2631 ML: 600; 310; 30; 20 INJECTION, SOLUTION INTRAVENOUS at 23:41

## 2023-07-29 RX ADMIN — Medication 10 ML: at 23:43

## 2023-07-29 RX ADMIN — IPRATROPIUM BROMIDE AND ALBUTEROL SULFATE 3 ML: .5; 3 SOLUTION RESPIRATORY (INHALATION) at 20:11

## 2023-07-29 RX ADMIN — ALBUTEROL SULFATE 2.5 MG: 2.5 SOLUTION RESPIRATORY (INHALATION) at 16:01

## 2023-07-29 RX ADMIN — IPRATROPIUM BROMIDE AND ALBUTEROL SULFATE 3 ML: .5; 3 SOLUTION RESPIRATORY (INHALATION) at 20:07

## 2023-07-29 RX ADMIN — CALCIUM GLUCONATE 1000 MG: 20 INJECTION, SOLUTION INTRAVENOUS at 17:53

## 2023-07-29 RX ADMIN — INSULIN HUMAN 7 UNITS: 100 INJECTION, SOLUTION PARENTERAL at 17:53

## 2023-07-29 RX ADMIN — VANCOMYCIN HYDROCHLORIDE 2500 MG: 5 INJECTION, POWDER, LYOPHILIZED, FOR SOLUTION INTRAVENOUS at 21:08

## 2023-07-29 RX ADMIN — DEXTROSE MONOHYDRATE 25 G: 25 INJECTION, SOLUTION INTRAVENOUS at 17:53

## 2023-07-29 RX ADMIN — PIPERACILLIN AND TAZOBACTAM 4.5 G: 4; .5 INJECTION, POWDER, FOR SOLUTION INTRAVENOUS at 23:45

## 2023-07-29 RX ADMIN — HEPARIN SODIUM 5000 UNITS: 5000 INJECTION INTRAVENOUS; SUBCUTANEOUS at 23:42

## 2023-07-29 RX ADMIN — IPRATROPIUM BROMIDE AND ALBUTEROL SULFATE 3 ML: .5; 3 SOLUTION RESPIRATORY (INHALATION) at 20:27

## 2023-07-29 RX ADMIN — SODIUM BICARBONATE 50 MEQ: 84 INJECTION INTRAVENOUS at 17:53

## 2023-07-29 NOTE — H&P
" [facility]  Date: 2023   Patient Name: Asad Meyer  : 1965  MRN: [id number]  Primary Care Physician:  Jailyn Shepard APRN  Date of admission: 2023    Subjective   Subjective     Chief Complaint: Altered mental status    HPI:    Asad Meyer is a 58 y.o. male presents emergency department for evaluation of altered mental status.  Patient was found by his family members today reportedly altered because he was not getting out of bed like he usually does.  History of present illness is limited secondary to patient's altered mental status, HPI information was obtained from aunt and cousin at bedside.  Patient was \"draped over the bed\" and sleep most of the day.  Patient is a history of COPD, on 4 L of nasal cannula oxygen at baseline.  Patient has recently been treated with 3 rounds of oral antibiotics without improvement of a presumed pneumonia.  He was also started on a new insomnia medication Wednesday, Qiviniq.      Personal History     Past Medical History:  Past Medical History:   Diagnosis Date    Anxiety     Asthma     Asthma, extrinsic     Chronic pain disorder     Depression     Emphysema of lung     Pneumonia of right lower lobe due to infectious organism 2023    PTSD (post-traumatic stress disorder)     Rheumatoid arthritis     Schizoaffective disorder     Self-injurious behavior     Suicide attempt          Past Surgical History:  Past Surgical History:   Procedure Laterality Date    ANKLE SURGERY Right     BRONCHOSCOPY N/A 2023    Procedure: BRONCHOSCOPY WITH BRONCHOALVEOLAR LAVAGE;  Surgeon: Taiwo Copeland DO;  Location: [facility] ENDOSCOPY;  Service: Pulmonary;  Laterality: N/A;  DIFFUSE BRONCHITIS         Family History:   Family History   Problem Relation Age of Onset    Alcohol abuse Father     Asthma Father     Emphysema Father     Heart failure Father          Social History:   Social History "     Socioeconomic History    Marital status: Single   Tobacco Use    Smoking status: Every Day     Packs/day: 0.25     Years: 15.00     Pack years: 3.75     Types: Cigarettes     Start date: 1993     Passive exposure: Current    Smokeless tobacco: Current     Types: Snuff    Tobacco comments:     1 pack last 3-4 days   Vaping Use    Vaping Use: Never used   Substance and Sexual Activity    Alcohol use: Not Currently     Alcohol/week: 9.0 standard drinks     Types: 6 Cans of beer, 3 Shots of liquor per week     Comment: none    Drug use: Not Currently    Sexual activity: Yes     Partners: Female     Birth control/protection: None         Home Medications:  ALPRAZolam, Cariprazine HCl, Daridorexant HCl, Fluticasone-Salmeterol, Fluticasone-Umeclidin-Vilant, albuterol, albuterol sulfate HFA, amLODIPine-benazepril, atorvastatin, divalproex, famotidine, furosemide, gabapentin, ipratropium-albuterol, and lisinopril    Allergies:  No Known Allergies    Review of Systems   All systems were reviewed and negative except for: Altered mental status    Objective   Objective     Vitals:   Temp:  [96.8 øF (36 øC)] 96.8 øF (36 øC)  Heart Rate:  [79-92] 92  Resp:  [20] 20  BP: (125-126)/(79-85) 126/85  Flow (L/min):  [2] 2    Physical Exam    Constitutional: Somnolent, ill-appearing, obese.   Eyes: Pupils equal, sclerae anicteric, no conjunctival injection   HENT: NCAT, mucous membranes moist   Neck: Supple, no thyromegaly, no lymphadenopathy, trachea midline   Respiratory: Diffuse rhonchi, worse in left lung fields and right, intercostal muscle use noted    Cardiovascular: RRR, no murmurs, rubs, or gallops, palpable pedal pulses bilaterally   Gastrointestinal: Positive bowel sounds, soft, nontender, nondistended   Musculoskeletal: No bilateral ankle edema, no clubbing or cyanosis to extremities   Psychiatric: unable to be assessed secondary patient's altered mental status   Neurologic: Unable to be assessed secondary  patient's altered mental status   Skin: No rashes     Result Review    Result Review:  I have personally reviewed the results from the time of this admission to 7/29/2023 19:47 EDT and agree with these findings:  [x]  Laboratory  []  Microbiology  [x]  Radiology  [x]  EKG/Telemetry   []  Cardiology/Vascular   []  Pathology  []  Old records  []  Other:      Assessment & Plan   Assessment / Plan     Assessment/Plan:   Severe Sepsis secondary to community-acquired pneumonia, failing outpatient therapy, patient meets sepsis criteria with leukocytosis, heart rate greater than 90.  Source of infection presumed to be pneumonia.  No evidence of endorgan damage with acute respiratory failure, acute kidney injury, and metabolic encephalopathy.  Patient has required 3 rounds of antibiotics without significant improvement.  Broaden antibiotics to include IV Zosyn, IV vancomycin.  30 mL/kg IV fluid resuscitation.  Acute on chronic hypoxic hypercapnic respiratory failure-4 L nasal cannula at baseline. Required BiPAP therapy in the ED.  Supportive care.  We will continue to monitor.  Acute kidney injury-baseline appears to be around 1.2-1.3, currently 2.  Likely induced from recent medication changes as creatinine has been precipitously creeping up over the last few weeks.  IV fluids.  If no improvement next 24 to 48 hours consider nephrology consultation.  Hyperkalemia-gotten a cocktail in ED, with improvement.  Proceed with Lokelma.  BMP and EKG trends.  Encephalopathy, toxic versus metabolic-CT scan initially negative, low concern for CVA.  Morbid obesity      DVT prophylaxis: Heparin    CODE STATUS: Full code    Admission Status:  I believe this patient meets inpatient status.    Electronically signed by Gautam Montenegro DO, 07/29/23, 7:47 PM EDT.

## 2023-07-29 NOTE — ED PROVIDER NOTES
Time: 3:57 PM EDT  Date of encounter:  7/29/2023  Independent Historian/Clinical History and Information was obtained by:   Family    History is limited by: Altered Mental Status    Chief Complaint: Altered mental status      History of Present Illness:  Patient is a 58 y.o. year old male who presents to the emergency department for evaluation of altered mental status.  This patient has a history of COPD and is on home oxygen.  He presented with altered mental status which has been present for the least the last 12 hours.  The patient has had no fever chills or cough according to family however he has recently been treated for pneumonia.  The patient is very lethargic and unable to provide further history.    HPI    Patient Care Team  Primary Care Provider: Jailyn Shepard APRN    Past Medical History:     No Known Allergies  Past Medical History:   Diagnosis Date    Anxiety     Asthma     Asthma, extrinsic     Chronic pain disorder     Depression     Emphysema of lung     Pneumonia of right lower lobe due to infectious organism 4/6/2023    PTSD (post-traumatic stress disorder)     Rheumatoid arthritis     Schizoaffective disorder     Self-injurious behavior     Suicide attempt      Past Surgical History:   Procedure Laterality Date    ANKLE SURGERY Right     BRONCHOSCOPY N/A 4/11/2023    Procedure: BRONCHOSCOPY WITH BRONCHOALVEOLAR LAVAGE;  Surgeon: Taiwo Copeland DO;  Location: [facility] ENDOSCOPY;  Service: Pulmonary;  Laterality: N/A;  DIFFUSE BRONCHITIS     Family History   Problem Relation Age of Onset    Alcohol abuse Father     Asthma Father     Emphysema Father     Heart failure Father        Home Medications:  Prior to Admission medications    Medication Sig Start Date End Date Taking? Authorizing Provider   Advair Diskus 250-50 MCG/ACT DISKUS  4/19/23   Provider, MD Verna   albuterol (ACCUNEB) 0.63 MG/3ML nebulizer solution Take 3 mL by nebulization Every 6 (Six) Hours As Needed for Wheezing.  3/28/23   Michael Haile MD   ALPRAZolam (Xanax) 1 MG tablet Take 1 tablet by mouth 2 (Two) Times a Day As Needed for Anxiety for up to 30 days. 7/21/23 8/20/23  Page Lee PA-C   amLODIPine (NORVASC) 10 MG tablet Take 1 tablet by mouth Daily. 5/22/23   ProviderVerna MD   amLODIPine-benazepril (LOTREL) 10-40 MG per capsule TAKE 1 CAPSULE BY MOUTH DAILY 7/13/23 7/12/24  Jailyn Shepard APRN   atorvastatin (LIPITOR) 10 MG tablet TAKE 1 TABLET BY MOUTH DAILY 6/1/23   Jailyn Shepard APRN   Cariprazine HCl (Vraylar) 1.5 MG capsule capsule Take 1 capsule by mouth Daily for 30 days. 7/21/23 8/20/23  Page Lee PA-C   cetirizine (zyrTEC) 10 MG tablet Take 1 tablet by mouth Daily. 10/27/22   Jailyn Shepard APRN   Daridorexant HCl (QUVIVIQ) 50 MG tablet Take 1 tablet by mouth At Night As Needed (sleep) for up to 30 days. 7/21/23 8/20/23  Page Lee PA-C   divalproex (Depakote ER) 250 MG 24 hr tablet Take 1 tab PO QD x 1 week, if tolerated can increase to 2 tab PO QD 7/21/23   Page Lee PA-C   famotidine (Pepcid) 20 MG tablet Take 1 tablet by mouth 2 (Two) Times a Day. 4/25/23   Jailyn Shepard APRN   furosemide (LASIX) 20 MG tablet TAKE 1 TABLET BY MOUTH TWICE DAILY  Patient taking differently: Takes as needed 5/23/23   Jailyn Shepard APRN   gabapentin (Neurontin) 800 MG tablet Take 1 tablet by mouth 3 (Three) Times a Day for 30 days. 7/21/23 8/20/23  Page Lee PA-C   ipratropium-albuterol (DUO-NEB) 0.5-2.5 mg/3 ml nebulizer Take 3 mL by nebulization Every 4 (Four) Hours As Needed for Wheezing. 7/11/23   Jailyn Shepard APRN   lisinopril (PRINIVIL,ZESTRIL) 40 MG tablet TAKE 1 TABLET BY MOUTH DAILY 6/1/23   Jailyn Shepard APRN   nicotine (Nicoderm CQ) 21 MG/24HR patch Place 1 patch on the skin as directed by provider Daily. Do not smoke with nicotine patches on 3/30/23   Jailyn Shepard APRN   tiotropium (SPIRIVA) 18 MCG per inhalation capsule Place 1 capsule into inhaler and inhale  "Daily.  Patient not taking: Reported on 7/13/2023 12/28/22   Michael Haile MD   traMADol-acetaminophen (Ultracet) 37.5-325 MG per tablet Take 1 tablet by mouth Every 6 (Six) Hours As Needed for Moderate Pain. 2/24/23   Drea PONCE Glaser   Trelegy Ellipta 200-62.5-25 MCG/ACT aerosol powder  INHALE 1 PUFF BY MOUTH EVERY DAY 7/25/23   Jailyn Shepard APRN   Ventolin  (90 Base) MCG/ACT inhaler INHALE 2 PUFFS BY MOUTH EVERY 4 HOURS AS NEEDED FOR WHEEZING OR SHORTNESS OF BREATH 5/8/23   Michael Haile MD        Social History:   Social History     Tobacco Use    Smoking status: Every Day     Packs/day: 0.25     Years: 15.00     Pack years: 3.75     Types: Cigarettes     Start date: 1993     Passive exposure: Current    Smokeless tobacco: Current     Types: Snuff    Tobacco comments:     1 pack last 3-4 days   Vaping Use    Vaping Use: Never used   Substance Use Topics    Alcohol use: Not Currently     Alcohol/week: 9.0 standard drinks     Types: 6 Cans of beer, 3 Shots of liquor per week     Comment: none    Drug use: Not Currently         Review of Systems:  Review of Systems   Unable to perform ROS: Mental status change      Physical Exam:  /63 (BP Location: Left arm, Patient Position: Lying)   Pulse 99   Temp 98 øF (36.7 øC) (Oral)   Resp 22   Ht 170.2 cm (67\")   Wt 120 kg (264 lb 15.9 oz)   SpO2 93%   BMI 41.50 kg/mý     Physical Exam  Vitals and nursing note reviewed.   Constitutional:       General: He is in acute distress.      Appearance: He is obese. He is not toxic-appearing.      Comments: The patient is hypoventilating and lethargic.  He is nonverbal and disoriented.   HENT:      Head: Normocephalic and atraumatic.      Right Ear: External ear normal.      Left Ear: External ear normal.      Nose: Nose normal.      Mouth/Throat:      Mouth: Mucous membranes are moist.      Pharynx: Oropharynx is clear.   Eyes:      General: No scleral icterus.     Extraocular Movements: Extraocular " movements intact.      Pupils: Pupils are equal, round, and reactive to light.   Cardiovascular:      Rate and Rhythm: Normal rate and regular rhythm.      Pulses: Normal pulses.      Heart sounds: Normal heart sounds.   Pulmonary:      Effort: Respiratory distress present.      Breath sounds: Wheezing and rhonchi present.      Comments: The patient is hypoventilated  Abdominal:      General: Abdomen is flat. Bowel sounds are normal.      Palpations: Abdomen is soft.      Tenderness: There is no abdominal tenderness.   Musculoskeletal:         General: Normal range of motion.      Cervical back: Normal range of motion and neck supple.   Skin:     General: Skin is warm and dry.   Neurological:      Mental Status: He is disoriented.      Motor: Weakness present.      Comments: Patient is lethargic, disoriented, and nonverbal.   Psychiatric:      Comments: Nonverbal                Procedures:  Procedures      Medical Decision Making:      Comorbidities that affect care:    COPD, Obesity    External Notes reviewed:    Previous Clinic Note: Office visit for depression and psychosis      The following orders were placed and all results were independently analyzed by me:  Orders Placed This Encounter   Procedures    Blood Culture - Blood,    Blood Culture - Blood,    MRSA Screen, PCR (Inpatient) - Swab, Nares    XR Chest 1 View    CT Head Without Contrast    [place] Draw    Comprehensive Metabolic Panel    BNP    Single High Sensitivity Troponin T    CBC Auto Differential    Lactic Acid, Plasma    Blood Gas, Arterial -With Co-Ox Panel: Yes    Ethanol    Valproic Acid Level, Total    Urinalysis With Culture If Indicated - Urine, Catheter    Acetaminophen Level    Urine Drug Screen - Urine, Clean Catch    Salicylate Level    VBG with Co-Ox and Electrolytes    ABG with Co-Ox and Electrolytes    Procalcitonin    Lactic Acid, Plasma    Undress & Gown    Vital Signs    POC Glucose Once    POC Glucose Once    POC Glucose    POC  Glucose Once    ECG 12 Lead ED Triage Standing Order; SOA    Inpatient Admission    CBC & Differential    Green Top (Gel)    Lavender Top    Gold Top - SST    Light Blue Top       Medications Given in the Emergency Department:  Medications   albuterol (PROVENTIL) nebulizer solution 0.083% 2.5 mg/3mL (2.5 mg Nebulization Given 7/29/23 1601)   Naloxone HCl (NARCAN) injection 2 mg (2 mg Intravenous Given 7/29/23 1607)   calcium gluconate 1000 Mg/50ml 0.675% NaCl IV SOLN (0 mg Intravenous Stopped 7/29/23 1822)   sodium bicarbonate injection 8.4% 50 mEq (50 mEq Intravenous Given 7/29/23 1753)   dextrose (D50W) (25 g/50 mL) IV injection 25 g (25 g Intravenous Given 7/29/23 1753)   insulin regular (humuLIN R,novoLIN R) injection 7 Units (7 Units Intravenous Given 7/29/23 1753)   vancomycin 2500 mg/500 mL 0.9% NS IVPB ([facility]) (2,500 mg Intravenous New Bag 7/29/23 2108)   ipratropium-albuterol (DUO-NEB) nebulizer solution 3 mL (3 mL Nebulization Given 7/29/23 2027)   lactated ringers bolus 2,631 mL (2,631 mL Intravenous New Bag 7/29/23 2341)   piperacillin-tazobactam (ZOSYN) 4.5 g/100 mL 0.9% NS IVPB (mbp) (4.5 g Intravenous New Bag 7/29/23 2345)        ED Course:         EKG:  Sinus rhythm with rate 83 beats per  Normal P wave and AK interval  Normal QRS and normal axis  No ST segments normal QT QTc interval.        Labs:    Lab Results (last 24 hours)       Procedure Component Value Units Date/Time    Blood Gas, Arterial -With Co-Ox Panel: Yes [[id number]]  (Abnormal) Collected: 07/29/23 1552    Specimen: Arterial Blood Updated: 07/29/23 1556     pH, Arterial 7.208 pH units      pCO2, Arterial 67.7 mm Hg      pO2, Arterial 70.2 mm Hg      HCO3, Arterial 26.3 mmol/L      Base Excess, Arterial -3.1 mmol/L      O2 Saturation, Arterial 92.3 %      Hemoglobin, Blood Gas 14.6 g/dL      Carboxyhemoglobin 3.7 %      Methemoglobin 0.20 %      Oxyhemoglobin 88.7 %      FHHB 7.4 %      Site Arterial: left radial     Modality Cannula -  Nasal     FIO2 28 %      Flow Rate 2.0 lpm      PO2/FIO2 251     Dann's Test Positive    CBC & Differential [[id number]]  (Abnormal) Collected: 07/29/23 1555    Specimen: Blood Updated: 07/29/23 1614    Narrative:      The following orders were created for panel order CBC & Differential.  Procedure                               Abnormality         Status                     ---------                               -----------         ------                     CBC Auto Differential[[id number]]        Abnormal            Final result                 Please view results for these tests on the individual orders.    Comprehensive Metabolic Panel [[id number]]  (Abnormal) Collected: 07/29/23 1555    Specimen: Blood Updated: 07/29/23 1632     Glucose 102 mg/dL      BUN 35 mg/dL      Creatinine 2.03 mg/dL      Sodium 141 mmol/L      Potassium 6.8 mmol/L      Chloride 105 mmol/L      CO2 28.6 mmol/L      Calcium 9.2 mg/dL      Total Protein 7.1 g/dL      Albumin 4.1 g/dL      ALT (SGPT) 23 U/L      AST (SGOT) 18 U/L      Alkaline Phosphatase 124 U/L      Total Bilirubin 0.3 mg/dL      Globulin 3.0 gm/dL      A/G Ratio 1.4 g/dL      BUN/Creatinine Ratio 17.2     Anion Gap 7.4 mmol/L      eGFR 37.8 mL/min/1.73     Narrative:      GFR Normal >60  Chronic Kidney Disease <60  Kidney Failure <15      BNP [[id number]]  (Normal) Collected: 07/29/23 1555    Specimen: Blood Updated: 07/29/23 1629     proBNP 109.6 pg/mL     Narrative:      Among patients with dyspnea, NT-proBNP is highly sensitive for the detection of acute congestive heart failure. In addition NT-proBNP of <300 pg/ml effectively rules out acute congestive heart failure with 99% negative predictive value.      Single High Sensitivity Troponin T [[id number]]  (Abnormal) Collected: 07/29/23 1555    Specimen: Blood Updated: 07/29/23 1631     HS Troponin T 22 ng/L     Narrative:      High Sensitive Troponin T Reference Range:  <10.0 ng/L- Negative Female for AMI  <15.0 ng/L-  Negative Male for AMI  >=10 - Abnormal Female indicating possible myocardial injury.  >=15 - Abnormal Male indicating possible myocardial injury.   Clinicians would have to utilize clinical acumen, EKG, Troponin, and serial changes to determine if it is an Acute Myocardial Infarction or myocardial injury due to an underlying chronic condition.         CBC Auto Differential [[id number]]  (Abnormal) Collected: 07/29/23 1555    Specimen: Blood Updated: 07/29/23 1614     WBC 16.29 10*3/mm3      RBC 4.57 10*6/mm3      Hemoglobin 14.2 g/dL      Hematocrit 45.1 %      MCV 98.7 fL      MCH 31.1 pg      MCHC 31.5 g/dL      RDW 16.4 %      RDW-SD 59.8 fl      MPV 10.8 fL      Platelets 223 10*3/mm3      Neutrophil % 86.1 %      Lymphocyte % 6.0 %      Monocyte % 5.6 %      Eosinophil % 0.9 %      Basophil % 0.2 %      Immature Grans % 1.2 %      Neutrophils, Absolute 14.02 10*3/mm3      Lymphocytes, Absolute 0.98 10*3/mm3      Monocytes, Absolute 0.91 10*3/mm3      Eosinophils, Absolute 0.15 10*3/mm3      Basophils, Absolute 0.04 10*3/mm3      Immature Grans, Absolute 0.19 10*3/mm3      nRBC 0.0 /100 WBC     Blood Culture - Blood, Arm, Left [[id number]] Collected: 07/29/23 1555    Specimen: Blood from Arm, Left Updated: 07/29/23 1605    Blood Culture - Blood, Arm, Right [[id number]] Collected: 07/29/23 1555    Specimen: Blood from Arm, Right Updated: 07/29/23 1604    Lactic Acid, Plasma [[id number]]  (Normal) Collected: 07/29/23 1555    Specimen: Blood Updated: 07/29/23 1622     Lactate 0.7 mmol/L     Ethanol [[id number]] Collected: 07/29/23 1555    Specimen: Blood Updated: 07/29/23 1631     Ethanol <10 mg/dL      Ethanol % <0.010 %     Narrative:      Ethanol (Plasma)  <10 Essentially Negative    Toxic Concentrations           mg/dL    Flushing, slowing of reflexes    Impaired visual activity         Depression of CNS              >100  Possible Coma                  >300       Valproic Acid Level, Total [[id number]]   (Abnormal) Collected: 07/29/23 1555    Specimen: Blood Updated: 07/29/23 1631     Valproic Acid 19.3 mcg/mL     Narrative:      Therapeutic Ranges for Valproic Acid    Epilepsy:       mcg/ml  Bipolar/Linh  up to 125 mcg/ml      Acetaminophen Level [[id number]]  (Normal) Collected: 07/29/23 1555    Specimen: Blood Updated: 07/29/23 1631     Acetaminophen <5.0 mcg/mL     Salicylate Level [[id number]]  (Normal) Collected: 07/29/23 1555    Specimen: Blood Updated: 07/29/23 1631     Salicylate <0.3 mg/dL     Urinalysis With Culture If Indicated - Urine, Catheter [[id number]]  (Abnormal) Collected: 07/29/23 1612    Specimen: Urine, Catheter Updated: 07/29/23 1628     Color, UA Yellow     Appearance, UA Clear     pH, UA 5.5     Specific Gravity, UA 1.018     Glucose, UA Negative     Ketones, UA Negative     Bilirubin, UA Negative     Blood, UA Negative     Protein, UA Trace     Leuk Esterase, UA Negative     Nitrite, UA Negative     Urobilinogen, UA 0.2 E.U./dL    Narrative:      In absence of clinical symptoms, the presence of pyuria, bacteria, and/or nitrites on the urinalysis result does not correlate with infection.  Urine microscopic not indicated.    Urine Drug Screen - Urine, Clean Catch [[id number]]  (Abnormal) Collected: 07/29/23 1614    Specimen: Urine, Clean Catch Updated: 07/29/23 1837     Amphet/Methamphet, Screen Negative     Barbiturates Screen, Urine Negative     Benzodiazepine Screen, Urine Positive     Cocaine Screen, Urine Negative     Opiate Screen Negative     THC, Screen, Urine Positive     Methadone Screen, Urine Negative     Oxycodone Screen, Urine Negative     Fentanyl, Urine Negative    Narrative:      Negative Thresholds Per Drugs Screened:    Amphetamines                 500 ng/ml  Barbiturates                 200 ng/ml  Benzodiazepines              100 ng/ml  Cocaine                      300 ng/ml  Methadone                    300 ng/ml  Opiates                      300 ng/ml  Oxycodone                     100 ng/ml  THC                           50 ng/ml  Fentanyl                       5 ng/ml      The Normal Value for all drugs tested is negative. This report includes final unconfirmed screening results to be used for medical treatment purposes only. Unconfirmed results must not be used for non-medical purposes such as employment or legal testing. Clinical consideration should be applied to any drug of abuse test, particularly when unconfirmed results are used.            VBG with Co-Ox and Electrolytes [[id number]]  (Abnormal) Collected: 07/29/23 1644    Specimen: Venous Blood Updated: 07/29/23 1648     pH, Venous 7.245 pH Units      pCO2, Venous 68.0 mm Hg      pO2, Venous 22.8 mm Hg      HCO3, Venous 28.8 mmol/L      Base Excess, Venous -0.3 mmol/L      O2 Saturation, Venous 40.9 %      Hemoglobin, Blood Gas 14.5 g/dL      Carboxyhemoglobin 4.1 %      Methemoglobin 0.30 %      Oxyhemoglobin 39.1 %      FHHB 56.5 %      Site Venous     Modality BiPap     FIO2 21 %      Sodium, Venous 140.2 mmol/L      Potassium, Venous 7.0 mmol/L      Ionized Calcium, Arterial 1.18 mmol/L      Chloride, Venous  103 mmol/L      Glucose, Arterial 94 mg/dL      Lactate, Arterial 1.38 mmol/L     POC Glucose Once [[id number]]  (Abnormal) Collected: 07/29/23 1803    Specimen: Blood Updated: 07/29/23 1805     Glucose 229 mg/dL      Comment: Serial Number: [id number]:  [id number]       ABG with Co-Ox and Electrolytes [[id number]]  (Abnormal) Collected: 07/29/23 1837    Specimen: Arterial Blood from Arm, Right Updated: 07/29/23 1842     pH, Arterial 7.343 pH units      pCO2, Arterial 52.1 mm Hg      pO2, Arterial 69.2 mm Hg      HCO3, Arterial 27.7 mmol/L      Base Excess, Arterial 1.1 mmol/L      O2 Saturation, Arterial 93.7 %      Hemoglobin, Blood Gas 13.6 g/dL      Carboxyhemoglobin 3.0 %      Methemoglobin 0.00 %      Oxyhemoglobin 90.9 %      FHHB 6.1 %      Dann's Test Positive     Note 16/6 RR14 28%     Site  "Arterial: right radial     Modality BiPap     FIO2 28 %      Flow Rate --     Sodium, Arterial 140.6 mmol/L      Potassium, Arterial 4.99 mmol/L      Ionized Calcium, Arterial 1.21 mmol/L      Chloride, Arterial 105 mmol/L      Glucose, Arterial 109 mg/dL      Lactate, Arterial 1.30 mmol/L      PO2/FIO2 247    POC Glucose Once [[id number]]  (Normal) Collected: 07/29/23 1927    Specimen: Blood Updated: 07/29/23 1929     Glucose 75 mg/dL      Comment: Serial Number: [id number]:  [id number]       POC Glucose Once [[id number]]  (Normal) Collected: 07/29/23 2146    Specimen: Blood Updated: 07/29/23 2148     Glucose 80 mg/dL      Comment: Serial Number: [id number]:  [id number]       POC Glucose [[id number]] Collected: 07/29/23 2148    Specimen: Blood Updated: 07/29/23 2148     Glucose 80 mg/dL      Comment: POC       Procalcitonin [[id number]]  (Normal) Collected: 07/29/23 2222    Specimen: Blood Updated: 07/29/23 2313     Procalcitonin 0.15 ng/mL     Narrative:      As a Marker for Sepsis (Non-Neonates):    1. <0.5 ng/mL represents a low risk of severe sepsis and/or septic shock.  2. >2 ng/mL represents a high risk of severe sepsis and/or septic shock.    As a Marker for Lower Respiratory Tract Infections that require antibiotic therapy:    PCT on Admission    Antibiotic Therapy       6-12 Hrs later    >0.5                Strongly Recommended  >0.25 - <0.5        Recommended  0.1 - 0.25          Discouraged              Remeasure/reassess PCT  <0.1                Strongly Discouraged     Remeasure/reassess PCT    As 28 day mortality risk marker: \"Change in Procalcitonin Result\" (>80% or <=80%) if Day 0 (or Day 1) and Day 4 values are available. Refer to http://www.[employer]-pct-calculator.com    Change in PCT <=80%  A decrease of PCT levels below or equal to 80% defines a positive change in PCT test result representing a higher risk for 28-day all-cause mortality of patients diagnosed with severe sepsis for septic " shock.    Change in PCT >80%  A decrease of PCT levels of more than 80% defines a negative change in PCT result representing a lower risk for 28-day all-cause mortality of patients diagnosed with severe sepsis or septic shock.    This test is Prognostic not Diagnostic, if elevated correlate with clinical findings before administering antibiotic treatment.        Lactic Acid, Plasma [[id number]]  (Normal) Collected: 07/29/23 2222    Specimen: Blood Updated: 07/29/23 2305     Lactate 0.8 mmol/L     Basic Metabolic Panel [[id number]]  (Abnormal) Collected: 07/30/23 0002    Specimen: Blood Updated: 07/30/23 0042     Glucose 82 mg/dL      BUN 31 mg/dL      Creatinine 1.53 mg/dL      Sodium 141 mmol/L      Potassium 5.0 mmol/L      Comment: Slight hemolysis detected by analyzer. Results may be affected.        Chloride 107 mmol/L      CO2 26.2 mmol/L      Calcium 8.7 mg/dL      BUN/Creatinine Ratio 20.3     Anion Gap 7.8 mmol/L      eGFR 53.0 mL/min/1.73     Narrative:      GFR Normal >60  Chronic Kidney Disease <60  Kidney Failure <15      MRSA Screen, PCR (Inpatient) - Swab, Nares [[id number]]  (Normal) Collected: 07/30/23 0110    Specimen: Swab from Nares Updated: 07/30/23 0250     MRSA PCR No MRSA Detected    Narrative:      The negative predictive value of this diagnostic test is high and should only be used to consider de-escalating anti-MRSA therapy. A positive result may indicate colonization with MRSA and must be correlated clinically.    Basic Metabolic Panel [[id number]]  (Abnormal) Collected: 07/30/23 0549    Specimen: Blood Updated: 07/30/23 0658     Glucose 85 mg/dL      BUN 25 mg/dL      Creatinine 1.42 mg/dL      Sodium 141 mmol/L      Potassium 4.7 mmol/L      Chloride 106 mmol/L      CO2 30.9 mmol/L      Calcium 8.9 mg/dL      BUN/Creatinine Ratio 17.6     Anion Gap 4.1 mmol/L      eGFR 58.0 mL/min/1.73     Narrative:      GFR Normal >60  Chronic Kidney Disease <60  Kidney Failure <15                Imaging:    CT Head Without Contrast    Result Date: 7/29/2023  PROCEDURE: CT HEAD WO CONTRAST  COMPARISON:  None INDICATIONS: ALTERED MENTAL STATUS  PROTOCOL:   Standard imaging protocol performed    RADIATION:   DLP: 1780.4mGy*cm   MA and/or KV was adjusted to minimize radiation dose.     TECHNIQUE: Axial images of the head without intravenous contrast.  FINDINGS:  The ventricles have a normal size and configuration. There is no evidence of acute intracranial hemorrhage, mass or midline shift. No extra-axial fluid collections are identified. There are no skull fractures. The visualized paranasal sinuses and mastoid air cells are grossly clear.  IMPRESSION: Normal exam.  SIMONE BUTLER MD       Electronically Signed and Approved By: SIMONE BUTLER MD on 7/29/2023 at 18:04             XR Chest 1 View    Result Date: 7/29/2023  PROCEDURE: XR CHEST 1 VW  COMPARISON: [facility], CR, XR CHEST 2 VW, 3/28/2023, 14:02.  [facility], CR, XR CHEST 1 VW, 7/07/2023, 16:31.  INDICATIONS: LOSS OF CONSCIOUSNESS, SHORTNESS OF BREATH  FINDINGS:   The lungs are well-expanded. The heart and pulmonary vasculature are within normal limits. No pleural effusions are identified. There are no active appearing infiltrates.  IMPRESSION: No active disease.  SIMONE BUTLER MD       Electronically Signed and Approved By: SIMONE BUTLER MD on 7/29/2023 at 16:18                Differential Diagnosis and Discussion:    Altered Mental Status: Based on the patient's signs and symptoms, differential diagnosis includes but is not limited to meningitis, stroke, sepsis, subarachnoid hemorrhage, intracranial bleeding, encephalitis, and metabolic encephalopathy.  Dyspnea: Differential diagnosis includes but is not limited to metabolic acidosis, neurological disorders, psychogenic, asthma, pneumothorax, upper airway obstruction, COPD, pneumonia, noncardiogenic pulmonary edema, interstitial lung disease, anemia,  congestive heart failure, and pulmonary embolism    All labs were reviewed and interpreted by me.  EKG was interpreted by me.    MDM     Amount and/or Complexity of Data Reviewed  Clinical lab tests: reviewed  Tests in the radiology section of CPTr: reviewed  Tests in the medicine section of CPTr: reviewed  Decide to obtain previous medical records or to obtain history from someone other than the patient: yes         Critical Care Note: Total Critical Care time of 55 minutes. Total critical care time documented does not include time spent on separately billed procedures for services of nurses or physician assistants. I personally saw and examined the patient. I have reviewed all diagnostic interpretations and treatment plans as written. I was present for the key portions of any procedures performed and the inclusive time noted in any critical care statement. Critical care time includes patient management by me, time spent at the patients bedside,  time to review lab and imaging results, discussing patient care, documentation in the medical record, and time spent with family or caregiver.    Patient Care Considerations:    CT ABDOMEN AND PELVIS: I considered ordering a CT scan of the abdomen and pelvis however patient has no abdominal symptoms.      Consultants/Shared Management Plan:    Hospitalist: I have discussed the case with hospitalist who agrees to accept the patient for admission.    Social Determinants of Health:    Patient has presented with family members who are responsible, reliable and will ensure follow up care.      Disposition and Care Coordination:    Admit:   Through independent evaluation of the patient's history, physical, and imperical data, the patient meets criteria for observation/admission to the hospital.        Final diagnoses:   Altered mental status, unspecified altered mental status type   Acute respiratory failure with hypercapnia        ED Disposition       ED Disposition   Decision  to Admit    Condition   --    Comment   Level of Care: Telemetry [5]   Diagnosis: Severe sepsis [[id number]]   Admitting Physician: LUIS ESTEVEZ [[id number]]   Attending Physician: LUIS ESTEVEZ [[id number]]   Certification: I Certify That Inpatient Hospital Services Are Medically Necessary For Greater Than 2 Midnights                 This medical record created using voice recognition software.             Van Interiano, DO  07/30/23 1308     no

## 2023-07-30 ENCOUNTER — READMISSION MANAGEMENT (OUTPATIENT)
Dept: CALL CENTER | Facility: HOSPITAL | Age: 58
End: 2023-07-30
Payer: MEDICAID

## 2023-07-30 VITALS
SYSTOLIC BLOOD PRESSURE: 144 MMHG | HEIGHT: 67 IN | TEMPERATURE: 98 F | BODY MASS INDEX: 41.59 KG/M2 | RESPIRATION RATE: 22 BRPM | HEART RATE: 99 BPM | OXYGEN SATURATION: 93 % | DIASTOLIC BLOOD PRESSURE: 63 MMHG | WEIGHT: 264.99 LBS

## 2023-07-30 LAB
ANION GAP SERPL CALCULATED.3IONS-SCNC: 4.1 MMOL/L (ref 5–15)
ANION GAP SERPL CALCULATED.3IONS-SCNC: 7.8 MMOL/L (ref 5–15)
BUN SERPL-MCNC: 25 MG/DL (ref 6–20)
BUN SERPL-MCNC: 31 MG/DL (ref 6–20)
BUN/CREAT SERPL: 17.6 (ref 7–25)
BUN/CREAT SERPL: 20.3 (ref 7–25)
CALCIUM SPEC-SCNC: 8.7 MG/DL (ref 8.6–10.5)
CALCIUM SPEC-SCNC: 8.9 MG/DL (ref 8.6–10.5)
CHLORIDE SERPL-SCNC: 106 MMOL/L (ref 98–107)
CHLORIDE SERPL-SCNC: 107 MMOL/L (ref 98–107)
CO2 SERPL-SCNC: 26.2 MMOL/L (ref 22–29)
CO2 SERPL-SCNC: 30.9 MMOL/L (ref 22–29)
CREAT SERPL-MCNC: 1.42 MG/DL (ref 0.76–1.27)
CREAT SERPL-MCNC: 1.53 MG/DL (ref 0.76–1.27)
EGFRCR SERPLBLD CKD-EPI 2021: 53 ML/MIN/1.73
EGFRCR SERPLBLD CKD-EPI 2021: 58 ML/MIN/1.73
GLUCOSE SERPL-MCNC: 82 MG/DL (ref 65–99)
GLUCOSE SERPL-MCNC: 85 MG/DL (ref 65–99)
MRSA DNA SPEC QL NAA+PROBE: NORMAL
POTASSIUM SERPL-SCNC: 4.7 MMOL/L (ref 3.5–5.2)
POTASSIUM SERPL-SCNC: 5 MMOL/L (ref 3.5–5.2)
QT INTERVAL: 359 MS
SODIUM SERPL-SCNC: 141 MMOL/L (ref 136–145)
SODIUM SERPL-SCNC: 141 MMOL/L (ref 136–145)

## 2023-07-30 PROCEDURE — 87641 MR-STAPH DNA AMP PROBE: CPT | Performed by: FAMILY MEDICINE

## 2023-07-30 PROCEDURE — 94761 N-INVAS EAR/PLS OXIMETRY MLT: CPT

## 2023-07-30 PROCEDURE — 25010000002 PIPERACILLIN SOD-TAZOBACTAM PER 1 G: Performed by: FAMILY MEDICINE

## 2023-07-30 PROCEDURE — 94799 UNLISTED PULMONARY SVC/PX: CPT

## 2023-07-30 PROCEDURE — 80048 BASIC METABOLIC PNL TOTAL CA: CPT | Performed by: FAMILY MEDICINE

## 2023-07-30 RX ADMIN — PIPERACILLIN AND TAZOBACTAM 4.5 G: 4; .5 INJECTION, POWDER, FOR SOLUTION INTRAVENOUS at 05:43

## 2023-07-30 NOTE — PLAN OF CARE
Goal Outcome Evaluation:   Patient was a transfer from the ED. Patient arrived on the BiPAP with the settings of 16/6, rate of 14 and 35% FiO2. Patient was placed on a continuous pulse oximeter and settings were adjusted per Dr. Montenegro to 15/5, rate of 18 and 35% FiO2. Patient wears 4L NC at home.

## 2023-07-30 NOTE — OUTREACH NOTE
Prep Survey      Flowsheet Row Responses   Mandaeism [facility] patient discharged from? [facility]   Is LACE score < 7 ? No   Eligibility [facility]   Date of Admission 07/29/23   Date of Discharge 07/30/23   Discharge Disposition Home or Self Care   Discharge diagnosis Severe Sepsis secondary to community-acquired pneumonia, fa   Does the patient have one of the following disease processes/diagnoses(primary or secondary)? Pneumonia   Does the patient have Home health ordered? No   Is there a DME ordered? No   Prep survey completed? Yes            LARON BURNS - Registered Nurse

## 2023-07-30 NOTE — PLAN OF CARE
Goal Outcome Evaluation:  Plan of Care Reviewed With: patient        Progress: no change  Outcome Evaluation: Patient is currenlty on 3L. Bipap is on standby in the room.

## 2023-07-30 NOTE — PLAN OF CARE
Goal Outcome Evaluation: patient      Improving     Patient awake and more alert. Cognition improved. Currently alert and oriented x4. Denies pain at this time. Maintain wearing bipap throughout the night.

## 2023-07-30 NOTE — NURSING NOTE
Patient alert and oriented, answers questions appropriately, pt says he wants to leave and wants me to call his significant other, MD notified said he can leave AMA, attempts to call significant other voicemails left, pt angry heart monitor thrown in the toilet and attempt to flush, stool on floor walls and bed, pulled IVs out and blood on floor and walls, security called, pt says he wont leave until his significant other gets here, pt signed AMA paper work educated once he signs that he has to leave, security walked pt out.

## 2023-07-31 ENCOUNTER — TRANSITIONAL CARE MANAGEMENT TELEPHONE ENCOUNTER (OUTPATIENT)
Dept: CALL CENTER | Facility: HOSPITAL | Age: 58
End: 2023-07-31
Payer: MEDICAID

## 2023-07-31 RX ORDER — ALBUTEROL SULFATE 90 UG/1
AEROSOL, METERED RESPIRATORY (INHALATION)
Qty: 18 G | Refills: 3 | Status: SHIPPED | OUTPATIENT
Start: 2023-07-31

## 2023-07-31 RX ORDER — QUETIAPINE FUMARATE 50 MG/1
50 TABLET, FILM COATED ORAL NIGHTLY
Qty: 60 TABLET | Refills: 1 | Status: SHIPPED | OUTPATIENT
Start: 2023-07-31

## 2023-08-02 ENCOUNTER — HOSPITAL ENCOUNTER (OUTPATIENT)
Dept: MRI IMAGING | Facility: HOSPITAL | Age: 58
Discharge: HOME | End: 2023-08-02
Payer: MEDICAID

## 2023-08-02 DIAGNOSIS — M25.561 ACUTE PAIN OF RIGHT KNEE: ICD-10-CM

## 2023-08-02 PROCEDURE — 73721 MRI JNT OF LWR EXTRE W/O DYE: CPT

## 2023-08-03 LAB
BACTERIA SPEC AEROBE CULT: NORMAL
BACTERIA SPEC AEROBE CULT: NORMAL

## 2023-08-08 ENCOUNTER — OFFICE VISIT (OUTPATIENT)
Dept: ORTHOPEDIC SURGERY | Facility: CLINIC | Age: 58
End: 2023-08-08
Payer: MEDICAID

## 2023-08-08 VITALS — WEIGHT: 264 LBS | HEIGHT: 67 IN | BODY MASS INDEX: 41.44 KG/M2

## 2023-08-08 DIAGNOSIS — M71.21 BAKER CYST, RIGHT: ICD-10-CM

## 2023-08-08 DIAGNOSIS — S83.411A SPRAIN OF MEDIAL COLLATERAL LIGAMENT OF RIGHT KNEE, INITIAL ENCOUNTER: ICD-10-CM

## 2023-08-08 DIAGNOSIS — M17.11 PRIMARY OSTEOARTHRITIS OF RIGHT KNEE: Primary | ICD-10-CM

## 2023-08-08 DIAGNOSIS — S83.241A ACUTE MEDIAL MENISCUS TEAR OF RIGHT KNEE, INITIAL ENCOUNTER: ICD-10-CM

## 2023-08-08 DIAGNOSIS — M25.461 EFFUSION OF RIGHT KNEE: ICD-10-CM

## 2023-08-08 RX ORDER — TRIAMCINOLONE ACETONIDE 40 MG/ML
40 INJECTION, SUSPENSION INTRA-ARTICULAR; INTRAMUSCULAR
Status: COMPLETED | OUTPATIENT
Start: 2023-08-08 | End: 2023-08-08

## 2023-08-08 RX ORDER — LIDOCAINE HYDROCHLORIDE 10 MG/ML
5 INJECTION, SOLUTION INFILTRATION; PERINEURAL
Status: COMPLETED | OUTPATIENT
Start: 2023-08-08 | End: 2023-08-08

## 2023-08-08 RX ADMIN — TRIAMCINOLONE ACETONIDE 40 MG: 40 INJECTION, SUSPENSION INTRA-ARTICULAR; INTRAMUSCULAR at 13:11

## 2023-08-08 RX ADMIN — LIDOCAINE HYDROCHLORIDE 5 ML: 10 INJECTION, SOLUTION INFILTRATION; PERINEURAL at 13:11

## 2023-08-08 NOTE — DISCHARGE SUMMARY
"               [facility]         HOSPITALIST  DISCHARGE SUMMARY    Patient Name: Asad Meyer  : 1965  MRN: [id number]    Date of Admission: 2023  Date of Discharge:  2023  Primary Care Physician: Jailyn Shepard APRN    Consults       No orders found from 2023 to 2023.            Active and Resolved Hospital Problems:  Severe sepsis  Acute on chronic hypercapnic and hypoxemic respiratory failure  PRACHI  Hyperkalemia  Encephalopathy-resolved    Hospital Course     Hospital Course:  Asad Meyer is a 58 y.o. male presents emergency department for evaluation of altered mental status.  Patient was found by his family members today reportedly altered because he was not getting out of bed like he usually does.  History of present illness is limited secondary to patient's altered mental status, HPI information was obtained from aunt and cousin at bedside.  Patient was \"draped over the bed\" and sleep most of the day.  Patient is a history of COPD, on 4 L of nasal cannula oxygen at baseline.  Patient has recently been treated with 3 rounds of oral antibiotics without improvement of a presumed pneumonia.  He was also started on a new insomnia medication Wednesday, Qiviniq.     Patient left AMA    Day of Discharge     Discharge Details        Discharge Medications        Changes to Medications        Instructions Start Date   furosemide 20 MG tablet  Commonly known as: LASIX  What changed:   how much to take  how to take this  when to take this  additional instructions   TAKE 1 TABLET BY MOUTH TWICE DAILY             Continue These Medications        Instructions Start Date   Advair Diskus 250-50 MCG/ACT DISKUS  Generic drug: Fluticasone-Salmeterol   1 puff, Inhalation, 2 Times Daily - RT      albuterol 0.63 MG/3ML nebulizer solution  Commonly known as: ACCUNEB   3 mL, Nebulization, Every 6 Hours PRN      ALPRAZolam 1 MG tablet  Commonly known as: Xanax   1 mg, Oral, 2 Times Daily PRN    "   amLODIPine-benazepril 10-40 MG per capsule  Commonly known as: LOTREL   1 capsule, Oral, Daily      atorvastatin 10 MG tablet  Commonly known as: LIPITOR   10 mg, Oral, Daily      Cariprazine HCl 1.5 MG capsule capsule  Commonly known as: Vraylar   1.5 mg, Oral, Daily      Daridorexant HCl 50 MG tablet  Commonly known as: QUVIVIQ   50 mg, Oral, Nightly PRN      divalproex 250 MG 24 hr tablet  Commonly known as: Depakote ER   Take 1 tab PO QD x 1 week, if tolerated can increase to 2 tab PO QD      famotidine 20 MG tablet  Commonly known as: Pepcid   20 mg, Oral, 2 Times Daily      gabapentin 800 MG tablet  Commonly known as: Neurontin   800 mg, Oral, 3 Times Daily      ipratropium-albuterol 0.5-2.5 mg/3 ml nebulizer  Commonly known as: DUO-NEB   3 mL, Nebulization, Every 4 Hours PRN      lisinopril 40 MG tablet  Commonly known as: PRINIVIL,ZESTRIL   TAKE 1 TABLET BY MOUTH DAILY      Trelegy Ellipta 200-62.5-25 MCG/ACT aerosol powder   Generic drug: Fluticasone-Umeclidin-Vilant   INHALE 1 PUFF BY MOUTH EVERY DAY               No Known Allergies    Discharge Disposition:  Left Against Medical Advice    Diet:  Hospital:No active diet order      Discharge Activity:       CODE STATUS:  Code Status and Medical Interventions:   Ordered at: 07/29/23 2001     Level Of Support Discussed With:    Patient     Code Status (Patient has no pulse and is not breathing):    CPR (Attempt to Resuscitate)     Medical Interventions (Patient has pulse or is breathing):    Full Support     Release to patient:    Routine Release       Future Appointments   Date Time Provider Department Center   8/24/2023  1:20 PM Page Lee PA-C [facility] BH PCRAD [facility]   8/28/2023 11:15 AM Michael Haile MD [facility] PCC ETW [facility]   8/29/2023 10:30 AM Jailyn Shepard APRN [facility] PC DULCE [facility]   9/21/2023  1:15 PM Emerson Bran PA [facility] ORS RING [facility]           Pertinent  and/or Most Recent Results     IMAGING:  CT Head Without Contrast    Result Date:  7/29/2023  PROCEDURE: CT HEAD WO CONTRAST  COMPARISON:  None INDICATIONS: ALTERED MENTAL STATUS  PROTOCOL:   Standard imaging protocol performed    RADIATION:   DLP: 1780.4mGy*cm   MA and/or KV was adjusted to minimize radiation dose.     TECHNIQUE: Axial images of the head without intravenous contrast.  FINDINGS:  The ventricles have a normal size and configuration. There is no evidence of acute intracranial hemorrhage, mass or midline shift. No extra-axial fluid collections are identified. There are no skull fractures. The visualized paranasal sinuses and mastoid air cells are grossly clear.  IMPRESSION: Normal exam.  SIMONE BUTLER MD       Electronically Signed and Approved By: SIMONE BUTLER MD on 7/29/2023 at 18:04             XR Chest 1 View    Result Date: 7/29/2023  PROCEDURE: XR CHEST 1 VW  COMPARISON: [facility], CR, XR CHEST 2 VW, 3/28/2023, 14:02.  [facility], CR, XR CHEST 1 VW, 7/07/2023, 16:31.  INDICATIONS: LOSS OF CONSCIOUSNESS, SHORTNESS OF BREATH  FINDINGS:   The lungs are well-expanded. The heart and pulmonary vasculature are within normal limits. No pleural effusions are identified. There are no active appearing infiltrates.  IMPRESSION: No active disease.  SIMONE BUTLER MD       Electronically Signed and Approved By: SIMONE BUTLER MD on 7/29/2023 at 16:18             MRI Knee Right Without Contrast    Result Date: 8/3/2023  PROCEDURE: MRI KNEE RIGHT  WO CONTRAST  COMPARISON: [facility], CR, XR KNEE 3 VW RIGHT, 7/07/2023, 16:32.  INDICATIONS: right knee pain      TECHNIQUE: A complete multi-planar MRI was performed.   FINDINGS:  There is a small subchondral fracture of the medial tibial plateau at the level the body measuring about 8 millimeters with minimal surrounding edema.  There is subchondral edema deep to cartilage loss in the anterior medial compartment as well.  There is mild patellofemoral and moderate medial compartment joint  space narrowing.  There is a moderate-sized joint effusion.  There is a moderate-sized Baker's cyst with evidence of recent partial rupture.  No intra-articular loose bodies are present.  There is edema in Hoffa's fat and at the expected location of the infrapatellar plica.  There is mild synovitis.  The anterior cruciate ligament posterior cruciate ligaments are intact.  The medial collateral ligament and lateral collateral and complex are intact.  There is edema superficial and deep to the medial collateral ligament which could be secondary to the medial compartment pathology.  Grade 2 MCL sprain is less likely.   there is a large radial tear of the posterior horn of the medial meniscus with horizontal tearing extending to the posterior root.  There is displacement of the body and posterior horn the medial meniscus into the medial gutter by 5 millimeters.  There are no lateral meniscal tears. The patella has anatomic position.  The extensor mechanism is intact.  Full-thickness cartilage loss throughout the majority weight-bearing portion of medial compartment with small osteophytes.  There is full-thickness cartilage loss of the inferior femoral trochlea measuring about 6 millimeters craniocaudal.  There is full-thickness cartilage loss of the superior patellar apex and superior medial facet measuring about 1.8 centimeters involving the superior 1/2 of the articular surface.  There is mild thinning of cartilage in the lateral compartment.  Popliteus muscle and tendon are normal appearance.        1. Moderate medial compartment and mild patellofemoral compartment arthritis of the knee with moderate-sized joint effusion and synovitis.  There is also increased signal intensity in Hoffa's fat and at the infrapatellar plica which could be seen with injury or inflammation. 2. There is a large radial tear of the posterior horn medial meniscus with extension to the posterior root.  There is extrusion of the body and  posterior horn of the medial meniscus into the gutter. 3. There is subchondral edema in the medial compartment deep to cartilage loss.  There is a small nonacute appearing subchondral fracture of the medial tibial plateau. 4. There is a fluid signal intensity superficial and deep to the medial collateral ligament.  This is likely secondary to the medial compartment pathology.  It is less likely seen with grade 2 MCL sprain. 5. There is a moderate-sized Baker's cyst with evidence of recent partial rupture. 6. There is moderate chondromalacia patellofemoral compartment and mild lateral compartment cartilage narrowing.     ELISA SHEN MD       Electronically Signed and Approved By: ELISA SHEN MD on 8/03/2023 at 12:31               LAB RESULTS:                              Brief Urine Lab Results  (Last result in the past 365 days)        Color   Clarity   Blood   Leuk Est   Nitrite   Protein   CREAT   Urine HCG        07/29/23 1612 Yellow   Clear   Negative   Negative   Negative   Trace                 Microbiology Results (last 10 days)       Procedure Component Value - Date/Time    MRSA Screen, PCR (Inpatient) - Swab, Nares [[id number]]  (Normal) Collected: 07/30/23 0110    Lab Status: Final result Specimen: Swab from Nares Updated: 07/30/23 0250     MRSA PCR No MRSA Detected    Narrative:      The negative predictive value of this diagnostic test is high and should only be used to consider de-escalating anti-MRSA therapy. A positive result may indicate colonization with MRSA and must be correlated clinically.    Blood Culture - Blood, Arm, Left [[id number]]  (Normal) Collected: 07/29/23 1555    Lab Status: Final result Specimen: Blood from Arm, Left Updated: 08/03/23 1615     Blood Culture No growth at 5 days    Blood Culture - Blood, Arm, Right [[id number]]  (Normal) Collected: 07/29/23 1555    Lab Status: Final result Specimen: Blood from Arm, Right Updated: 08/03/23 1615     Blood Culture No growth at 5 days                 Electronically signed by Paulo Azevedo MD, 08/08/23, 2:48 PM EDT.

## 2023-08-08 NOTE — PROGRESS NOTES
"Chief Complaint  Pain and Initial Evaluation of the Right Knee     Subjective      Asad Meyer presents to [facility] for evaluation of the right knee. The patient reports right knee pain. He recently had an MRI. He reports he twisted his knee recently. He reports it hyperextends when walking randomly. He reports knee clicking. He deneis previous pain. He has taken aleve for the pain.     No Known Allergies     Social History     Socioeconomic History    Marital status: Single   Tobacco Use    Smoking status: Every Day     Packs/day: 0.25     Years: 15.00     Pack years: 3.75     Types: Cigarettes     Start date: 1993     Passive exposure: Current    Smokeless tobacco: Current     Types: Snuff    Tobacco comments:     1 pack last 3-4 days   Vaping Use    Vaping Use: Never used   Substance and Sexual Activity    Alcohol use: Not Currently     Alcohol/week: 9.0 standard drinks     Types: 6 Cans of beer, 3 Shots of liquor per week     Comment: none    Drug use: Not Currently    Sexual activity: Yes     Partners: Female     Birth control/protection: None        I reviewed the patient's chief complaint, history of present illness, review of systems, past medical history, surgical history, family history, social history, medications, and allergy list.     Review of Systems     Constitutional: Denies fevers, chills, weight loss  Cardiovascular: Denies chest pain, shortness of breath  Skin: Denies rashes, acute skin changes  Neurologic: Denies headache, loss of consciousness  MSK: Right knee pain      Vital Signs:   Ht 170.2 cm (67\")   Wt 120 kg (264 lb)   BMI 41.35 kg/mý          Physical Exam  General: Alert. No acute distress    Ortho Exam      Right knee- 1+ edema to the right lower extremity. Minimal joint effusion. Tender to the medial joint line. ROM 0-120 degrees. Positive EHL, FHL, GS and TA. Sensation intact to all 5 nerves of the foot. Positive pulses. Pain with Danelle's. Stable " to varus/valgus stress. Stable to anterior/posterior drawer. Tender to the posterior knee. Knee Extensor Mechanism  intact.     Right knee: R knee  Date/Time: 8/8/2023 1:11 PM  Consent given by: patient  Site marked: site marked  Timeout: Immediately prior to procedure a time out was called to verify the correct patient, procedure, equipment, support staff and site/side marked as required   Supporting Documentation  Indications: pain   Procedure Details  Location: knee - R knee  Needle gauge: 21G.  Medications administered: 5 mL lidocaine 1 %; 40 mg triamcinolone acetonide 40 MG/ML  Patient tolerance: patient tolerated the procedure well with no immediate complications        Imaging Results (Most Recent)       None             Result Review :       CT Head Without Contrast    Result Date: 7/29/2023  Narrative: PROCEDURE: CT HEAD WO CONTRAST  COMPARISON:  None INDICATIONS: ALTERED MENTAL STATUS  PROTOCOL:   Standard imaging protocol performed    RADIATION:   DLP: 1780.4mGy*cm   MA and/or KV was adjusted to minimize radiation dose.     TECHNIQUE: Axial images of the head without intravenous contrast.  FINDINGS:  The ventricles have a normal size and configuration. There is no evidence of acute intracranial hemorrhage, mass or midline shift. No extra-axial fluid collections are identified. There are no skull fractures. The visualized paranasal sinuses and mastoid air cells are grossly clear.  IMPRESSION: Normal exam.  SIMONE BUTLER MD       Electronically Signed and Approved By: SIMONE BUTLER MD on 7/29/2023 at 18:04             XR Chest 1 View    Result Date: 7/29/2023  Narrative: PROCEDURE: XR CHEST 1 VW  COMPARISON: [facility], CR, XR CHEST 2 VW, 3/28/2023, 14:02.  [facility], CR, XR CHEST 1 VW, 7/07/2023, 16:31.  INDICATIONS: LOSS OF CONSCIOUSNESS, SHORTNESS OF BREATH  FINDINGS:   The lungs are well-expanded. The heart and pulmonary vasculature are within normal limits. No  pleural effusions are identified. There are no active appearing infiltrates.  IMPRESSION: No active disease.  SIMONE BUTLER MD       Electronically Signed and Approved By: SIMONE BUTLER MD on 7/29/2023 at 16:18             MRI Knee Right Without Contrast    Result Date: 8/3/2023  Narrative: PROCEDURE: MRI KNEE RIGHT  WO CONTRAST  COMPARISON: [facility], CR, XR KNEE 3 VW RIGHT, 7/07/2023, 16:32.  INDICATIONS: right knee pain      TECHNIQUE: A complete multi-planar MRI was performed.   FINDINGS:  There is a small subchondral fracture of the medial tibial plateau at the level the body measuring about 8 millimeters with minimal surrounding edema.  There is subchondral edema deep to cartilage loss in the anterior medial compartment as well.  There is mild patellofemoral and moderate medial compartment joint space narrowing.  There is a moderate-sized joint effusion.  There is a moderate-sized Baker's cyst with evidence of recent partial rupture.  No intra-articular loose bodies are present.  There is edema in Hoffa's fat and at the expected location of the infrapatellar plica.  There is mild synovitis.  The anterior cruciate ligament posterior cruciate ligaments are intact.  The medial collateral ligament and lateral collateral and complex are intact.  There is edema superficial and deep to the medial collateral ligament which could be secondary to the medial compartment pathology.  Grade 2 MCL sprain is less likely.   there is a large radial tear of the posterior horn of the medial meniscus with horizontal tearing extending to the posterior root.  There is displacement of the body and posterior horn the medial meniscus into the medial gutter by 5 millimeters.  There are no lateral meniscal tears. The patella has anatomic position.  The extensor mechanism is intact.  Full-thickness cartilage loss throughout the majority weight-bearing portion of medial compartment with small osteophytes.  There is  full-thickness cartilage loss of the inferior femoral trochlea measuring about 6 millimeters craniocaudal.  There is full-thickness cartilage loss of the superior patellar apex and superior medial facet measuring about 1.8 centimeters involving the superior 1/2 of the articular surface.  There is mild thinning of cartilage in the lateral compartment.  Popliteus muscle and tendon are normal appearance.      Impression:   1. Moderate medial compartment and mild patellofemoral compartment arthritis of the knee with moderate-sized joint effusion and synovitis.  There is also increased signal intensity in Hoffa's fat and at the infrapatellar plica which could be seen with injury or inflammation. 2. There is a large radial tear of the posterior horn medial meniscus with extension to the posterior root.  There is extrusion of the body and posterior horn of the medial meniscus into the gutter. 3. There is subchondral edema in the medial compartment deep to cartilage loss.  There is a small nonacute appearing subchondral fracture of the medial tibial plateau. 4. There is a fluid signal intensity superficial and deep to the medial collateral ligament.  This is likely secondary to the medial compartment pathology.  It is less likely seen with grade 2 MCL sprain. 5. There is a moderate-sized Baker's cyst with evidence of recent partial rupture. 6. There is moderate chondromalacia patellofemoral compartment and mild lateral compartment cartilage narrowing.     ELISA SHEN MD       Electronically Signed and Approved By: ELISA SHEN MD on 8/03/2023 at 12:31                     Assessment and Plan     Diagnoses and all orders for this visit:    1. Primary osteoarthritis of right knee (Primary)    2. Effusion of right knee    3. Acute medial meniscus tear of right knee, initial encounter    4. Sprain of medial collateral ligament of right knee, initial encounter    5. Boggs cyst, right        Discussed the treatment options with the  patient, operative vs non-operative. Discussed the risks and benefits of a right knee arthroscopic partial medial menisectomy and chondroplasty vs conservative treatment. The patient expressed understanding and wished to proceed with conservative treatment at this time.  Discussed the risks and benefits of a right knee steroid injection. The patient expressed understanding and wished to proceed. He tolerated the injection well. Home exercises given today.     Educated on risk of elevated BMI.  Discussed options for weight loss/decreasing BMI prior to procedure including dietician consult, weight loss options and exercise program., Educated on risk of smoking. Discussed options for smoking cessation., Discussed surgery., Risks/benefits discussed with patient including, but not limited to: infection, bleeding, neurovascular damage, malunion, nonunion, aesthetic deformity, need for further surgery, and death., Discussed with patient the implant type being used during surgery and patient understands., Surgery pamphlet given., Call or return if worsening symptoms., and DME order for a 3 in 1 given today due to patient will be confined to one room/level of the home that does not offer a toilet during postop recovery.     Follow Up     6 weeks       Patient was given instructions and counseling regarding his condition or for health maintenance advice. Please see specific information pulled into the AVS if appropriate.     Scribed for Ayush Sprague MD by Kelsi Zepeda.  08/08/23   13:02 EDT      I have personally performed the services described in this document as scribed by the above individual and it is both accurate and complete. Ayush Sprague MD 08/08/23

## 2023-08-11 ENCOUNTER — TELEPHONE (OUTPATIENT)
Dept: INTERNAL MEDICINE | Facility: CLINIC | Age: 58
End: 2023-08-11

## 2023-08-11 NOTE — TELEPHONE ENCOUNTER
Caller: ARYA    Relationship to patient: EUGENE    Best call back number: [phone]     Patient is needing: ARYA FROM [facility] IS REQUESTING A CALL BACK REQUEST REGARDING FACE TO FACE NOTES AND TESTING THAT QUALIFIES PATIENT FOR THE OXYGEN. PLEASE CALL OR FAX TO: [phone]

## 2023-08-12 DIAGNOSIS — I10 ESSENTIAL (PRIMARY) HYPERTENSION: ICD-10-CM

## 2023-08-14 RX ORDER — AMLODIPINE AND BENAZEPRIL HYDROCHLORIDE 10; 40 MG/1; MG/1
1 CAPSULE ORAL DAILY
Qty: 90 CAPSULE | Refills: 1 | Status: SHIPPED | OUTPATIENT
Start: 2023-08-14 | End: 2024-08-13

## 2023-08-14 RX ORDER — FUROSEMIDE 20 MG/1
TABLET ORAL
Qty: 60 TABLET | Refills: 2 | Status: SHIPPED | OUTPATIENT
Start: 2023-08-14

## 2023-08-16 DIAGNOSIS — J18.9 MULTIFOCAL PNEUMONIA: ICD-10-CM

## 2023-08-16 DIAGNOSIS — J44.9 CHRONIC OBSTRUCTIVE PULMONARY DISEASE, UNSPECIFIED COPD TYPE: ICD-10-CM

## 2023-08-16 RX ORDER — IPRATROPIUM BROMIDE AND ALBUTEROL SULFATE 2.5; .5 MG/3ML; MG/3ML
SOLUTION RESPIRATORY (INHALATION)
Qty: 360 ML | Refills: 1 | Status: SHIPPED | OUTPATIENT
Start: 2023-08-16

## 2023-08-18 ENCOUNTER — OFFICE VISIT (OUTPATIENT)
Dept: BEHAVIORAL HEALTH | Facility: CLINIC | Age: 58
End: 2023-08-18
Payer: MEDICAID

## 2023-08-18 VITALS
WEIGHT: 246.3 LBS | BODY MASS INDEX: 38.66 KG/M2 | DIASTOLIC BLOOD PRESSURE: 80 MMHG | SYSTOLIC BLOOD PRESSURE: 140 MMHG | HEIGHT: 67 IN

## 2023-08-18 DIAGNOSIS — G47.00 INSOMNIA, UNSPECIFIED TYPE: ICD-10-CM

## 2023-08-18 DIAGNOSIS — F41.1 GENERALIZED ANXIETY DISORDER: Primary | ICD-10-CM

## 2023-08-18 DIAGNOSIS — F41.0 PANIC DISORDER: ICD-10-CM

## 2023-08-18 DIAGNOSIS — F43.10 POST TRAUMATIC STRESS DISORDER (PTSD): ICD-10-CM

## 2023-08-18 RX ORDER — GABAPENTIN 800 MG/1
800 TABLET ORAL 3 TIMES DAILY
Qty: 90 TABLET | Refills: 1 | Status: SHIPPED | OUTPATIENT
Start: 2023-08-18 | End: 2023-09-17

## 2023-08-18 RX ORDER — ALPRAZOLAM 1 MG/1
1 TABLET ORAL 2 TIMES DAILY PRN
Qty: 60 TABLET | Refills: 0 | Status: SHIPPED | OUTPATIENT
Start: 2023-08-18 | End: 2023-09-17

## 2023-08-18 NOTE — PROGRESS NOTES
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 35.7 g/dL Final    RDW 07/07/2023 16.1 (H)  12.3 - 15.4 % Final    RDW-SD 07/07/2023 53.9  37.0 - 54.0 fl Final    MPV 07/07/2023 10.8  6.0 - 12.0 fL Final    Platelets 07/07/2023 244  140 - 450 10*3/mm3 Final    Neutrophil % 07/07/2023 73.0  42.7 - 76.0 % Final    Lymphocyte % 07/07/2023 15.7 (L)  19.6 - 45.3 % Final    Monocyte % 07/07/2023 8.1  5.0 - 12.0 % Final    Eosinophil % 07/07/2023 2.1  0.3 - 6.2 % Final    Basophil % 07/07/2023 0.6  0.0 - 1.5 % Final    Immature Grans % 07/07/2023 0.5  0.0 - 0.5 % Final    Neutrophils, Absolute 07/07/2023 7.96 (H)  1.70 - 7.00 10*3/mm3 Final    Lymphocytes, Absolute 07/07/2023 1.71  0.70 - 3.10 10*3/mm3 Final    Monocytes, Absolute 07/07/2023 0.88  0.10 - 0.90 10*3/mm3 Final    Eosinophils, Absolute 07/07/2023 0.23  0.00 - 0.40 10*3/mm3 Final    Basophils, Absolute 07/07/2023 0.07  0.00 - 0.20 10*3/mm3 Final    Immature Grans, Absolute 07/07/2023 0.06 (H)  0.00 - 0.05 10*3/mm3 Final    nRBC 07/07/2023 0.0  0.0 - 0.2 /100 WBC Final    Extra Tube 07/07/2023 Hold for add-ons.   Final    Auto resulted.    Extra Tube 07/07/2023 hold for add-on   Final    Auto resulted    Extra Tube 07/07/2023 Hold for add-ons.   Final    Auto resulted.    Extra Tube 07/07/2023 Hold for add-ons.   Final    Auto resulted    proBNP 07/07/2023 117.0  0.0 - 900.0 pg/mL  Final   Lab on 06/30/2023   Component Date Value Ref Range Status    Glucose 06/30/2023 97  65 - 99 mg/dL Final    BUN 06/30/2023 20  6 - 20 mg/dL Final    Creatinine 06/30/2023 1.28 (H)  0.76 - 1.27 mg/dL Final    Sodium 06/30/2023 139  136 - 145 mmol/L Final    Potassium 06/30/2023 4.4  3.5 - 5.2 mmol/L Final    Chloride 06/30/2023 102  98 - 107 mmol/L Final    CO2 06/30/2023 26.8  22.0 - 29.0 mmol/L Final    Calcium 06/30/2023 9.8  8.6 - 10.5 mg/dL Final    Total Protein 06/30/2023 7.4  6.0 - 8.5 g/dL Final    Albumin 06/30/2023 4.3  3.5 - 5.2 g/dL Final    ALT (SGPT) 06/30/2023 26  1 - 41 U/L Final    AST (SGOT) 06/30/2023 28  1 - 40 U/L Final    Alkaline Phosphatase 06/30/2023 113  39 - 117 U/L Final    Total Bilirubin 06/30/2023 0.4  0.0 - 1.2 mg/dL Final    Globulin 06/30/2023 3.1  gm/dL Final    A/G Ratio 06/30/2023 1.4  g/dL Final    BUN/Creatinine Ratio 06/30/2023 15.6  7.0 - 25.0 Final    Anion Gap 06/30/2023 10.2  5.0 - 15.0 mmol/L Final    eGFR 06/30/2023 65.7  >60.0 mL/min/1.73 Final    Total Cholesterol 06/30/2023 186  0 - 200 mg/dL Final    Triglycerides 06/30/2023 254 (H)  0 - 150 mg/dL Final    HDL Cholesterol 06/30/2023 38 (L)  40 - 60 mg/dL Final    LDL Cholesterol  06/30/2023 104 (H)  0 - 100 mg/dL Final    VLDL Cholesterol 06/30/2023 44 (H)  5 - 40 mg/dL Final    LDL/HDL Ratio 06/30/2023 2.56   Final    TSH 06/30/2023 1.750  0.270 - 4.200 uIU/mL Final    TSH 06/30/2023 1.750  0.270 - 4.200 uIU/mL Final    Free T4 06/30/2023 0.81 (L)  0.93 - 1.70 ng/dL Final    T4 results may be falsely increased if patient taking Biotin.    WBC 06/30/2023 7.91  3.40 - 10.80 10*3/mm3 Final    RBC 06/30/2023 4.74  4.14 - 5.80 10*6/mm3 Final    Hemoglobin 06/30/2023 14.3  13.0 - 17.7 g/dL Final    Hematocrit 06/30/2023 42.0  37.5 - 51.0 % Final    MCV 06/30/2023 88.6  79.0 - 97.0 fL Final    MCH 06/30/2023 30.2  26.6 - 33.0 pg Final    MCHC 06/30/2023 34.0  31.5 - 35.7 g/dL Final    RDW 06/30/2023 14.5  12.3 -  15.4 % Final    RDW-SD 06/30/2023 46.4  37.0 - 54.0 fl Final    MPV 06/30/2023 11.5  6.0 - 12.0 fL Final    Platelets 06/30/2023 221  140 - 450 10*3/mm3 Final    Neutrophil % 06/30/2023 64.2  42.7 - 76.0 % Final    Lymphocyte % 06/30/2023 22.4  19.6 - 45.3 % Final    Monocyte % 06/30/2023 7.2  5.0 - 12.0 % Final    Eosinophil % 06/30/2023 4.0  0.3 - 6.2 % Final    Basophil % 06/30/2023 1.1  0.0 - 1.5 % Final    Immature Grans % 06/30/2023 1.1 (H)  0.0 - 0.5 % Final    Neutrophils, Absolute 06/30/2023 5.07  1.70 - 7.00 10*3/mm3 Final    Lymphocytes, Absolute 06/30/2023 1.77  0.70 - 3.10 10*3/mm3 Final    Monocytes, Absolute 06/30/2023 0.57  0.10 - 0.90 10*3/mm3 Final    Eosinophils, Absolute 06/30/2023 0.32  0.00 - 0.40 10*3/mm3 Final    Basophils, Absolute 06/30/2023 0.09  0.00 - 0.20 10*3/mm3 Final    Immature Grans, Absolute 06/30/2023 0.09 (H)  0.00 - 0.05 10*3/mm3 Final    nRBC 06/30/2023 0.1  0.0 - 0.2 /100 WBC Final   Office Visit on 05/25/2023   Component Date Value Ref Range Status    Glucose 05/25/2023 97  65 - 99 mg/dL Final    BUN 05/25/2023 24 (H)  6 - 20 mg/dL Final    Creatinine 05/25/2023 1.34 (H)  0.76 - 1.27 mg/dL Final    Sodium 05/25/2023 140  136 - 145 mmol/L Final    Potassium 05/25/2023 4.4  3.5 - 5.2 mmol/L Final    Chloride 05/25/2023 102  98 - 107 mmol/L Final    CO2 05/25/2023 28.8  22.0 - 29.0 mmol/L Final    Calcium 05/25/2023 10.0  8.6 - 10.5 mg/dL Final    BUN/Creatinine Ratio 05/25/2023 17.9  7.0 - 25.0 Final    Anion Gap 05/25/2023 9.2  5.0 - 15.0 mmol/L Final    eGFR 05/25/2023 62.2  >60.0 mL/min/1.73 Final   Office Visit on 04/25/2023   Component Date Value Ref Range Status    Glucose 04/25/2023 102 (H)  65 - 99 mg/dL Final    BUN 04/25/2023 19  6 - 20 mg/dL Final    Creatinine 04/25/2023 1.38 (H)  0.76 - 1.27 mg/dL Final    Sodium 04/25/2023 136  136 - 145 mmol/L Final    Potassium 04/25/2023 4.4  3.5 - 5.2 mmol/L Final    Chloride 04/25/2023 101  98 - 107 mmol/L Final    CO2  04/25/2023 23.8  22.0 - 29.0 mmol/L Final    Calcium 04/25/2023 9.6  8.6 - 10.5 mg/dL Final    BUN/Creatinine Ratio 04/25/2023 13.8  7.0 - 25.0 Final    Anion Gap 04/25/2023 11.2  5.0 - 15.0 mmol/L Final    eGFR 04/25/2023 60.0 (L)  >60.0 mL/min/1.73 Final    proBNP 04/25/2023 35.1  0.0 - 900.0 pg/mL Final    WBC 04/25/2023 7.73  3.40 - 10.80 10*3/mm3 Final    RBC 04/25/2023 4.70  4.14 - 5.80 10*6/mm3 Final    Hemoglobin 04/25/2023 14.8  13.0 - 17.7 g/dL Final    Hematocrit 04/25/2023 42.9  37.5 - 51.0 % Final    MCV 04/25/2023 91.3  79.0 - 97.0 fL Final    MCH 04/25/2023 31.5  26.6 - 33.0 pg Final    MCHC 04/25/2023 34.5  31.5 - 35.7 g/dL Final    RDW 04/25/2023 13.2  12.3 - 15.4 % Final    RDW-SD 04/25/2023 44.2  37.0 - 54.0 fl Final    MPV 04/25/2023 11.3  6.0 - 12.0 fL Final    Platelets 04/25/2023 298  140 - 450 10*3/mm3 Final    Neutrophil % 04/25/2023 66.2  42.7 - 76.0 % Final    Lymphocyte % 04/25/2023 20.6  19.6 - 45.3 % Final    Monocyte % 04/25/2023 7.8  5.0 - 12.0 % Final    Eosinophil % 04/25/2023 4.1  0.3 - 6.2 % Final    Basophil % 04/25/2023 0.9  0.0 - 1.5 % Final    Immature Grans % 04/25/2023 0.4  0.0 - 0.5 % Final    Neutrophils, Absolute 04/25/2023 5.12  1.70 - 7.00 10*3/mm3 Final    Lymphocytes, Absolute 04/25/2023 1.59  0.70 - 3.10 10*3/mm3 Final    Monocytes, Absolute 04/25/2023 0.60  0.10 - 0.90 10*3/mm3 Final    Eosinophils, Absolute 04/25/2023 0.32  0.00 - 0.40 10*3/mm3 Final    Basophils, Absolute 04/25/2023 0.07  0.00 - 0.20 10*3/mm3 Final    Immature Grans, Absolute 04/25/2023 0.03  0.00 - 0.05 10*3/mm3 Final    nRBC 04/25/2023 0.0  0.0 - 0.2 /100 WBC Final   Admission on 04/11/2023, Discharged on 04/11/2023   Component Date Value Ref Range Status    Case Report 04/11/2023    Final                    Value:Medical Cytology Report                           Case: [id number]                                  Authorizing Provider:  Taiwo Copeland, Collected:           04/11/2023  09:40 AM                                 DO                                                                           Ordering Location:     [facility] Received:            04/12/2023 08:53 AM                                 SUITES                                                                       Pathologist:           Maria Teresa Borja DO                                                       Specimen:    Lung, Right Upper Lobe, RIGHT UPPER LOBE BAL                                               Final Diagnosis 04/11/2023    Final                    Value:This result contains rich text formatting which cannot be displayed here.    Clinical Information 04/11/2023    Final                    Value:This result contains rich text formatting which cannot be displayed here.    Comment 04/11/2023    Final                    Value:This result contains rich text formatting which cannot be displayed here.    Gross Description 04/11/2023    Final                    Value:This result contains rich text formatting which cannot be displayed here.    Microscopic Description 04/11/2023    Final    This result contains rich text formatting which cannot be displayed here.    Special Stains 04/11/2023    Final                    Value:This result contains rich text formatting which cannot be displayed here.    Fungus Culture 04/11/2023 Heavy growth (4+) Candida albicans (A)   Final    AFB Culture 04/11/2023 No AFB isolated at 6 weeks   Final    AFB Stain 04/11/2023 No acid fast bacilli seen on direct smear   Final    AFB Stain 04/11/2023 No acid fast bacilli seen on concentrated smear   Final    BAL Culture 04/11/2023 >100,000 CFU/mL Normal respiratory lee. No S. aureus or Pseudomonas aeruginosa detected. Final report.   Final    Gram Stain 04/11/2023 Moderate (3+) Gram positive cocci in pairs and chains   Final    Escherichia coli PCR 04/11/2023 Not Detected  Not Detected Final    Acinetobacter  calcoaceticus-travon* 04/11/2023 Not Detected  Not Detected Final    Enterobacter cloacae PCR 04/11/2023 Not Detected  Not Detected Final    Klebsiella oxytoca PCR 04/11/2023 Not Detected  Not Detected Final    Klebsiella pneumoniae group PCR 04/11/2023 Not Detected  Not Detected Final    Klebsiella aerogenes PCR 04/11/2023 Not Detected  Not Detected Final    Moraxella catarrhalis PCR 04/11/2023 Not Detected  Not Detected Final    Proteus species PCR 04/11/2023 Not Detected  Not Detected Final    Pseudomonas aeroginosa PCR 04/11/2023 Not Detected  Not Detected Final    Serratia marcescens PCR 04/11/2023 Not Detected  Not Detected Final    Staphylococcus aureus PCR 04/11/2023 Not Detected  Not Detected Final    Streptococcus pyogenes PCR 04/11/2023 Not Detected  Not Detected Final    Haemophilus influenzae PCR 04/11/2023 Not Detected  Not Detected Final    Streptococcus agalactiae PCR 04/11/2023 Not Detected  Not Detected Final    Streptococcus pneumoniae PCR 04/11/2023 Not Detected  Not Detected Final    Chlamydophila pneumoniae PCR 04/11/2023 Not Detected  Not Detected Final    Legionella pneumophilia PCR 04/11/2023 Not Detected  Not Detected Final    Mycoplasma pneumo by PCR 04/11/2023 Not Detected  Not Detected Final    ADENOVIRUS, PCR 04/11/2023 Not Detected  Not Detected Final    CTX-M Gene 04/11/2023 N/A  Not Detected, N/A Final    IMP Gene 04/11/2023 N/A  Not Detected, N/A Final    KPC Gene 04/11/2023 N/A  Not Detected, N/A Final    mecA/C and MREJ Gene 04/11/2023 N/A  Not Detected, N/A Final    NDM Gene 04/11/2023 N/A  Not Detected, N/A Final    OXA-48-like Gene 04/11/2023 N/A  Not Detected, N/A Final    VIM Gene 04/11/2023 N/A  Not Detected, N/A Final    Coronavirus 04/11/2023 Not Detected  Not Detected Final    Human Metapneumovirus 04/11/2023 Not Detected  Not Detected Final    Human Rhinovirus/Enterovirus 04/11/2023 Not Detected  Not Detected Final    Influenza A PCR 04/11/2023 Not Detected  Not Detected  Final    Influenza B PCR 04/11/2023 Not Detected  Not Detected Final    RSV, PCR 04/11/2023 Not Detected  Not Detected Final    Parainfluenza virus PCR 04/11/2023 Not Detected  Not Detected Final    Neutrophils, Fluid 04/11/2023 100  % Final   Lab on 03/28/2023   Component Date Value Ref Range Status    WBC 03/28/2023 10.03  3.40 - 10.80 10*3/mm3 Final    RBC 03/28/2023 4.61  4.14 - 5.80 10*6/mm3 Final    Hemoglobin 03/28/2023 14.4  13.0 - 17.7 g/dL Final    Hematocrit 03/28/2023 42.2  37.5 - 51.0 % Final    MCV 03/28/2023 91.5  79.0 - 97.0 fL Final    MCH 03/28/2023 31.2  26.6 - 33.0 pg Final    MCHC 03/28/2023 34.1  31.5 - 35.7 g/dL Final    RDW 03/28/2023 13.4  12.3 - 15.4 % Final    RDW-SD 03/28/2023 45.3  37.0 - 54.0 fl Final    MPV 03/28/2023 11.6  6.0 - 12.0 fL Final    Platelets 03/28/2023 246  140 - 450 10*3/mm3 Final    Neutrophil % 03/28/2023 69.6  42.7 - 76.0 % Final    Lymphocyte % 03/28/2023 19.6  19.6 - 45.3 % Final    Monocyte % 03/28/2023 6.8  5.0 - 12.0 % Final    Eosinophil % 03/28/2023 2.8  0.3 - 6.2 % Final    Basophil % 03/28/2023 0.6  0.0 - 1.5 % Final    Immature Grans % 03/28/2023 0.6 (H)  0.0 - 0.5 % Final    Neutrophils, Absolute 03/28/2023 6.98  1.70 - 7.00 10*3/mm3 Final    Lymphocytes, Absolute 03/28/2023 1.97  0.70 - 3.10 10*3/mm3 Final    Monocytes, Absolute 03/28/2023 0.68  0.10 - 0.90 10*3/mm3 Final    Eosinophils, Absolute 03/28/2023 0.28  0.00 - 0.40 10*3/mm3 Final    Basophils, Absolute 03/28/2023 0.06  0.00 - 0.20 10*3/mm3 Final    Immature Grans, Absolute 03/28/2023 0.06 (H)  0.00 - 0.05 10*3/mm3 Final    nRBC 03/28/2023 0.0  0.0 - 0.2 /100 WBC Final    Respiratory Culture 03/28/2023 Moderate growth (3+) Normal respiratory lee. No S. aureus or Pseudomonas aeruginosa detected. Final report.   Final    Gram Stain 03/28/2023 Moderate (3+) WBCs seen   Final    Gram Stain 03/28/2023 Few (2+) Epithelial cells seen   Final    Gram Stain 03/28/2023 Few (2+) Gram positive cocci in  pairs and chains   Final    Gram Stain 03/28/2023 Rare (1+) Gram positive cocci in clusters   Final    Gram Stain 03/28/2023 Occasional Gram negative bacilli   Final   Hospital Outpatient Visit on 02/28/2023   Component Date Value Ref Range Status    Creatinine 02/28/2023 1.30  mg/dL Final    Serial Number: [id number]:  [id number]    eGFR 02/28/2023 64.9  >60.0 mL/min/1.73 Final   Office Visit on 02/24/2023   Component Date Value Ref Range Status    Amphetamine Screen, Urine 02/24/2023 Negative  Negative Final    AMP INTERNAL CONTROL 02/24/2023 Passed  Passed Final    Barbiturates Screen, Urine 02/24/2023 Negative  Negative Final    BARBITURATE INTERNAL CONTROL 02/24/2023 Passed  Passed Final    Buprenorphine, Screen, Urine 02/24/2023 Negative  Negative Final    BUPRENORPHINE INTERNAL CONTROL 02/24/2023 Passed  Passed Final    Benzodiazepine Screen, Urine 02/24/2023 Positive (A)  Negative Final    BENZODIAZEPINE INTERNAL CONTROL 02/24/2023 Passed  Passed Final    Cocaine Screen, Urine 02/24/2023 Negative  Negative Final    COCAINE INTERNAL CONTROL 02/24/2023 Passed  Passed Final    MDMA (ECSTASY) 02/24/2023 Negative  Negative Final    MDMA (ECSTASY) INTERNAL CONTROL 02/24/2023 Passed  Passed Final    Methamphetamine, Ur 02/24/2023 Negative  Negative Final    METHAMPHETAMINE INTERNAL CONTROL 02/24/2023 Passed  Passed Final    Methadone Screen, Urine 02/24/2023 Negative  Negative Final    METHADONE INTERNAL CONTROL 02/24/2023 Passed  Passed Final    Opiate Screen 02/24/2023 Negative  Negative Final    OPIATES INTERNAL CONTROL 02/24/2023 Passed  Passed Final    Oxycodone Screen, Urine 02/24/2023 Negative  Negative Final    OXYCODONE INTERNAL CONTROL 02/24/2023 Passed  Passed Final    Phencyclidine (PCP), Urine 02/24/2023 Negative  Negative Final    PHENCYCLIDINE INTERNAL CONTROL 02/24/2023 Passed  Passed Final    THC, Screen, Urine 02/24/2023 Negative  Negative Final    THC INTERNAL CONTROL 02/24/2023 Passed  Passed  Final    Lot Number 02/24/2023 [id number]   Final    Expiration Date 02/24/2023 03/31/2024   Final   There may be more visits with results that are not included.       EKG Results:  No orders to display       Imaging Results:  XR Chest 1 View    Result Date: 10/3/2022   No active disease is seen.       MIKAL HOPE MD       Electronically Signed and Approved By: MIKAL HOPE MD on 10/03/2022 at 12:47                 Assessment & Plan   Diagnoses and all orders for this visit:    1. Generalized anxiety disorder (Primary)  -     gabapentin (Neurontin) 800 MG tablet; Take 1 tablet by mouth 3 (Three) Times a Day for 30 days.  Dispense: 90 tablet; Refill: 1    2. Panic disorder  -     ALPRAZolam (Xanax) 1 MG tablet; Take 1 tablet by mouth 2 (Two) Times a Day As Needed for Anxiety for up to 30 days.  Dispense: 60 tablet; Refill: 0  -     gabapentin (Neurontin) 800 MG tablet; Take 1 tablet by mouth 3 (Three) Times a Day for 30 days.  Dispense: 90 tablet; Refill: 1    3. Post traumatic stress disorder (PTSD)  -     gabapentin (Neurontin) 800 MG tablet; Take 1 tablet by mouth 3 (Three) Times a Day for 30 days.  Dispense: 90 tablet; Refill: 1    4. Insomnia, unspecified type  -     Ambulatory Referral to Sleep Medicine    Patient screened positive for depression based on a PHQ-9 score of 8 on 4/20/2023. Follow-up recommendations include: Prescribed antidepressant medication treatment, Suicide Risk Assessment performed, and see assessment below .    Presentation seems most consistent with MDD, GREG, panic disorder, PTSD, insomnia.  We will discontinue Seroquel, Blair-Devick, Vraylar, Depakote as patient has already stopped these meds.  We will continue gabapentin for anxiety and overall mood.  Discussed my concerns about Xanax especially considering recent hospitalization and risk for respiratory depression with COPD.  Discussed that I will not be increasing it based on these concerns, but would switch to Klonopin if patient  does not feel like this is working well for him.  Patient states he will think about this and discussed with his PCP and pulmonologist.  He would like to continue Xanax as prescribed at this time.  We will continue Xanax as needed for severe anxiety.  Counseled the patient on the risks including addiction, dependence, and misuse.  Will refer for sleep medicine.  Follow-up in 1 month.  Addressed all questions and concerns.    Visit Diagnoses:    ICD-10-CM ICD-9-CM   1. Generalized anxiety disorder  F41.1 300.02   2. Panic disorder  F41.0 300.01   3. Post traumatic stress disorder (PTSD)  F43.10 309.81   4. Insomnia, unspecified type  G47.00 780.52       PLAN:  Safety: No acute safety concerns at this time.  Therapy: Patient declines referral for psychotherapy.  Risk Assessment: Risk of self-harm acutely is moderate to severe.  Risk factors include anxiety disorder, mood disorder, h/o suicide attempt in the past, and recent psychosocial stressors (pandemic). Protective factors include no family history, denies access to guns/weapons, no present SI, no history of self-harm in the past, minimal AODA, healthcare seeking, future orientation, willingness to engage in care.  Risk of self-harm chronically is also moderate to severe, but could be further elevated in the event of treatment noncompliance and/or AODA.  Labs/Diagnostics Ordered:   Orders Placed This Encounter   Procedures    Ambulatory Referral to Sleep Medicine     Medications:   New Medications Ordered This Visit   Medications    ALPRAZolam (Xanax) 1 MG tablet     Sig: Take 1 tablet by mouth 2 (Two) Times a Day As Needed for Anxiety for up to 30 days.     Dispense:  60 tablet     Refill:  0    gabapentin (Neurontin) 800 MG tablet     Sig: Take 1 tablet by mouth 3 (Three) Times a Day for 30 days.     Dispense:  90 tablet     Refill:  1     Discussed all risks, benefits, alternatives, and side effects of Xanax including but not limited to risks of abuse, misuse,  and addiction, which can lead to overdose or death; risks of dependence and withdrawal reactions; drowsiness, sedation, fatigue, depression, dizziness, ataxia, weakness, confusion, forgetfulness, hypotension, falls risk, respiratory depression, anterograde amnesia, paradoxical reactions such as hyperactivity or aggressive behavior; and hallucinations. Pt educated on the need to practice safe sex while taking this med. Instructed pt to avoid performing tasks that require mental alertness such as driving or operating machinery. Discussed the need for pt to immediately call the office for any new or worsening symptoms, such as changes in mood or behavior, and all other concerns. Pt educated on med compliance, including the proper use and monitoring for signs and symptoms of abuse, misuse, and addiction. Pt verbalized understanding and is agreeable to taking Xanax. CARLOS obtained and USD ordered. Controlled substances agreement verbally signed. Addressed all questions and concerns.     Discussed all risks, benefits, alternatives, and side effects of Gabapentin including but not limited to sedation, dizziness, GI upset, dry mouth, and weight gain. Pt instructed to avoid driving and doing other tasks or actions that require to be alert until knowing how the drug affects them.  Pt educated on the need to practice safe sex while taking this med. Discussed the need for pt to immediately call the office for any new or worsening symptoms, such as worsening depression; feeling nervous or restless; suicidal thoughts or actions; or other changes changes in mood or behavior, and all other concerns. Pt educated on med compliance and the risks of suddenly stopping this medication or missing doses. Pt verbalized understanding and is agreeable to taking Gabapentin. Addressed all questions and concerns.  Will order UDS and obtain CARLOS report. Pt verbally signed controlled substances agreement.      Follow up:   F/u in 1  month.    TREATMENT PLAN/GOALS: Continue supportive psychotherapy efforts and medications as indicated. Treatment and medication options discussed during today's visit. Patient ackowledged and verbally consented to continue with current treatment plan and was educated on the importance of compliance with treatment and follow-up appointments.    MEDICATION ISSUES:  CARLOS reviewed as expected.  Discussed medication options and treatment plan of prescribed medication as well as the risks, benefits, and side effects including potential falls, possible impaired driving and metabolic adversities among others. Patient is agreeable to call the office with any worsening of symptoms or onset of side effects. Patient is agreeable to call 911 or go to the nearest ER should he/she begin having SI/HI. No medication side effects or related complaints today.            This document has been electronically signed by Page Lee PA-C  August 18, 2023 16:22 EDT      Part of this note may be an electronic transcription/translation of spoken language to printed text using the Dragon Dictation System.

## 2023-08-21 RX ORDER — AMLODIPINE BESYLATE 10 MG/1
10 TABLET ORAL DAILY
Qty: 90 TABLET | Refills: 1 | OUTPATIENT
Start: 2023-08-21

## 2023-08-28 ENCOUNTER — OFFICE VISIT (OUTPATIENT)
Dept: PULMONOLOGY | Facility: CLINIC | Age: 58
End: 2023-08-28
Payer: MEDICAID

## 2023-08-28 VITALS
BODY MASS INDEX: 38.2 KG/M2 | RESPIRATION RATE: 16 BRPM | HEIGHT: 67 IN | HEART RATE: 85 BPM | WEIGHT: 243.4 LBS | TEMPERATURE: 98.6 F | OXYGEN SATURATION: 88 % | DIASTOLIC BLOOD PRESSURE: 82 MMHG | SYSTOLIC BLOOD PRESSURE: 125 MMHG

## 2023-08-28 DIAGNOSIS — Z72.0 TOBACCO ABUSE: ICD-10-CM

## 2023-08-28 DIAGNOSIS — J44.9 ASTHMA-COPD OVERLAP SYNDROME: ICD-10-CM

## 2023-08-28 DIAGNOSIS — J44.1 COPD WITH ACUTE EXACERBATION: Primary | ICD-10-CM

## 2023-08-28 PROCEDURE — 1160F RVW MEDS BY RX/DR IN RCRD: CPT | Performed by: INTERNAL MEDICINE

## 2023-08-28 PROCEDURE — 3074F SYST BP LT 130 MM HG: CPT | Performed by: INTERNAL MEDICINE

## 2023-08-28 PROCEDURE — 3079F DIAST BP 80-89 MM HG: CPT | Performed by: INTERNAL MEDICINE

## 2023-08-28 PROCEDURE — 99214 OFFICE O/P EST MOD 30 MIN: CPT | Performed by: INTERNAL MEDICINE

## 2023-08-28 PROCEDURE — 1159F MED LIST DOCD IN RCRD: CPT | Performed by: INTERNAL MEDICINE

## 2023-08-28 RX ORDER — CARIPRAZINE 1.5 MG/1
1 CAPSULE, GELATIN COATED ORAL DAILY
COMMUNITY
Start: 2023-08-19 | End: 2023-08-29

## 2023-08-28 RX ORDER — AMOXICILLIN AND CLAVULANATE POTASSIUM 875; 125 MG/1; MG/1
1 TABLET, FILM COATED ORAL 2 TIMES DAILY
Qty: 20 TABLET | Refills: 0 | Status: SHIPPED | OUTPATIENT
Start: 2023-08-28 | End: 2023-09-04

## 2023-08-28 NOTE — PROGRESS NOTES
Pulmonary Office Follow-up    Subjective     Asad Meyer is seen today at the office for   Chief Complaint   Patient presents with    Wheezing    COPD    Asthma    Shortness of Breath    OXYGEN         HPI  Asad Meyer is a 58 y.o. male with a PMH significant for COPD and tobacco abuse presents for follow-up patient continues to smoke but is trying to cut back patient has been having cough with yellowish mucopurulent sputum along with chest congestion and wheezing off-and-on he has had steroid injection in his knees and has been retaining some fluid      Tobacco use history:        Patient Active Problem List   Diagnosis    Asthma    Essential (primary) hypertension    Psychophysiological insomnia    GREG (generalized anxiety disorder)    Polyarthralgia    Chronic obstructive pulmonary disease    Mixed hyperlipidemia    DDD (degenerative disc disease), thoracolumbar    Osteoarthritis of multiple joints    Cigarette nicotine dependence without complication    Pneumonia of right lower lobe due to infectious organism    Ground glass opacity present on imaging of lung    Annual physical exam    Renal insufficiency    Acute pain of right knee    Severe sepsis       Review of Systems  Review of Systems   Respiratory:  Positive for cough, shortness of breath and wheezing.    All other systems reviewed and are negative.  As described in the HPI. Otherwise, remainder of ROS (14 systems) were negative.    Medications, Allergies, Social, and Family Histories reviewed as per EMR.    Objective     Vitals:    08/28/23 1109   BP: 125/82   Pulse: 85   Resp: 16   Temp: 98.6 øF (37 øC)   SpO2: (!) 88%         08/28/23  1109   Weight: 110 kg (243 lb 6.4 oz)       Physical Exam  Vitals and nursing note reviewed.   Constitutional:       Appearance: He is obese. He is ill-appearing.   HENT:      Head: Normocephalic and atraumatic.      Nose: Nose normal.      Mouth/Throat:      Mouth: Mucous membranes are moist.   Eyes:       Extraocular Movements: Extraocular movements intact.      Pupils: Pupils are equal, round, and reactive to light.   Cardiovascular:      Rate and Rhythm: Normal rate and regular rhythm.      Pulses: Normal pulses.      Heart sounds: Normal heart sounds.   Pulmonary:      Effort: Pulmonary effort is normal.      Breath sounds: Wheezing and rhonchi present.   Abdominal:      General: Abdomen is flat. Bowel sounds are normal.      Palpations: Abdomen is soft.   Musculoskeletal:      Cervical back: Normal range of motion and neck supple.      Right lower leg: Edema present.      Left lower leg: Edema present.   Skin:     General: Skin is warm.      Capillary Refill: Capillary refill takes less than 2 seconds.   Neurological:      General: No focal deficit present.      Mental Status: He is alert.   Psychiatric:         Mood and Affect: Mood normal.       XR Knee 3 View Right    Result Date: 7/7/2023    1. Moderate knee effusion.  If there is concern for internal derangement, MRI would be recommended. 2. Mild osteoarthritis.  3. No findings of acute osseous knee abnormality.      ROBBY OSPINA MD       Electronically Signed and Approved By: ROBBY OSPINA MD on 7/07/2023 at 16:47             CT Chest With Contrast Diagnostic    Result Date: 7/7/2023     1. The contrast bolus in the pulmonary arteries is limited.  Grossly, no proximal, or central, pulmonary embolism is identified.   2. Residual micronodular/reticulo-nodular non-cavitating infiltrates involve the right lung and have decreased in degree overall since the prior 2/28/2023 chest CT study with the exception of the right middle lobe.  They may represent infectious multifocal pneumonia.  Aspiration pneumonia is possible.  Probably no acute infiltrate on the left.  3. Please see above comments for further detail.    Please note that portions of this note were completed with a voice recognition program.  NABEEL ARAUJO JR, MD       Electronically Signed and Approved  By: NABEEL ARAUJO JR, MD on 7/07/2023 at 22:13              XR Chest 1 View    Result Date: 7/7/2023    No radiographic findings of acute cardiopulmonary abnormality.       ROBBY OSPINA MD       Electronically Signed and Approved By: ROBBY OSPINA MD on 7/07/2023 at 16:46             MRI Knee Right Without Contrast    Result Date: 8/3/2023    1. Moderate medial compartment and mild patellofemoral compartment arthritis of the knee with moderate-sized joint effusion and synovitis.  There is also increased signal intensity in Hoffa's fat and at the infrapatellar plica which could be seen with injury or inflammation. 2. There is a large radial tear of the posterior horn medial meniscus with extension to the posterior root.  There is extrusion of the body and posterior horn of the medial meniscus into the gutter. 3. There is subchondral edema in the medial compartment deep to cartilage loss.  There is a small nonacute appearing subchondral fracture of the medial tibial plateau. 4. There is a fluid signal intensity superficial and deep to the medial collateral ligament.  This is likely secondary to the medial compartment pathology.  It is less likely seen with grade 2 MCL sprain. 5. There is a moderate-sized Baker's cyst with evidence of recent partial rupture. 6. There is moderate chondromalacia patellofemoral compartment and mild lateral compartment cartilage narrowing.     ELISA SHEN MD       Electronically Signed and Approved By: ELISA SHEN MD on 8/03/2023 at 12:31               Assessment & Plan     Diagnoses and all orders for this visit:    1. COPD with acute exacerbation (Primary)    2. Asthma-COPD overlap syndrome    3. Tobacco abuse    Other orders  -     amoxicillin-clavulanate (AUGMENTIN) 875-125 MG per tablet; Take 1 tablet by mouth 2 (Two) Times a Day for 7 days.  Dispense: 20 tablet; Refill: 0         Discussion/ Recommendations:   Patient is strongly advised to stop smoking  We will start him on  Augmentin for his COPD exacerbation patient has already had steroid shot  Continue Trelegy daily  Albuterol nebs every 6 hours as needed  Continue home oxygen  Vaccinations discussed and recommended             Return in about 3 months (around 11/28/2023).          This document has been electronically signed by Michael Haile MD on August 28, 2023 11:22 EDT

## 2023-08-29 ENCOUNTER — OFFICE VISIT (OUTPATIENT)
Dept: INTERNAL MEDICINE | Facility: CLINIC | Age: 58
End: 2023-08-29
Payer: MEDICAID

## 2023-08-29 VITALS
OXYGEN SATURATION: 93 % | SYSTOLIC BLOOD PRESSURE: 142 MMHG | HEART RATE: 70 BPM | DIASTOLIC BLOOD PRESSURE: 90 MMHG | WEIGHT: 242.4 LBS | HEIGHT: 67 IN | TEMPERATURE: 97.8 F | RESPIRATION RATE: 18 BRPM | BODY MASS INDEX: 38.04 KG/M2

## 2023-08-29 DIAGNOSIS — E78.2 MIXED HYPERLIPIDEMIA: Primary | ICD-10-CM

## 2023-08-29 DIAGNOSIS — N28.9 RENAL INSUFFICIENCY: ICD-10-CM

## 2023-08-29 DIAGNOSIS — J44.9 CHRONIC OBSTRUCTIVE PULMONARY DISEASE, UNSPECIFIED COPD TYPE: ICD-10-CM

## 2023-08-29 DIAGNOSIS — I10 ESSENTIAL (PRIMARY) HYPERTENSION: ICD-10-CM

## 2023-08-29 RX ORDER — LISINOPRIL 40 MG/1
TABLET ORAL
Qty: 90 TABLET | Refills: 0 | Status: SHIPPED | OUTPATIENT
Start: 2023-08-29 | End: 2023-08-29

## 2023-08-29 RX ORDER — ATORVASTATIN CALCIUM 10 MG/1
10 TABLET, FILM COATED ORAL DAILY
Qty: 90 TABLET | Refills: 0 | Status: SHIPPED | OUTPATIENT
Start: 2023-08-29

## 2023-08-29 RX ORDER — METHYLPREDNISOLONE ACETATE 40 MG/ML
40 INJECTION, SUSPENSION INTRA-ARTICULAR; INTRALESIONAL; INTRAMUSCULAR; SOFT TISSUE ONCE
Status: COMPLETED | OUTPATIENT
Start: 2023-08-29 | End: 2023-08-29

## 2023-08-29 RX ADMIN — METHYLPREDNISOLONE ACETATE 60 MG: 40 INJECTION, SUSPENSION INTRA-ARTICULAR; INTRALESIONAL; INTRAMUSCULAR; SOFT TISSUE at 10:51

## 2023-08-29 NOTE — ASSESSMENT & PLAN NOTE
Patient states he has been monitoring his blood pressure at home. He states that it runs around 118/80's.  He is currently on Lotrel and lasix   Continue current regimen.

## 2023-08-29 NOTE — ASSESSMENT & PLAN NOTE
Current COPD exacerbation  Patient does have multiple COPD exacerbations. He does become hypoxic after ambulating. He has O2 that he wears PRN. Recommend he wear that if his O2 drops below 88% and at night.  He saw Pulmonology yesterday and was started on Augmentin for COPD exacerbation. He has not started that yet. He does complain of cough, soa, and wheezing. O2 sat is 93% on RA. He is on Duonebs and trelegy.   He does have wheezes and rhonchi to auscultation.   Will add in Depomedrol injection- continue breathing treatments. Monitor o2 levels.  Follow up with pulmonology as scheduled or sooner if needed.  He is also working on smoking cessation

## 2023-08-29 NOTE — ASSESSMENT & PLAN NOTE
Will check renal function  Labs reviewed from hospitalization.  Mild decrease in renal function-will recheck.   bone, soft tissue

## 2023-08-29 NOTE — PROGRESS NOTES
Chief Complaint  Follow-up (58 year old male here today for a 3 month follow up. He denies any issues or concerns)    History of Present Illness  SUBJECTIVE  Asad Meyer presents to [facility] follow up on chronic disease management.     He was recently hospitalized AMS and acute hypoxic respiratory failure. He was started on a new insomnia medication and he had a reaction. He is feeling better. He is no longer taking the insomnia medication prescribed.    He denies any chest pain, patient's, fever, chills, nausea, vomiting, diarrhea, dizziness or weakness.  He does have shortness of breath with exertion especially.    Past Medical History:   Diagnosis Date    Anxiety     Asthma     Asthma, extrinsic     Chronic pain disorder     Depression     Emphysema of lung     Pneumonia of right lower lobe due to infectious organism 4/6/2023    PTSD (post-traumatic stress disorder)     Rheumatoid arthritis     Schizoaffective disorder     Self-injurious behavior     Suicide attempt       Family History   Problem Relation Age of Onset    Alcohol abuse Father     Asthma Father     Emphysema Father     Heart failure Father       Past Surgical History:   Procedure Laterality Date    ANKLE SURGERY Right     BRONCHOSCOPY N/A 4/11/2023    Procedure: BRONCHOSCOPY WITH BRONCHOALVEOLAR LAVAGE;  Surgeon: Taiwo Copeland DO;  Location: [facility];  Service: Pulmonary;  Laterality: N/A;  DIFFUSE BRONCHITIS        Current Outpatient Medications:     albuterol (ACCUNEB) 0.63 MG/3ML nebulizer solution, Take 3 mL by nebulization Every 6 (Six) Hours As Needed for Wheezing., Disp: 300 each, Rfl: 5    ALPRAZolam (Xanax) 1 MG tablet, Take 1 tablet by mouth 2 (Two) Times a Day As Needed for Anxiety for up to 30 days., Disp: 60 tablet, Rfl: 0    amLODIPine-benazepril (LOTREL) 10-40 MG per capsule, TAKE 1 CAPSULE BY MOUTH DAILY, Disp: 90 capsule, Rfl: 1    amoxicillin-clavulanate (AUGMENTIN)  "875-125 MG per tablet, Take 1 tablet by mouth 2 (Two) Times a Day for 7 days., Disp: 20 tablet, Rfl: 0    famotidine (Pepcid) 20 MG tablet, Take 1 tablet by mouth 2 (Two) Times a Day., Disp: 60 tablet, Rfl: 5    furosemide (LASIX) 20 MG tablet, TAKE 1 TABLET BY MOUTH TWICE DAILY, Disp: 60 tablet, Rfl: 2    gabapentin (Neurontin) 800 MG tablet, Take 1 tablet by mouth 3 (Three) Times a Day for 30 days., Disp: 90 tablet, Rfl: 1    ipratropium-albuterol (DUO-NEB) 0.5-2.5 mg/3 ml nebulizer, USE 3 ML VIA NEBULIZER EVERY 4 HOURS AS NEEDED FOR WHEEZING, Disp: 360 mL, Rfl: 1    Trelegy Ellipta 200-62.5-25 MCG/ACT aerosol powder , INHALE 1 PUFF BY MOUTH EVERY DAY, Disp: 60 each, Rfl: 2    Ventolin  (90 Base) MCG/ACT inhaler, INHALE 2 PUFFS BY MOUTH EVERY 4 HOURS AS NEEDED FOR WHEEZING OR SHORTNESS OF BREATH, Disp: 18 g, Rfl: 3    atorvastatin (LIPITOR) 10 MG tablet, TAKE 1 TABLET BY MOUTH DAILY, Disp: 90 tablet, Rfl: 0  No current facility-administered medications for this visit.    OBJECTIVE  Vital Signs:   /90 (BP Location: Left arm, Patient Position: Sitting)   Pulse 70   Temp 97.8 øF (36.6 øC) (Temporal)   Resp 18   Ht 170.2 cm (67\")   Wt 110 kg (242 lb 6.4 oz)   SpO2 93%   BMI 37.97 kg/mý    Estimated body mass index is 37.97 kg/mý as calculated from the following:    Height as of this encounter: 170.2 cm (67\").    Weight as of this encounter: 110 kg (242 lb 6.4 oz).     Wt Readings from Last 3 Encounters:   08/29/23 110 kg (242 lb 6.4 oz)   08/28/23 110 kg (243 lb 6.4 oz)   08/18/23 112 kg (246 lb 4.8 oz)     BP Readings from Last 3 Encounters:   08/29/23 142/90   08/28/23 125/82   08/18/23 140/80       Physical Exam  Vitals and nursing note reviewed.   Constitutional:       Appearance: Normal appearance.   HENT:      Head: Normocephalic.   Eyes:      Extraocular Movements: Extraocular movements intact.      Conjunctiva/sclera: Conjunctivae normal.   Cardiovascular:      Rate and Rhythm: Normal rate " and regular rhythm.      Heart sounds: Normal heart sounds. No murmur heard.  Pulmonary:      Effort: Pulmonary effort is normal.      Breath sounds: Wheezing, rhonchi and rales present.   Abdominal:      General: Bowel sounds are normal.      Palpations: Abdomen is soft.      Tenderness: There is no abdominal tenderness. There is no guarding.   Musculoskeletal:         General: No swelling. Normal range of motion.      Cervical back: Normal range of motion and neck supple.      Right lower leg: No edema.      Left lower leg: No edema.   Skin:     General: Skin is warm and dry.   Neurological:      General: No focal deficit present.      Mental Status: He is alert and oriented to person, place, and time. Mental status is at baseline.   Psychiatric:         Mood and Affect: Mood normal.         Behavior: Behavior normal.         Thought Content: Thought content normal.         Judgment: Judgment normal.        Result Review        XR Knee 3 View Right    Result Date: 7/7/2023    1. Moderate knee effusion.  If there is concern for internal derangement, MRI would be recommended. 2. Mild osteoarthritis.  3. No findings of acute osseous knee abnormality.      ROBBY OSPINA MD       Electronically Signed and Approved By: ROBBY OSPINA MD on 7/07/2023 at 16:47             CT Chest With Contrast Diagnostic    Result Date: 7/7/2023     1. The contrast bolus in the pulmonary arteries is limited.  Grossly, no proximal, or central, pulmonary embolism is identified.   2. Residual micronodular/reticulo-nodular non-cavitating infiltrates involve the right lung and have decreased in degree overall since the prior 2/28/2023 chest CT study with the exception of the right middle lobe.  They may represent infectious multifocal pneumonia.  Aspiration pneumonia is possible.  Probably no acute infiltrate on the left.  3. Please see above comments for further detail.    Please note that portions of this note were completed with a voice  recognition program.  NABEEL ARAUJO JR, MD       Electronically Signed and Approved By: NABEEL ARAUJO JR, MD on 7/07/2023 at 22:13              XR Chest 1 View    Result Date: 7/7/2023    No radiographic findings of acute cardiopulmonary abnormality.       ROBBY OSPINA MD       Electronically Signed and Approved By: ROBBY OSPINA MD on 7/07/2023 at 16:46             MRI Knee Right Without Contrast    Result Date: 8/3/2023    1. Moderate medial compartment and mild patellofemoral compartment arthritis of the knee with moderate-sized joint effusion and synovitis.  There is also increased signal intensity in Hoffa's fat and at the infrapatellar plica which could be seen with injury or inflammation. 2. There is a large radial tear of the posterior horn medial meniscus with extension to the posterior root.  There is extrusion of the body and posterior horn of the medial meniscus into the gutter. 3. There is subchondral edema in the medial compartment deep to cartilage loss.  There is a small nonacute appearing subchondral fracture of the medial tibial plateau. 4. There is a fluid signal intensity superficial and deep to the medial collateral ligament.  This is likely secondary to the medial compartment pathology.  It is less likely seen with grade 2 MCL sprain. 5. There is a moderate-sized Baker's cyst with evidence of recent partial rupture. 6. There is moderate chondromalacia patellofemoral compartment and mild lateral compartment cartilage narrowing.     ELISA SHEN MD       Electronically Signed and Approved By: ELISA SHEN MD on 8/03/2023 at 12:31                The above data has been reviewed by PONCE Valadez 08/29/2023 10:31 EDT.          Patient Care Team:  Jailyn Shepard APRN as PCP - General (Internal Medicine)            ASSESSMENT & PLAN    Diagnoses and all orders for this visit:    1. Mixed hyperlipidemia (Primary)  Assessment & Plan:  Will check labs.    Orders:  -     CBC & Differential; Future  -      Comprehensive Metabolic Panel; Future  -     Lipid Panel; Future  -     TSH Rfx On Abnormal To Free T4; Future    2. Essential (primary) hypertension  Assessment & Plan:  Patient states he has been monitoring his blood pressure at home. He states that it runs around 118/80's.  He is currently on Lotrel and lasix   Continue current regimen.     Orders:  -     CBC & Differential; Future  -     Comprehensive Metabolic Panel; Future  -     Lipid Panel; Future  -     TSH Rfx On Abnormal To Free T4; Future  -     proBNP; Future    3. Renal insufficiency  Assessment & Plan:  Will check renal function  Labs reviewed from hospitalization.  Mild decrease in renal function-will recheck.    Orders:  -     CBC & Differential; Future  -     Comprehensive Metabolic Panel; Future  -     Lipid Panel; Future  -     TSH Rfx On Abnormal To Free T4; Future  -     proBNP; Future    4. Chronic obstructive pulmonary disease, unspecified COPD type  Assessment & Plan:  Current COPD exacerbation  Patient does have multiple COPD exacerbations. He does become hypoxic after ambulating. He has O2 that he wears PRN. Recommend he wear that if his O2 drops below 88% and at night.  He saw Pulmonology yesterday and was started on Augmentin for COPD exacerbation. He has not started that yet. He does complain of cough, soa, and wheezing. O2 sat is 93% on RA. He is on Duonebs and trelegy.   He does have wheezes and rhonchi to auscultation.   Will add in Depomedrol injection- continue breathing treatments. Monitor o2 levels.  Follow up with pulmonology as scheduled or sooner if needed.  He is also working on smoking cessation      Orders:  -     methylPREDNISolone acetate (DEPO-medrol) injection 40 mg         Tobacco Use: High Risk    Smoking Tobacco Use: Every Day    Smokeless Tobacco Use: Current    Passive Exposure: Current       Follow Up     Return in about 3 months (around 11/29/2023) for Recheck.    Please note that portions of this note were  completed with a voice recognition program.    Patient was given instructions and counseling regarding his condition or for health maintenance advice. Please see specific information pulled into the AVS if appropriate.   I have reviewed information obtained and documented by others and I have confirmed the accuracy of this documented note.    PONCE Valadez

## 2023-09-07 RX ORDER — ATORVASTATIN CALCIUM 10 MG/1
10 TABLET, FILM COATED ORAL DAILY
Qty: 90 TABLET | Refills: 0 | OUTPATIENT
Start: 2023-09-07

## 2023-09-11 RX ORDER — AMLODIPINE AND BENAZEPRIL HYDROCHLORIDE 10; 40 MG/1; MG/1
1 CAPSULE ORAL DAILY
Qty: 90 CAPSULE | Refills: 1 | Status: SHIPPED | OUTPATIENT
Start: 2023-09-11 | End: 2024-09-10

## 2023-09-21 ENCOUNTER — OFFICE VISIT (OUTPATIENT)
Dept: ORTHOPEDIC SURGERY | Facility: CLINIC | Age: 58
End: 2023-09-21
Payer: MEDICAID

## 2023-09-21 VITALS
HEIGHT: 67 IN | DIASTOLIC BLOOD PRESSURE: 96 MMHG | BODY MASS INDEX: 38.06 KG/M2 | WEIGHT: 242.51 LBS | HEART RATE: 77 BPM | SYSTOLIC BLOOD PRESSURE: 138 MMHG | OXYGEN SATURATION: 90 %

## 2023-09-21 DIAGNOSIS — M17.11 PRIMARY OSTEOARTHRITIS OF RIGHT KNEE: Primary | ICD-10-CM

## 2023-09-21 DIAGNOSIS — S83.241A ACUTE MEDIAL MENISCUS TEAR OF RIGHT KNEE, INITIAL ENCOUNTER: ICD-10-CM

## 2023-09-21 DIAGNOSIS — M71.21 BAKER CYST, RIGHT: ICD-10-CM

## 2023-09-21 DIAGNOSIS — S83.411A SPRAIN OF MEDIAL COLLATERAL LIGAMENT OF RIGHT KNEE, INITIAL ENCOUNTER: ICD-10-CM

## 2023-09-21 RX ORDER — FLUTICASONE PROPIONATE AND SALMETEROL 50; 250 UG/1; UG/1
POWDER RESPIRATORY (INHALATION)
COMMUNITY
Start: 2023-09-19

## 2023-09-21 RX ORDER — LISINOPRIL 40 MG/1
1 TABLET ORAL DAILY
COMMUNITY
Start: 2023-08-29

## 2023-09-21 NOTE — PROGRESS NOTES
"Chief Complaint  Pain and Follow-up of the Right Knee    Subjective          History of Present Illness      Asad Meyer is a 58 y.o. male  presents to [facility] for     Patient presents for follow-up evaluation of right knee pain, right knee osteoarthritis, medial meniscus tear MCL sprain and right knee Baker's cyst.  Patient states the injection he received from Dr. Sprague on 8/8/2023 helped his knee pain he states he is able to walk and flex and extend it without pain, he denies new injury or symptoms of pain.  He has a history of being a , .      No Known Allergies     Social History     Socioeconomic History    Marital status: Single   Tobacco Use    Smoking status: Every Day     Packs/day: 0.25     Years: 15.00     Pack years: 3.75     Types: Cigarettes     Start date: 1993     Passive exposure: Current    Smokeless tobacco: Current     Types: Snuff    Tobacco comments:     1 pack last 3-4 days   Vaping Use    Vaping Use: Never used   Substance and Sexual Activity    Alcohol use: Not Currently     Alcohol/week: 9.0 standard drinks     Types: 6 Cans of beer, 3 Shots of liquor per week     Comment: none    Drug use: Not Currently    Sexual activity: Yes     Partners: Female     Birth control/protection: None        REVIEW OF SYSTEMS    Constitutional: Denies fevers, chills, weight loss  Cardiovascular: Denies chest pain, shortness of breath  Skin: Denies rashes, acute skin changes  Neurologic: Denies headache, loss of consciousness  MSK: Right knee pain      Objective   Vital Signs:   /96   Pulse 77   Ht 170.2 cm (67\")   Wt 110 kg (242 lb 8.1 oz)   SpO2 90%   BMI 37.98 kg/m²     Body mass index is 37.98 kg/m².    Physical Exam         Right knee: Skin is intact, no erythema, no ecchymosis, no swelling, no effusion, no signs of infection, full extension, flexion 120, stable anterior/posterior drawer, stable to varus/valgus stress.  Nontender to " palpation, no pain with range of motion. Negative Danelle, Negative Lachman.        Procedures    Imaging Results (Most Recent)       None             Result Review :   The following data was reviewed by: JUDITH Park on 09/21/2023:               Assessment and Plan    Diagnoses and all orders for this visit:    1. Primary osteoarthritis of right knee (Primary)    2. Acute medial meniscus tear of right knee, initial encounter    3. Sprain of medial collateral ligament of right knee, initial encounter    4. Boggs cyst, right        Discussed diagnosis and treatment options with the patient he was advised of future treatment options including injections he would like to follow-up as needed he may call at any time as long as its after November 8 for steroid injection.    Call or return if worsening symptoms.    Follow Up   Return if symptoms worsen or fail to improve.  Patient was given instructions and counseling regarding his condition or for health maintenance advice. Please see specific information pulled into the AVS if appropriate.

## 2023-09-27 RX ORDER — FLUTICASONE FUROATE, UMECLIDINIUM BROMIDE AND VILANTEROL TRIFENATATE 200; 62.5; 25 UG/1; UG/1; UG/1
POWDER RESPIRATORY (INHALATION)
Qty: 60 EACH | Refills: 2 | Status: SHIPPED | OUTPATIENT
Start: 2023-09-27

## 2023-09-28 ENCOUNTER — TELEMEDICINE (OUTPATIENT)
Dept: BEHAVIORAL HEALTH | Facility: CLINIC | Age: 58
End: 2023-09-28
Payer: MEDICAID

## 2023-09-28 DIAGNOSIS — F41.0 PANIC DISORDER: ICD-10-CM

## 2023-09-28 DIAGNOSIS — F41.1 GENERALIZED ANXIETY DISORDER: ICD-10-CM

## 2023-09-28 DIAGNOSIS — F43.10 POST TRAUMATIC STRESS DISORDER (PTSD): ICD-10-CM

## 2023-09-28 DIAGNOSIS — F17.210 CIGARETTE NICOTINE DEPENDENCE WITHOUT COMPLICATION: ICD-10-CM

## 2023-09-28 DIAGNOSIS — G47.00 INSOMNIA, UNSPECIFIED TYPE: ICD-10-CM

## 2023-09-28 DIAGNOSIS — F33.2 SEVERE EPISODE OF RECURRENT MAJOR DEPRESSIVE DISORDER, WITHOUT PSYCHOTIC FEATURES: Primary | ICD-10-CM

## 2023-09-28 RX ORDER — BUPROPION HYDROCHLORIDE 150 MG/1
150 TABLET ORAL EVERY MORNING
Qty: 30 TABLET | Refills: 1 | Status: SHIPPED | OUTPATIENT
Start: 2023-09-28 | End: 2023-10-28

## 2023-09-28 RX ORDER — ALPRAZOLAM 1 MG/1
1 TABLET ORAL 2 TIMES DAILY PRN
Qty: 60 TABLET | Refills: 0 | Status: SHIPPED | OUTPATIENT
Start: 2023-09-28 | End: 2023-10-28

## 2023-09-28 RX ORDER — GABAPENTIN 800 MG/1
800 TABLET ORAL 3 TIMES DAILY
Qty: 90 TABLET | Refills: 1 | Status: SHIPPED | OUTPATIENT
Start: 2023-09-28 | End: 2023-10-28

## 2023-09-28 NOTE — PROGRESS NOTES
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 1.27 mg/dL Final    Sodium 07/07/2023 141  136 - 145 mmol/L Final    Potassium 07/07/2023 4.3  3.5 - 5.2 mmol/L Final    Chloride 07/07/2023 102  98 - 107 mmol/L Final    CO2 07/07/2023 26.9  22.0 - 29.0 mmol/L Final    Calcium 07/07/2023 9.9  8.6 - 10.5 mg/dL Final    Total Protein 07/07/2023 7.6  6.0 - 8.5 g/dL Final    Albumin 07/07/2023 4.1  3.5 - 5.2 g/dL Final    ALT (SGPT) 07/07/2023 20  1 - 41 U/L Final    AST (SGOT) 07/07/2023 23  1 - 40 U/L Final    Alkaline Phosphatase 07/07/2023 118 (H)  39 - 117 U/L Final    Total Bilirubin 07/07/2023 0.3  0.0 - 1.2 mg/dL Final    Globulin 07/07/2023 3.5  gm/dL Final    A/G Ratio 07/07/2023 1.2  g/dL Final    BUN/Creatinine Ratio 07/07/2023 18.1  7.0 - 25.0 Final    Anion Gap 07/07/2023 12.1  5.0 - 15.0 mmol/L Final    eGFR 07/07/2023 44.5 (L)  >60.0 mL/min/1.73 Final    Lipase 07/07/2023 23  13 - 60 U/L Final    Color, UA 07/07/2023 Yellow  Yellow, Straw Final    Appearance, UA 07/07/2023 Clear  Clear Final    pH, UA 07/07/2023 5.5  5.0 - 8.0 Final    Specific Gravity, UA 07/07/2023 1.010  1.005 - 1.030 Final    Glucose, UA 07/07/2023 Negative  Negative Final    Ketones, UA 07/07/2023 Negative  Negative Final    Bilirubin, UA 07/07/2023 Negative  Negative Final    Blood, UA 07/07/2023 Negative  Negative Final    Protein, UA 07/07/2023  Negative  Negative Final    Leuk Esterase, UA 07/07/2023 Negative  Negative Final    Nitrite, UA 07/07/2023 Negative  Negative Final    Urobilinogen, UA 07/07/2023 0.2 E.U./dL  0.2 - 1.0 E.U./dL Final    WBC 07/07/2023 10.91 (H)  3.40 - 10.80 10*3/mm3 Final    RBC 07/07/2023 4.85  4.14 - 5.80 10*6/mm3 Final    Hemoglobin 07/07/2023 15.0  13.0 - 17.7 g/dL Final    Hematocrit 07/07/2023 45.1  37.5 - 51.0 % Final    MCV 07/07/2023 93.0  79.0 - 97.0 fL Final    MCH 07/07/2023 30.9  26.6 - 33.0 pg Final    MCHC 07/07/2023 33.3  31.5 - 35.7 g/dL Final    RDW 07/07/2023 16.1 (H)  12.3 - 15.4 % Final    RDW-SD 07/07/2023 53.9  37.0 - 54.0 fl Final    MPV 07/07/2023 10.8  6.0 - 12.0 fL Final    Platelets 07/07/2023 244  140 - 450 10*3/mm3 Final    Neutrophil % 07/07/2023 73.0  42.7 - 76.0 % Final    Lymphocyte % 07/07/2023 15.7 (L)  19.6 - 45.3 % Final    Monocyte % 07/07/2023 8.1  5.0 - 12.0 % Final    Eosinophil % 07/07/2023 2.1  0.3 - 6.2 % Final    Basophil % 07/07/2023 0.6  0.0 - 1.5 % Final    Immature Grans % 07/07/2023 0.5  0.0 - 0.5 % Final    Neutrophils, Absolute 07/07/2023 7.96 (H)  1.70 - 7.00 10*3/mm3 Final    Lymphocytes, Absolute 07/07/2023 1.71  0.70 - 3.10 10*3/mm3 Final    Monocytes, Absolute 07/07/2023 0.88  0.10 - 0.90 10*3/mm3 Final    Eosinophils, Absolute 07/07/2023 0.23  0.00 - 0.40 10*3/mm3 Final    Basophils, Absolute 07/07/2023 0.07  0.00 - 0.20 10*3/mm3 Final    Immature Grans, Absolute 07/07/2023 0.06 (H)  0.00 - 0.05 10*3/mm3 Final    nRBC 07/07/2023 0.0  0.0 - 0.2 /100 WBC Final    Extra Tube 07/07/2023 Hold for add-ons.   Final    Auto resulted.    Extra Tube 07/07/2023 hold for add-on   Final    Auto resulted    Extra Tube 07/07/2023 Hold for add-ons.   Final    Auto resulted.    Extra Tube 07/07/2023 Hold for add-ons.   Final    Auto resulted    proBNP 07/07/2023 117.0  0.0 - 900.0 pg/mL Final   Lab on 06/30/2023   Component Date Value Ref Range Status    Glucose 06/30/2023 97  65 - 99  mg/dL Final    BUN 06/30/2023 20  6 - 20 mg/dL Final    Creatinine 06/30/2023 1.28 (H)  0.76 - 1.27 mg/dL Final    Sodium 06/30/2023 139  136 - 145 mmol/L Final    Potassium 06/30/2023 4.4  3.5 - 5.2 mmol/L Final    Chloride 06/30/2023 102  98 - 107 mmol/L Final    CO2 06/30/2023 26.8  22.0 - 29.0 mmol/L Final    Calcium 06/30/2023 9.8  8.6 - 10.5 mg/dL Final    Total Protein 06/30/2023 7.4  6.0 - 8.5 g/dL Final    Albumin 06/30/2023 4.3  3.5 - 5.2 g/dL Final    ALT (SGPT) 06/30/2023 26  1 - 41 U/L Final    AST (SGOT) 06/30/2023 28  1 - 40 U/L Final    Alkaline Phosphatase 06/30/2023 113  39 - 117 U/L Final    Total Bilirubin 06/30/2023 0.4  0.0 - 1.2 mg/dL Final    Globulin 06/30/2023 3.1  gm/dL Final    A/G Ratio 06/30/2023 1.4  g/dL Final    BUN/Creatinine Ratio 06/30/2023 15.6  7.0 - 25.0 Final    Anion Gap 06/30/2023 10.2  5.0 - 15.0 mmol/L Final    eGFR 06/30/2023 65.7  >60.0 mL/min/1.73 Final    Total Cholesterol 06/30/2023 186  0 - 200 mg/dL Final    Triglycerides 06/30/2023 254 (H)  0 - 150 mg/dL Final    HDL Cholesterol 06/30/2023 38 (L)  40 - 60 mg/dL Final    LDL Cholesterol  06/30/2023 104 (H)  0 - 100 mg/dL Final    VLDL Cholesterol 06/30/2023 44 (H)  5 - 40 mg/dL Final    LDL/HDL Ratio 06/30/2023 2.56   Final    TSH 06/30/2023 1.750  0.270 - 4.200 uIU/mL Final    TSH 06/30/2023 1.750  0.270 - 4.200 uIU/mL Final    Free T4 06/30/2023 0.81 (L)  0.93 - 1.70 ng/dL Final    T4 results may be falsely increased if patient taking Biotin.    WBC 06/30/2023 7.91  3.40 - 10.80 10*3/mm3 Final    RBC 06/30/2023 4.74  4.14 - 5.80 10*6/mm3 Final    Hemoglobin 06/30/2023 14.3  13.0 - 17.7 g/dL Final    Hematocrit 06/30/2023 42.0  37.5 - 51.0 % Final    MCV 06/30/2023 88.6  79.0 - 97.0 fL Final    MCH 06/30/2023 30.2  26.6 - 33.0 pg Final    MCHC 06/30/2023 34.0  31.5 - 35.7 g/dL Final    RDW 06/30/2023 14.5  12.3 - 15.4 % Final    RDW-SD 06/30/2023 46.4  37.0 - 54.0 fl Final    MPV 06/30/2023 11.5  6.0 - 12.0 fL  Final    Platelets 06/30/2023 221  140 - 450 10*3/mm3 Final    Neutrophil % 06/30/2023 64.2  42.7 - 76.0 % Final    Lymphocyte % 06/30/2023 22.4  19.6 - 45.3 % Final    Monocyte % 06/30/2023 7.2  5.0 - 12.0 % Final    Eosinophil % 06/30/2023 4.0  0.3 - 6.2 % Final    Basophil % 06/30/2023 1.1  0.0 - 1.5 % Final    Immature Grans % 06/30/2023 1.1 (H)  0.0 - 0.5 % Final    Neutrophils, Absolute 06/30/2023 5.07  1.70 - 7.00 10*3/mm3 Final    Lymphocytes, Absolute 06/30/2023 1.77  0.70 - 3.10 10*3/mm3 Final    Monocytes, Absolute 06/30/2023 0.57  0.10 - 0.90 10*3/mm3 Final    Eosinophils, Absolute 06/30/2023 0.32  0.00 - 0.40 10*3/mm3 Final    Basophils, Absolute 06/30/2023 0.09  0.00 - 0.20 10*3/mm3 Final    Immature Grans, Absolute 06/30/2023 0.09 (H)  0.00 - 0.05 10*3/mm3 Final    nRBC 06/30/2023 0.1  0.0 - 0.2 /100 WBC Final   Office Visit on 05/25/2023   Component Date Value Ref Range Status    Glucose 05/25/2023 97  65 - 99 mg/dL Final    BUN 05/25/2023 24 (H)  6 - 20 mg/dL Final    Creatinine 05/25/2023 1.34 (H)  0.76 - 1.27 mg/dL Final    Sodium 05/25/2023 140  136 - 145 mmol/L Final    Potassium 05/25/2023 4.4  3.5 - 5.2 mmol/L Final    Chloride 05/25/2023 102  98 - 107 mmol/L Final    CO2 05/25/2023 28.8  22.0 - 29.0 mmol/L Final    Calcium 05/25/2023 10.0  8.6 - 10.5 mg/dL Final    BUN/Creatinine Ratio 05/25/2023 17.9  7.0 - 25.0 Final    Anion Gap 05/25/2023 9.2  5.0 - 15.0 mmol/L Final    eGFR 05/25/2023 62.2  >60.0 mL/min/1.73 Final   Office Visit on 04/25/2023   Component Date Value Ref Range Status    Glucose 04/25/2023 102 (H)  65 - 99 mg/dL Final    BUN 04/25/2023 19  6 - 20 mg/dL Final    Creatinine 04/25/2023 1.38 (H)  0.76 - 1.27 mg/dL Final    Sodium 04/25/2023 136  136 - 145 mmol/L Final    Potassium 04/25/2023 4.4  3.5 - 5.2 mmol/L Final    Chloride 04/25/2023 101  98 - 107 mmol/L Final    CO2 04/25/2023 23.8  22.0 - 29.0 mmol/L Final    Calcium 04/25/2023 9.6  8.6 - 10.5 mg/dL Final     BUN/Creatinine Ratio 04/25/2023 13.8  7.0 - 25.0 Final    Anion Gap 04/25/2023 11.2  5.0 - 15.0 mmol/L Final    eGFR 04/25/2023 60.0 (L)  >60.0 mL/min/1.73 Final    proBNP 04/25/2023 35.1  0.0 - 900.0 pg/mL Final    WBC 04/25/2023 7.73  3.40 - 10.80 10*3/mm3 Final    RBC 04/25/2023 4.70  4.14 - 5.80 10*6/mm3 Final    Hemoglobin 04/25/2023 14.8  13.0 - 17.7 g/dL Final    Hematocrit 04/25/2023 42.9  37.5 - 51.0 % Final    MCV 04/25/2023 91.3  79.0 - 97.0 fL Final    MCH 04/25/2023 31.5  26.6 - 33.0 pg Final    MCHC 04/25/2023 34.5  31.5 - 35.7 g/dL Final    RDW 04/25/2023 13.2  12.3 - 15.4 % Final    RDW-SD 04/25/2023 44.2  37.0 - 54.0 fl Final    MPV 04/25/2023 11.3  6.0 - 12.0 fL Final    Platelets 04/25/2023 298  140 - 450 10*3/mm3 Final    Neutrophil % 04/25/2023 66.2  42.7 - 76.0 % Final    Lymphocyte % 04/25/2023 20.6  19.6 - 45.3 % Final    Monocyte % 04/25/2023 7.8  5.0 - 12.0 % Final    Eosinophil % 04/25/2023 4.1  0.3 - 6.2 % Final    Basophil % 04/25/2023 0.9  0.0 - 1.5 % Final    Immature Grans % 04/25/2023 0.4  0.0 - 0.5 % Final    Neutrophils, Absolute 04/25/2023 5.12  1.70 - 7.00 10*3/mm3 Final    Lymphocytes, Absolute 04/25/2023 1.59  0.70 - 3.10 10*3/mm3 Final    Monocytes, Absolute 04/25/2023 0.60  0.10 - 0.90 10*3/mm3 Final    Eosinophils, Absolute 04/25/2023 0.32  0.00 - 0.40 10*3/mm3 Final    Basophils, Absolute 04/25/2023 0.07  0.00 - 0.20 10*3/mm3 Final    Immature Grans, Absolute 04/25/2023 0.03  0.00 - 0.05 10*3/mm3 Final    nRBC 04/25/2023 0.0  0.0 - 0.2 /100 WBC Final   Admission on 04/11/2023, Discharged on 04/11/2023   Component Date Value Ref Range Status    Case Report 04/11/2023    Final                    Value:Medical Cytology Report                           Case: [id number]                                  Authorizing Provider:  Taiwo Copeland, Collected:           04/11/2023 09:40 AM                                 DO                                                                            Ordering Location:     [facility] Received:            04/12/2023 08:53 AM                                 SUITES                                                                       Pathologist:           Maria Teresa Borja DO                                                       Specimen:    Lung, Right Upper Lobe, RIGHT UPPER LOBE BAL                                               Final Diagnosis 04/11/2023    Final                    Value:This result contains rich text formatting which cannot be displayed here.    Clinical Information 04/11/2023    Final                    Value:This result contains rich text formatting which cannot be displayed here.    Comment 04/11/2023    Final                    Value:This result contains rich text formatting which cannot be displayed here.    Gross Description 04/11/2023    Final                    Value:This result contains rich text formatting which cannot be displayed here.    Microscopic Description 04/11/2023    Final    This result contains rich text formatting which cannot be displayed here.    Special Stains 04/11/2023    Final                    Value:This result contains rich text formatting which cannot be displayed here.    Fungus Culture 04/11/2023 Heavy growth (4+) Candida albicans (A)   Final    AFB Culture 04/11/2023 No AFB isolated at 6 weeks   Final    AFB Stain 04/11/2023 No acid fast bacilli seen on direct smear   Final    AFB Stain 04/11/2023 No acid fast bacilli seen on concentrated smear   Final    BAL Culture 04/11/2023 >100,000 CFU/mL Normal respiratory lee. No S. aureus or Pseudomonas aeruginosa detected. Final report.   Final    Gram Stain 04/11/2023 Moderate (3+) Gram positive cocci in pairs and chains   Final    Escherichia coli PCR 04/11/2023 Not Detected  Not Detected Final    Acinetobacter calcoaceticus-travon* 04/11/2023 Not Detected  Not Detected Final    Enterobacter cloacae PCR 04/11/2023 Not  Detected  Not Detected Final    Klebsiella oxytoca PCR 04/11/2023 Not Detected  Not Detected Final    Klebsiella pneumoniae group PCR 04/11/2023 Not Detected  Not Detected Final    Klebsiella aerogenes PCR 04/11/2023 Not Detected  Not Detected Final    Moraxella catarrhalis PCR 04/11/2023 Not Detected  Not Detected Final    Proteus species PCR 04/11/2023 Not Detected  Not Detected Final    Pseudomonas aeroginosa PCR 04/11/2023 Not Detected  Not Detected Final    Serratia marcescens PCR 04/11/2023 Not Detected  Not Detected Final    Staphylococcus aureus PCR 04/11/2023 Not Detected  Not Detected Final    Streptococcus pyogenes PCR 04/11/2023 Not Detected  Not Detected Final    Haemophilus influenzae PCR 04/11/2023 Not Detected  Not Detected Final    Streptococcus agalactiae PCR 04/11/2023 Not Detected  Not Detected Final    Streptococcus pneumoniae PCR 04/11/2023 Not Detected  Not Detected Final    Chlamydophila pneumoniae PCR 04/11/2023 Not Detected  Not Detected Final    Legionella pneumophilia PCR 04/11/2023 Not Detected  Not Detected Final    Mycoplasma pneumo by PCR 04/11/2023 Not Detected  Not Detected Final    ADENOVIRUS, PCR 04/11/2023 Not Detected  Not Detected Final    CTX-M Gene 04/11/2023 N/A  Not Detected, N/A Final    IMP Gene 04/11/2023 N/A  Not Detected, N/A Final    KPC Gene 04/11/2023 N/A  Not Detected, N/A Final    mecA/C and MREJ Gene 04/11/2023 N/A  Not Detected, N/A Final    NDM Gene 04/11/2023 N/A  Not Detected, N/A Final    OXA-48-like Gene 04/11/2023 N/A  Not Detected, N/A Final    VIM Gene 04/11/2023 N/A  Not Detected, N/A Final    Coronavirus 04/11/2023 Not Detected  Not Detected Final    Human Metapneumovirus 04/11/2023 Not Detected  Not Detected Final    Human Rhinovirus/Enterovirus 04/11/2023 Not Detected  Not Detected Final    Influenza A PCR 04/11/2023 Not Detected  Not Detected Final    Influenza B PCR 04/11/2023 Not Detected  Not Detected Final    RSV, PCR 04/11/2023 Not Detected   Not Detected Final    Parainfluenza virus PCR 04/11/2023 Not Detected  Not Detected Final    Neutrophils, Fluid 04/11/2023 100  % Final   Lab on 03/28/2023   Component Date Value Ref Range Status    WBC 03/28/2023 10.03  3.40 - 10.80 10*3/mm3 Final    RBC 03/28/2023 4.61  4.14 - 5.80 10*6/mm3 Final    Hemoglobin 03/28/2023 14.4  13.0 - 17.7 g/dL Final    Hematocrit 03/28/2023 42.2  37.5 - 51.0 % Final    MCV 03/28/2023 91.5  79.0 - 97.0 fL Final    MCH 03/28/2023 31.2  26.6 - 33.0 pg Final    MCHC 03/28/2023 34.1  31.5 - 35.7 g/dL Final    RDW 03/28/2023 13.4  12.3 - 15.4 % Final    RDW-SD 03/28/2023 45.3  37.0 - 54.0 fl Final    MPV 03/28/2023 11.6  6.0 - 12.0 fL Final    Platelets 03/28/2023 246  140 - 450 10*3/mm3 Final    Neutrophil % 03/28/2023 69.6  42.7 - 76.0 % Final    Lymphocyte % 03/28/2023 19.6  19.6 - 45.3 % Final    Monocyte % 03/28/2023 6.8  5.0 - 12.0 % Final    Eosinophil % 03/28/2023 2.8  0.3 - 6.2 % Final    Basophil % 03/28/2023 0.6  0.0 - 1.5 % Final    Immature Grans % 03/28/2023 0.6 (H)  0.0 - 0.5 % Final    Neutrophils, Absolute 03/28/2023 6.98  1.70 - 7.00 10*3/mm3 Final    Lymphocytes, Absolute 03/28/2023 1.97  0.70 - 3.10 10*3/mm3 Final    Monocytes, Absolute 03/28/2023 0.68  0.10 - 0.90 10*3/mm3 Final    Eosinophils, Absolute 03/28/2023 0.28  0.00 - 0.40 10*3/mm3 Final    Basophils, Absolute 03/28/2023 0.06  0.00 - 0.20 10*3/mm3 Final    Immature Grans, Absolute 03/28/2023 0.06 (H)  0.00 - 0.05 10*3/mm3 Final    nRBC 03/28/2023 0.0  0.0 - 0.2 /100 WBC Final    Respiratory Culture 03/28/2023 Moderate growth (3+) Normal respiratory lee. No S. aureus or Pseudomonas aeruginosa detected. Final report.   Final    Gram Stain 03/28/2023 Moderate (3+) WBCs seen   Final    Gram Stain 03/28/2023 Few (2+) Epithelial cells seen   Final    Gram Stain 03/28/2023 Few (2+) Gram positive cocci in pairs and chains   Final    Gram Stain 03/28/2023 Rare (1+) Gram positive cocci in clusters   Final    Gram  Stain 03/28/2023 Occasional Gram negative bacilli   Final   Hospital Outpatient Visit on 02/28/2023   Component Date Value Ref Range Status    Creatinine 02/28/2023 1.30  mg/dL Final    Serial Number: [id number]:  [id number]    eGFR 02/28/2023 64.9  >60.0 mL/min/1.73 Final   Office Visit on 02/24/2023   Component Date Value Ref Range Status    Amphetamine Screen, Urine 02/24/2023 Negative  Negative Final    AMP INTERNAL CONTROL 02/24/2023 Passed  Passed Final    Barbiturates Screen, Urine 02/24/2023 Negative  Negative Final    BARBITURATE INTERNAL CONTROL 02/24/2023 Passed  Passed Final    Buprenorphine, Screen, Urine 02/24/2023 Negative  Negative Final    BUPRENORPHINE INTERNAL CONTROL 02/24/2023 Passed  Passed Final    Benzodiazepine Screen, Urine 02/24/2023 Positive (A)  Negative Final    BENZODIAZEPINE INTERNAL CONTROL 02/24/2023 Passed  Passed Final    Cocaine Screen, Urine 02/24/2023 Negative  Negative Final    COCAINE INTERNAL CONTROL 02/24/2023 Passed  Passed Final    MDMA (ECSTASY) 02/24/2023 Negative  Negative Final    MDMA (ECSTASY) INTERNAL CONTROL 02/24/2023 Passed  Passed Final    Methamphetamine, Ur 02/24/2023 Negative  Negative Final    METHAMPHETAMINE INTERNAL CONTROL 02/24/2023 Passed  Passed Final    Methadone Screen, Urine 02/24/2023 Negative  Negative Final    METHADONE INTERNAL CONTROL 02/24/2023 Passed  Passed Final    Opiate Screen 02/24/2023 Negative  Negative Final    OPIATES INTERNAL CONTROL 02/24/2023 Passed  Passed Final    Oxycodone Screen, Urine 02/24/2023 Negative  Negative Final    OXYCODONE INTERNAL CONTROL 02/24/2023 Passed  Passed Final    Phencyclidine (PCP), Urine 02/24/2023 Negative  Negative Final    PHENCYCLIDINE INTERNAL CONTROL 02/24/2023 Passed  Passed Final    THC, Screen, Urine 02/24/2023 Negative  Negative Final    THC INTERNAL CONTROL 02/24/2023 Passed  Passed Final    Lot Number 02/24/2023 [id number]   Final    Expiration Date 02/24/2023 03/31/2024   Final   There may  be more visits with results that are not included.       EKG Results:  No orders to display       Imaging Results:  XR Chest 1 View    Result Date: 10/3/2022   No active disease is seen.       MIKAL HOPE MD       Electronically Signed and Approved By: MIKAL HOPE MD on 10/03/2022 at 12:47                 Assessment & Plan   Diagnoses and all orders for this visit:    1. Severe episode of recurrent major depressive disorder, without psychotic features (Primary)  -     buPROPion XL (Wellbutrin XL) 150 MG 24 hr tablet; Take 1 tablet by mouth Every Morning for 30 days.  Dispense: 30 tablet; Refill: 1    2. Post traumatic stress disorder (PTSD)  -     gabapentin (Neurontin) 800 MG tablet; Take 1 tablet by mouth 3 (Three) Times a Day for 30 days.  Dispense: 90 tablet; Refill: 1  -     buPROPion XL (Wellbutrin XL) 150 MG 24 hr tablet; Take 1 tablet by mouth Every Morning for 30 days.  Dispense: 30 tablet; Refill: 1    3. Generalized anxiety disorder  -     gabapentin (Neurontin) 800 MG tablet; Take 1 tablet by mouth 3 (Three) Times a Day for 30 days.  Dispense: 90 tablet; Refill: 1  -     buPROPion XL (Wellbutrin XL) 150 MG 24 hr tablet; Take 1 tablet by mouth Every Morning for 30 days.  Dispense: 30 tablet; Refill: 1    4. Panic disorder  -     gabapentin (Neurontin) 800 MG tablet; Take 1 tablet by mouth 3 (Three) Times a Day for 30 days.  Dispense: 90 tablet; Refill: 1  -     ALPRAZolam (Xanax) 1 MG tablet; Take 1 tablet by mouth 2 (Two) Times a Day As Needed for Anxiety for up to 30 days.  Dispense: 60 tablet; Refill: 0  -     buPROPion XL (Wellbutrin XL) 150 MG 24 hr tablet; Take 1 tablet by mouth Every Morning for 30 days.  Dispense: 30 tablet; Refill: 1    5. Insomnia, unspecified type    6. Cigarette nicotine dependence without complication  -     buPROPion XL (Wellbutrin XL) 150 MG 24 hr tablet; Take 1 tablet by mouth Every Morning for 30 days.  Dispense: 30 tablet; Refill: 1    Patient screened positive for  depression based on a PHQ-9 score of 16 on 9/28/2023. Follow-up recommendations include: Prescribed antidepressant medication treatment, Suicide Risk Assessment performed, and see assessment below .    Presentation seems most consistent with MDD, GREG, panic disorder, PTSD, insomnia, nicotine dependence. Pt would like to try Wellbutrin. Pt reports checking his BP daily and home and has been WNL as noted in history. Will start on Wellbutrin for management of depression, anxiety, nicotine dependence, and overall mood. Instructed pt to d/c this med if BP becomes elevated. Pt verbalized understanding and is agreeable to this plan. We will continue Xanax as needed for severe anxiety.  Counseled the patient on the risks including addiction, dependence, and misuse.  Discussed the risks with Xanax such as respiratory depression which pt would still like to continue with this med as it helps to control his anxiety. Will continue gabapentin for management of anxiety and overall mood. Pt states he declined sleep medicine referral when they called. I extensively discussed my concerns as pt's mood and sleep may be attributed to OSCAR, which pt still declines. Follow-up in 1 month.  Addressed all questions and concerns.    Visit Diagnoses:    ICD-10-CM ICD-9-CM   1. Severe episode of recurrent major depressive disorder, without psychotic features  F33.2 296.33   2. Post traumatic stress disorder (PTSD)  F43.10 309.81   3. Generalized anxiety disorder  F41.1 300.02   4. Panic disorder  F41.0 300.01   5. Insomnia, unspecified type  G47.00 780.52   6. Cigarette nicotine dependence without complication  F17.210 305.1       PLAN:  Safety: No acute safety concerns at this time.  Therapy: Patient declines referral for psychotherapy.  Risk Assessment: Risk of self-harm acutely is moderate to severe.  Risk factors include anxiety disorder, mood disorder, h/o suicide attempt in the past, and recent psychosocial stressors (pandemic).  Protective factors include no family history, denies access to guns/weapons, no present SI, no history of self-harm in the past, minimal AODA, healthcare seeking, future orientation, willingness to engage in care.  Risk of self-harm chronically is also moderate to severe, but could be further elevated in the event of treatment noncompliance and/or AODA.  Labs/Diagnostics Ordered:   No orders of the defined types were placed in this encounter.    Medications:   New Medications Ordered This Visit   Medications    gabapentin (Neurontin) 800 MG tablet     Sig: Take 1 tablet by mouth 3 (Three) Times a Day for 30 days.     Dispense:  90 tablet     Refill:  1    ALPRAZolam (Xanax) 1 MG tablet     Sig: Take 1 tablet by mouth 2 (Two) Times a Day As Needed for Anxiety for up to 30 days.     Dispense:  60 tablet     Refill:  0    buPROPion XL (Wellbutrin XL) 150 MG 24 hr tablet     Sig: Take 1 tablet by mouth Every Morning for 30 days.     Dispense:  30 tablet     Refill:  1     Discussed all risks, benefits, alternatives, and side effects of Xanax including but not limited to risks of abuse, misuse, and addiction, which can lead to overdose or death; risks of dependence and withdrawal reactions; drowsiness, sedation, fatigue, depression, dizziness, ataxia, weakness, confusion, forgetfulness, hypotension, falls risk, respiratory depression, anterograde amnesia, paradoxical reactions such as hyperactivity or aggressive behavior; and hallucinations. Pt educated on the need to practice safe sex while taking this med. Instructed pt to avoid performing tasks that require mental alertness such as driving or operating machinery. Discussed the need for pt to immediately call the office for any new or worsening symptoms, such as changes in mood or behavior, and all other concerns. Pt educated on med compliance, including the proper use and monitoring for signs and symptoms of abuse, misuse, and addiction. Pt verbalized understanding  and is agreeable to taking Xanax. CARLOS obtained and USD ordered. Controlled substances agreement verbally signed. Addressed all questions and concerns.     Discussed all risks, benefits, alternatives, and side effects of Gabapentin including but not limited to sedation, dizziness, GI upset, dry mouth, and weight gain. Pt instructed to avoid driving and doing other tasks or actions that require to be alert until knowing how the drug affects them.  Pt educated on the need to practice safe sex while taking this med. Discussed the need for pt to immediately call the office for any new or worsening symptoms, such as worsening depression; feeling nervous or restless; suicidal thoughts or actions; or other changes changes in mood or behavior, and all other concerns. Pt educated on med compliance and the risks of suddenly stopping this medication or missing doses. Pt verbalized understanding and is agreeable to taking Gabapentin. Addressed all questions and concerns.  Will order UDS and obtain CARLOS report. Pt verbally signed controlled substances agreement.    Discussed all risks, benefits, alternatives, and side effects of Bupropion including but not limited to GI upset (N/V/D, constipation), tachycardia, diaphoresis, weight loss, agitation, dizziness, headache, insomnia, tremor, blurred vision, anorexia, HTN, activation of shakir or hypomania, CNS stimulation and neuropsychiatric effects, ocular effects, seizure risk, withdrawal syndrome following abrupt discontinuation, and activation of suicidal ideation and behavior. Pt educated on the need to practice safe sex while taking this med. Discussed the need for pt to immediately call the office for any new or worsening symptoms, such as worsening depression; feeling nervous or restless; suicidal thoughts or actions; or other changes changes in mood or behavior, and all other concerns. Pt educated on med compliance. Pt verbalized understanding and is agreeable to taking  Bupropion. Addressed all questions and concerns.     Follow up:   F/u in 1 month.    TREATMENT PLAN/GOALS: Continue supportive psychotherapy efforts and medications as indicated. Treatment and medication options discussed during today's visit. Patient ackowledged and verbally consented to continue with current treatment plan and was educated on the importance of compliance with treatment and follow-up appointments.    MEDICATION ISSUES:  CARLOS reviewed as expected.  Discussed medication options and treatment plan of prescribed medication as well as the risks, benefits, and side effects including potential falls, possible impaired driving and metabolic adversities among others. Patient is agreeable to call the office with any worsening of symptoms or onset of side effects. Patient is agreeable to call 911 or go to the nearest ER should he/she begin having SI/HI. No medication side effects or related complaints today.            This document has been electronically signed by Page Lee PA-C  September 28, 2023 14:25 EDT      Part of this note may be an electronic transcription/translation of spoken language to printed text using the Dragon Dictation System.

## 2023-10-07 DIAGNOSIS — F51.5 NIGHTMARES: ICD-10-CM

## 2023-10-09 RX ORDER — ALBUTEROL SULFATE 90 UG/1
AEROSOL, METERED RESPIRATORY (INHALATION)
Qty: 18 G | Refills: 3 | Status: SHIPPED | OUTPATIENT
Start: 2023-10-09

## 2023-10-09 RX ORDER — CLONIDINE HYDROCHLORIDE 0.2 MG/1
TABLET ORAL
Qty: 90 TABLET | Refills: 0 | OUTPATIENT
Start: 2023-10-09 | End: 2024-01-07

## 2023-10-09 NOTE — TELEPHONE ENCOUNTER
cloNIDine (CATAPRES) 0.2 MG tablet (07/11/2023)     Medication discontinued, therefore refill may not be appropriate. Order refused and pended.

## 2023-10-19 RX ORDER — FAMOTIDINE 20 MG/1
20 TABLET, FILM COATED ORAL 2 TIMES DAILY
Qty: 60 TABLET | Refills: 5 | Status: SHIPPED | OUTPATIENT
Start: 2023-10-19

## 2023-11-10 ENCOUNTER — TELEMEDICINE (OUTPATIENT)
Dept: BEHAVIORAL HEALTH | Facility: CLINIC | Age: 58
End: 2023-11-10
Payer: MEDICAID

## 2023-11-10 DIAGNOSIS — F41.1 GENERALIZED ANXIETY DISORDER: Primary | ICD-10-CM

## 2023-11-10 DIAGNOSIS — F33.2 SEVERE EPISODE OF RECURRENT MAJOR DEPRESSIVE DISORDER, WITHOUT PSYCHOTIC FEATURES: ICD-10-CM

## 2023-11-10 DIAGNOSIS — G47.00 INSOMNIA, UNSPECIFIED TYPE: ICD-10-CM

## 2023-11-10 DIAGNOSIS — F41.0 PANIC DISORDER: ICD-10-CM

## 2023-11-10 DIAGNOSIS — F43.10 POST TRAUMATIC STRESS DISORDER (PTSD): ICD-10-CM

## 2023-11-10 RX ORDER — MIRTAZAPINE 15 MG/1
TABLET, FILM COATED ORAL
Qty: 30 TABLET | Refills: 1 | Status: SHIPPED | OUTPATIENT
Start: 2023-11-10

## 2023-11-10 RX ORDER — GABAPENTIN 800 MG/1
800 TABLET ORAL 3 TIMES DAILY
Qty: 90 TABLET | Refills: 1 | Status: SHIPPED | OUTPATIENT
Start: 2023-11-10 | End: 2023-12-10

## 2023-11-10 RX ORDER — ALPRAZOLAM 1 MG/1
1 TABLET ORAL 2 TIMES DAILY PRN
Qty: 60 TABLET | Refills: 0 | Status: SHIPPED | OUTPATIENT
Start: 2023-11-10 | End: 2023-12-10

## 2023-11-10 NOTE — PROGRESS NOTES
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 15.0 mmol/L Final    eGFR 07/07/2023 44.5 (L)  >60.0 mL/min/1.73 Final    Lipase 07/07/2023 23  13 - 60 U/L Final    Color, UA 07/07/2023 Yellow  Yellow, Straw Final    Appearance, UA 07/07/2023 Clear  Clear Final    pH, UA 07/07/2023 5.5  5.0 - 8.0 Final    Specific Gravity, UA 07/07/2023 1.010  1.005 - 1.030 Final    Glucose, UA 07/07/2023 Negative  Negative Final    Ketones, UA 07/07/2023 Negative  Negative Final    Bilirubin, UA 07/07/2023 Negative  Negative Final    Blood, UA 07/07/2023 Negative  Negative Final    Protein, UA 07/07/2023 Negative  Negative Final    Leuk Esterase, UA 07/07/2023 Negative  Negative Final    Nitrite, UA 07/07/2023 Negative  Negative Final    Urobilinogen, UA 07/07/2023 0.2 E.U./dL  0.2 - 1.0 E.U./dL Final    WBC 07/07/2023 10.91 (H)  3.40 - 10.80 10*3/mm3 Final    RBC 07/07/2023 4.85  4.14 - 5.80 10*6/mm3 Final    Hemoglobin 07/07/2023 15.0  13.0 - 17.7 g/dL Final    Hematocrit 07/07/2023 45.1  37.5 - 51.0 % Final    MCV 07/07/2023 93.0  79.0 - 97.0 fL Final    MCH 07/07/2023 30.9  26.6 - 33.0 pg Final    MCHC 07/07/2023 33.3  31.5 - 35.7 g/dL Final    RDW 07/07/2023 16.1 (H)  12.3 - 15.4 % Final    RDW-SD 07/07/2023 53.9  37.0 - 54.0 fl Final    MPV 07/07/2023 10.8  6.0 - 12.0 fL Final    Platelets 07/07/2023 244  140 - 450 10*3/mm3 Final    Neutrophil % 07/07/2023 73.0  42.7 - 76.0 % Final    Lymphocyte %  07/07/2023 15.7 (L)  19.6 - 45.3 % Final    Monocyte % 07/07/2023 8.1  5.0 - 12.0 % Final    Eosinophil % 07/07/2023 2.1  0.3 - 6.2 % Final    Basophil % 07/07/2023 0.6  0.0 - 1.5 % Final    Immature Grans % 07/07/2023 0.5  0.0 - 0.5 % Final    Neutrophils, Absolute 07/07/2023 7.96 (H)  1.70 - 7.00 10*3/mm3 Final    Lymphocytes, Absolute 07/07/2023 1.71  0.70 - 3.10 10*3/mm3 Final    Monocytes, Absolute 07/07/2023 0.88  0.10 - 0.90 10*3/mm3 Final    Eosinophils, Absolute 07/07/2023 0.23  0.00 - 0.40 10*3/mm3 Final    Basophils, Absolute 07/07/2023 0.07  0.00 - 0.20 10*3/mm3 Final    Immature Grans, Absolute 07/07/2023 0.06 (H)  0.00 - 0.05 10*3/mm3 Final    nRBC 07/07/2023 0.0  0.0 - 0.2 /100 WBC Final    Extra Tube 07/07/2023 Hold for add-ons.   Final    Auto resulted.    Extra Tube 07/07/2023 hold for add-on   Final    Auto resulted    Extra Tube 07/07/2023 Hold for add-ons.   Final    Auto resulted.    Extra Tube 07/07/2023 Hold for add-ons.   Final    Auto resulted    proBNP 07/07/2023 117.0  0.0 - 900.0 pg/mL Final   Lab on 06/30/2023   Component Date Value Ref Range Status    Glucose 06/30/2023 97  65 - 99 mg/dL Final    BUN 06/30/2023 20  6 - 20 mg/dL Final    Creatinine 06/30/2023 1.28 (H)  0.76 - 1.27 mg/dL Final    Sodium 06/30/2023 139  136 - 145 mmol/L Final    Potassium 06/30/2023 4.4  3.5 - 5.2 mmol/L Final    Chloride 06/30/2023 102  98 - 107 mmol/L Final    CO2 06/30/2023 26.8  22.0 - 29.0 mmol/L Final    Calcium 06/30/2023 9.8  8.6 - 10.5 mg/dL Final    Total Protein 06/30/2023 7.4  6.0 - 8.5 g/dL Final    Albumin 06/30/2023 4.3  3.5 - 5.2 g/dL Final    ALT (SGPT) 06/30/2023 26  1 - 41 U/L Final    AST (SGOT) 06/30/2023 28  1 - 40 U/L Final    Alkaline Phosphatase 06/30/2023 113  39 - 117 U/L Final    Total Bilirubin 06/30/2023 0.4  0.0 - 1.2 mg/dL Final    Globulin 06/30/2023 3.1  gm/dL Final    A/G Ratio 06/30/2023 1.4  g/dL Final    BUN/Creatinine Ratio 06/30/2023 15.6  7.0 - 25.0 Final    Anion  Gap 06/30/2023 10.2  5.0 - 15.0 mmol/L Final    eGFR 06/30/2023 65.7  >60.0 mL/min/1.73 Final    Total Cholesterol 06/30/2023 186  0 - 200 mg/dL Final    Triglycerides 06/30/2023 254 (H)  0 - 150 mg/dL Final    HDL Cholesterol 06/30/2023 38 (L)  40 - 60 mg/dL Final    LDL Cholesterol  06/30/2023 104 (H)  0 - 100 mg/dL Final    VLDL Cholesterol 06/30/2023 44 (H)  5 - 40 mg/dL Final    LDL/HDL Ratio 06/30/2023 2.56   Final    TSH 06/30/2023 1.750  0.270 - 4.200 uIU/mL Final    TSH 06/30/2023 1.750  0.270 - 4.200 uIU/mL Final    Free T4 06/30/2023 0.81 (L)  0.93 - 1.70 ng/dL Final    T4 results may be falsely increased if patient taking Biotin.    WBC 06/30/2023 7.91  3.40 - 10.80 10*3/mm3 Final    RBC 06/30/2023 4.74  4.14 - 5.80 10*6/mm3 Final    Hemoglobin 06/30/2023 14.3  13.0 - 17.7 g/dL Final    Hematocrit 06/30/2023 42.0  37.5 - 51.0 % Final    MCV 06/30/2023 88.6  79.0 - 97.0 fL Final    MCH 06/30/2023 30.2  26.6 - 33.0 pg Final    MCHC 06/30/2023 34.0  31.5 - 35.7 g/dL Final    RDW 06/30/2023 14.5  12.3 - 15.4 % Final    RDW-SD 06/30/2023 46.4  37.0 - 54.0 fl Final    MPV 06/30/2023 11.5  6.0 - 12.0 fL Final    Platelets 06/30/2023 221  140 - 450 10*3/mm3 Final    Neutrophil % 06/30/2023 64.2  42.7 - 76.0 % Final    Lymphocyte % 06/30/2023 22.4  19.6 - 45.3 % Final    Monocyte % 06/30/2023 7.2  5.0 - 12.0 % Final    Eosinophil % 06/30/2023 4.0  0.3 - 6.2 % Final    Basophil % 06/30/2023 1.1  0.0 - 1.5 % Final    Immature Grans % 06/30/2023 1.1 (H)  0.0 - 0.5 % Final    Neutrophils, Absolute 06/30/2023 5.07  1.70 - 7.00 10*3/mm3 Final    Lymphocytes, Absolute 06/30/2023 1.77  0.70 - 3.10 10*3/mm3 Final    Monocytes, Absolute 06/30/2023 0.57  0.10 - 0.90 10*3/mm3 Final    Eosinophils, Absolute 06/30/2023 0.32  0.00 - 0.40 10*3/mm3 Final    Basophils, Absolute 06/30/2023 0.09  0.00 - 0.20 10*3/mm3 Final    Immature Grans, Absolute 06/30/2023 0.09 (H)  0.00 - 0.05 10*3/mm3 Final    nRBC 06/30/2023 0.1  0.0 - 0.2  /100 WBC Final   Office Visit on 05/25/2023   Component Date Value Ref Range Status    Glucose 05/25/2023 97  65 - 99 mg/dL Final    BUN 05/25/2023 24 (H)  6 - 20 mg/dL Final    Creatinine 05/25/2023 1.34 (H)  0.76 - 1.27 mg/dL Final    Sodium 05/25/2023 140  136 - 145 mmol/L Final    Potassium 05/25/2023 4.4  3.5 - 5.2 mmol/L Final    Chloride 05/25/2023 102  98 - 107 mmol/L Final    CO2 05/25/2023 28.8  22.0 - 29.0 mmol/L Final    Calcium 05/25/2023 10.0  8.6 - 10.5 mg/dL Final    BUN/Creatinine Ratio 05/25/2023 17.9  7.0 - 25.0 Final    Anion Gap 05/25/2023 9.2  5.0 - 15.0 mmol/L Final    eGFR 05/25/2023 62.2  >60.0 mL/min/1.73 Final   Office Visit on 04/25/2023   Component Date Value Ref Range Status    Glucose 04/25/2023 102 (H)  65 - 99 mg/dL Final    BUN 04/25/2023 19  6 - 20 mg/dL Final    Creatinine 04/25/2023 1.38 (H)  0.76 - 1.27 mg/dL Final    Sodium 04/25/2023 136  136 - 145 mmol/L Final    Potassium 04/25/2023 4.4  3.5 - 5.2 mmol/L Final    Chloride 04/25/2023 101  98 - 107 mmol/L Final    CO2 04/25/2023 23.8  22.0 - 29.0 mmol/L Final    Calcium 04/25/2023 9.6  8.6 - 10.5 mg/dL Final    BUN/Creatinine Ratio 04/25/2023 13.8  7.0 - 25.0 Final    Anion Gap 04/25/2023 11.2  5.0 - 15.0 mmol/L Final    eGFR 04/25/2023 60.0 (L)  >60.0 mL/min/1.73 Final    proBNP 04/25/2023 35.1  0.0 - 900.0 pg/mL Final    WBC 04/25/2023 7.73  3.40 - 10.80 10*3/mm3 Final    RBC 04/25/2023 4.70  4.14 - 5.80 10*6/mm3 Final    Hemoglobin 04/25/2023 14.8  13.0 - 17.7 g/dL Final    Hematocrit 04/25/2023 42.9  37.5 - 51.0 % Final    MCV 04/25/2023 91.3  79.0 - 97.0 fL Final    MCH 04/25/2023 31.5  26.6 - 33.0 pg Final    MCHC 04/25/2023 34.5  31.5 - 35.7 g/dL Final    RDW 04/25/2023 13.2  12.3 - 15.4 % Final    RDW-SD 04/25/2023 44.2  37.0 - 54.0 fl Final    MPV 04/25/2023 11.3  6.0 - 12.0 fL Final    Platelets 04/25/2023 298  140 - 450 10*3/mm3 Final    Neutrophil % 04/25/2023 66.2  42.7 - 76.0 % Final    Lymphocyte % 04/25/2023  20.6  19.6 - 45.3 % Final    Monocyte % 04/25/2023 7.8  5.0 - 12.0 % Final    Eosinophil % 04/25/2023 4.1  0.3 - 6.2 % Final    Basophil % 04/25/2023 0.9  0.0 - 1.5 % Final    Immature Grans % 04/25/2023 0.4  0.0 - 0.5 % Final    Neutrophils, Absolute 04/25/2023 5.12  1.70 - 7.00 10*3/mm3 Final    Lymphocytes, Absolute 04/25/2023 1.59  0.70 - 3.10 10*3/mm3 Final    Monocytes, Absolute 04/25/2023 0.60  0.10 - 0.90 10*3/mm3 Final    Eosinophils, Absolute 04/25/2023 0.32  0.00 - 0.40 10*3/mm3 Final    Basophils, Absolute 04/25/2023 0.07  0.00 - 0.20 10*3/mm3 Final    Immature Grans, Absolute 04/25/2023 0.03  0.00 - 0.05 10*3/mm3 Final    nRBC 04/25/2023 0.0  0.0 - 0.2 /100 WBC Final   Admission on 04/11/2023, Discharged on 04/11/2023   Component Date Value Ref Range Status    Case Report 04/11/2023    Final                    Value:Medical Cytology Report                           Case: [id number]                                  Authorizing Provider:  Taiwo Copeland, Collected:           04/11/2023 09:40 AM                                 DO                                                                           Ordering Location:     [facility] Received:            04/12/2023 08:53 AM                                 SUITES                                                                       Pathologist:           Maria Teresa Borja DO                                                       Specimen:    Lung, Right Upper Lobe, RIGHT UPPER LOBE BAL                                               Final Diagnosis 04/11/2023    Final                    Value:This result contains rich text formatting which cannot be displayed here.    Clinical Information 04/11/2023    Final                    Value:This result contains rich text formatting which cannot be displayed here.    Comment 04/11/2023    Final                    Value:This result contains rich text formatting which cannot be displayed  here.    Gross Description 04/11/2023    Final                    Value:This result contains rich text formatting which cannot be displayed here.    Microscopic Description 04/11/2023    Final    This result contains rich text formatting which cannot be displayed here.    Special Stains 04/11/2023    Final                    Value:This result contains rich text formatting which cannot be displayed here.    Fungus Culture 04/11/2023 Heavy growth (4+) Candida albicans (A)   Final    AFB Culture 04/11/2023 No AFB isolated at 6 weeks   Final    AFB Stain 04/11/2023 No acid fast bacilli seen on direct smear   Final    AFB Stain 04/11/2023 No acid fast bacilli seen on concentrated smear   Final    BAL Culture 04/11/2023 >100,000 CFU/mL Normal respiratory lee. No S. aureus or Pseudomonas aeruginosa detected. Final report.   Final    Gram Stain 04/11/2023 Moderate (3+) Gram positive cocci in pairs and chains   Final    Escherichia coli PCR 04/11/2023 Not Detected  Not Detected Final    Acinetobacter calcoaceticus-travon* 04/11/2023 Not Detected  Not Detected Final    Enterobacter cloacae PCR 04/11/2023 Not Detected  Not Detected Final    Klebsiella oxytoca PCR 04/11/2023 Not Detected  Not Detected Final    Klebsiella pneumoniae group PCR 04/11/2023 Not Detected  Not Detected Final    Klebsiella aerogenes PCR 04/11/2023 Not Detected  Not Detected Final    Moraxella catarrhalis PCR 04/11/2023 Not Detected  Not Detected Final    Proteus species PCR 04/11/2023 Not Detected  Not Detected Final    Pseudomonas aeroginosa PCR 04/11/2023 Not Detected  Not Detected Final    Serratia marcescens PCR 04/11/2023 Not Detected  Not Detected Final    Staphylococcus aureus PCR 04/11/2023 Not Detected  Not Detected Final    Streptococcus pyogenes PCR 04/11/2023 Not Detected  Not Detected Final    Haemophilus influenzae PCR 04/11/2023 Not Detected  Not Detected Final    Streptococcus agalactiae PCR 04/11/2023 Not Detected  Not Detected Final     Streptococcus pneumoniae PCR 04/11/2023 Not Detected  Not Detected Final    Chlamydophila pneumoniae PCR 04/11/2023 Not Detected  Not Detected Final    Legionella pneumophilia PCR 04/11/2023 Not Detected  Not Detected Final    Mycoplasma pneumo by PCR 04/11/2023 Not Detected  Not Detected Final    ADENOVIRUS, PCR 04/11/2023 Not Detected  Not Detected Final    CTX-M Gene 04/11/2023 N/A  Not Detected, N/A Final    IMP Gene 04/11/2023 N/A  Not Detected, N/A Final    KPC Gene 04/11/2023 N/A  Not Detected, N/A Final    mecA/C and MREJ Gene 04/11/2023 N/A  Not Detected, N/A Final    NDM Gene 04/11/2023 N/A  Not Detected, N/A Final    OXA-48-like Gene 04/11/2023 N/A  Not Detected, N/A Final    VIM Gene 04/11/2023 N/A  Not Detected, N/A Final    Coronavirus 04/11/2023 Not Detected  Not Detected Final    Human Metapneumovirus 04/11/2023 Not Detected  Not Detected Final    Human Rhinovirus/Enterovirus 04/11/2023 Not Detected  Not Detected Final    Influenza A PCR 04/11/2023 Not Detected  Not Detected Final    Influenza B PCR 04/11/2023 Not Detected  Not Detected Final    RSV, PCR 04/11/2023 Not Detected  Not Detected Final    Parainfluenza virus PCR 04/11/2023 Not Detected  Not Detected Final    Neutrophils, Fluid 04/11/2023 100  % Final   Lab on 03/28/2023   Component Date Value Ref Range Status    WBC 03/28/2023 10.03  3.40 - 10.80 10*3/mm3 Final    RBC 03/28/2023 4.61  4.14 - 5.80 10*6/mm3 Final    Hemoglobin 03/28/2023 14.4  13.0 - 17.7 g/dL Final    Hematocrit 03/28/2023 42.2  37.5 - 51.0 % Final    MCV 03/28/2023 91.5  79.0 - 97.0 fL Final    MCH 03/28/2023 31.2  26.6 - 33.0 pg Final    MCHC 03/28/2023 34.1  31.5 - 35.7 g/dL Final    RDW 03/28/2023 13.4  12.3 - 15.4 % Final    RDW-SD 03/28/2023 45.3  37.0 - 54.0 fl Final    MPV 03/28/2023 11.6  6.0 - 12.0 fL Final    Platelets 03/28/2023 246  140 - 450 10*3/mm3 Final    Neutrophil % 03/28/2023 69.6  42.7 - 76.0 % Final    Lymphocyte % 03/28/2023 19.6  19.6 - 45.3 %  Final    Monocyte % 03/28/2023 6.8  5.0 - 12.0 % Final    Eosinophil % 03/28/2023 2.8  0.3 - 6.2 % Final    Basophil % 03/28/2023 0.6  0.0 - 1.5 % Final    Immature Grans % 03/28/2023 0.6 (H)  0.0 - 0.5 % Final    Neutrophils, Absolute 03/28/2023 6.98  1.70 - 7.00 10*3/mm3 Final    Lymphocytes, Absolute 03/28/2023 1.97  0.70 - 3.10 10*3/mm3 Final    Monocytes, Absolute 03/28/2023 0.68  0.10 - 0.90 10*3/mm3 Final    Eosinophils, Absolute 03/28/2023 0.28  0.00 - 0.40 10*3/mm3 Final    Basophils, Absolute 03/28/2023 0.06  0.00 - 0.20 10*3/mm3 Final    Immature Grans, Absolute 03/28/2023 0.06 (H)  0.00 - 0.05 10*3/mm3 Final    nRBC 03/28/2023 0.0  0.0 - 0.2 /100 WBC Final    Respiratory Culture 03/28/2023 Moderate growth (3+) Normal respiratory lee. No S. aureus or Pseudomonas aeruginosa detected. Final report.   Final    Gram Stain 03/28/2023 Moderate (3+) WBCs seen   Final    Gram Stain 03/28/2023 Few (2+) Epithelial cells seen   Final    Gram Stain 03/28/2023 Few (2+) Gram positive cocci in pairs and chains   Final    Gram Stain 03/28/2023 Rare (1+) Gram positive cocci in clusters   Final    Gram Stain 03/28/2023 Occasional Gram negative bacilli   Final   Hospital Outpatient Visit on 02/28/2023   Component Date Value Ref Range Status    Creatinine 02/28/2023 1.30  mg/dL Final    Serial Number: [id number]:  [id number]    eGFR 02/28/2023 64.9  >60.0 mL/min/1.73 Final   Office Visit on 02/24/2023   Component Date Value Ref Range Status    Amphetamine Screen, Urine 02/24/2023 Negative  Negative Final    AMP INTERNAL CONTROL 02/24/2023 Passed  Passed Final    Barbiturates Screen, Urine 02/24/2023 Negative  Negative Final    BARBITURATE INTERNAL CONTROL 02/24/2023 Passed  Passed Final    Buprenorphine, Screen, Urine 02/24/2023 Negative  Negative Final    BUPRENORPHINE INTERNAL CONTROL 02/24/2023 Passed  Passed Final    Benzodiazepine Screen, Urine 02/24/2023 Positive (A)  Negative Final    BENZODIAZEPINE INTERNAL CONTROL  02/24/2023 Passed  Passed Final    Cocaine Screen, Urine 02/24/2023 Negative  Negative Final    COCAINE INTERNAL CONTROL 02/24/2023 Passed  Passed Final    MDMA (ECSTASY) 02/24/2023 Negative  Negative Final    MDMA (ECSTASY) INTERNAL CONTROL 02/24/2023 Passed  Passed Final    Methamphetamine, Ur 02/24/2023 Negative  Negative Final    METHAMPHETAMINE INTERNAL CONTROL 02/24/2023 Passed  Passed Final    Methadone Screen, Urine 02/24/2023 Negative  Negative Final    METHADONE INTERNAL CONTROL 02/24/2023 Passed  Passed Final    Opiate Screen 02/24/2023 Negative  Negative Final    OPIATES INTERNAL CONTROL 02/24/2023 Passed  Passed Final    Oxycodone Screen, Urine 02/24/2023 Negative  Negative Final    OXYCODONE INTERNAL CONTROL 02/24/2023 Passed  Passed Final    Phencyclidine (PCP), Urine 02/24/2023 Negative  Negative Final    PHENCYCLIDINE INTERNAL CONTROL 02/24/2023 Passed  Passed Final    THC, Screen, Urine 02/24/2023 Negative  Negative Final    THC INTERNAL CONTROL 02/24/2023 Passed  Passed Final    Lot Number 02/24/2023 [id number]   Final    Expiration Date 02/24/2023 03/31/2024   Final   There may be more visits with results that are not included.       EKG Results:  No orders to display       Imaging Results:  XR Chest 1 View    Result Date: 10/3/2022   No active disease is seen.       MIKAL HOPE MD       Electronically Signed and Approved By: MIKAL HOPE MD on 10/03/2022 at 12:47                 Assessment & Plan   Diagnoses and all orders for this visit:    1. Generalized anxiety disorder (Primary)  -     gabapentin (Neurontin) 800 MG tablet; Take 1 tablet by mouth 3 (Three) Times a Day for 30 days.  Dispense: 90 tablet; Refill: 1  -     mirtazapine (Remeron) 15 MG tablet; Take 0.5 to 1 tab PO QHS PRN sleep  Dispense: 30 tablet; Refill: 1    2. Post traumatic stress disorder (PTSD)  -     gabapentin (Neurontin) 800 MG tablet; Take 1 tablet by mouth 3 (Three) Times a Day for 30 days.  Dispense: 90 tablet;  Refill: 1  -     mirtazapine (Remeron) 15 MG tablet; Take 0.5 to 1 tab PO QHS PRN sleep  Dispense: 30 tablet; Refill: 1    3. Panic disorder  -     gabapentin (Neurontin) 800 MG tablet; Take 1 tablet by mouth 3 (Three) Times a Day for 30 days.  Dispense: 90 tablet; Refill: 1  -     ALPRAZolam (Xanax) 1 MG tablet; Take 1 tablet by mouth 2 (Two) Times a Day As Needed for Anxiety for up to 30 days.  Dispense: 60 tablet; Refill: 0  -     mirtazapine (Remeron) 15 MG tablet; Take 0.5 to 1 tab PO QHS PRN sleep  Dispense: 30 tablet; Refill: 1    4. Severe episode of recurrent major depressive disorder, without psychotic features  -     mirtazapine (Remeron) 15 MG tablet; Take 0.5 to 1 tab PO QHS PRN sleep  Dispense: 30 tablet; Refill: 1    5. Insomnia, unspecified type  -     mirtazapine (Remeron) 15 MG tablet; Take 0.5 to 1 tab PO QHS PRN sleep  Dispense: 30 tablet; Refill: 1      Patient screened positive for depression based on a PHQ-9 score of 16 on 9/28/2023. Follow-up recommendations include: Prescribed antidepressant medication treatment, Suicide Risk Assessment performed, and see assessment below .    Presentation seems most consistent with MDD, GREG, panic disorder, PTSD, insomnia.  Discussed concerns about increased anxiety and irritability since starting Wellbutrin, we will discontinue this med.  Extensively discussed my concerns about patient being on Xanax including but not limited to respiratory depression, coma, death.  Discussed risk factors including COPD.  Discussed that I will not be increasing this medication.  Patient verbalized understanding and is agreeable to this plan.  Patient would like to continue Xanax.  We will continue Xanax for anxiety as needed.  Counseled the patient on the risks including addiction, dependence, and misuse.  Patient declines referral for sleep medicine.  Extensively discussed my concerns about prescribing mirtazapine including but not limited to respiratory depression.   Patient would like to still proceed with this medication.  We will start on mirtazapine for management depression, anxiety, insomnia, and overall mood. Will continue gabapentin for management of anxiety and overall mood.  Instructed patient to contact the office for any new or worsening symptoms or any other concerns.  Follow-up in 1 month.  Addressed all questions and concerns.    Visit Diagnoses:    ICD-10-CM ICD-9-CM   1. Generalized anxiety disorder  F41.1 300.02   2. Post traumatic stress disorder (PTSD)  F43.10 309.81   3. Panic disorder  F41.0 300.01   4. Severe episode of recurrent major depressive disorder, without psychotic features  F33.2 296.33   5. Insomnia, unspecified type  G47.00 780.52         PLAN:  Safety: No acute safety concerns at this time.  Therapy: Patient declines referral for psychotherapy.  Risk Assessment: Risk of self-harm acutely is moderate to severe.  Risk factors include anxiety disorder, mood disorder, h/o suicide attempt in the past, and recent psychosocial stressors (pandemic). Protective factors include no family history, denies access to guns/weapons, no present SI, no history of self-harm in the past, minimal AODA, healthcare seeking, future orientation, willingness to engage in care.  Risk of self-harm chronically is also moderate to severe, but could be further elevated in the event of treatment noncompliance and/or AODA.  Labs/Diagnostics Ordered:   No orders of the defined types were placed in this encounter.    Medications:   New Medications Ordered This Visit   Medications    gabapentin (Neurontin) 800 MG tablet     Sig: Take 1 tablet by mouth 3 (Three) Times a Day for 30 days.     Dispense:  90 tablet     Refill:  1    ALPRAZolam (Xanax) 1 MG tablet     Sig: Take 1 tablet by mouth 2 (Two) Times a Day As Needed for Anxiety for up to 30 days.     Dispense:  60 tablet     Refill:  0    mirtazapine (Remeron) 15 MG tablet     Sig: Take 0.5 to 1 tab PO QHS PRN sleep      Dispense:  30 tablet     Refill:  1     Discussed all risks, benefits, alternatives, and side effects of Xanax including but not limited to risks of abuse, misuse, and addiction, which can lead to overdose or death; risks of dependence and withdrawal reactions; drowsiness, sedation, fatigue, depression, dizziness, ataxia, weakness, confusion, forgetfulness, hypotension, falls risk, respiratory depression, anterograde amnesia, paradoxical reactions such as hyperactivity or aggressive behavior; and hallucinations. Pt educated on the need to practice safe sex while taking this med. Instructed pt to avoid performing tasks that require mental alertness such as driving or operating machinery. Discussed the need for pt to immediately call the office for any new or worsening symptoms, such as changes in mood or behavior, and all other concerns. Pt educated on med compliance, including the proper use and monitoring for signs and symptoms of abuse, misuse, and addiction. Pt verbalized understanding and is agreeable to taking Xanax. CARLOS obtained and USD ordered. Controlled substances agreement verbally signed. Addressed all questions and concerns.     Discussed all risks, benefits, alternatives, and side effects of Gabapentin including but not limited to sedation, dizziness, GI upset, dry mouth, and weight gain. Pt instructed to avoid driving and doing other tasks or actions that require to be alert until knowing how the drug affects them.  Pt educated on the need to practice safe sex while taking this med. Discussed the need for pt to immediately call the office for any new or worsening symptoms, such as worsening depression; feeling nervous or restless; suicidal thoughts or actions; or other changes changes in mood or behavior, and all other concerns. Pt educated on med compliance and the risks of suddenly stopping this medication or missing doses. Pt verbalized understanding and is agreeable to taking Gabapentin.  Addressed all questions and concerns.  Will order UDS and obtain CARLOS report. Pt verbally signed controlled substances agreement.    Discussed all risks, benefits, alternatives, and side effects of Mirtazapine including but not limited to GI upset, sexual dysfunction, weight gain, dizziness, bleeding risk, seizure risk, sedation, headache, activation of shakir or hypomania, drug-inducted movement disorders (akathisia, dystonia, restless leg syndrome), dyslipidemia, hematologic abnormalities, orthostatic hypotension, sedation, withdrawal syndrome, serotonin syndrome, and activation of suicidal ideation and behavior.  Pt educated on the need to practice safe sex while taking this med. Discussed the need for pt to immediately call the office for any new or worsening symptoms, such as worsening depression; feeling nervous or restless; suicidal thoughts or actions; or other changes changes in mood or behavior, and all other concerns. Pt educated on med compliance and the risks of suddenly stopping this medication or missing doses. Pt verbalized understanding and is agreeable to taking Mirtazapine. Addressed all questions and concerns.     Follow up:   F/u in 1 month.    TREATMENT PLAN/GOALS: Continue supportive psychotherapy efforts and medications as indicated. Treatment and medication options discussed during today's visit. Patient ackowledged and verbally consented to continue with current treatment plan and was educated on the importance of compliance with treatment and follow-up appointments.    MEDICATION ISSUES:  CARLOS reviewed as expected.  Discussed medication options and treatment plan of prescribed medication as well as the risks, benefits, and side effects including potential falls, possible impaired driving and metabolic adversities among others. Patient is agreeable to call the office with any worsening of symptoms or onset of side effects. Patient is agreeable to call 911 or go to the nearest ER should  he/she begin having SI/HI. No medication side effects or related complaints today.            This document has been electronically signed by Page Lee PA-C  November 10, 2023 13:16 EST      Part of this note may be an electronic transcription/translation of spoken language to printed text using the Dragon Dictation System.

## 2023-11-17 ENCOUNTER — APPOINTMENT (OUTPATIENT)
Dept: GENERAL RADIOLOGY | Facility: HOSPITAL | Age: 58
End: 2023-11-17
Payer: MEDICAID

## 2023-11-17 ENCOUNTER — HOSPITAL ENCOUNTER (EMERGENCY)
Facility: HOSPITAL | Age: 58
Discharge: HOME | End: 2023-11-17
Attending: EMERGENCY MEDICINE
Payer: MEDICAID

## 2023-11-17 VITALS
HEART RATE: 95 BPM | TEMPERATURE: 97.7 F | SYSTOLIC BLOOD PRESSURE: 107 MMHG | WEIGHT: 248.24 LBS | HEIGHT: 67 IN | DIASTOLIC BLOOD PRESSURE: 85 MMHG | BODY MASS INDEX: 38.96 KG/M2 | RESPIRATION RATE: 20 BRPM | OXYGEN SATURATION: 91 %

## 2023-11-17 DIAGNOSIS — J45.901 ASTHMA WITH ACUTE EXACERBATION, UNSPECIFIED ASTHMA SEVERITY, UNSPECIFIED WHETHER PERSISTENT: Primary | ICD-10-CM

## 2023-11-17 LAB
ALBUMIN SERPL-MCNC: 4.1 G/DL (ref 3.5–5.2)
ALBUMIN/GLOB SERPL: 1.4 G/DL
ALP SERPL-CCNC: 142 U/L (ref 39–117)
ALT SERPL W P-5'-P-CCNC: 40 U/L (ref 1–41)
ANION GAP SERPL CALCULATED.3IONS-SCNC: 12.9 MMOL/L (ref 5–15)
ARTERIAL PATENCY WRIST A: POSITIVE
AST SERPL-CCNC: 35 U/L (ref 1–40)
BASE EXCESS BLDA CALC-SCNC: 1.5 MMOL/L (ref -2–2)
BASOPHILS # BLD AUTO: 0.09 10*3/MM3 (ref 0–0.2)
BASOPHILS NFR BLD AUTO: 0.9 % (ref 0–1.5)
BDY SITE: ABNORMAL
BILIRUB SERPL-MCNC: 0.3 MG/DL (ref 0–1.2)
BUN SERPL-MCNC: 18 MG/DL (ref 6–20)
BUN/CREAT SERPL: 11.2 (ref 7–25)
CALCIUM SPEC-SCNC: 9.3 MG/DL (ref 8.6–10.5)
CHLORIDE SERPL-SCNC: 100 MMOL/L (ref 98–107)
CO2 SERPL-SCNC: 24.1 MMOL/L (ref 22–29)
COHGB MFR BLD: 6.5 % (ref 0–1.5)
CREAT SERPL-MCNC: 1.61 MG/DL (ref 0.76–1.27)
DEPRECATED RDW RBC AUTO: 46.1 FL (ref 37–54)
EGFRCR SERPLBLD CKD-EPI 2021: 49.9 ML/MIN/1.73
EOSINOPHIL # BLD AUTO: 0.28 10*3/MM3 (ref 0–0.4)
EOSINOPHIL NFR BLD AUTO: 2.8 % (ref 0.3–6.2)
ERYTHROCYTE [DISTWIDTH] IN BLOOD BY AUTOMATED COUNT: 13.7 % (ref 12.3–15.4)
FHHB: 5.9 % (ref 0–5)
FLUAV SUBTYP SPEC NAA+PROBE: NOT DETECTED
FLUBV RNA ISLT QL NAA+PROBE: NOT DETECTED
GLOBULIN UR ELPH-MCNC: 3 GM/DL
GLUCOSE SERPL-MCNC: 133 MG/DL (ref 65–99)
HCO3 BLDA-SCNC: 26.1 MMOL/L (ref 22–26)
HCT VFR BLD AUTO: 46.4 % (ref 37.5–51)
HGB BLD-MCNC: 15.3 G/DL (ref 13–17.7)
HGB BLDA-MCNC: 16 G/DL (ref 13.8–16.4)
HOLD SPECIMEN: NORMAL
HOLD SPECIMEN: NORMAL
IMM GRANULOCYTES # BLD AUTO: 0.06 10*3/MM3 (ref 0–0.05)
IMM GRANULOCYTES NFR BLD AUTO: 0.6 % (ref 0–0.5)
INHALED O2 CONCENTRATION: 21 %
LYMPHOCYTES # BLD AUTO: 2.03 10*3/MM3 (ref 0.7–3.1)
LYMPHOCYTES NFR BLD AUTO: 20.5 % (ref 19.6–45.3)
MCH RBC QN AUTO: 30.4 PG (ref 26.6–33)
MCHC RBC AUTO-ENTMCNC: 33 G/DL (ref 31.5–35.7)
MCV RBC AUTO: 92.2 FL (ref 79–97)
METHGB BLD QL: 0.3 % (ref 0–1.5)
MODALITY: ABNORMAL
MONOCYTES # BLD AUTO: 0.59 10*3/MM3 (ref 0.1–0.9)
MONOCYTES NFR BLD AUTO: 6 % (ref 5–12)
NEUTROPHILS NFR BLD AUTO: 6.83 10*3/MM3 (ref 1.7–7)
NEUTROPHILS NFR BLD AUTO: 69.2 % (ref 42.7–76)
NRBC BLD AUTO-RTO: 0 /100 WBC (ref 0–0.2)
NT-PROBNP SERPL-MCNC: 106.3 PG/ML (ref 0–900)
OXYHGB MFR BLDV: 87.3 % (ref 94–99)
PCO2 BLDA: 41 MM HG (ref 35–45)
PH BLDA: 7.42 PH UNITS (ref 7.35–7.45)
PLATELET # BLD AUTO: 227 10*3/MM3 (ref 140–450)
PMV BLD AUTO: 10.7 FL (ref 6–12)
PO2 BLD: 314 MM[HG] (ref 0–500)
PO2 BLDA: 65.9 MM HG (ref 80–100)
POTASSIUM SERPL-SCNC: 3.9 MMOL/L (ref 3.5–5.2)
PROT SERPL-MCNC: 7.1 G/DL (ref 6–8.5)
QT INTERVAL: 355 MS
QTC INTERVAL: 466 MS
RBC # BLD AUTO: 5.03 10*6/MM3 (ref 4.14–5.8)
RSV RNA NPH QL NAA+NON-PROBE: NOT DETECTED
SAO2 % BLDCOA: 93.7 % (ref 95–99)
SARS-COV-2 RNA RESP QL NAA+PROBE: NOT DETECTED
SODIUM SERPL-SCNC: 137 MMOL/L (ref 136–145)
TROPONIN T SERPL HS-MCNC: 15 NG/L
WBC NRBC COR # BLD AUTO: 9.88 10*3/MM3 (ref 3.4–10.8)
WHOLE BLOOD HOLD COAG: NORMAL
WHOLE BLOOD HOLD SPECIMEN: NORMAL

## 2023-11-17 PROCEDURE — 85025 COMPLETE CBC W/AUTO DIFF WBC: CPT

## 2023-11-17 PROCEDURE — 82375 ASSAY CARBOXYHB QUANT: CPT

## 2023-11-17 PROCEDURE — 93010 ELECTROCARDIOGRAM REPORT: CPT | Performed by: INTERNAL MEDICINE

## 2023-11-17 PROCEDURE — 87637 SARSCOV2&INF A&B&RSV AMP PRB: CPT

## 2023-11-17 PROCEDURE — 93005 ELECTROCARDIOGRAM TRACING: CPT

## 2023-11-17 PROCEDURE — 94640 AIRWAY INHALATION TREATMENT: CPT

## 2023-11-17 PROCEDURE — 25010000002 METHYLPREDNISOLONE PER 125 MG

## 2023-11-17 PROCEDURE — 82805 BLOOD GASES W/O2 SATURATION: CPT

## 2023-11-17 PROCEDURE — 83050 HGB METHEMOGLOBIN QUAN: CPT

## 2023-11-17 PROCEDURE — 80053 COMPREHEN METABOLIC PANEL: CPT

## 2023-11-17 PROCEDURE — 83880 ASSAY OF NATRIURETIC PEPTIDE: CPT

## 2023-11-17 PROCEDURE — 84484 ASSAY OF TROPONIN QUANT: CPT

## 2023-11-17 PROCEDURE — 96374 THER/PROPH/DIAG INJ IV PUSH: CPT

## 2023-11-17 PROCEDURE — 71045 X-RAY EXAM CHEST 1 VIEW: CPT

## 2023-11-17 PROCEDURE — 99284 EMERGENCY DEPT VISIT MOD MDM: CPT

## 2023-11-17 PROCEDURE — 36600 WITHDRAWAL OF ARTERIAL BLOOD: CPT

## 2023-11-17 PROCEDURE — 94799 UNLISTED PULMONARY SVC/PX: CPT

## 2023-11-17 PROCEDURE — 93005 ELECTROCARDIOGRAM TRACING: CPT | Performed by: EMERGENCY MEDICINE

## 2023-11-17 RX ORDER — ALBUTEROL SULFATE 0.63 MG/3ML
1 SOLUTION RESPIRATORY (INHALATION) EVERY 6 HOURS PRN
Qty: 30 EACH | Refills: 0 | Status: SHIPPED | OUTPATIENT
Start: 2023-11-17

## 2023-11-17 RX ORDER — PREDNISONE 50 MG/1
50 TABLET ORAL DAILY
Qty: 5 TABLET | Refills: 0 | Status: SHIPPED | OUTPATIENT
Start: 2023-11-17

## 2023-11-17 RX ORDER — IPRATROPIUM BROMIDE AND ALBUTEROL SULFATE 2.5; .5 MG/3ML; MG/3ML
3 SOLUTION RESPIRATORY (INHALATION) ONCE
Status: COMPLETED | OUTPATIENT
Start: 2023-11-17 | End: 2023-11-17

## 2023-11-17 RX ORDER — METHYLPREDNISOLONE SODIUM SUCCINATE 125 MG/2ML
125 INJECTION, POWDER, LYOPHILIZED, FOR SOLUTION INTRAMUSCULAR; INTRAVENOUS ONCE
Status: COMPLETED | OUTPATIENT
Start: 2023-11-17 | End: 2023-11-17

## 2023-11-17 RX ORDER — AZITHROMYCIN 250 MG/1
TABLET, FILM COATED ORAL
Qty: 6 TABLET | Refills: 0 | Status: SHIPPED | OUTPATIENT
Start: 2023-11-17

## 2023-11-17 RX ORDER — SODIUM CHLORIDE 0.9 % (FLUSH) 0.9 %
10 SYRINGE (ML) INJECTION AS NEEDED
Status: DISCONTINUED | OUTPATIENT
Start: 2023-11-17 | End: 2023-11-17 | Stop reason: HOSPADM

## 2023-11-17 RX ADMIN — IPRATROPIUM BROMIDE AND ALBUTEROL SULFATE 3 ML: .5; 3 SOLUTION RESPIRATORY (INHALATION) at 16:28

## 2023-11-17 RX ADMIN — METHYLPREDNISOLONE SODIUM SUCCINATE 125 MG: 125 INJECTION INTRAMUSCULAR; INTRAVENOUS at 17:20

## 2023-11-18 NOTE — ED PROVIDER NOTES
Time: 7:06 PM EST  Date of encounter:  11/17/2023  Independent Historian/Clinical History and Information was obtained by:   Patient    History is limited by: N/A    Chief Complaint: Dyspnea      History of Present Illness:  Patient is a 58 y.o. year old male who presents to the emergency department for evaluation of dyspnea consistent with the patient's asthma exacerbations.  Patient denies nausea and vomiting.  Patient denies chest pain.  Patient does report shortness of breath with exertion.  Patient has a cough with no hemoptysis.  Patient denies leg pain and swelling.  Patient denies abdominal pain.    HPI    Patient Care Team  Primary Care Provider: Jailyn Shepard APRN    Past Medical History:     No Known Allergies  Past Medical History:   Diagnosis Date    Anxiety     Asthma     Asthma, extrinsic     Chronic pain disorder     Depression     Emphysema of lung     Pneumonia of right lower lobe due to infectious organism 4/6/2023    PTSD (post-traumatic stress disorder)     Rheumatoid arthritis     Schizoaffective disorder     Self-injurious behavior     Suicide attempt      Past Surgical History:   Procedure Laterality Date    ANKLE SURGERY Right     BRONCHOSCOPY N/A 4/11/2023    Procedure: BRONCHOSCOPY WITH BRONCHOALVEOLAR LAVAGE;  Surgeon: Taiwo Copeland DO;  Location: [facility];  Service: Pulmonary;  Laterality: N/A;  DIFFUSE BRONCHITIS     Family History   Problem Relation Age of Onset    Alcohol abuse Father     Asthma Father     Emphysema Father     Heart failure Father        Home Medications:  Prior to Admission medications    Medication Sig Start Date End Date Taking? Authorizing Provider   Advair Diskus 250-50 MCG/ACT DISKUS  9/19/23   Provider, MD Verna   albuterol (ACCUNEB) 0.63 MG/3ML nebulizer solution Take 3 mL by nebulization Every 6 (Six) Hours As Needed for Wheezing. 11/17/23   Kylah Schwartz MD   ALPRAZolam (Xanax) 1 MG tablet Take 1 tablet by mouth 2 (Two) Times  a Day As Needed for Anxiety for up to 30 days. 11/10/23 12/10/23  Page Lee PA-C   amLODIPine-benazepril (LOTREL) 10-40 MG per capsule TAKE 1 CAPSULE BY MOUTH DAILY 9/11/23 9/10/24  Jailyn Shepard APRN   atorvastatin (LIPITOR) 10 MG tablet TAKE 1 TABLET BY MOUTH DAILY 8/29/23   Jailyn Shepard APRN   azithromycin (Zithromax Z-Jono) 250 MG tablet Take 2 tablets by mouth on day 1, then 1 tablet daily on days 2-5 11/17/23   Kylah Schwartz MD   famotidine (PEPCID) 20 MG tablet TAKE 1 TABLET BY MOUTH TWICE DAILY 10/19/23   Jailyn Shepard APRN   furosemide (LASIX) 20 MG tablet TAKE 1 TABLET BY MOUTH TWICE DAILY 8/14/23   Jailyn Shepard APRN   gabapentin (Neurontin) 800 MG tablet Take 1 tablet by mouth 3 (Three) Times a Day for 30 days. 11/10/23 12/10/23  Page Lee PA-C   ipratropium-albuterol (DUO-NEB) 0.5-2.5 mg/3 ml nebulizer USE 3 ML VIA NEBULIZER EVERY 4 HOURS AS NEEDED FOR WHEEZING 8/16/23   Jailyn Shepard APRN   lisinopril (PRINIVIL,ZESTRIL) 40 MG tablet Take 1 tablet by mouth Daily. 8/29/23   ProviderVerna MD   mirtazapine (Remeron) 15 MG tablet Take 0.5 to 1 tab PO QHS PRN sleep 11/10/23   Page Lee PA-C   predniSONE (DELTASONE) 50 MG tablet Take 1 tablet by mouth Daily. 11/17/23   Kylah Schwartz MD   Trelegy Ellipta 200-62.5-25 MCG/ACT aerosol powder  INHALE 1 PUFF BY MOUTH EVERY DAY 9/27/23   Jailyn Shepard APRN   albuterol (ACCUNEB) 0.63 MG/3ML nebulizer solution Take 3 mL by nebulization Every 6 (Six) Hours As Needed for Wheezing. 3/28/23 11/17/23  Michael Haile MD   Ventolin  (90 Base) MCG/ACT inhaler INHALE 2 PUFFS BY MOUTH EVERY 4 HOURS AS NEEDED FOR WHEEZING OR SHORTNESS OF BREATH 10/9/23 11/17/23  Michael Haile MD        Social History:   Social History     Tobacco Use    Smoking status: Every Day     Packs/day: 0.25     Years: 15.00     Additional pack years: 0.00     Total pack years: 3.75     Types: Cigarettes     Start date: 1993     Passive exposure:  "Current    Smokeless tobacco: Current     Types: Snuff    Tobacco comments:     1 pack last 3-4 days   Vaping Use    Vaping Use: Never used   Substance Use Topics    Alcohol use: Not Currently     Alcohol/week: 9.0 standard drinks of alcohol     Types: 6 Cans of beer, 3 Shots of liquor per week     Comment: none    Drug use: Not Currently         Review of Systems:  Review of Systems   Constitutional:  Negative for chills and fever.   HENT:  Negative for congestion, rhinorrhea and sore throat.    Eyes:  Negative for pain and visual disturbance.   Respiratory:  Negative for apnea, cough, chest tightness and shortness of breath.    Cardiovascular:  Negative for chest pain and palpitations.   Gastrointestinal:  Negative for abdominal pain, diarrhea, nausea and vomiting.   Genitourinary:  Negative for difficulty urinating and dysuria.   Musculoskeletal:  Negative for joint swelling and myalgias.   Skin:  Negative for color change.   Neurological:  Negative for seizures and headaches.   Psychiatric/Behavioral: Negative.     All other systems reviewed and are negative.       Physical Exam:  /85   Pulse 95   Temp 97.7 °F (36.5 °C) (Oral)   Resp 20   Ht 170.2 cm (67\")   Wt 113 kg (248 lb 3.8 oz)   SpO2 91%   BMI 38.88 kg/m²     Physical Exam  Vitals and nursing note reviewed.   Constitutional:       General: He is not in acute distress.     Appearance: Normal appearance. He is not toxic-appearing.   HENT:      Head: Normocephalic and atraumatic.      Jaw: There is normal jaw occlusion.   Eyes:      General: Lids are normal.      Extraocular Movements: Extraocular movements intact.      Conjunctiva/sclera: Conjunctivae normal.      Pupils: Pupils are equal, round, and reactive to light.   Cardiovascular:      Rate and Rhythm: Normal rate and regular rhythm.      Pulses: Normal pulses.      Heart sounds: Normal heart sounds.   Pulmonary:      Effort: Pulmonary effort is normal. No respiratory distress.      " Breath sounds: Wheezing present. No rhonchi.   Abdominal:      General: Abdomen is flat.      Palpations: Abdomen is soft.      Tenderness: There is no abdominal tenderness. There is no guarding or rebound.   Musculoskeletal:         General: Normal range of motion.      Cervical back: Normal range of motion and neck supple.      Right lower leg: No edema.      Left lower leg: No edema.   Skin:     General: Skin is warm and dry.   Neurological:      Mental Status: He is alert and oriented to person, place, and time. Mental status is at baseline.   Psychiatric:         Mood and Affect: Mood normal.                  Procedures:  Procedures      Medical Decision Making:      Comorbidities that affect care:    Asthma    External Notes reviewed:    Hospital Discharge Summary: Patient was admitted for hypoxemic respiratory failure.      The following orders were placed and all results were independently analyzed by me:  Orders Placed This Encounter   Procedures    COVID-19, FLU A/B, RSV PCR 1 HR TAT - Swab, Nasopharynx    XR Chest 1 View    [place] Draw    Comprehensive Metabolic Panel    BNP    Single High Sensitivity Troponin T    CBC Auto Differential    Blood Gas, Arterial -With Co-Ox Panel: Yes    NPO Diet NPO Type: Strict NPO    Undress & Gown    Continuous Pulse Oximetry    Vital Signs    Oxygen Therapy- Nasal Cannula; Titrate 1-6 LPM Per SpO2; 90 - 95%    ECG 12 Lead ED Triage Standing Order; SOA    Insert Peripheral IV    CBC & Differential    Green Top (Gel)    Lavender Top    Gold Top - SST    Light Blue Top       Medications Given in the Emergency Department:  Medications   sodium chloride 0.9 % flush 10 mL (has no administration in time range)   ipratropium-albuterol (DUO-NEB) nebulizer solution 3 mL (3 mL Nebulization Given 11/17/23 1628)   methylPREDNISolone sodium succinate (SOLU-Medrol) injection 125 mg (125 mg Intravenous Given 11/17/23 1720)        ED Course:    ED Course as of 11/17/23 1909 Fri Nov  17, 2023 1905 EKG:    Rhythm: sinus  Rate: 104  Axis: left  Intervals: normal  ST Segment: no elevations      Interpreted by me   [BN]      ED Course User Index  [BN] Kylah Schwartz MD       Labs:    Lab Results (last 24 hours)       Procedure Component Value Units Date/Time    CBC & Differential [[id number]]  (Abnormal) Collected: 11/17/23 1607    Specimen: Blood Updated: 11/17/23 1613    Narrative:      The following orders were created for panel order CBC & Differential.  Procedure                               Abnormality         Status                     ---------                               -----------         ------                     CBC Auto Differential[[id number]]        Abnormal            Final result                 Please view results for these tests on the individual orders.    Comprehensive Metabolic Panel [[id number]]  (Abnormal) Collected: 11/17/23 1607    Specimen: Blood Updated: 11/17/23 1634     Glucose 133 mg/dL      BUN 18 mg/dL      Creatinine 1.61 mg/dL      Sodium 137 mmol/L      Potassium 3.9 mmol/L      Chloride 100 mmol/L      CO2 24.1 mmol/L      Calcium 9.3 mg/dL      Total Protein 7.1 g/dL      Albumin 4.1 g/dL      ALT (SGPT) 40 U/L      AST (SGOT) 35 U/L      Alkaline Phosphatase 142 U/L      Total Bilirubin 0.3 mg/dL      Globulin 3.0 gm/dL      A/G Ratio 1.4 g/dL      BUN/Creatinine Ratio 11.2     Anion Gap 12.9 mmol/L      eGFR 49.9 mL/min/1.73     Narrative:      GFR Normal >60  Chronic Kidney Disease <60  Kidney Failure <15      BNP [[id number]]  (Normal) Collected: 11/17/23 1607    Specimen: Blood Updated: 11/17/23 1630     proBNP 106.3 pg/mL     Narrative:      This assay is used as an aid in the diagnosis of individuals suspected of having heart failure. It can be used as an aid in the diagnosis of acute decompensated heart failure (ADHF) in patients presenting with signs and symptoms of ADHF to the emergency department (ED). In addition, NT-proBNP of <300 pg/mL  indicates ADHF is not likely.    Age Range Result Interpretation  NT-proBNP Concentration (pg/mL:      <50             Positive            >450                   Gray                 300-450                    Negative             <300    50-75           Positive            >900                  Gray                300-900                  Negative            <300      >75             Positive            >1800                  Gray                300-1800                  Negative            <300    Single High Sensitivity Troponin T [[id number]]  (Normal) Collected: 11/17/23 1607    Specimen: Blood Updated: 11/17/23 1634     HS Troponin T 15 ng/L     Narrative:      High Sensitive Troponin T Reference Range:  <14.0 ng/L- Negative Female for AMI  <22.0 ng/L- Negative Male for AMI  >=14 - Abnormal Female indicating possible myocardial injury.  >=22 - Abnormal Male indicating possible myocardial injury.   Clinicians would have to utilize clinical acumen, EKG, Troponin, and serial changes to determine if it is an Acute Myocardial Infarction or myocardial injury due to an underlying chronic condition.         CBC Auto Differential [[id number]]  (Abnormal) Collected: 11/17/23 1607    Specimen: Blood Updated: 11/17/23 1613     WBC 9.88 10*3/mm3      RBC 5.03 10*6/mm3      Hemoglobin 15.3 g/dL      Hematocrit 46.4 %      MCV 92.2 fL      MCH 30.4 pg      MCHC 33.0 g/dL      RDW 13.7 %      RDW-SD 46.1 fl      MPV 10.7 fL      Platelets 227 10*3/mm3      Neutrophil % 69.2 %      Lymphocyte % 20.5 %      Monocyte % 6.0 %      Eosinophil % 2.8 %      Basophil % 0.9 %      Immature Grans % 0.6 %      Neutrophils, Absolute 6.83 10*3/mm3      Lymphocytes, Absolute 2.03 10*3/mm3      Monocytes, Absolute 0.59 10*3/mm3      Eosinophils, Absolute 0.28 10*3/mm3      Basophils, Absolute 0.09 10*3/mm3      Immature Grans, Absolute 0.06 10*3/mm3      nRBC 0.0 /100 WBC     Blood Gas, Arterial -With Co-Ox Panel: Yes [[id number]]  (Abnormal)  Collected: 11/17/23 1636    Specimen: Arterial Blood Updated: 11/17/23 1638     pH, Arterial 7.422 pH units      pCO2, Arterial 41.0 mm Hg      pO2, Arterial 65.9 mm Hg      HCO3, Arterial 26.1 mmol/L      Base Excess, Arterial 1.5 mmol/L      O2 Saturation, Arterial 93.7 %      Hemoglobin, Blood Gas 16.0 g/dL      Carboxyhemoglobin 6.5 %      Methemoglobin 0.30 %      Oxyhemoglobin 87.3 %      FHHB 5.9 %      Site Arterial: left radial     Modality Room Air     FIO2 21 %      PO2/FIO2 314     Dann's Test Positive    COVID-19, FLU A/B, RSV PCR 1 HR TAT - Swab, Nasopharynx [[id number]]  (Normal) Collected: 11/17/23 1723    Specimen: Swab from Nasopharynx Updated: 11/17/23 1814     COVID19 Not Detected     Influenza A PCR Not Detected     Influenza B PCR Not Detected     RSV, PCR Not Detected    Narrative:      Fact sheet for providers: https://www.fda.gov/media/316119/download    Fact sheet for patients: https://www.fda.gov/media/192937/download    Test performed by PCR.             Imaging:    XR Chest 1 View    Result Date: 11/17/2023  PROCEDURE: XR CHEST 1 VW  COMPARISON: [facility], , XR CHEST 1 VW, 7/29/2023, 16:00.  INDICATIONS: chest tightness and SOA  FINDINGS:  The heart is normal in size.  The lungs are well-expanded and free of infiltrates.  Bony structures appear intact.       No active disease is seen.       MIKAL HOPE MD       Electronically Signed and Approved By: MIKAL HOPE MD on 11/17/2023 at 16:37                Differential Diagnosis and Discussion:    Dyspnea: Differential diagnosis includes but is not limited to metabolic acidosis, neurological disorders, psychogenic, asthma, pneumothorax, upper airway obstruction, COPD, pneumonia, noncardiogenic pulmonary edema, interstitial lung disease, anemia, congestive heart failure, and pulmonary embolism    All labs were reviewed and interpreted by me.  All X-rays impressions were independently interpreted by me.  EKG was  interpreted by me.    MDM     Amount and/or Complexity of Data Reviewed  Clinical lab tests: reviewed  Tests in the radiology section of CPT®: reviewed  Tests in the medicine section of CPT®: reviewed       The patient presents with shortness of breath consistent with their asthma exacerbations. The patient was monitored in the ED for 3-1/2.  The patient´s CBC that was reviewed and interpreted by me shows no abnormalities of critical concern. Of note, there is no anemia requiring a blood transfusion and the platelet count is acceptable.  The patient´s CMP that was reviewed and interpretted by me shows no abnormalities of critical concern. Of note, the patient´s sodium and potassium are acceptable. The patient´s liver enzymes are unremarkable. The patient´s renal function (creatinine) is preserved. The patient has a normal anion gap.  COVID-19 and influenza swabs are negative.  ABG shows a pH of 7.4 and a PCO2 of 41.  Chest x-ray is negative for acute infiltrate.  The patient reports significant relief of their symptoms with ED treatment. The patient has no respiratory distress or hypoxia. This includes the absence of cervical, clavicular, and abdominal retractions; tachypnea and an oxygen saturation of less than 92% on room air.  The patient is able to speak in full sentences and is ambulatory without hypoxia on exertion. The patient's condition is stable and appropriate for discharge. The patient will be discharged with a prescription for bronchodilators and a steroid. The patient was advised to return for fever, respiratory distress, intractable vomiting, weakness, worsening shortness of breath, chest pain, or altered mental status. The patient will pursue further outpatient evaluation with the primary care physician. The patient has expressed a clear and thorough understanding and agreed to follow-up as instructed.    Critical Care Note: Total Critical Care time of 45 minutes. Total critical care time documented  does not include time spent on separately billed procedures for services of nurses or physician assistants. I personally saw and examined the patient. I have reviewed all diagnostic interpretations and treatment plans as written. I was present for the key portions of any procedures performed and the inclusive time noted in any critical care statement. Critical care time includes patient management by me, time spent at the patients bedside,  time to review lab and imaging results, discussing patient care, documentation in the medical record, and time spent with family or caregiver.        Patient Care Considerations:    SEPSIS IS NOT PRESENT IN THE EMERGENCY DEPARTMENT: Patient meets SIRS criteria in the emergency department however the patient does not have a known source of bacterial infection to confirm the diagnosis of sepsis.        Consultants/Shared Management Plan:    None    Social Determinants of Health:    Patient is independent, reliable, and has access to care.       Disposition and Care Coordination:    Discharged: I considered escalation of care by admitting this patient for observation, however the patient has improved and is suitable and  stable for discharge.    I have explained the patient´s condition, diagnoses and treatment plan based on the information available to me at this time. I have answered questions and addressed any concerns. The patient has a good  understanding of the patient´s diagnosis, condition, and treatment plan as can be expected at this point. The vital signs have been stable. The patient´s condition is stable and appropriate for discharge from the emergency department.      The patient will pursue further outpatient evaluation with the primary care physician or other designated or consulting physician as outlined in the discharge instructions. They are agreeable to this plan of care and follow-up instructions have been explained in detail. The patient has received these  instructions in written format and have expressed an understanding of the discharge instructions. The patient is aware that any significant change in condition or worsening of symptoms should prompt an immediate return to this or the closest emergency department or call to 911.  I have explained discharge medications and the need for follow up with the patient/caretakers. This was also printed in the discharge instructions. Patient was discharged with the following medications and follow up:      Medication List        New Prescriptions      azithromycin 250 MG tablet  Commonly known as: Zithromax Z-Jono  Take 2 tablets by mouth on day 1, then 1 tablet daily on days 2-5     predniSONE 50 MG tablet  Commonly known as: DELTASONE  Take 1 tablet by mouth Daily.            Changed      albuterol 0.63 MG/3ML nebulizer solution  Commonly known as: ACCUNEB  Take 3 mL by nebulization Every 6 (Six) Hours As Needed for Wheezing.  What changed: Another medication with the same name was removed. Continue taking this medication, and follow the directions you see here.               Where to Get Your Medications        These medications were sent to [id number] - [place], [place] - [address] AT [facility] OF [address] & [place] - [phone] [facility] [phone]   [address] [place] [address]      Phone: [phone]   albuterol 0.63 MG/3ML nebulizer solution  azithromycin 250 MG tablet  predniSONE 50 MG tablet      Jailyn Shepard, APRN  [address] [facility] [address]  [facility] [id number] [phone]    In 2 days         Final diagnoses:   Asthma with acute exacerbation, unspecified asthma severity, unspecified whether persistent        ED Disposition       ED Disposition   Discharge    Condition   Stable    Comment   --               This medical record created using voice recognition software.             Kylah Schwartz MD  11/17/23 6670

## 2023-11-29 ENCOUNTER — OFFICE VISIT (OUTPATIENT)
Dept: PULMONOLOGY | Facility: CLINIC | Age: 58
End: 2023-11-29
Payer: MEDICAID

## 2023-11-29 VITALS
HEIGHT: 67 IN | OXYGEN SATURATION: 90 % | SYSTOLIC BLOOD PRESSURE: 153 MMHG | RESPIRATION RATE: 18 BRPM | BODY MASS INDEX: 39.33 KG/M2 | TEMPERATURE: 99.5 F | WEIGHT: 250.6 LBS | DIASTOLIC BLOOD PRESSURE: 87 MMHG | HEART RATE: 106 BPM

## 2023-11-29 DIAGNOSIS — J44.1 COPD WITH ACUTE EXACERBATION: Primary | ICD-10-CM

## 2023-11-29 DIAGNOSIS — J96.11 CHRONIC RESPIRATORY FAILURE WITH HYPOXIA: ICD-10-CM

## 2023-11-29 DIAGNOSIS — Z72.0 TOBACCO ABUSE: ICD-10-CM

## 2023-11-29 PROCEDURE — 1159F MED LIST DOCD IN RCRD: CPT | Performed by: INTERNAL MEDICINE

## 2023-11-29 PROCEDURE — 99214 OFFICE O/P EST MOD 30 MIN: CPT | Performed by: INTERNAL MEDICINE

## 2023-11-29 PROCEDURE — 3079F DIAST BP 80-89 MM HG: CPT | Performed by: INTERNAL MEDICINE

## 2023-11-29 PROCEDURE — 1160F RVW MEDS BY RX/DR IN RCRD: CPT | Performed by: INTERNAL MEDICINE

## 2023-11-29 PROCEDURE — 3077F SYST BP >= 140 MM HG: CPT | Performed by: INTERNAL MEDICINE

## 2023-11-29 RX ORDER — FLUTICASONE FUROATE, UMECLIDINIUM BROMIDE AND VILANTEROL TRIFENATATE 200; 62.5; 25 UG/1; UG/1; UG/1
1 POWDER RESPIRATORY (INHALATION) DAILY
Qty: 60 EACH | Refills: 5 | Status: SHIPPED | OUTPATIENT
Start: 2023-11-29

## 2023-11-29 RX ORDER — AMOXICILLIN 500 MG/1
500 CAPSULE ORAL 3 TIMES DAILY
Qty: 21 CAPSULE | Refills: 0 | Status: SHIPPED | OUTPATIENT
Start: 2023-11-29 | End: 2023-12-06

## 2023-11-29 NOTE — PROGRESS NOTES
Pulmonary Office Follow-up    Subjective     Asad Meyer is seen today at the office for   Chief Complaint   Patient presents with    COPD    Shortness of Breath    Wheezing    Cough    Follow-up     3 month follow up         HPI  Asad Meyer is a 58 y.o. male with a PMH significant for COPD presents for follow-up patient complains of worsening breathlessness along with cough with wheeze and yellowish mucopurulent sputum patient has really recently been on prednisone for his COPD exacerbation patient continues to smoke but denies any chest pain fever or hemoptysis he is also using home oxygen      Tobacco use history:        Patient Active Problem List   Diagnosis    Asthma    Essential (primary) hypertension    Psychophysiological insomnia    GREG (generalized anxiety disorder)    Polyarthralgia    Chronic obstructive pulmonary disease    Mixed hyperlipidemia    DDD (degenerative disc disease), thoracolumbar    Osteoarthritis of multiple joints    Cigarette nicotine dependence without complication    Pneumonia of right lower lobe due to infectious organism    Ground glass opacity present on imaging of lung    Annual physical exam    Renal insufficiency    Acute pain of right knee    Severe sepsis    Baker cyst, right    Sprain of medial collateral ligament of right knee    Acute medial meniscus tear of right knee    Primary osteoarthritis of right knee       Review of Systems  Review of Systems   Respiratory:  Positive for cough, shortness of breath and wheezing.    All other systems reviewed and are negative.    As described in the HPI. Otherwise, remainder of ROS (14 systems) were negative.    Medications, Allergies, Social, and Family Histories reviewed as per EMR.    Result Review :            Objective     Vitals:    11/29/23 1329   BP: 153/87   Pulse: 106   Resp: 18   Temp: 99.5 °F (37.5 °C)   SpO2: 90%         11/29/23  1329   Weight: 114 kg (250 lb 9.6 oz)       Physical Exam  Vitals and nursing note  reviewed.   Constitutional:       Appearance: He is obese. He is ill-appearing.   HENT:      Head: Normocephalic and atraumatic.      Nose: Nose normal.      Mouth/Throat:      Mouth: Mucous membranes are moist.      Pharynx: Oropharynx is clear.   Eyes:      Extraocular Movements: Extraocular movements intact.      Conjunctiva/sclera: Conjunctivae normal.      Pupils: Pupils are equal, round, and reactive to light.   Cardiovascular:      Rate and Rhythm: Normal rate and regular rhythm.      Pulses: Normal pulses.      Heart sounds: Normal heart sounds.   Pulmonary:      Effort: Pulmonary effort is normal.      Breath sounds: Wheezing and rhonchi present.   Abdominal:      General: Abdomen is flat. Bowel sounds are normal.      Palpations: Abdomen is soft.   Musculoskeletal:         General: Normal range of motion.      Cervical back: Normal range of motion and neck supple.   Skin:     General: Skin is warm.      Capillary Refill: Capillary refill takes 2 to 3 seconds.   Neurological:      General: No focal deficit present.      Mental Status: He is alert and oriented to person, place, and time.   Psychiatric:         Mood and Affect: Mood normal.         Behavior: Behavior normal.         XR Chest 1 View    Result Date: 11/17/2023   No active disease is seen.       MIKAL HOPE MD       Electronically Signed and Approved By: MIKAL HOPE MD on 11/17/2023 at 16:37               Assessment & Plan     Diagnoses and all orders for this visit:    1. COPD with acute exacerbation (Primary)    2. Tobacco abuse    3. Chronic respiratory failure with hypoxia    Other orders  -     amoxicillin (AMOXIL) 500 MG capsule; Take 1 capsule by mouth 3 (Three) Times a Day for 7 days.  Dispense: 21 capsule; Refill: 0  -     Fluticasone-Umeclidin-Vilant (Trelegy Ellipta) 200-62.5-25 MCG/ACT aerosol powder ; Inhale 1 puff Daily.  Dispense: 60 each; Refill: 5         Discussion/ Recommendations:   Patient is advised to stop  smoking  Advised to reduce weight his BMI is 39.25  Continue albuterol neb treatments as needed  Continue home oxygen  Refill Trelegy  We will start him on Amoxil for COPD exacerbation for 1 week  Vaccinations discussed and recommended             Return in about 3 months (around 2/29/2024).          This document has been electronically signed by Michael Haile MD on November 29, 2023 13:38 EST

## 2023-12-04 RX ORDER — LISINOPRIL 40 MG/1
TABLET ORAL DAILY
Qty: 90 TABLET | Refills: 3 | Status: SHIPPED | OUTPATIENT
Start: 2023-12-04

## 2023-12-14 ENCOUNTER — TELEPHONE (OUTPATIENT)
Dept: INTERNAL MEDICINE | Facility: CLINIC | Age: 58
End: 2023-12-14

## 2023-12-14 NOTE — TELEPHONE ENCOUNTER
Hub staff attempted to follow warm transfer process and was unsuccessful     Caller: MARJORIE FROM [employer] [facility]    Relationship to patient:     Best call back number: [phone]    Patient is needing: MARJORIE FROM [employer] [facility] STATES THAT THEY FAXED THE OFFICE ON 12.11.23 TO REQUEST MEDICAL RECORDS. SHE STATES THAT THIS IS ON BEHALF OF [employer] AND THAT IT IS NEEDED FOR THE PATIENT'S SOCIAL SECURITY.     MARJORIE STATES THAT WHOEVER CALLS BACK WILL NEED TO PROVIDE THE FOLLOWING:      ID NUMBER [id number]

## 2023-12-15 ENCOUNTER — OFFICE VISIT (OUTPATIENT)
Dept: INTERNAL MEDICINE | Facility: CLINIC | Age: 58
End: 2023-12-15
Payer: MEDICAID

## 2023-12-15 VITALS
SYSTOLIC BLOOD PRESSURE: 142 MMHG | TEMPERATURE: 97.6 F | HEART RATE: 88 BPM | RESPIRATION RATE: 18 BRPM | BODY MASS INDEX: 37.51 KG/M2 | DIASTOLIC BLOOD PRESSURE: 86 MMHG | HEIGHT: 67 IN | OXYGEN SATURATION: 97 % | WEIGHT: 239 LBS

## 2023-12-15 DIAGNOSIS — J44.9 CHRONIC OBSTRUCTIVE PULMONARY DISEASE, UNSPECIFIED COPD TYPE: ICD-10-CM

## 2023-12-15 DIAGNOSIS — E78.2 MIXED HYPERLIPIDEMIA: ICD-10-CM

## 2023-12-15 DIAGNOSIS — F17.210 CIGARETTE NICOTINE DEPENDENCE WITHOUT COMPLICATION: ICD-10-CM

## 2023-12-15 DIAGNOSIS — F41.1 GAD (GENERALIZED ANXIETY DISORDER): ICD-10-CM

## 2023-12-15 DIAGNOSIS — I10 ESSENTIAL (PRIMARY) HYPERTENSION: Primary | ICD-10-CM

## 2023-12-15 PROCEDURE — 3079F DIAST BP 80-89 MM HG: CPT

## 2023-12-15 PROCEDURE — 1159F MED LIST DOCD IN RCRD: CPT

## 2023-12-15 PROCEDURE — 1160F RVW MEDS BY RX/DR IN RCRD: CPT

## 2023-12-15 PROCEDURE — 99214 OFFICE O/P EST MOD 30 MIN: CPT

## 2023-12-15 PROCEDURE — 3077F SYST BP >= 140 MM HG: CPT

## 2023-12-15 RX ORDER — NICOTINE 21 MG/24HR
1 PATCH, TRANSDERMAL 24 HOURS TRANSDERMAL EVERY 24 HOURS
Qty: 30 PATCH | Refills: 1 | Status: SHIPPED | OUTPATIENT
Start: 2023-12-15

## 2023-12-15 RX ORDER — FUROSEMIDE 20 MG/1
20 TABLET ORAL DAILY
Qty: 90 TABLET | Refills: 1 | Status: SHIPPED | OUTPATIENT
Start: 2023-12-15

## 2023-12-15 NOTE — ASSESSMENT & PLAN NOTE
Patient with a history of anxiety, PTSD.  Remains on remeron, xanax and gabapentin per psych.  Continue current regimen.

## 2023-12-15 NOTE — ASSESSMENT & PLAN NOTE
HTN is improving for patient.  He has been trying to lose weight and working on smoking cessation.  He is currently on Lotrel. He is out of lasix.  We will refill lasix, continue lotrel.   Monitor BP at home.   Continue with healthy diet and increasing activity.

## 2023-12-15 NOTE — PROGRESS NOTES
Chief Complaint  Hyperlipidemia (58 year old male here today for a routine 3 month follow up. States he is doing okay overall. )    History of Present Illness  SUBJECTIVE  Asad Meyer presents to [facility] follow up on chronic disease management.     Patient is trying to lose weight-he is eating healthier.    Patient is also working on smoking cessation. Patient admits he is smoking about 1/2 PPD.     He has not had his labs completed yet. He is not fasting today.    He denies any chest pain, soa, palpitations, nausea, vomiting, diarrhea, dizziness, weakness, change to bowel/bladder havits.       Past Medical History:   Diagnosis Date    Anxiety     Asthma     Asthma, extrinsic     Chronic pain disorder     Depression     Emphysema of lung     Essential (primary) hypertension 10/27/2022    Pneumonia of right lower lobe due to infectious organism 04/06/2023    PTSD (post-traumatic stress disorder)     Rheumatoid arthritis     Schizoaffective disorder     Self-injurious behavior     Suicide attempt       Family History   Problem Relation Age of Onset    Alcohol abuse Father     Asthma Father     Emphysema Father     Heart failure Father       Past Surgical History:   Procedure Laterality Date    ANKLE SURGERY Right     BRONCHOSCOPY N/A 4/11/2023    Procedure: BRONCHOSCOPY WITH BRONCHOALVEOLAR LAVAGE;  Surgeon: Taiwo Copeland DO;  Location: [facility] ENDOSCOPY;  Service: Pulmonary;  Laterality: N/A;  DIFFUSE BRONCHITIS        Current Outpatient Medications:     albuterol (ACCUNEB) 0.63 MG/3ML nebulizer solution, Take 3 mL by nebulization Every 6 (Six) Hours As Needed for Wheezing., Disp: 30 each, Rfl: 0    amLODIPine-benazepril (LOTREL) 10-40 MG per capsule, TAKE 1 CAPSULE BY MOUTH DAILY, Disp: 90 capsule, Rfl: 1    atorvastatin (LIPITOR) 10 MG tablet, TAKE 1 TABLET BY MOUTH DAILY, Disp: 90 tablet, Rfl: 0    Fluticasone-Umeclidin-Vilant (Trelegy Ellipta) 200-62.5-25  "MCG/ACT aerosol powder , Inhale 1 puff Daily., Disp: 60 each, Rfl: 5    furosemide (LASIX) 20 MG tablet, Take 1 tablet by mouth Daily., Disp: 90 tablet, Rfl: 1    gabapentin (Neurontin) 800 MG tablet, Take 1 tablet by mouth 3 (Three) Times a Day for 30 days., Disp: 90 tablet, Rfl: 1    ipratropium-albuterol (DUO-NEB) 0.5-2.5 mg/3 ml nebulizer, USE 3 ML VIA NEBULIZER EVERY 4 HOURS AS NEEDED FOR WHEEZING, Disp: 360 mL, Rfl: 1    mirtazapine (Remeron) 15 MG tablet, Take 0.5 to 1 tab PO QHS PRN sleep, Disp: 30 tablet, Rfl: 1    nicotine (Nicoderm CQ) 21 MG/24HR patch, Place 1 patch on the skin as directed by provider Daily., Disp: 30 patch, Rfl: 1    OBJECTIVE  Vital Signs:   /86 (BP Location: Left arm, Patient Position: Sitting)   Pulse 88   Temp 97.6 °F (36.4 °C) (Temporal)   Resp 18   Ht 170.2 cm (67\")   Wt 108 kg (239 lb)   SpO2 97%   BMI 37.43 kg/m²    Estimated body mass index is 37.43 kg/m² as calculated from the following:    Height as of this encounter: 170.2 cm (67\").    Weight as of this encounter: 108 kg (239 lb).     Wt Readings from Last 3 Encounters:   12/15/23 108 kg (239 lb)   11/29/23 114 kg (250 lb 9.6 oz)   11/17/23 113 kg (248 lb 3.8 oz)     BP Readings from Last 3 Encounters:   12/15/23 142/86   11/29/23 153/87   11/17/23 107/85       Physical Exam  Vitals and nursing note reviewed.   Constitutional:       Appearance: Normal appearance.   HENT:      Head: Normocephalic.   Eyes:      Extraocular Movements: Extraocular movements intact.      Conjunctiva/sclera: Conjunctivae normal.   Cardiovascular:      Rate and Rhythm: Normal rate and regular rhythm.      Heart sounds: Normal heart sounds. No murmur heard.  Pulmonary:      Effort: Pulmonary effort is normal.      Breath sounds: Wheezing present. No rales.   Abdominal:      General: Bowel sounds are normal.      Palpations: Abdomen is soft.      Tenderness: There is no abdominal tenderness. There is no guarding.   Musculoskeletal:       "   General: No swelling. Normal range of motion.      Cervical back: Normal range of motion and neck supple.   Skin:     General: Skin is warm and dry.   Neurological:      General: No focal deficit present.      Mental Status: He is alert and oriented to person, place, and time. Mental status is at baseline.   Psychiatric:         Mood and Affect: Mood normal.         Behavior: Behavior normal.         Thought Content: Thought content normal.         Judgment: Judgment normal.          Result Review        XR Chest 1 View    Result Date: 11/17/2023   No active disease is seen.       MIKAL HOPE MD       Electronically Signed and Approved By: MIKAL HOPE MD on 11/17/2023 at 16:37                The above data has been reviewed by PONCE Valadez 12/15/2023 11:29 EST.          Patient Care Team:  Jailyn Shepard APRN as PCP - General (Internal Medicine)            ASSESSMENT & PLAN    Diagnoses and all orders for this visit:    1. Essential (primary) hypertension (Primary)  Assessment & Plan:  HTN is improving for patient.  He has been trying to lose weight and working on smoking cessation.  He is currently on Lotrel. He is out of lasix.  We will refill lasix, continue lotrel.   Monitor BP at home.   Continue with healthy diet and increasing activity.    Orders:  -     furosemide (LASIX) 20 MG tablet; Take 1 tablet by mouth Daily.  Dispense: 90 tablet; Refill: 1    2. Mixed hyperlipidemia  Assessment & Plan:  Patient is currently on lipitor 10 mg qhs  No recent labs  Patient will have lipid panel drawn when he is fasting.  Continue with lifestyle modifications.       3. Chronic obstructive pulmonary disease, unspecified COPD type  Assessment & Plan:  Patient states that he is feeling better from recent COPD exacerbation. He was recently treated by pulm for COPDE.  He was started on amoxil  He is trying to quite smoking  O2 sat is 97% on RA. Denies much shortness of breath today.   He is wheezing today.  Continue  with smoking cessation.  Continue trelegy, increase duonebs to 3-4 times/day.      4. Cigarette nicotine dependence without complication  -     nicotine (Nicoderm CQ) 21 MG/24HR patch; Place 1 patch on the skin as directed by provider Daily.  Dispense: 30 patch; Refill: 1    5. GREG (generalized anxiety disorder)  Assessment & Plan:  Patient with a history of anxiety, PTSD.  Remains on remeron, xanax and gabapentin per psych.  Continue current regimen.      Other orders  -     Cancel: Tdap Vaccine => 8yo IM (BOOSTRIX)         Tobacco Use: High Risk (12/15/2023)    Patient History     Smoking Tobacco Use: Every Day     Smokeless Tobacco Use: Current     Passive Exposure: Current       Follow Up     Return in about 3 months (around 3/15/2024).    Please note that portions of this note were completed with a voice recognition program.    Patient was given instructions and counseling regarding his condition or for health maintenance advice. Please see specific information pulled into the AVS if appropriate.   I have reviewed information obtained and documented by others and I have confirmed the accuracy of this documented note.    PONCE Valadez

## 2023-12-15 NOTE — ASSESSMENT & PLAN NOTE
Patient is currently on lipitor 10 mg qhs  No recent labs  Patient will have lipid panel drawn when he is fasting.  Continue with lifestyle modifications.

## 2023-12-15 NOTE — ASSESSMENT & PLAN NOTE
Patient states that he is feeling better from recent COPD exacerbation. He was recently treated by pulm for COPDE.  He was started on amoxil  He is trying to quite smoking  O2 sat is 97% on RA. Denies much shortness of breath today.   He is wheezing today.  Continue with smoking cessation.  Continue trelegy, increase duonebs to 3-4 times/day.

## 2023-12-22 ENCOUNTER — OFFICE VISIT (OUTPATIENT)
Dept: BEHAVIORAL HEALTH | Facility: CLINIC | Age: 58
End: 2023-12-22
Payer: MEDICAID

## 2023-12-22 VITALS
SYSTOLIC BLOOD PRESSURE: 136 MMHG | HEIGHT: 67 IN | WEIGHT: 239.8 LBS | BODY MASS INDEX: 37.64 KG/M2 | DIASTOLIC BLOOD PRESSURE: 82 MMHG

## 2023-12-22 DIAGNOSIS — F33.2 SEVERE EPISODE OF RECURRENT MAJOR DEPRESSIVE DISORDER, WITHOUT PSYCHOTIC FEATURES: ICD-10-CM

## 2023-12-22 DIAGNOSIS — F41.1 GENERALIZED ANXIETY DISORDER: Primary | ICD-10-CM

## 2023-12-22 DIAGNOSIS — F41.0 PANIC DISORDER: ICD-10-CM

## 2023-12-22 DIAGNOSIS — G47.00 INSOMNIA, UNSPECIFIED TYPE: ICD-10-CM

## 2023-12-22 DIAGNOSIS — F43.10 POST TRAUMATIC STRESS DISORDER (PTSD): ICD-10-CM

## 2023-12-22 RX ORDER — ALPRAZOLAM 1 MG/1
1 TABLET ORAL 2 TIMES DAILY PRN
Qty: 60 TABLET | Refills: 0 | Status: SHIPPED | OUTPATIENT
Start: 2023-12-22 | End: 2024-01-21

## 2023-12-22 RX ORDER — MIRTAZAPINE 30 MG/1
30 TABLET, FILM COATED ORAL NIGHTLY
Qty: 90 TABLET | Refills: 0 | Status: SHIPPED | OUTPATIENT
Start: 2023-12-22 | End: 2024-03-21

## 2023-12-22 RX ORDER — GABAPENTIN 800 MG/1
800 TABLET ORAL 3 TIMES DAILY
Qty: 90 TABLET | Refills: 1 | Status: SHIPPED | OUTPATIENT
Start: 2023-12-22 | End: 2024-01-21

## 2023-12-22 NOTE — PROGRESS NOTES
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 Arterial: right radial   Final    Modality 07/29/2023 BiPap   Final    FIO2 07/29/2023 28  % Final    Sodium, Arterial 07/29/2023 140.6  136 - 146 mmol/L Final    Potassium, Arterial 07/29/2023 4.99  3.5 - 5 mmol/L Final    Ionized Calcium, Arterial 07/29/2023 1.21  1.13 - 1.32 mmol/L Final    Chloride, Arterial 07/29/2023 105  98 - 106 mmol/L Final    Glucose, Arterial 07/29/2023 109 (H)  70 - 99 mg/dL Final    Lactate, Arterial 07/29/2023 1.30  0.5 - 2 mmol/L Final    PO2/FIO2 07/29/2023 247  0 - 500 Final    Glucose 07/29/2023 75  70 - 99 mg/dL Final    Serial Number: [id number]    Glucose 07/29/2023 80  70 - 130 mg/dL Final    POC    Glucose 07/29/2023 80  70 - 99 mg/dL Final    Serial Number: [id number]  [id number]    Procalcitonin 07/29/2023 0.15  0.00 - 0.25 ng/mL Final    Glucose 07/30/2023 82  65 - 99 mg/dL Final    BUN 07/30/2023 31 (H)  6 - 20 mg/dL Final    Creatinine 07/30/2023 1.53 (H)  0.76 - 1.27 mg/dL Final    Sodium 07/30/2023 141  136 - 145 mmol/L Final    Potassium 07/30/2023 5.0  3.5 - 5.2 mmol/L Final    Slight hemolysis detected by analyzer. Results may be affected.    Chloride 07/30/2023 107  98 - 107 mmol/L Final    CO2 07/30/2023 26.2  22.0 - 29.0 mmol/L Final    Calcium 07/30/2023 8.7  8.6 - 10.5 mg/dL Final    BUN/Creatinine Ratio 07/30/2023 20.3  7.0 - 25.0 Final    Anion  Gap 07/30/2023 7.8  5.0 - 15.0 mmol/L Final    eGFR 07/30/2023 53.0 (L)  >60.0 mL/min/1.73 Final    Lactate 07/29/2023 0.8  0.5 - 2.0 mmol/L Final    Glucose 07/30/2023 85  65 - 99 mg/dL Final    BUN 07/30/2023 25 (H)  6 - 20 mg/dL Final    Creatinine 07/30/2023 1.42 (H)  0.76 - 1.27 mg/dL Final    Sodium 07/30/2023 141  136 - 145 mmol/L Final    Potassium 07/30/2023 4.7  3.5 - 5.2 mmol/L Final    Chloride 07/30/2023 106  98 - 107 mmol/L Final    CO2 07/30/2023 30.9 (H)  22.0 - 29.0 mmol/L Final    Calcium 07/30/2023 8.9  8.6 - 10.5 mg/dL Final    BUN/Creatinine Ratio 07/30/2023 17.6  7.0 - 25.0 Final    Anion Gap 07/30/2023 4.1 (L)  5.0 - 15.0 mmol/L Final    eGFR 07/30/2023 58.0 (L)  >60.0 mL/min/1.73 Final    MRSA PCR 07/30/2023 No MRSA Detected  No MRSA Detected Final   Lab on 07/25/2023   Component Date Value Ref Range Status    Glucose 07/25/2023 81  65 - 99 mg/dL Final    BUN 07/25/2023 28 (H)  6 - 20 mg/dL Final    Creatinine 07/25/2023 1.50 (H)  0.76 - 1.27 mg/dL Final    Sodium 07/25/2023 146 (H)  136 - 145 mmol/L Final    Potassium 07/25/2023 3.9  3.5 - 5.2 mmol/L Final    Chloride 07/25/2023 103  98 - 107 mmol/L Final    CO2 07/25/2023 32.0 (H)  22.0 - 29.0 mmol/L Final    Calcium 07/25/2023 9.6  8.6 - 10.5 mg/dL Final    Total Protein 07/25/2023 6.6  6.0 - 8.5 g/dL Final    Albumin 07/25/2023 4.4  3.5 - 5.2 g/dL Final    ALT (SGPT) 07/25/2023 23  1 - 41 U/L Final    AST (SGOT) 07/25/2023 17  1 - 40 U/L Final    Alkaline Phosphatase 07/25/2023 106  39 - 117 U/L Final    Total Bilirubin 07/25/2023 0.2  0.0 - 1.2 mg/dL Final    Globulin 07/25/2023 2.2  gm/dL Final    A/G Ratio 07/25/2023 2.0  g/dL Final    BUN/Creatinine Ratio 07/25/2023 18.7  7.0 - 25.0 Final    Anion Gap 07/25/2023 11.0  5.0 - 15.0 mmol/L Final    eGFR 07/25/2023 54.3 (L)  >60.0 mL/min/1.73 Final   Admission on 07/07/2023, Discharged on 07/07/2023   Component Date Value Ref Range Status    Glucose 07/07/2023 116 (H)  65 - 99 mg/dL Final     BUN 07/07/2023 32 (H)  6 - 20 mg/dL Final    Creatinine 07/07/2023 1.77 (H)  0.76 - 1.27 mg/dL Final    Sodium 07/07/2023 141  136 - 145 mmol/L Final    Potassium 07/07/2023 4.3  3.5 - 5.2 mmol/L Final    Chloride 07/07/2023 102  98 - 107 mmol/L Final    CO2 07/07/2023 26.9  22.0 - 29.0 mmol/L Final    Calcium 07/07/2023 9.9  8.6 - 10.5 mg/dL Final    Total Protein 07/07/2023 7.6  6.0 - 8.5 g/dL Final    Albumin 07/07/2023 4.1  3.5 - 5.2 g/dL Final    ALT (SGPT) 07/07/2023 20  1 - 41 U/L Final    AST (SGOT) 07/07/2023 23  1 - 40 U/L Final    Alkaline Phosphatase 07/07/2023 118 (H)  39 - 117 U/L Final    Total Bilirubin 07/07/2023 0.3  0.0 - 1.2 mg/dL Final    Globulin 07/07/2023 3.5  gm/dL Final    A/G Ratio 07/07/2023 1.2  g/dL Final    BUN/Creatinine Ratio 07/07/2023 18.1  7.0 - 25.0 Final    Anion Gap 07/07/2023 12.1  5.0 - 15.0 mmol/L Final    eGFR 07/07/2023 44.5 (L)  >60.0 mL/min/1.73 Final    Lipase 07/07/2023 23  13 - 60 U/L Final    Color, UA 07/07/2023 Yellow  Yellow, Straw Final    Appearance, UA 07/07/2023 Clear  Clear Final    pH, UA 07/07/2023 5.5  5.0 - 8.0 Final    Specific Gravity, UA 07/07/2023 1.010  1.005 - 1.030 Final    Glucose, UA 07/07/2023 Negative  Negative Final    Ketones, UA 07/07/2023 Negative  Negative Final    Bilirubin, UA 07/07/2023 Negative  Negative Final    Blood, UA 07/07/2023 Negative  Negative Final    Protein, UA 07/07/2023 Negative  Negative Final    Leuk Esterase, UA 07/07/2023 Negative  Negative Final    Nitrite, UA 07/07/2023 Negative  Negative Final    Urobilinogen, UA 07/07/2023 0.2 E.U./dL  0.2 - 1.0 E.U./dL Final    WBC 07/07/2023 10.91 (H)  3.40 - 10.80 10*3/mm3 Final    RBC 07/07/2023 4.85  4.14 - 5.80 10*6/mm3 Final    Hemoglobin 07/07/2023 15.0  13.0 - 17.7 g/dL Final    Hematocrit 07/07/2023 45.1  37.5 - 51.0 % Final    MCV 07/07/2023 93.0  79.0 - 97.0 fL Final    MCH 07/07/2023 30.9  26.6 - 33.0 pg Final    MCHC 07/07/2023 33.3  31.5 - 35.7 g/dL Final    RDW  07/07/2023 16.1 (H)  12.3 - 15.4 % Final    RDW-SD 07/07/2023 53.9  37.0 - 54.0 fl Final    MPV 07/07/2023 10.8  6.0 - 12.0 fL Final    Platelets 07/07/2023 244  140 - 450 10*3/mm3 Final    Neutrophil % 07/07/2023 73.0  42.7 - 76.0 % Final    Lymphocyte % 07/07/2023 15.7 (L)  19.6 - 45.3 % Final    Monocyte % 07/07/2023 8.1  5.0 - 12.0 % Final    Eosinophil % 07/07/2023 2.1  0.3 - 6.2 % Final    Basophil % 07/07/2023 0.6  0.0 - 1.5 % Final    Immature Grans % 07/07/2023 0.5  0.0 - 0.5 % Final    Neutrophils, Absolute 07/07/2023 7.96 (H)  1.70 - 7.00 10*3/mm3 Final    Lymphocytes, Absolute 07/07/2023 1.71  0.70 - 3.10 10*3/mm3 Final    Monocytes, Absolute 07/07/2023 0.88  0.10 - 0.90 10*3/mm3 Final    Eosinophils, Absolute 07/07/2023 0.23  0.00 - 0.40 10*3/mm3 Final    Basophils, Absolute 07/07/2023 0.07  0.00 - 0.20 10*3/mm3 Final    Immature Grans, Absolute 07/07/2023 0.06 (H)  0.00 - 0.05 10*3/mm3 Final    nRBC 07/07/2023 0.0  0.0 - 0.2 /100 WBC Final    Extra Tube 07/07/2023 Hold for add-ons.   Final    Auto resulted.    Extra Tube 07/07/2023 hold for add-on   Final    Auto resulted    Extra Tube 07/07/2023 Hold for add-ons.   Final    Auto resulted.    Extra Tube 07/07/2023 Hold for add-ons.   Final    Auto resulted    proBNP 07/07/2023 117.0  0.0 - 900.0 pg/mL Final   Lab on 06/30/2023   Component Date Value Ref Range Status    Glucose 06/30/2023 97  65 - 99 mg/dL Final    BUN 06/30/2023 20  6 - 20 mg/dL Final    Creatinine 06/30/2023 1.28 (H)  0.76 - 1.27 mg/dL Final    Sodium 06/30/2023 139  136 - 145 mmol/L Final    Potassium 06/30/2023 4.4  3.5 - 5.2 mmol/L Final    Chloride 06/30/2023 102  98 - 107 mmol/L Final    CO2 06/30/2023 26.8  22.0 - 29.0 mmol/L Final    Calcium 06/30/2023 9.8  8.6 - 10.5 mg/dL Final    Total Protein 06/30/2023 7.4  6.0 - 8.5 g/dL Final    Albumin 06/30/2023 4.3  3.5 - 5.2 g/dL Final    ALT (SGPT) 06/30/2023 26  1 - 41 U/L Final    AST (SGOT) 06/30/2023 28  1 - 40 U/L Final     Alkaline Phosphatase 06/30/2023 113  39 - 117 U/L Final    Total Bilirubin 06/30/2023 0.4  0.0 - 1.2 mg/dL Final    Globulin 06/30/2023 3.1  gm/dL Final    A/G Ratio 06/30/2023 1.4  g/dL Final    BUN/Creatinine Ratio 06/30/2023 15.6  7.0 - 25.0 Final    Anion Gap 06/30/2023 10.2  5.0 - 15.0 mmol/L Final    eGFR 06/30/2023 65.7  >60.0 mL/min/1.73 Final    Total Cholesterol 06/30/2023 186  0 - 200 mg/dL Final    Triglycerides 06/30/2023 254 (H)  0 - 150 mg/dL Final    HDL Cholesterol 06/30/2023 38 (L)  40 - 60 mg/dL Final    LDL Cholesterol  06/30/2023 104 (H)  0 - 100 mg/dL Final    VLDL Cholesterol 06/30/2023 44 (H)  5 - 40 mg/dL Final    LDL/HDL Ratio 06/30/2023 2.56   Final    TSH 06/30/2023 1.750  0.270 - 4.200 uIU/mL Final    TSH 06/30/2023 1.750  0.270 - 4.200 uIU/mL Final    Free T4 06/30/2023 0.81 (L)  0.93 - 1.70 ng/dL Final    T4 results may be falsely increased if patient taking Biotin.    WBC 06/30/2023 7.91  3.40 - 10.80 10*3/mm3 Final    RBC 06/30/2023 4.74  4.14 - 5.80 10*6/mm3 Final    Hemoglobin 06/30/2023 14.3  13.0 - 17.7 g/dL Final    Hematocrit 06/30/2023 42.0  37.5 - 51.0 % Final    MCV 06/30/2023 88.6  79.0 - 97.0 fL Final    MCH 06/30/2023 30.2  26.6 - 33.0 pg Final    MCHC 06/30/2023 34.0  31.5 - 35.7 g/dL Final    RDW 06/30/2023 14.5  12.3 - 15.4 % Final    RDW-SD 06/30/2023 46.4  37.0 - 54.0 fl Final    MPV 06/30/2023 11.5  6.0 - 12.0 fL Final    Platelets 06/30/2023 221  140 - 450 10*3/mm3 Final    Neutrophil % 06/30/2023 64.2  42.7 - 76.0 % Final    Lymphocyte % 06/30/2023 22.4  19.6 - 45.3 % Final    Monocyte % 06/30/2023 7.2  5.0 - 12.0 % Final    Eosinophil % 06/30/2023 4.0  0.3 - 6.2 % Final    Basophil % 06/30/2023 1.1  0.0 - 1.5 % Final    Immature Grans % 06/30/2023 1.1 (H)  0.0 - 0.5 % Final    Neutrophils, Absolute 06/30/2023 5.07  1.70 - 7.00 10*3/mm3 Final    Lymphocytes, Absolute 06/30/2023 1.77  0.70 - 3.10 10*3/mm3 Final    Monocytes, Absolute 06/30/2023 0.57  0.10 - 0.90  10*3/mm3 Final    Eosinophils, Absolute 06/30/2023 0.32  0.00 - 0.40 10*3/mm3 Final    Basophils, Absolute 06/30/2023 0.09  0.00 - 0.20 10*3/mm3 Final    Immature Grans, Absolute 06/30/2023 0.09 (H)  0.00 - 0.05 10*3/mm3 Final    nRBC 06/30/2023 0.1  0.0 - 0.2 /100 WBC Final   Office Visit on 05/25/2023   Component Date Value Ref Range Status    Glucose 05/25/2023 97  65 - 99 mg/dL Final    BUN 05/25/2023 24 (H)  6 - 20 mg/dL Final    Creatinine 05/25/2023 1.34 (H)  0.76 - 1.27 mg/dL Final    Sodium 05/25/2023 140  136 - 145 mmol/L Final    Potassium 05/25/2023 4.4  3.5 - 5.2 mmol/L Final    Chloride 05/25/2023 102  98 - 107 mmol/L Final    CO2 05/25/2023 28.8  22.0 - 29.0 mmol/L Final    Calcium 05/25/2023 10.0  8.6 - 10.5 mg/dL Final    BUN/Creatinine Ratio 05/25/2023 17.9  7.0 - 25.0 Final    Anion Gap 05/25/2023 9.2  5.0 - 15.0 mmol/L Final    eGFR 05/25/2023 62.2  >60.0 mL/min/1.73 Final   Office Visit on 04/25/2023   Component Date Value Ref Range Status    Glucose 04/25/2023 102 (H)  65 - 99 mg/dL Final    BUN 04/25/2023 19  6 - 20 mg/dL Final    Creatinine 04/25/2023 1.38 (H)  0.76 - 1.27 mg/dL Final    Sodium 04/25/2023 136  136 - 145 mmol/L Final    Potassium 04/25/2023 4.4  3.5 - 5.2 mmol/L Final    Chloride 04/25/2023 101  98 - 107 mmol/L Final    CO2 04/25/2023 23.8  22.0 - 29.0 mmol/L Final    Calcium 04/25/2023 9.6  8.6 - 10.5 mg/dL Final    BUN/Creatinine Ratio 04/25/2023 13.8  7.0 - 25.0 Final    Anion Gap 04/25/2023 11.2  5.0 - 15.0 mmol/L Final    eGFR 04/25/2023 60.0 (L)  >60.0 mL/min/1.73 Final    proBNP 04/25/2023 35.1  0.0 - 900.0 pg/mL Final    WBC 04/25/2023 7.73  3.40 - 10.80 10*3/mm3 Final    RBC 04/25/2023 4.70  4.14 - 5.80 10*6/mm3 Final    Hemoglobin 04/25/2023 14.8  13.0 - 17.7 g/dL Final    Hematocrit 04/25/2023 42.9  37.5 - 51.0 % Final    MCV 04/25/2023 91.3  79.0 - 97.0 fL Final    MCH 04/25/2023 31.5  26.6 - 33.0 pg Final    MCHC 04/25/2023 34.5  31.5 - 35.7 g/dL Final    RDW  04/25/2023 13.2  12.3 - 15.4 % Final    RDW-SD 04/25/2023 44.2  37.0 - 54.0 fl Final    MPV 04/25/2023 11.3  6.0 - 12.0 fL Final    Platelets 04/25/2023 298  140 - 450 10*3/mm3 Final    Neutrophil % 04/25/2023 66.2  42.7 - 76.0 % Final    Lymphocyte % 04/25/2023 20.6  19.6 - 45.3 % Final    Monocyte % 04/25/2023 7.8  5.0 - 12.0 % Final    Eosinophil % 04/25/2023 4.1  0.3 - 6.2 % Final    Basophil % 04/25/2023 0.9  0.0 - 1.5 % Final    Immature Grans % 04/25/2023 0.4  0.0 - 0.5 % Final    Neutrophils, Absolute 04/25/2023 5.12  1.70 - 7.00 10*3/mm3 Final    Lymphocytes, Absolute 04/25/2023 1.59  0.70 - 3.10 10*3/mm3 Final    Monocytes, Absolute 04/25/2023 0.60  0.10 - 0.90 10*3/mm3 Final    Eosinophils, Absolute 04/25/2023 0.32  0.00 - 0.40 10*3/mm3 Final    Basophils, Absolute 04/25/2023 0.07  0.00 - 0.20 10*3/mm3 Final    Immature Grans, Absolute 04/25/2023 0.03  0.00 - 0.05 10*3/mm3 Final    nRBC 04/25/2023 0.0  0.0 - 0.2 /100 WBC Final   Admission on 04/11/2023, Discharged on 04/11/2023   Component Date Value Ref Range Status    Case Report 04/11/2023    Final                    Value:Medical Cytology Report                           Case: [id number]                                  Authorizing Provider:  Taiwo Copeland, Collected:           04/11/2023 09:40 AM                                 DO                                                                           Ordering Location:     [facility] Received:            04/12/2023 08:53 AM                                 SUITES                                                                       Pathologist:           Maria Teresa Borja DO                                                       Specimen:    Lung, Right Upper Lobe, RIGHT UPPER LOBE BAL                                               Final Diagnosis 04/11/2023    Final                    Value:This result contains rich text formatting which cannot be displayed here.     Clinical Information 04/11/2023    Final                    Value:This result contains rich text formatting which cannot be displayed here.    Comment 04/11/2023    Final                    Value:This result contains rich text formatting which cannot be displayed here.    Gross Description 04/11/2023    Final                    Value:This result contains rich text formatting which cannot be displayed here.    Microscopic Description 04/11/2023    Final    This result contains rich text formatting which cannot be displayed here.    Special Stains 04/11/2023    Final                    Value:This result contains rich text formatting which cannot be displayed here.    Fungus Culture 04/11/2023 Heavy growth (4+) Candida albicans (A)   Final    AFB Culture 04/11/2023 No AFB isolated at 6 weeks   Final    AFB Stain 04/11/2023 No acid fast bacilli seen on direct smear   Final    AFB Stain 04/11/2023 No acid fast bacilli seen on concentrated smear   Final    BAL Culture 04/11/2023 >100,000 CFU/mL Normal respiratory lee. No S. aureus or Pseudomonas aeruginosa detected. Final report.   Final    Gram Stain 04/11/2023 Moderate (3+) Gram positive cocci in pairs and chains   Final    Escherichia coli PCR 04/11/2023 Not Detected  Not Detected Final    Acinetobacter calcoaceticus-travon* 04/11/2023 Not Detected  Not Detected Final    Enterobacter cloacae PCR 04/11/2023 Not Detected  Not Detected Final    Klebsiella oxytoca PCR 04/11/2023 Not Detected  Not Detected Final    Klebsiella pneumoniae group PCR 04/11/2023 Not Detected  Not Detected Final    Klebsiella aerogenes PCR 04/11/2023 Not Detected  Not Detected Final    Moraxella catarrhalis PCR 04/11/2023 Not Detected  Not Detected Final    Proteus species PCR 04/11/2023 Not Detected  Not Detected Final    Pseudomonas aeroginosa PCR 04/11/2023 Not Detected  Not Detected Final    Serratia marcescens PCR 04/11/2023 Not Detected  Not Detected Final    Staphylococcus aureus PCR  04/11/2023 Not Detected  Not Detected Final    Streptococcus pyogenes PCR 04/11/2023 Not Detected  Not Detected Final    Haemophilus influenzae PCR 04/11/2023 Not Detected  Not Detected Final    Streptococcus agalactiae PCR 04/11/2023 Not Detected  Not Detected Final    Streptococcus pneumoniae PCR 04/11/2023 Not Detected  Not Detected Final    Chlamydophila pneumoniae PCR 04/11/2023 Not Detected  Not Detected Final    Legionella pneumophilia PCR 04/11/2023 Not Detected  Not Detected Final    Mycoplasma pneumo by PCR 04/11/2023 Not Detected  Not Detected Final    ADENOVIRUS, PCR 04/11/2023 Not Detected  Not Detected Final    CTX-M Gene 04/11/2023 N/A  Not Detected, N/A Final    IMP Gene 04/11/2023 N/A  Not Detected, N/A Final    KPC Gene 04/11/2023 N/A  Not Detected, N/A Final    mecA/C and MREJ Gene 04/11/2023 N/A  Not Detected, N/A Final    NDM Gene 04/11/2023 N/A  Not Detected, N/A Final    OXA-48-like Gene 04/11/2023 N/A  Not Detected, N/A Final    VIM Gene 04/11/2023 N/A  Not Detected, N/A Final    Coronavirus 04/11/2023 Not Detected  Not Detected Final    Human Metapneumovirus 04/11/2023 Not Detected  Not Detected Final    Human Rhinovirus/Enterovirus 04/11/2023 Not Detected  Not Detected Final    Influenza A PCR 04/11/2023 Not Detected  Not Detected Final    Influenza B PCR 04/11/2023 Not Detected  Not Detected Final    RSV, PCR 04/11/2023 Not Detected  Not Detected Final    Parainfluenza virus PCR 04/11/2023 Not Detected  Not Detected Final    Neutrophils, Fluid 04/11/2023 100  % Final   Lab on 03/28/2023   Component Date Value Ref Range Status    WBC 03/28/2023 10.03  3.40 - 10.80 10*3/mm3 Final    RBC 03/28/2023 4.61  4.14 - 5.80 10*6/mm3 Final    Hemoglobin 03/28/2023 14.4  13.0 - 17.7 g/dL Final    Hematocrit 03/28/2023 42.2  37.5 - 51.0 % Final    MCV 03/28/2023 91.5  79.0 - 97.0 fL Final    MCH 03/28/2023 31.2  26.6 - 33.0 pg Final    MCHC 03/28/2023 34.1  31.5 - 35.7 g/dL Final    RDW 03/28/2023 13.4   12.3 - 15.4 % Final    RDW-SD 03/28/2023 45.3  37.0 - 54.0 fl Final    MPV 03/28/2023 11.6  6.0 - 12.0 fL Final    Platelets 03/28/2023 246  140 - 450 10*3/mm3 Final    Neutrophil % 03/28/2023 69.6  42.7 - 76.0 % Final    Lymphocyte % 03/28/2023 19.6  19.6 - 45.3 % Final    Monocyte % 03/28/2023 6.8  5.0 - 12.0 % Final    Eosinophil % 03/28/2023 2.8  0.3 - 6.2 % Final    Basophil % 03/28/2023 0.6  0.0 - 1.5 % Final    Immature Grans % 03/28/2023 0.6 (H)  0.0 - 0.5 % Final    Neutrophils, Absolute 03/28/2023 6.98  1.70 - 7.00 10*3/mm3 Final    Lymphocytes, Absolute 03/28/2023 1.97  0.70 - 3.10 10*3/mm3 Final    Monocytes, Absolute 03/28/2023 0.68  0.10 - 0.90 10*3/mm3 Final    Eosinophils, Absolute 03/28/2023 0.28  0.00 - 0.40 10*3/mm3 Final    Basophils, Absolute 03/28/2023 0.06  0.00 - 0.20 10*3/mm3 Final    Immature Grans, Absolute 03/28/2023 0.06 (H)  0.00 - 0.05 10*3/mm3 Final    nRBC 03/28/2023 0.0  0.0 - 0.2 /100 WBC Final    Respiratory Culture 03/28/2023 Moderate growth (3+) Normal respiratory lee. No S. aureus or Pseudomonas aeruginosa detected. Final report.   Final    Gram Stain 03/28/2023 Moderate (3+) WBCs seen   Final    Gram Stain 03/28/2023 Few (2+) Epithelial cells seen   Final    Gram Stain 03/28/2023 Few (2+) Gram positive cocci in pairs and chains   Final    Gram Stain 03/28/2023 Rare (1+) Gram positive cocci in clusters   Final    Gram Stain 03/28/2023 Occasional Gram negative bacilli   Final   Hospital Outpatient Visit on 02/28/2023   Component Date Value Ref Range Status    Creatinine 02/28/2023 1.30  mg/dL Final    Serial Number: [id number]:  [id number]    eGFR 02/28/2023 64.9  >60.0 mL/min/1.73 Final   There may be more visits with results that are not included.       EKG Results:  No orders to display       Imaging Results:  XR Chest 1 View    Result Date: 10/3/2022   No active disease is seen.       MIKAL HOPE MD       Electronically Signed and Approved By: MIKAL HOPE MD on  10/03/2022 at 12:47                 Assessment & Plan   Diagnoses and all orders for this visit:    1. Generalized anxiety disorder (Primary)  -     mirtazapine (Remeron) 30 MG tablet; Take 1 tablet by mouth Every Night for 90 days.  Dispense: 90 tablet; Refill: 0  -     gabapentin (Neurontin) 800 MG tablet; Take 1 tablet by mouth 3 (Three) Times a Day for 30 days.  Dispense: 90 tablet; Refill: 1    2. Post traumatic stress disorder (PTSD)  -     mirtazapine (Remeron) 30 MG tablet; Take 1 tablet by mouth Every Night for 90 days.  Dispense: 90 tablet; Refill: 0  -     gabapentin (Neurontin) 800 MG tablet; Take 1 tablet by mouth 3 (Three) Times a Day for 30 days.  Dispense: 90 tablet; Refill: 1    3. Panic disorder  -     mirtazapine (Remeron) 30 MG tablet; Take 1 tablet by mouth Every Night for 90 days.  Dispense: 90 tablet; Refill: 0  -     gabapentin (Neurontin) 800 MG tablet; Take 1 tablet by mouth 3 (Three) Times a Day for 30 days.  Dispense: 90 tablet; Refill: 1  -     ALPRAZolam (Xanax) 1 MG tablet; Take 1 tablet by mouth 2 (Two) Times a Day As Needed for Anxiety for up to 30 days.  Dispense: 60 tablet; Refill: 0    4. Severe episode of recurrent major depressive disorder, without psychotic features  -     mirtazapine (Remeron) 30 MG tablet; Take 1 tablet by mouth Every Night for 90 days.  Dispense: 90 tablet; Refill: 0    5. Insomnia, unspecified type  -     mirtazapine (Remeron) 30 MG tablet; Take 1 tablet by mouth Every Night for 90 days.  Dispense: 90 tablet; Refill: 0        Patient screened positive for depression based on a PHQ-9 score of 23 on 12/21/2023. Follow-up recommendations include: Prescribed antidepressant medication treatment, Suicide Risk Assessment performed, and see assessment below .    Presentation seems most consistent with MDD, GREG, panic disorder, PTSD, insomnia.  We will increase mirtazapine for management of depression, anxiety, and overall mood.  Patient declines referral for sleep  medicine. I extensively discussed my concerns about patient being on Xanax including but not limited to respiratory depression, coma, death.  Discussed risk factors including COPD. Patient would like to continue Xanax.  We will continue Xanax for anxiety as needed.  Counseled the patient on the risks including addiction, dependence, and misuse.  Will continue gabapentin for management of anxiety and overall mood.  Discussed referral for Spravato due to severity of depression, which patient declines at this time.  Instructed patient to contact the office for any new or worsening symptoms or any other concerns.  Follow-up in 1 month.  Addressed all questions and concerns.    Visit Diagnoses:    ICD-10-CM ICD-9-CM   1. Generalized anxiety disorder  F41.1 300.02   2. Post traumatic stress disorder (PTSD)  F43.10 309.81   3. Panic disorder  F41.0 300.01   4. Severe episode of recurrent major depressive disorder, without psychotic features  F33.2 296.33   5. Insomnia, unspecified type  G47.00 780.52           PLAN:  Safety: No acute safety concerns at this time.  Therapy: Patient declines referral for psychotherapy.  Risk Assessment: Risk of self-harm acutely is moderate to severe.  Risk factors include anxiety disorder, mood disorder, h/o suicide attempt in the past, and recent psychosocial stressors (pandemic). Protective factors include no family history, denies access to guns/weapons, no present SI, no history of self-harm in the past, minimal AODA, healthcare seeking, future orientation, willingness to engage in care.  Risk of self-harm chronically is also moderate to severe, but could be further elevated in the event of treatment noncompliance and/or AODA.  Labs/Diagnostics Ordered:   No orders of the defined types were placed in this encounter.    Medications:   New Medications Ordered This Visit   Medications    mirtazapine (Remeron) 30 MG tablet     Sig: Take 1 tablet by mouth Every Night for 90 days.      Dispense:  90 tablet     Refill:  0    gabapentin (Neurontin) 800 MG tablet     Sig: Take 1 tablet by mouth 3 (Three) Times a Day for 30 days.     Dispense:  90 tablet     Refill:  1    ALPRAZolam (Xanax) 1 MG tablet     Sig: Take 1 tablet by mouth 2 (Two) Times a Day As Needed for Anxiety for up to 30 days.     Dispense:  60 tablet     Refill:  0     Discussed all risks, benefits, alternatives, and side effects of Xanax including but not limited to risks of abuse, misuse, and addiction, which can lead to overdose or death; risks of dependence and withdrawal reactions; drowsiness, sedation, fatigue, depression, dizziness, ataxia, weakness, confusion, forgetfulness, hypotension, falls risk, respiratory depression, anterograde amnesia, paradoxical reactions such as hyperactivity or aggressive behavior; and hallucinations. Pt educated on the need to practice safe sex while taking this med. Instructed pt to avoid performing tasks that require mental alertness such as driving or operating machinery. Discussed the need for pt to immediately call the office for any new or worsening symptoms, such as changes in mood or behavior, and all other concerns. Pt educated on med compliance, including the proper use and monitoring for signs and symptoms of abuse, misuse, and addiction. Pt verbalized understanding and is agreeable to taking Xanax. CARLOS obtained and USD ordered. Controlled substances agreement verbally signed. Addressed all questions and concerns.     Discussed all risks, benefits, alternatives, and side effects of Gabapentin including but not limited to sedation, dizziness, GI upset, dry mouth, and weight gain. Pt instructed to avoid driving and doing other tasks or actions that require to be alert until knowing how the drug affects them.  Pt educated on the need to practice safe sex while taking this med. Discussed the need for pt to immediately call the office for any new or worsening symptoms, such as worsening  depression; feeling nervous or restless; suicidal thoughts or actions; or other changes changes in mood or behavior, and all other concerns. Pt educated on med compliance and the risks of suddenly stopping this medication or missing doses. Pt verbalized understanding and is agreeable to taking Gabapentin. Addressed all questions and concerns.  Will order UDS and obtain CARLOS report. Pt verbally signed controlled substances agreement.    Discussed all risks, benefits, alternatives, and side effects of Mirtazapine including but not limited to GI upset, sexual dysfunction, weight gain, dizziness, bleeding risk, seizure risk, sedation, headache, activation of shakir or hypomania, drug-inducted movement disorders (akathisia, dystonia, restless leg syndrome), dyslipidemia, hematologic abnormalities, orthostatic hypotension, sedation, withdrawal syndrome, serotonin syndrome, and activation of suicidal ideation and behavior.  Pt educated on the need to practice safe sex while taking this med. Discussed the need for pt to immediately call the office for any new or worsening symptoms, such as worsening depression; feeling nervous or restless; suicidal thoughts or actions; or other changes changes in mood or behavior, and all other concerns. Pt educated on med compliance and the risks of suddenly stopping this medication or missing doses. Pt verbalized understanding and is agreeable to taking Mirtazapine. Addressed all questions and concerns.     Follow up:   F/u in 1 month.    TREATMENT PLAN/GOALS: Continue supportive psychotherapy efforts and medications as indicated. Treatment and medication options discussed during today's visit. Patient ackowledged and verbally consented to continue with current treatment plan and was educated on the importance of compliance with treatment and follow-up appointments.    MEDICATION ISSUES:  CARLOS reviewed as expected.  Discussed medication options and treatment plan of prescribed  medication as well as the risks, benefits, and side effects including potential falls, possible impaired driving and metabolic adversities among others. Patient is agreeable to call the office with any worsening of symptoms or onset of side effects. Patient is agreeable to call 911 or go to the nearest ER should he/she begin having SI/HI. No medication side effects or related complaints today.            This document has been electronically signed by Page Lee PA-C  December 22, 2023 11:41 EST      Part of this note may be an electronic transcription/translation of spoken language to printed text using the Dragon Dictation System.

## 2023-12-27 ENCOUNTER — TELEPHONE (OUTPATIENT)
Dept: PSYCHIATRY | Facility: CLINIC | Age: 58
End: 2023-12-27
Payer: MEDICAID

## 2023-12-27 NOTE — TELEPHONE ENCOUNTER
ATTEMPTED TO START PRIOR AUTHORIZATION FOR ALPRAZOLAM.  [facility] REPORTED NOT BEING ABLE TO FIND A FORM.  I ATTEMPTED TO RESTART WITH INFORMATION IN SYSTEM.

## 2023-12-29 ENCOUNTER — TELEPHONE (OUTPATIENT)
Dept: PSYCHIATRY | Facility: CLINIC | Age: 58
End: 2023-12-29
Payer: MEDICAID

## 2023-12-29 NOTE — TELEPHONE ENCOUNTER
Patient was referred out to  sleep lab, patient had and apt scheduled for 09/06/2023, appt was canceled and was not rescheduled, closing out referral.

## 2024-01-04 RX ORDER — FLUTICASONE FUROATE, UMECLIDINIUM BROMIDE AND VILANTEROL TRIFENATATE 200; 62.5; 25 UG/1; UG/1; UG/1
1 POWDER RESPIRATORY (INHALATION) DAILY
Qty: 60 EACH | Refills: 5 | Status: SHIPPED | OUTPATIENT
Start: 2024-01-04

## 2024-01-04 NOTE — TELEPHONE ENCOUNTER
Caller: MeyerAsad    Relationship: Self    Best call back number: [phone]     Requested Prescriptions:   Requested Prescriptions     Pending Prescriptions Disp Refills    Fluticasone-Umeclidin-Vilant (Trelegy Ellipta) 200-62.5-25 MCG/ACT aerosol powder  60 each 5     Sig: Inhale 1 puff Daily.        Pharmacy where request should be sent: [facility] [place] - [address] [address] - [phone] [facility] [phone] FX     Last office visit with prescribing clinician: 12/15/2023   Last telemedicine visit with prescribing clinician: Visit date not found   Next office visit with prescribing clinician: 3/15/2024     Does the patient have less than a 3 day supply:  [x] Yes  [] No    Would you like a call back once the refill request has been completed: [x] Yes [] No    If the office needs to give you a call back, can they leave a voicemail: [x] Yes [] No    Frieda Montana Rep   01/04/24 11:18 EST

## 2024-01-04 NOTE — TELEPHONE ENCOUNTER
Information regarding your request  Member should be able to get the drug/product without a PA at this time.

## 2024-01-25 ENCOUNTER — OFFICE VISIT (OUTPATIENT)
Dept: BEHAVIORAL HEALTH | Facility: CLINIC | Age: 59
End: 2024-01-25
Payer: MEDICAID

## 2024-01-25 VITALS
WEIGHT: 241 LBS | SYSTOLIC BLOOD PRESSURE: 132 MMHG | DIASTOLIC BLOOD PRESSURE: 82 MMHG | BODY MASS INDEX: 37.83 KG/M2 | HEIGHT: 67 IN

## 2024-01-25 DIAGNOSIS — F41.0 PANIC DISORDER: ICD-10-CM

## 2024-01-25 DIAGNOSIS — G47.00 INSOMNIA, UNSPECIFIED TYPE: ICD-10-CM

## 2024-01-25 DIAGNOSIS — F43.10 POST TRAUMATIC STRESS DISORDER (PTSD): Primary | ICD-10-CM

## 2024-01-25 DIAGNOSIS — F41.1 GENERALIZED ANXIETY DISORDER: ICD-10-CM

## 2024-01-25 DIAGNOSIS — F33.2 SEVERE EPISODE OF RECURRENT MAJOR DEPRESSIVE DISORDER, WITHOUT PSYCHOTIC FEATURES: ICD-10-CM

## 2024-01-25 DIAGNOSIS — F43.10 POST TRAUMATIC STRESS DISORDER (PTSD): ICD-10-CM

## 2024-01-25 RX ORDER — GABAPENTIN 800 MG/1
800 TABLET ORAL 3 TIMES DAILY
Qty: 90 TABLET | Refills: 1 | Status: SHIPPED | OUTPATIENT
Start: 2024-01-25 | End: 2024-01-26 | Stop reason: SDUPTHER

## 2024-01-25 RX ORDER — ALPRAZOLAM 1 MG/1
1 TABLET ORAL 2 TIMES DAILY PRN
Qty: 60 TABLET | Refills: 0 | Status: SHIPPED | OUTPATIENT
Start: 2024-01-25 | End: 2024-01-26

## 2024-01-25 RX ORDER — MIRTAZAPINE 45 MG/1
45 TABLET, FILM COATED ORAL NIGHTLY
Qty: 90 TABLET | Refills: 0 | Status: SHIPPED | OUTPATIENT
Start: 2024-01-25 | End: 2024-01-26 | Stop reason: SDUPTHER

## 2024-01-25 NOTE — PROGRESS NOTES
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 may be affected.    Chloride 07/30/2023 107  98 - 107 mmol/L Final    CO2 07/30/2023 26.2  22.0 - 29.0 mmol/L Final    Calcium 07/30/2023 8.7  8.6 - 10.5 mg/dL Final    BUN/Creatinine Ratio 07/30/2023 20.3  7.0 - 25.0 Final    Anion Gap 07/30/2023 7.8  5.0 - 15.0 mmol/L Final    eGFR 07/30/2023 53.0 (L)  >60.0 mL/min/1.73 Final    Lactate 07/29/2023 0.8  0.5 - 2.0 mmol/L Final    Glucose 07/30/2023 85  65 - 99 mg/dL Final    BUN 07/30/2023 25 (H)  6 - 20 mg/dL Final    Creatinine 07/30/2023 1.42 (H)  0.76 - 1.27 mg/dL Final    Sodium 07/30/2023 141  136 - 145 mmol/L Final    Potassium 07/30/2023 4.7  3.5 - 5.2 mmol/L Final    Chloride 07/30/2023 106  98 - 107 mmol/L Final    CO2 07/30/2023 30.9 (H)  22.0 - 29.0 mmol/L Final    Calcium 07/30/2023 8.9  8.6 - 10.5 mg/dL Final    BUN/Creatinine Ratio 07/30/2023 17.6  7.0 - 25.0 Final    Anion Gap 07/30/2023 4.1 (L)  5.0 - 15.0 mmol/L Final    eGFR 07/30/2023 58.0 (L)  >60.0 mL/min/1.73 Final    MRSA PCR 07/30/2023 No MRSA Detected  No MRSA Detected Final   Lab on 07/25/2023   Component Date Value Ref Range Status    Glucose 07/25/2023 81  65 - 99 mg/dL Final    BUN 07/25/2023 28 (H)  6 - 20 mg/dL Final    Creatinine 07/25/2023 1.50 (H)  0.76 - 1.27 mg/dL Final    Sodium 07/25/2023 146 (H)  136 - 145 mmol/L Final    Potassium 07/25/2023 3.9  3.5 - 5.2  mmol/L Final    Chloride 07/25/2023 103  98 - 107 mmol/L Final    CO2 07/25/2023 32.0 (H)  22.0 - 29.0 mmol/L Final    Calcium 07/25/2023 9.6  8.6 - 10.5 mg/dL Final    Total Protein 07/25/2023 6.6  6.0 - 8.5 g/dL Final    Albumin 07/25/2023 4.4  3.5 - 5.2 g/dL Final    ALT (SGPT) 07/25/2023 23  1 - 41 U/L Final    AST (SGOT) 07/25/2023 17  1 - 40 U/L Final    Alkaline Phosphatase 07/25/2023 106  39 - 117 U/L Final    Total Bilirubin 07/25/2023 0.2  0.0 - 1.2 mg/dL Final    Globulin 07/25/2023 2.2  gm/dL Final    A/G Ratio 07/25/2023 2.0  g/dL Final    BUN/Creatinine Ratio 07/25/2023 18.7  7.0 - 25.0 Final    Anion Gap 07/25/2023 11.0  5.0 - 15.0 mmol/L Final    eGFR 07/25/2023 54.3 (L)  >60.0 mL/min/1.73 Final   Admission on 07/07/2023, Discharged on 07/07/2023   Component Date Value Ref Range Status    Glucose 07/07/2023 116 (H)  65 - 99 mg/dL Final    BUN 07/07/2023 32 (H)  6 - 20 mg/dL Final    Creatinine 07/07/2023 1.77 (H)  0.76 - 1.27 mg/dL Final    Sodium 07/07/2023 141  136 - 145 mmol/L Final    Potassium 07/07/2023 4.3  3.5 - 5.2 mmol/L Final    Chloride 07/07/2023 102  98 - 107 mmol/L Final    CO2 07/07/2023 26.9  22.0 - 29.0 mmol/L Final    Calcium 07/07/2023 9.9  8.6 - 10.5 mg/dL Final    Total Protein 07/07/2023 7.6  6.0 - 8.5 g/dL Final    Albumin 07/07/2023 4.1  3.5 - 5.2 g/dL Final    ALT (SGPT) 07/07/2023 20  1 - 41 U/L Final    AST (SGOT) 07/07/2023 23  1 - 40 U/L Final    Alkaline Phosphatase 07/07/2023 118 (H)  39 - 117 U/L Final    Total Bilirubin 07/07/2023 0.3  0.0 - 1.2 mg/dL Final    Globulin 07/07/2023 3.5  gm/dL Final    A/G Ratio 07/07/2023 1.2  g/dL Final    BUN/Creatinine Ratio 07/07/2023 18.1  7.0 - 25.0 Final    Anion Gap 07/07/2023 12.1  5.0 - 15.0 mmol/L Final    eGFR 07/07/2023 44.5 (L)  >60.0 mL/min/1.73 Final    Lipase 07/07/2023 23  13 - 60 U/L Final    Color, UA 07/07/2023 Yellow  Yellow, Straw Final    Appearance, UA 07/07/2023 Clear  Clear Final    pH, UA 07/07/2023 5.5  5.0  - 8.0 Final    Specific Gravity, UA 07/07/2023 1.010  1.005 - 1.030 Final    Glucose, UA 07/07/2023 Negative  Negative Final    Ketones, UA 07/07/2023 Negative  Negative Final    Bilirubin, UA 07/07/2023 Negative  Negative Final    Blood, UA 07/07/2023 Negative  Negative Final    Protein, UA 07/07/2023 Negative  Negative Final    Leuk Esterase, UA 07/07/2023 Negative  Negative Final    Nitrite, UA 07/07/2023 Negative  Negative Final    Urobilinogen, UA 07/07/2023 0.2 E.U./dL  0.2 - 1.0 E.U./dL Final    WBC 07/07/2023 10.91 (H)  3.40 - 10.80 10*3/mm3 Final    RBC 07/07/2023 4.85  4.14 - 5.80 10*6/mm3 Final    Hemoglobin 07/07/2023 15.0  13.0 - 17.7 g/dL Final    Hematocrit 07/07/2023 45.1  37.5 - 51.0 % Final    MCV 07/07/2023 93.0  79.0 - 97.0 fL Final    MCH 07/07/2023 30.9  26.6 - 33.0 pg Final    MCHC 07/07/2023 33.3  31.5 - 35.7 g/dL Final    RDW 07/07/2023 16.1 (H)  12.3 - 15.4 % Final    RDW-SD 07/07/2023 53.9  37.0 - 54.0 fl Final    MPV 07/07/2023 10.8  6.0 - 12.0 fL Final    Platelets 07/07/2023 244  140 - 450 10*3/mm3 Final    Neutrophil % 07/07/2023 73.0  42.7 - 76.0 % Final    Lymphocyte % 07/07/2023 15.7 (L)  19.6 - 45.3 % Final    Monocyte % 07/07/2023 8.1  5.0 - 12.0 % Final    Eosinophil % 07/07/2023 2.1  0.3 - 6.2 % Final    Basophil % 07/07/2023 0.6  0.0 - 1.5 % Final    Immature Grans % 07/07/2023 0.5  0.0 - 0.5 % Final    Neutrophils, Absolute 07/07/2023 7.96 (H)  1.70 - 7.00 10*3/mm3 Final    Lymphocytes, Absolute 07/07/2023 1.71  0.70 - 3.10 10*3/mm3 Final    Monocytes, Absolute 07/07/2023 0.88  0.10 - 0.90 10*3/mm3 Final    Eosinophils, Absolute 07/07/2023 0.23  0.00 - 0.40 10*3/mm3 Final    Basophils, Absolute 07/07/2023 0.07  0.00 - 0.20 10*3/mm3 Final    Immature Grans, Absolute 07/07/2023 0.06 (H)  0.00 - 0.05 10*3/mm3 Final    nRBC 07/07/2023 0.0  0.0 - 0.2 /100 WBC Final    Extra Tube 07/07/2023 Hold for add-ons.   Final    Auto resulted.    Extra Tube 07/07/2023 hold for add-on   Final     Auto resulted    Extra Tube 07/07/2023 Hold for add-ons.   Final    Auto resulted.    Extra Tube 07/07/2023 Hold for add-ons.   Final    Auto resulted    proBNP 07/07/2023 117.0  0.0 - 900.0 pg/mL Final   Lab on 06/30/2023   Component Date Value Ref Range Status    Glucose 06/30/2023 97  65 - 99 mg/dL Final    BUN 06/30/2023 20  6 - 20 mg/dL Final    Creatinine 06/30/2023 1.28 (H)  0.76 - 1.27 mg/dL Final    Sodium 06/30/2023 139  136 - 145 mmol/L Final    Potassium 06/30/2023 4.4  3.5 - 5.2 mmol/L Final    Chloride 06/30/2023 102  98 - 107 mmol/L Final    CO2 06/30/2023 26.8  22.0 - 29.0 mmol/L Final    Calcium 06/30/2023 9.8  8.6 - 10.5 mg/dL Final    Total Protein 06/30/2023 7.4  6.0 - 8.5 g/dL Final    Albumin 06/30/2023 4.3  3.5 - 5.2 g/dL Final    ALT (SGPT) 06/30/2023 26  1 - 41 U/L Final    AST (SGOT) 06/30/2023 28  1 - 40 U/L Final    Alkaline Phosphatase 06/30/2023 113  39 - 117 U/L Final    Total Bilirubin 06/30/2023 0.4  0.0 - 1.2 mg/dL Final    Globulin 06/30/2023 3.1  gm/dL Final    A/G Ratio 06/30/2023 1.4  g/dL Final    BUN/Creatinine Ratio 06/30/2023 15.6  7.0 - 25.0 Final    Anion Gap 06/30/2023 10.2  5.0 - 15.0 mmol/L Final    eGFR 06/30/2023 65.7  >60.0 mL/min/1.73 Final    Total Cholesterol 06/30/2023 186  0 - 200 mg/dL Final    Triglycerides 06/30/2023 254 (H)  0 - 150 mg/dL Final    HDL Cholesterol 06/30/2023 38 (L)  40 - 60 mg/dL Final    LDL Cholesterol  06/30/2023 104 (H)  0 - 100 mg/dL Final    VLDL Cholesterol 06/30/2023 44 (H)  5 - 40 mg/dL Final    LDL/HDL Ratio 06/30/2023 2.56   Final    TSH 06/30/2023 1.750  0.270 - 4.200 uIU/mL Final    TSH 06/30/2023 1.750  0.270 - 4.200 uIU/mL Final    Free T4 06/30/2023 0.81 (L)  0.93 - 1.70 ng/dL Final    T4 results may be falsely increased if patient taking Biotin.    WBC 06/30/2023 7.91  3.40 - 10.80 10*3/mm3 Final    RBC 06/30/2023 4.74  4.14 - 5.80 10*6/mm3 Final    Hemoglobin 06/30/2023 14.3  13.0 - 17.7 g/dL Final    Hematocrit  06/30/2023 42.0  37.5 - 51.0 % Final    MCV 06/30/2023 88.6  79.0 - 97.0 fL Final    MCH 06/30/2023 30.2  26.6 - 33.0 pg Final    MCHC 06/30/2023 34.0  31.5 - 35.7 g/dL Final    RDW 06/30/2023 14.5  12.3 - 15.4 % Final    RDW-SD 06/30/2023 46.4  37.0 - 54.0 fl Final    MPV 06/30/2023 11.5  6.0 - 12.0 fL Final    Platelets 06/30/2023 221  140 - 450 10*3/mm3 Final    Neutrophil % 06/30/2023 64.2  42.7 - 76.0 % Final    Lymphocyte % 06/30/2023 22.4  19.6 - 45.3 % Final    Monocyte % 06/30/2023 7.2  5.0 - 12.0 % Final    Eosinophil % 06/30/2023 4.0  0.3 - 6.2 % Final    Basophil % 06/30/2023 1.1  0.0 - 1.5 % Final    Immature Grans % 06/30/2023 1.1 (H)  0.0 - 0.5 % Final    Neutrophils, Absolute 06/30/2023 5.07  1.70 - 7.00 10*3/mm3 Final    Lymphocytes, Absolute 06/30/2023 1.77  0.70 - 3.10 10*3/mm3 Final    Monocytes, Absolute 06/30/2023 0.57  0.10 - 0.90 10*3/mm3 Final    Eosinophils, Absolute 06/30/2023 0.32  0.00 - 0.40 10*3/mm3 Final    Basophils, Absolute 06/30/2023 0.09  0.00 - 0.20 10*3/mm3 Final    Immature Grans, Absolute 06/30/2023 0.09 (H)  0.00 - 0.05 10*3/mm3 Final    nRBC 06/30/2023 0.1  0.0 - 0.2 /100 WBC Final   Office Visit on 05/25/2023   Component Date Value Ref Range Status    Glucose 05/25/2023 97  65 - 99 mg/dL Final    BUN 05/25/2023 24 (H)  6 - 20 mg/dL Final    Creatinine 05/25/2023 1.34 (H)  0.76 - 1.27 mg/dL Final    Sodium 05/25/2023 140  136 - 145 mmol/L Final    Potassium 05/25/2023 4.4  3.5 - 5.2 mmol/L Final    Chloride 05/25/2023 102  98 - 107 mmol/L Final    CO2 05/25/2023 28.8  22.0 - 29.0 mmol/L Final    Calcium 05/25/2023 10.0  8.6 - 10.5 mg/dL Final    BUN/Creatinine Ratio 05/25/2023 17.9  7.0 - 25.0 Final    Anion Gap 05/25/2023 9.2  5.0 - 15.0 mmol/L Final    eGFR 05/25/2023 62.2  >60.0 mL/min/1.73 Final   Office Visit on 04/25/2023   Component Date Value Ref Range Status    Glucose 04/25/2023 102 (H)  65 - 99 mg/dL Final    BUN 04/25/2023 19  6 - 20 mg/dL Final    Creatinine  04/25/2023 1.38 (H)  0.76 - 1.27 mg/dL Final    Sodium 04/25/2023 136  136 - 145 mmol/L Final    Potassium 04/25/2023 4.4  3.5 - 5.2 mmol/L Final    Chloride 04/25/2023 101  98 - 107 mmol/L Final    CO2 04/25/2023 23.8  22.0 - 29.0 mmol/L Final    Calcium 04/25/2023 9.6  8.6 - 10.5 mg/dL Final    BUN/Creatinine Ratio 04/25/2023 13.8  7.0 - 25.0 Final    Anion Gap 04/25/2023 11.2  5.0 - 15.0 mmol/L Final    eGFR 04/25/2023 60.0 (L)  >60.0 mL/min/1.73 Final    proBNP 04/25/2023 35.1  0.0 - 900.0 pg/mL Final    WBC 04/25/2023 7.73  3.40 - 10.80 10*3/mm3 Final    RBC 04/25/2023 4.70  4.14 - 5.80 10*6/mm3 Final    Hemoglobin 04/25/2023 14.8  13.0 - 17.7 g/dL Final    Hematocrit 04/25/2023 42.9  37.5 - 51.0 % Final    MCV 04/25/2023 91.3  79.0 - 97.0 fL Final    MCH 04/25/2023 31.5  26.6 - 33.0 pg Final    MCHC 04/25/2023 34.5  31.5 - 35.7 g/dL Final    RDW 04/25/2023 13.2  12.3 - 15.4 % Final    RDW-SD 04/25/2023 44.2  37.0 - 54.0 fl Final    MPV 04/25/2023 11.3  6.0 - 12.0 fL Final    Platelets 04/25/2023 298  140 - 450 10*3/mm3 Final    Neutrophil % 04/25/2023 66.2  42.7 - 76.0 % Final    Lymphocyte % 04/25/2023 20.6  19.6 - 45.3 % Final    Monocyte % 04/25/2023 7.8  5.0 - 12.0 % Final    Eosinophil % 04/25/2023 4.1  0.3 - 6.2 % Final    Basophil % 04/25/2023 0.9  0.0 - 1.5 % Final    Immature Grans % 04/25/2023 0.4  0.0 - 0.5 % Final    Neutrophils, Absolute 04/25/2023 5.12  1.70 - 7.00 10*3/mm3 Final    Lymphocytes, Absolute 04/25/2023 1.59  0.70 - 3.10 10*3/mm3 Final    Monocytes, Absolute 04/25/2023 0.60  0.10 - 0.90 10*3/mm3 Final    Eosinophils, Absolute 04/25/2023 0.32  0.00 - 0.40 10*3/mm3 Final    Basophils, Absolute 04/25/2023 0.07  0.00 - 0.20 10*3/mm3 Final    Immature Grans, Absolute 04/25/2023 0.03  0.00 - 0.05 10*3/mm3 Final    nRBC 04/25/2023 0.0  0.0 - 0.2 /100 WBC Final   Admission on 04/11/2023, Discharged on 04/11/2023   Component Date Value Ref Range Status    Case Report 04/11/2023    Final                     Value:Medical Cytology Report                           Case: [id number]                                  Authorizing Provider:  Taiwo Copeland, Collected:           04/11/2023 09:40 AM                                 DO                                                                           Ordering Location:     [facility] Received:            04/12/2023 08:53 AM                                 SUITES                                                                       Pathologist:           Maria Teresa Borja DO                                                       Specimen:    Lung, Right Upper Lobe, RIGHT UPPER LOBE BAL                                               Final Diagnosis 04/11/2023    Final                    Value:This result contains rich text formatting which cannot be displayed here.    Clinical Information 04/11/2023    Final                    Value:This result contains rich text formatting which cannot be displayed here.    Comment 04/11/2023    Final                    Value:This result contains rich text formatting which cannot be displayed here.    Gross Description 04/11/2023    Final                    Value:This result contains rich text formatting which cannot be displayed here.    Microscopic Description 04/11/2023    Final    This result contains rich text formatting which cannot be displayed here.    Special Stains 04/11/2023    Final                    Value:This result contains rich text formatting which cannot be displayed here.    Fungus Culture 04/11/2023 Heavy growth (4+) Candida albicans (A)   Final    AFB Culture 04/11/2023 No AFB isolated at 6 weeks   Final    AFB Stain 04/11/2023 No acid fast bacilli seen on direct smear   Final    AFB Stain 04/11/2023 No acid fast bacilli seen on concentrated smear   Final    BAL Culture 04/11/2023 >100,000 CFU/mL Normal respiratory lee. No S. aureus or Pseudomonas aeruginosa detected. Final  report.   Final    Gram Stain 04/11/2023 Moderate (3+) Gram positive cocci in pairs and chains   Final    Escherichia coli PCR 04/11/2023 Not Detected  Not Detected Final    Acinetobacter calcoaceticus-travon* 04/11/2023 Not Detected  Not Detected Final    Enterobacter cloacae PCR 04/11/2023 Not Detected  Not Detected Final    Klebsiella oxytoca PCR 04/11/2023 Not Detected  Not Detected Final    Klebsiella pneumoniae group PCR 04/11/2023 Not Detected  Not Detected Final    Klebsiella aerogenes PCR 04/11/2023 Not Detected  Not Detected Final    Moraxella catarrhalis PCR 04/11/2023 Not Detected  Not Detected Final    Proteus species PCR 04/11/2023 Not Detected  Not Detected Final    Pseudomonas aeroginosa PCR 04/11/2023 Not Detected  Not Detected Final    Serratia marcescens PCR 04/11/2023 Not Detected  Not Detected Final    Staphylococcus aureus PCR 04/11/2023 Not Detected  Not Detected Final    Streptococcus pyogenes PCR 04/11/2023 Not Detected  Not Detected Final    Haemophilus influenzae PCR 04/11/2023 Not Detected  Not Detected Final    Streptococcus agalactiae PCR 04/11/2023 Not Detected  Not Detected Final    Streptococcus pneumoniae PCR 04/11/2023 Not Detected  Not Detected Final    Chlamydophila pneumoniae PCR 04/11/2023 Not Detected  Not Detected Final    Legionella pneumophilia PCR 04/11/2023 Not Detected  Not Detected Final    Mycoplasma pneumo by PCR 04/11/2023 Not Detected  Not Detected Final    ADENOVIRUS, PCR 04/11/2023 Not Detected  Not Detected Final    CTX-M Gene 04/11/2023 N/A  Not Detected, N/A Final    IMP Gene 04/11/2023 N/A  Not Detected, N/A Final    KPC Gene 04/11/2023 N/A  Not Detected, N/A Final    mecA/C and MREJ Gene 04/11/2023 N/A  Not Detected, N/A Final    NDM Gene 04/11/2023 N/A  Not Detected, N/A Final    OXA-48-like Gene 04/11/2023 N/A  Not Detected, N/A Final    VIM Gene 04/11/2023 N/A  Not Detected, N/A Final    Coronavirus 04/11/2023 Not Detected  Not Detected Final    Human  Metapneumovirus 04/11/2023 Not Detected  Not Detected Final    Human Rhinovirus/Enterovirus 04/11/2023 Not Detected  Not Detected Final    Influenza A PCR 04/11/2023 Not Detected  Not Detected Final    Influenza B PCR 04/11/2023 Not Detected  Not Detected Final    RSV, PCR 04/11/2023 Not Detected  Not Detected Final    Parainfluenza virus PCR 04/11/2023 Not Detected  Not Detected Final    Neutrophils, Fluid 04/11/2023 100  % Final   Lab on 03/28/2023   Component Date Value Ref Range Status    WBC 03/28/2023 10.03  3.40 - 10.80 10*3/mm3 Final    RBC 03/28/2023 4.61  4.14 - 5.80 10*6/mm3 Final    Hemoglobin 03/28/2023 14.4  13.0 - 17.7 g/dL Final    Hematocrit 03/28/2023 42.2  37.5 - 51.0 % Final    MCV 03/28/2023 91.5  79.0 - 97.0 fL Final    MCH 03/28/2023 31.2  26.6 - 33.0 pg Final    MCHC 03/28/2023 34.1  31.5 - 35.7 g/dL Final    RDW 03/28/2023 13.4  12.3 - 15.4 % Final    RDW-SD 03/28/2023 45.3  37.0 - 54.0 fl Final    MPV 03/28/2023 11.6  6.0 - 12.0 fL Final    Platelets 03/28/2023 246  140 - 450 10*3/mm3 Final    Neutrophil % 03/28/2023 69.6  42.7 - 76.0 % Final    Lymphocyte % 03/28/2023 19.6  19.6 - 45.3 % Final    Monocyte % 03/28/2023 6.8  5.0 - 12.0 % Final    Eosinophil % 03/28/2023 2.8  0.3 - 6.2 % Final    Basophil % 03/28/2023 0.6  0.0 - 1.5 % Final    Immature Grans % 03/28/2023 0.6 (H)  0.0 - 0.5 % Final    Neutrophils, Absolute 03/28/2023 6.98  1.70 - 7.00 10*3/mm3 Final    Lymphocytes, Absolute 03/28/2023 1.97  0.70 - 3.10 10*3/mm3 Final    Monocytes, Absolute 03/28/2023 0.68  0.10 - 0.90 10*3/mm3 Final    Eosinophils, Absolute 03/28/2023 0.28  0.00 - 0.40 10*3/mm3 Final    Basophils, Absolute 03/28/2023 0.06  0.00 - 0.20 10*3/mm3 Final    Immature Grans, Absolute 03/28/2023 0.06 (H)  0.00 - 0.05 10*3/mm3 Final    nRBC 03/28/2023 0.0  0.0 - 0.2 /100 WBC Final    Respiratory Culture 03/28/2023 Moderate growth (3+) Normal respiratory lee. No S. aureus or Pseudomonas aeruginosa detected. Final  report.   Final    Gram Stain 03/28/2023 Moderate (3+) WBCs seen   Final    Gram Stain 03/28/2023 Few (2+) Epithelial cells seen   Final    Gram Stain 03/28/2023 Few (2+) Gram positive cocci in pairs and chains   Final    Gram Stain 03/28/2023 Rare (1+) Gram positive cocci in clusters   Final    Gram Stain 03/28/2023 Occasional Gram negative bacilli   Final   Hospital Outpatient Visit on 02/28/2023   Component Date Value Ref Range Status    Creatinine 02/28/2023 1.30  mg/dL Final    Serial Number: [id number]:  [id number]    eGFR 02/28/2023 64.9  >60.0 mL/min/1.73 Final   There may be more visits with results that are not included.       EKG Results:  No orders to display       Imaging Results:  XR Chest 1 View    Result Date: 10/3/2022   No active disease is seen.       MIKAL HOPE MD       Electronically Signed and Approved By: MIKAL HOPE MD on 10/03/2022 at 12:47                 Assessment & Plan   Diagnoses and all orders for this visit:    1. Post traumatic stress disorder (PTSD) (Primary)  -     mirtazapine (REMERON) 45 MG tablet; Take 1 tablet by mouth Every Night for 90 days.  Dispense: 90 tablet; Refill: 0  -     gabapentin (Neurontin) 800 MG tablet; Take 1 tablet by mouth 3 (Three) Times a Day for 30 days.  Dispense: 90 tablet; Refill: 1    2. Generalized anxiety disorder  -     mirtazapine (REMERON) 45 MG tablet; Take 1 tablet by mouth Every Night for 90 days.  Dispense: 90 tablet; Refill: 0  -     gabapentin (Neurontin) 800 MG tablet; Take 1 tablet by mouth 3 (Three) Times a Day for 30 days.  Dispense: 90 tablet; Refill: 1    3. Panic disorder  -     mirtazapine (REMERON) 45 MG tablet; Take 1 tablet by mouth Every Night for 90 days.  Dispense: 90 tablet; Refill: 0  -     gabapentin (Neurontin) 800 MG tablet; Take 1 tablet by mouth 3 (Three) Times a Day for 30 days.  Dispense: 90 tablet; Refill: 1  -     ALPRAZolam (Xanax) 1 MG tablet; Take 1 tablet by mouth 2 (Two) Times a Day As Needed for Anxiety  for up to 30 days.  Dispense: 60 tablet; Refill: 0    4. Severe episode of recurrent major depressive disorder, without psychotic features  -     mirtazapine (REMERON) 45 MG tablet; Take 1 tablet by mouth Every Night for 90 days.  Dispense: 90 tablet; Refill: 0    5. Insomnia, unspecified type  -     mirtazapine (REMERON) 45 MG tablet; Take 1 tablet by mouth Every Night for 90 days.  Dispense: 90 tablet; Refill: 0          Patient screened positive for depression based on a PHQ-9 score of 23 on 12/21/2023. Follow-up recommendations include: Prescribed antidepressant medication treatment, Suicide Risk Assessment performed, and see assessment below .    Presentation seems most consistent with MDD, GREG, panic disorder, PTSD, insomnia.  We will increase mirtazapine for management of depression, anxiety, and overall mood.  Patient declines referral for sleep medicine. I extensively discussed my concerns about patient being on Xanax including but not limited to respiratory depression, coma, death.  Discussed risk factors including COPD. Patient would like to continue Xanax.  We will continue Xanax for anxiety as needed.  Counseled the patient on the risks including addiction, dependence, and misuse.  Will continue gabapentin for management of anxiety and overall mood.  Instructed patient to contact the office for any new or worsening symptoms or any other concerns.  Follow-up in 1 month.  Addressed all questions and concerns.    Visit Diagnoses:    ICD-10-CM ICD-9-CM   1. Post traumatic stress disorder (PTSD)  F43.10 309.81   2. Generalized anxiety disorder  F41.1 300.02   3. Panic disorder  F41.0 300.01   4. Severe episode of recurrent major depressive disorder, without psychotic features  F33.2 296.33   5. Insomnia, unspecified type  G47.00 780.52             PLAN:  Safety: No acute safety concerns at this time.  Therapy: Patient declines referral for psychotherapy.  Risk Assessment: Risk of self-harm acutely is  moderate to severe.  Risk factors include anxiety disorder, mood disorder, h/o suicide attempt in the past, and recent psychosocial stressors (pandemic). Protective factors include no family history, denies access to guns/weapons, no present SI, no history of self-harm in the past, minimal AODA, healthcare seeking, future orientation, willingness to engage in care.  Risk of self-harm chronically is also moderate to severe, but could be further elevated in the event of treatment noncompliance and/or AODA.  Labs/Diagnostics Ordered:   No orders of the defined types were placed in this encounter.    Medications:   New Medications Ordered This Visit   Medications    mirtazapine (REMERON) 45 MG tablet     Sig: Take 1 tablet by mouth Every Night for 90 days.     Dispense:  90 tablet     Refill:  0    gabapentin (Neurontin) 800 MG tablet     Sig: Take 1 tablet by mouth 3 (Three) Times a Day for 30 days.     Dispense:  90 tablet     Refill:  1    ALPRAZolam (Xanax) 1 MG tablet     Sig: Take 1 tablet by mouth 2 (Two) Times a Day As Needed for Anxiety for up to 30 days.     Dispense:  60 tablet     Refill:  0     Discussed all risks, benefits, alternatives, and side effects of Xanax including but not limited to risks of abuse, misuse, and addiction, which can lead to overdose or death; risks of dependence and withdrawal reactions; drowsiness, sedation, fatigue, depression, dizziness, ataxia, weakness, confusion, forgetfulness, hypotension, falls risk, respiratory depression, anterograde amnesia, paradoxical reactions such as hyperactivity or aggressive behavior; and hallucinations. Pt educated on the need to practice safe sex while taking this med. Instructed pt to avoid performing tasks that require mental alertness such as driving or operating machinery. Discussed the need for pt to immediately call the office for any new or worsening symptoms, such as changes in mood or behavior, and all other concerns. Pt educated on med  compliance, including the proper use and monitoring for signs and symptoms of abuse, misuse, and addiction. Pt verbalized understanding and is agreeable to taking Xanax. CARLOS obtained and USD ordered. Controlled substances agreement verbally signed. Addressed all questions and concerns.     Discussed all risks, benefits, alternatives, and side effects of Gabapentin including but not limited to sedation, dizziness, GI upset, dry mouth, and weight gain. Pt instructed to avoid driving and doing other tasks or actions that require to be alert until knowing how the drug affects them.  Pt educated on the need to practice safe sex while taking this med. Discussed the need for pt to immediately call the office for any new or worsening symptoms, such as worsening depression; feeling nervous or restless; suicidal thoughts or actions; or other changes changes in mood or behavior, and all other concerns. Pt educated on med compliance and the risks of suddenly stopping this medication or missing doses. Pt verbalized understanding and is agreeable to taking Gabapentin. Addressed all questions and concerns.  Will order UDS and obtain CARLOS report. Pt verbally signed controlled substances agreement.    Discussed all risks, benefits, alternatives, and side effects of Mirtazapine including but not limited to GI upset, sexual dysfunction, weight gain, dizziness, bleeding risk, seizure risk, sedation, headache, activation of shakir or hypomania, drug-inducted movement disorders (akathisia, dystonia, restless leg syndrome), dyslipidemia, hematologic abnormalities, orthostatic hypotension, sedation, withdrawal syndrome, serotonin syndrome, and activation of suicidal ideation and behavior.  Pt educated on the need to practice safe sex while taking this med. Discussed the need for pt to immediately call the office for any new or worsening symptoms, such as worsening depression; feeling nervous or restless; suicidal thoughts or actions;  or other changes changes in mood or behavior, and all other concerns. Pt educated on med compliance and the risks of suddenly stopping this medication or missing doses. Pt verbalized understanding and is agreeable to taking Mirtazapine. Addressed all questions and concerns.     Follow up:   F/u in 1 month.    TREATMENT PLAN/GOALS: Continue supportive psychotherapy efforts and medications as indicated. Treatment and medication options discussed during today's visit. Patient ackowledged and verbally consented to continue with current treatment plan and was educated on the importance of compliance with treatment and follow-up appointments.    MEDICATION ISSUES:  CARLOS reviewed as expected.  Discussed medication options and treatment plan of prescribed medication as well as the risks, benefits, and side effects including potential falls, possible impaired driving and metabolic adversities among others. Patient is agreeable to call the office with any worsening of symptoms or onset of side effects. Patient is agreeable to call 911 or go to the nearest ER should he/she begin having SI/HI. No medication side effects or related complaints today.            This document has been electronically signed by Page Lee PA-C  January 25, 2024 13:17 EST      Part of this note may be an electronic transcription/translation of spoken language to printed text using the Dragon Dictation System.

## 2024-01-25 NOTE — TELEPHONE ENCOUNTER
DUPLICATE REFILL REQUEST.     PT HAD APPT WITH PROVIDER TODAY AND THIS MEDICATION HAS ALREADY BEEN SENT INTO THE PHARMACY.

## 2024-01-26 RX ORDER — GABAPENTIN 800 MG/1
800 TABLET ORAL 3 TIMES DAILY
Qty: 90 TABLET | Refills: 1 | Status: SHIPPED | OUTPATIENT
Start: 2024-01-26 | End: 2024-02-25

## 2024-01-26 RX ORDER — MIRTAZAPINE 45 MG/1
45 TABLET, FILM COATED ORAL NIGHTLY
Qty: 90 TABLET | Refills: 0 | Status: SHIPPED | OUTPATIENT
Start: 2024-01-26 | End: 2024-04-25

## 2024-01-26 RX ORDER — ALPRAZOLAM 1 MG/1
1 TABLET ORAL 2 TIMES DAILY PRN
Qty: 60 TABLET | Refills: 0 | Status: SHIPPED | OUTPATIENT
Start: 2024-01-26

## 2024-01-26 NOTE — TELEPHONE ENCOUNTER
PT(PATIENT) VERIFIED     mirtazapine (REMERON) 45 MG tablet (2024)  gabapentin (Neurontin) 800 MG tablet (2024)  ALPRAZolam (Xanax) 1 MG tablet (2024)    PT(PATIENT) STATES MEDICATIONS WERE SENT TO WRONG PHARMACY     PT(PATIENT) STATES HE NOW USES [facility]    PROVIDER PLEASE ADVISE

## 2024-01-30 DIAGNOSIS — F41.0 PANIC DISORDER: ICD-10-CM

## 2024-01-30 RX ORDER — ALPRAZOLAM 1 MG/1
1 TABLET ORAL 2 TIMES DAILY PRN
Qty: 60 TABLET | OUTPATIENT
Start: 2024-01-30

## 2024-01-30 NOTE — TELEPHONE ENCOUNTER
REFILLED RECENTLY   ALPRAZolam (XANAX) 1 MG tablet (01/26/2024)     TOO SOON FOR REFILL     PROVIDER PLEASE ADVISE

## 2024-02-27 RX ORDER — ATORVASTATIN CALCIUM 10 MG/1
10 TABLET, FILM COATED ORAL DAILY
Qty: 90 TABLET | Refills: 0 | Status: SHIPPED | OUTPATIENT
Start: 2024-02-27

## 2024-02-27 NOTE — TELEPHONE ENCOUNTER
Caller: Asad Meyer    Relationship: Self    Best call back number:     [phone]       Requested Prescriptions:   Requested Prescriptions     Pending Prescriptions Disp Refills    atorvastatin (LIPITOR) 10 MG tablet 90 tablet 0     Sig: Take 1 tablet by mouth Daily.        Pharmacy where request should be sent: [facility] [place] - [address] [facility] [address] - [phone]  - [phone]     Last office visit with prescribing clinician: 12/15/2023   Last telemedicine visit with prescribing clinician: Visit date not found   Next office visit with prescribing clinician: 3/15/2024     Additional details provided by patient: TOOK LAST PILL TODAY    Does the patient have less than a 3 day supply:  [x] Yes  [] No    Would you like a call back once the refill request has been completed: [x] Yes [] No    If the office needs to give you a call back, can they leave a voicemail: [x] Yes [] No    Frieda Catalan Rep   02/27/24 12:55 EST

## 2024-02-29 ENCOUNTER — OFFICE VISIT (OUTPATIENT)
Dept: BEHAVIORAL HEALTH | Facility: CLINIC | Age: 59
End: 2024-02-29
Payer: MEDICAID

## 2024-02-29 VITALS
SYSTOLIC BLOOD PRESSURE: 138 MMHG | DIASTOLIC BLOOD PRESSURE: 82 MMHG | BODY MASS INDEX: 37.83 KG/M2 | WEIGHT: 241 LBS | HEIGHT: 67 IN

## 2024-02-29 DIAGNOSIS — G47.00 INSOMNIA, UNSPECIFIED TYPE: ICD-10-CM

## 2024-02-29 DIAGNOSIS — F43.10 POST TRAUMATIC STRESS DISORDER (PTSD): ICD-10-CM

## 2024-02-29 DIAGNOSIS — F41.0 PANIC DISORDER: ICD-10-CM

## 2024-02-29 DIAGNOSIS — F17.211 CIGARETTE NICOTINE DEPENDENCE IN REMISSION: ICD-10-CM

## 2024-02-29 DIAGNOSIS — F33.2 SEVERE EPISODE OF RECURRENT MAJOR DEPRESSIVE DISORDER, WITHOUT PSYCHOTIC FEATURES: Primary | ICD-10-CM

## 2024-02-29 DIAGNOSIS — F41.1 GENERALIZED ANXIETY DISORDER: ICD-10-CM

## 2024-02-29 RX ORDER — GABAPENTIN 800 MG/1
800 TABLET ORAL 3 TIMES DAILY
Qty: 90 TABLET | Refills: 1 | Status: SHIPPED | OUTPATIENT
Start: 2024-02-29

## 2024-02-29 RX ORDER — ALPRAZOLAM 1 MG/1
1 TABLET ORAL 2 TIMES DAILY PRN
Qty: 60 TABLET | Refills: 0 | Status: SHIPPED | OUTPATIENT
Start: 2024-02-29

## 2024-02-29 RX ORDER — MIRTAZAPINE 45 MG/1
45 TABLET, FILM COATED ORAL NIGHTLY
Qty: 90 TABLET | Refills: 0 | Status: SHIPPED | OUTPATIENT
Start: 2024-02-29 | End: 2024-05-29

## 2024-02-29 NOTE — PROGRESS NOTES
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 07/30/2023 141  136 - 145 mmol/L Final    Potassium 07/30/2023 5.0  3.5 - 5.2 mmol/L Final    Slight hemolysis detected by analyzer. Results may be affected.    Chloride 07/30/2023 107  98 - 107 mmol/L Final    CO2 07/30/2023 26.2  22.0 - 29.0 mmol/L Final    Calcium 07/30/2023 8.7  8.6 - 10.5 mg/dL Final    BUN/Creatinine Ratio 07/30/2023 20.3  7.0 - 25.0 Final    Anion Gap 07/30/2023 7.8  5.0 - 15.0 mmol/L Final    eGFR 07/30/2023 53.0 (L)  >60.0 mL/min/1.73 Final    Lactate 07/29/2023 0.8  0.5 - 2.0 mmol/L Final    Glucose 07/30/2023 85  65 - 99 mg/dL Final    BUN 07/30/2023 25 (H)  6 - 20 mg/dL Final    Creatinine 07/30/2023 1.42 (H)  0.76 - 1.27 mg/dL Final    Sodium 07/30/2023 141  136 - 145 mmol/L Final    Potassium 07/30/2023 4.7  3.5 - 5.2 mmol/L Final    Chloride 07/30/2023 106  98 - 107 mmol/L Final    CO2 07/30/2023 30.9 (H)  22.0 - 29.0 mmol/L Final    Calcium 07/30/2023 8.9  8.6 - 10.5 mg/dL Final    BUN/Creatinine Ratio 07/30/2023 17.6  7.0 - 25.0 Final    Anion Gap 07/30/2023 4.1 (L)  5.0 - 15.0 mmol/L Final    eGFR 07/30/2023 58.0 (L)  >60.0 mL/min/1.73 Final    MRSA PCR 07/30/2023 No MRSA Detected  No MRSA Detected Final   Lab on 07/25/2023   Component Date Value Ref Range Status    Glucose 07/25/2023 81  65 - 99 mg/dL Final    BUN 07/25/2023 28 (H)  6 - 20 mg/dL Final    Creatinine  07/25/2023 1.50 (H)  0.76 - 1.27 mg/dL Final    Sodium 07/25/2023 146 (H)  136 - 145 mmol/L Final    Potassium 07/25/2023 3.9  3.5 - 5.2 mmol/L Final    Chloride 07/25/2023 103  98 - 107 mmol/L Final    CO2 07/25/2023 32.0 (H)  22.0 - 29.0 mmol/L Final    Calcium 07/25/2023 9.6  8.6 - 10.5 mg/dL Final    Total Protein 07/25/2023 6.6  6.0 - 8.5 g/dL Final    Albumin 07/25/2023 4.4  3.5 - 5.2 g/dL Final    ALT (SGPT) 07/25/2023 23  1 - 41 U/L Final    AST (SGOT) 07/25/2023 17  1 - 40 U/L Final    Alkaline Phosphatase 07/25/2023 106  39 - 117 U/L Final    Total Bilirubin 07/25/2023 0.2  0.0 - 1.2 mg/dL Final    Globulin 07/25/2023 2.2  gm/dL Final    A/G Ratio 07/25/2023 2.0  g/dL Final    BUN/Creatinine Ratio 07/25/2023 18.7  7.0 - 25.0 Final    Anion Gap 07/25/2023 11.0  5.0 - 15.0 mmol/L Final    eGFR 07/25/2023 54.3 (L)  >60.0 mL/min/1.73 Final   Admission on 07/07/2023, Discharged on 07/07/2023   Component Date Value Ref Range Status    Glucose 07/07/2023 116 (H)  65 - 99 mg/dL Final    BUN 07/07/2023 32 (H)  6 - 20 mg/dL Final    Creatinine 07/07/2023 1.77 (H)  0.76 - 1.27 mg/dL Final    Sodium 07/07/2023 141  136 - 145 mmol/L Final    Potassium 07/07/2023 4.3  3.5 - 5.2 mmol/L Final    Chloride 07/07/2023 102  98 - 107 mmol/L Final    CO2 07/07/2023 26.9  22.0 - 29.0 mmol/L Final    Calcium 07/07/2023 9.9  8.6 - 10.5 mg/dL Final    Total Protein 07/07/2023 7.6  6.0 - 8.5 g/dL Final    Albumin 07/07/2023 4.1  3.5 - 5.2 g/dL Final    ALT (SGPT) 07/07/2023 20  1 - 41 U/L Final    AST (SGOT) 07/07/2023 23  1 - 40 U/L Final    Alkaline Phosphatase 07/07/2023 118 (H)  39 - 117 U/L Final    Total Bilirubin 07/07/2023 0.3  0.0 - 1.2 mg/dL Final    Globulin 07/07/2023 3.5  gm/dL Final    A/G Ratio 07/07/2023 1.2  g/dL Final    BUN/Creatinine Ratio 07/07/2023 18.1  7.0 - 25.0 Final    Anion Gap 07/07/2023 12.1  5.0 - 15.0 mmol/L Final    eGFR 07/07/2023 44.5 (L)  >60.0 mL/min/1.73 Final    Lipase 07/07/2023 23  13 - 60  U/L Final    Color, UA 07/07/2023 Yellow  Yellow, Straw Final    Appearance, UA 07/07/2023 Clear  Clear Final    pH, UA 07/07/2023 5.5  5.0 - 8.0 Final    Specific Gravity, UA 07/07/2023 1.010  1.005 - 1.030 Final    Glucose, UA 07/07/2023 Negative  Negative Final    Ketones, UA 07/07/2023 Negative  Negative Final    Bilirubin, UA 07/07/2023 Negative  Negative Final    Blood, UA 07/07/2023 Negative  Negative Final    Protein, UA 07/07/2023 Negative  Negative Final    Leuk Esterase, UA 07/07/2023 Negative  Negative Final    Nitrite, UA 07/07/2023 Negative  Negative Final    Urobilinogen, UA 07/07/2023 0.2 E.U./dL  0.2 - 1.0 E.U./dL Final    WBC 07/07/2023 10.91 (H)  3.40 - 10.80 10*3/mm3 Final    RBC 07/07/2023 4.85  4.14 - 5.80 10*6/mm3 Final    Hemoglobin 07/07/2023 15.0  13.0 - 17.7 g/dL Final    Hematocrit 07/07/2023 45.1  37.5 - 51.0 % Final    MCV 07/07/2023 93.0  79.0 - 97.0 fL Final    MCH 07/07/2023 30.9  26.6 - 33.0 pg Final    MCHC 07/07/2023 33.3  31.5 - 35.7 g/dL Final    RDW 07/07/2023 16.1 (H)  12.3 - 15.4 % Final    RDW-SD 07/07/2023 53.9  37.0 - 54.0 fl Final    MPV 07/07/2023 10.8  6.0 - 12.0 fL Final    Platelets 07/07/2023 244  140 - 450 10*3/mm3 Final    Neutrophil % 07/07/2023 73.0  42.7 - 76.0 % Final    Lymphocyte % 07/07/2023 15.7 (L)  19.6 - 45.3 % Final    Monocyte % 07/07/2023 8.1  5.0 - 12.0 % Final    Eosinophil % 07/07/2023 2.1  0.3 - 6.2 % Final    Basophil % 07/07/2023 0.6  0.0 - 1.5 % Final    Immature Grans % 07/07/2023 0.5  0.0 - 0.5 % Final    Neutrophils, Absolute 07/07/2023 7.96 (H)  1.70 - 7.00 10*3/mm3 Final    Lymphocytes, Absolute 07/07/2023 1.71  0.70 - 3.10 10*3/mm3 Final    Monocytes, Absolute 07/07/2023 0.88  0.10 - 0.90 10*3/mm3 Final    Eosinophils, Absolute 07/07/2023 0.23  0.00 - 0.40 10*3/mm3 Final    Basophils, Absolute 07/07/2023 0.07  0.00 - 0.20 10*3/mm3 Final    Immature Grans, Absolute 07/07/2023 0.06 (H)  0.00 - 0.05 10*3/mm3 Final    nRBC 07/07/2023 0.0  0.0  - 0.2 /100 WBC Final    Extra Tube 07/07/2023 Hold for add-ons.   Final    Auto resulted.    Extra Tube 07/07/2023 hold for add-on   Final    Auto resulted    Extra Tube 07/07/2023 Hold for add-ons.   Final    Auto resulted.    Extra Tube 07/07/2023 Hold for add-ons.   Final    Auto resulted    proBNP 07/07/2023 117.0  0.0 - 900.0 pg/mL Final   Lab on 06/30/2023   Component Date Value Ref Range Status    Glucose 06/30/2023 97  65 - 99 mg/dL Final    BUN 06/30/2023 20  6 - 20 mg/dL Final    Creatinine 06/30/2023 1.28 (H)  0.76 - 1.27 mg/dL Final    Sodium 06/30/2023 139  136 - 145 mmol/L Final    Potassium 06/30/2023 4.4  3.5 - 5.2 mmol/L Final    Chloride 06/30/2023 102  98 - 107 mmol/L Final    CO2 06/30/2023 26.8  22.0 - 29.0 mmol/L Final    Calcium 06/30/2023 9.8  8.6 - 10.5 mg/dL Final    Total Protein 06/30/2023 7.4  6.0 - 8.5 g/dL Final    Albumin 06/30/2023 4.3  3.5 - 5.2 g/dL Final    ALT (SGPT) 06/30/2023 26  1 - 41 U/L Final    AST (SGOT) 06/30/2023 28  1 - 40 U/L Final    Alkaline Phosphatase 06/30/2023 113  39 - 117 U/L Final    Total Bilirubin 06/30/2023 0.4  0.0 - 1.2 mg/dL Final    Globulin 06/30/2023 3.1  gm/dL Final    A/G Ratio 06/30/2023 1.4  g/dL Final    BUN/Creatinine Ratio 06/30/2023 15.6  7.0 - 25.0 Final    Anion Gap 06/30/2023 10.2  5.0 - 15.0 mmol/L Final    eGFR 06/30/2023 65.7  >60.0 mL/min/1.73 Final    Total Cholesterol 06/30/2023 186  0 - 200 mg/dL Final    Triglycerides 06/30/2023 254 (H)  0 - 150 mg/dL Final    HDL Cholesterol 06/30/2023 38 (L)  40 - 60 mg/dL Final    LDL Cholesterol  06/30/2023 104 (H)  0 - 100 mg/dL Final    VLDL Cholesterol 06/30/2023 44 (H)  5 - 40 mg/dL Final    LDL/HDL Ratio 06/30/2023 2.56   Final    TSH 06/30/2023 1.750  0.270 - 4.200 uIU/mL Final    TSH 06/30/2023 1.750  0.270 - 4.200 uIU/mL Final    Free T4 06/30/2023 0.81 (L)  0.93 - 1.70 ng/dL Final    T4 results may be falsely increased if patient taking Biotin.    WBC 06/30/2023 7.91  3.40 - 10.80  10*3/mm3 Final    RBC 06/30/2023 4.74  4.14 - 5.80 10*6/mm3 Final    Hemoglobin 06/30/2023 14.3  13.0 - 17.7 g/dL Final    Hematocrit 06/30/2023 42.0  37.5 - 51.0 % Final    MCV 06/30/2023 88.6  79.0 - 97.0 fL Final    MCH 06/30/2023 30.2  26.6 - 33.0 pg Final    MCHC 06/30/2023 34.0  31.5 - 35.7 g/dL Final    RDW 06/30/2023 14.5  12.3 - 15.4 % Final    RDW-SD 06/30/2023 46.4  37.0 - 54.0 fl Final    MPV 06/30/2023 11.5  6.0 - 12.0 fL Final    Platelets 06/30/2023 221  140 - 450 10*3/mm3 Final    Neutrophil % 06/30/2023 64.2  42.7 - 76.0 % Final    Lymphocyte % 06/30/2023 22.4  19.6 - 45.3 % Final    Monocyte % 06/30/2023 7.2  5.0 - 12.0 % Final    Eosinophil % 06/30/2023 4.0  0.3 - 6.2 % Final    Basophil % 06/30/2023 1.1  0.0 - 1.5 % Final    Immature Grans % 06/30/2023 1.1 (H)  0.0 - 0.5 % Final    Neutrophils, Absolute 06/30/2023 5.07  1.70 - 7.00 10*3/mm3 Final    Lymphocytes, Absolute 06/30/2023 1.77  0.70 - 3.10 10*3/mm3 Final    Monocytes, Absolute 06/30/2023 0.57  0.10 - 0.90 10*3/mm3 Final    Eosinophils, Absolute 06/30/2023 0.32  0.00 - 0.40 10*3/mm3 Final    Basophils, Absolute 06/30/2023 0.09  0.00 - 0.20 10*3/mm3 Final    Immature Grans, Absolute 06/30/2023 0.09 (H)  0.00 - 0.05 10*3/mm3 Final    nRBC 06/30/2023 0.1  0.0 - 0.2 /100 WBC Final   Office Visit on 05/25/2023   Component Date Value Ref Range Status    Glucose 05/25/2023 97  65 - 99 mg/dL Final    BUN 05/25/2023 24 (H)  6 - 20 mg/dL Final    Creatinine 05/25/2023 1.34 (H)  0.76 - 1.27 mg/dL Final    Sodium 05/25/2023 140  136 - 145 mmol/L Final    Potassium 05/25/2023 4.4  3.5 - 5.2 mmol/L Final    Chloride 05/25/2023 102  98 - 107 mmol/L Final    CO2 05/25/2023 28.8  22.0 - 29.0 mmol/L Final    Calcium 05/25/2023 10.0  8.6 - 10.5 mg/dL Final    BUN/Creatinine Ratio 05/25/2023 17.9  7.0 - 25.0 Final    Anion Gap 05/25/2023 9.2  5.0 - 15.0 mmol/L Final    eGFR 05/25/2023 62.2  >60.0 mL/min/1.73 Final   Office Visit on 04/25/2023   Component  Date Value Ref Range Status    Glucose 04/25/2023 102 (H)  65 - 99 mg/dL Final    BUN 04/25/2023 19  6 - 20 mg/dL Final    Creatinine 04/25/2023 1.38 (H)  0.76 - 1.27 mg/dL Final    Sodium 04/25/2023 136  136 - 145 mmol/L Final    Potassium 04/25/2023 4.4  3.5 - 5.2 mmol/L Final    Chloride 04/25/2023 101  98 - 107 mmol/L Final    CO2 04/25/2023 23.8  22.0 - 29.0 mmol/L Final    Calcium 04/25/2023 9.6  8.6 - 10.5 mg/dL Final    BUN/Creatinine Ratio 04/25/2023 13.8  7.0 - 25.0 Final    Anion Gap 04/25/2023 11.2  5.0 - 15.0 mmol/L Final    eGFR 04/25/2023 60.0 (L)  >60.0 mL/min/1.73 Final    proBNP 04/25/2023 35.1  0.0 - 900.0 pg/mL Final    WBC 04/25/2023 7.73  3.40 - 10.80 10*3/mm3 Final    RBC 04/25/2023 4.70  4.14 - 5.80 10*6/mm3 Final    Hemoglobin 04/25/2023 14.8  13.0 - 17.7 g/dL Final    Hematocrit 04/25/2023 42.9  37.5 - 51.0 % Final    MCV 04/25/2023 91.3  79.0 - 97.0 fL Final    MCH 04/25/2023 31.5  26.6 - 33.0 pg Final    MCHC 04/25/2023 34.5  31.5 - 35.7 g/dL Final    RDW 04/25/2023 13.2  12.3 - 15.4 % Final    RDW-SD 04/25/2023 44.2  37.0 - 54.0 fl Final    MPV 04/25/2023 11.3  6.0 - 12.0 fL Final    Platelets 04/25/2023 298  140 - 450 10*3/mm3 Final    Neutrophil % 04/25/2023 66.2  42.7 - 76.0 % Final    Lymphocyte % 04/25/2023 20.6  19.6 - 45.3 % Final    Monocyte % 04/25/2023 7.8  5.0 - 12.0 % Final    Eosinophil % 04/25/2023 4.1  0.3 - 6.2 % Final    Basophil % 04/25/2023 0.9  0.0 - 1.5 % Final    Immature Grans % 04/25/2023 0.4  0.0 - 0.5 % Final    Neutrophils, Absolute 04/25/2023 5.12  1.70 - 7.00 10*3/mm3 Final    Lymphocytes, Absolute 04/25/2023 1.59  0.70 - 3.10 10*3/mm3 Final    Monocytes, Absolute 04/25/2023 0.60  0.10 - 0.90 10*3/mm3 Final    Eosinophils, Absolute 04/25/2023 0.32  0.00 - 0.40 10*3/mm3 Final    Basophils, Absolute 04/25/2023 0.07  0.00 - 0.20 10*3/mm3 Final    Immature Grans, Absolute 04/25/2023 0.03  0.00 - 0.05 10*3/mm3 Final    nRBC 04/25/2023 0.0  0.0 - 0.2 /100 WBC Final    Admission on 04/11/2023, Discharged on 04/11/2023   Component Date Value Ref Range Status    Case Report 04/11/2023    Final                    Value:Medical Cytology Report                           Case: [id number]                                  Authorizing Provider:  Taiwo Copeland, Collected:           04/11/2023 09:40 AM                                 DO                                                                           Ordering Location:     [facility] Received:            04/12/2023 08:53 AM                                 SUITES                                                                       Pathologist:           Maria Teresa Borja DO                                                       Specimen:    Lung, Right Upper Lobe, RIGHT UPPER LOBE BAL                                               Final Diagnosis 04/11/2023    Final                    Value:This result contains rich text formatting which cannot be displayed here.    Clinical Information 04/11/2023    Final                    Value:This result contains rich text formatting which cannot be displayed here.    Comment 04/11/2023    Final                    Value:This result contains rich text formatting which cannot be displayed here.    Gross Description 04/11/2023    Final                    Value:This result contains rich text formatting which cannot be displayed here.    Microscopic Description 04/11/2023    Final    This result contains rich text formatting which cannot be displayed here.    Special Stains 04/11/2023    Final                    Value:This result contains rich text formatting which cannot be displayed here.    Fungus Culture 04/11/2023 Heavy growth (4+) Candida albicans (A)   Final    AFB Culture 04/11/2023 No AFB isolated at 6 weeks   Final    AFB Stain 04/11/2023 No acid fast bacilli seen on direct smear   Final    AFB Stain 04/11/2023 No acid fast bacilli seen on concentrated smear    Final    BAL Culture 04/11/2023 >100,000 CFU/mL Normal respiratory lee. No S. aureus or Pseudomonas aeruginosa detected. Final report.   Final    Gram Stain 04/11/2023 Moderate (3+) Gram positive cocci in pairs and chains   Final    Escherichia coli PCR 04/11/2023 Not Detected  Not Detected Final    Acinetobacter calcoaceticus-travon* 04/11/2023 Not Detected  Not Detected Final    Enterobacter cloacae PCR 04/11/2023 Not Detected  Not Detected Final    Klebsiella oxytoca PCR 04/11/2023 Not Detected  Not Detected Final    Klebsiella pneumoniae group PCR 04/11/2023 Not Detected  Not Detected Final    Klebsiella aerogenes PCR 04/11/2023 Not Detected  Not Detected Final    Moraxella catarrhalis PCR 04/11/2023 Not Detected  Not Detected Final    Proteus species PCR 04/11/2023 Not Detected  Not Detected Final    Pseudomonas aeroginosa PCR 04/11/2023 Not Detected  Not Detected Final    Serratia marcescens PCR 04/11/2023 Not Detected  Not Detected Final    Staphylococcus aureus PCR 04/11/2023 Not Detected  Not Detected Final    Streptococcus pyogenes PCR 04/11/2023 Not Detected  Not Detected Final    Haemophilus influenzae PCR 04/11/2023 Not Detected  Not Detected Final    Streptococcus agalactiae PCR 04/11/2023 Not Detected  Not Detected Final    Streptococcus pneumoniae PCR 04/11/2023 Not Detected  Not Detected Final    Chlamydophila pneumoniae PCR 04/11/2023 Not Detected  Not Detected Final    Legionella pneumophilia PCR 04/11/2023 Not Detected  Not Detected Final    Mycoplasma pneumo by PCR 04/11/2023 Not Detected  Not Detected Final    ADENOVIRUS, PCR 04/11/2023 Not Detected  Not Detected Final    CTX-M Gene 04/11/2023 N/A  Not Detected, N/A Final    IMP Gene 04/11/2023 N/A  Not Detected, N/A Final    KPC Gene 04/11/2023 N/A  Not Detected, N/A Final    mecA/C and MREJ Gene 04/11/2023 N/A  Not Detected, N/A Final    NDM Gene 04/11/2023 N/A  Not Detected, N/A Final    OXA-48-like Gene 04/11/2023 N/A  Not Detected, N/A  Final    VIM Gene 04/11/2023 N/A  Not Detected, N/A Final    Coronavirus 04/11/2023 Not Detected  Not Detected Final    Human Metapneumovirus 04/11/2023 Not Detected  Not Detected Final    Human Rhinovirus/Enterovirus 04/11/2023 Not Detected  Not Detected Final    Influenza A PCR 04/11/2023 Not Detected  Not Detected Final    Influenza B PCR 04/11/2023 Not Detected  Not Detected Final    RSV, PCR 04/11/2023 Not Detected  Not Detected Final    Parainfluenza virus PCR 04/11/2023 Not Detected  Not Detected Final    Neutrophils, Fluid 04/11/2023 100  % Final   Lab on 03/28/2023   Component Date Value Ref Range Status    WBC 03/28/2023 10.03  3.40 - 10.80 10*3/mm3 Final    RBC 03/28/2023 4.61  4.14 - 5.80 10*6/mm3 Final    Hemoglobin 03/28/2023 14.4  13.0 - 17.7 g/dL Final    Hematocrit 03/28/2023 42.2  37.5 - 51.0 % Final    MCV 03/28/2023 91.5  79.0 - 97.0 fL Final    MCH 03/28/2023 31.2  26.6 - 33.0 pg Final    MCHC 03/28/2023 34.1  31.5 - 35.7 g/dL Final    RDW 03/28/2023 13.4  12.3 - 15.4 % Final    RDW-SD 03/28/2023 45.3  37.0 - 54.0 fl Final    MPV 03/28/2023 11.6  6.0 - 12.0 fL Final    Platelets 03/28/2023 246  140 - 450 10*3/mm3 Final    Neutrophil % 03/28/2023 69.6  42.7 - 76.0 % Final    Lymphocyte % 03/28/2023 19.6  19.6 - 45.3 % Final    Monocyte % 03/28/2023 6.8  5.0 - 12.0 % Final    Eosinophil % 03/28/2023 2.8  0.3 - 6.2 % Final    Basophil % 03/28/2023 0.6  0.0 - 1.5 % Final    Immature Grans % 03/28/2023 0.6 (H)  0.0 - 0.5 % Final    Neutrophils, Absolute 03/28/2023 6.98  1.70 - 7.00 10*3/mm3 Final    Lymphocytes, Absolute 03/28/2023 1.97  0.70 - 3.10 10*3/mm3 Final    Monocytes, Absolute 03/28/2023 0.68  0.10 - 0.90 10*3/mm3 Final    Eosinophils, Absolute 03/28/2023 0.28  0.00 - 0.40 10*3/mm3 Final    Basophils, Absolute 03/28/2023 0.06  0.00 - 0.20 10*3/mm3 Final    Immature Grans, Absolute 03/28/2023 0.06 (H)  0.00 - 0.05 10*3/mm3 Final    nRBC 03/28/2023 0.0  0.0 - 0.2 /100 WBC Final    Respiratory  Culture 03/28/2023 Moderate growth (3+) Normal respiratory lee. No S. aureus or Pseudomonas aeruginosa detected. Final report.   Final    Gram Stain 03/28/2023 Moderate (3+) WBCs seen   Final    Gram Stain 03/28/2023 Few (2+) Epithelial cells seen   Final    Gram Stain 03/28/2023 Few (2+) Gram positive cocci in pairs and chains   Final    Gram Stain 03/28/2023 Rare (1+) Gram positive cocci in clusters   Final    Gram Stain 03/28/2023 Occasional Gram negative bacilli   Final       EKG Results:  No orders to display       Imaging Results:  XR Chest 1 View    Result Date: 10/3/2022   No active disease is seen.       MIKAL HOPE MD       Electronically Signed and Approved By: MIKAL HOPE MD on 10/03/2022 at 12:47                 Assessment & Plan   Diagnoses and all orders for this visit:    1. Severe episode of recurrent major depressive disorder, without psychotic features (Primary)  -     Cariprazine HCl (Vraylar) 1.5 MG capsule capsule; Take 1 capsule by mouth Daily.  Dispense: 30 capsule; Refill: 1  -     mirtazapine (REMERON) 45 MG tablet; Take 1 tablet by mouth Every Night for 90 days.  Dispense: 90 tablet; Refill: 0    2. Panic disorder  -     ALPRAZolam (XANAX) 1 MG tablet; Take 1 tablet by mouth 2 (Two) Times a Day As Needed for Anxiety. for anxiety  Dispense: 60 tablet; Refill: 0  -     gabapentin (Neurontin) 800 MG tablet; Take 1 tablet by mouth 3 (Three) Times a Day.  Dispense: 90 tablet; Refill: 1  -     mirtazapine (REMERON) 45 MG tablet; Take 1 tablet by mouth Every Night for 90 days.  Dispense: 90 tablet; Refill: 0    3. Post traumatic stress disorder (PTSD)  -     gabapentin (Neurontin) 800 MG tablet; Take 1 tablet by mouth 3 (Three) Times a Day.  Dispense: 90 tablet; Refill: 1  -     mirtazapine (REMERON) 45 MG tablet; Take 1 tablet by mouth Every Night for 90 days.  Dispense: 90 tablet; Refill: 0    4. Generalized anxiety disorder  -     gabapentin (Neurontin) 800 MG tablet; Take 1 tablet by  mouth 3 (Three) Times a Day.  Dispense: 90 tablet; Refill: 1  -     mirtazapine (REMERON) 45 MG tablet; Take 1 tablet by mouth Every Night for 90 days.  Dispense: 90 tablet; Refill: 0    5. Insomnia, unspecified type  -     mirtazapine (REMERON) 45 MG tablet; Take 1 tablet by mouth Every Night for 90 days.  Dispense: 90 tablet; Refill: 0    6. Cigarette nicotine dependence in remission            Patient screened positive for depression based on a PHQ-9 score of 23 on 12/21/2023. Follow-up recommendations include: Prescribed antidepressant medication treatment, Suicide Risk Assessment performed, and see assessment below .    Presentation seems most consistent with MDD, GREG, panic disorder, PTSD, insomnia, nicotine dependence.  We will continue mirtazapine for management of depression, anxiety, and overall mood.  Patient declines referral for sleep medicine. I extensively discussed my concerns about patient being on Xanax including but not limited to respiratory depression, coma, death.  Discussed risk factors including COPD. Patient would like to continue Xanax.  We will continue Xanax for anxiety as needed.  Counseled the patient on the risks including addiction, dependence, and misuse.  Will continue gabapentin for management of anxiety and overall mood.  We will start on Vraylar for management of depression and overall mood.  Discussed medication options such as Spravato, patient will think about this.  Instructed patient to contact the office for any new or worsening symptoms or any other concerns.  Follow-up in 1 month.  Addressed all questions and concerns.    Visit Diagnoses:    ICD-10-CM ICD-9-CM   1. Severe episode of recurrent major depressive disorder, without psychotic features  F33.2 296.33   2. Panic disorder  F41.0 300.01   3. Post traumatic stress disorder (PTSD)  F43.10 309.81   4. Generalized anxiety disorder  F41.1 300.02   5. Insomnia, unspecified type  G47.00 780.52   6. Cigarette nicotine  dependence in remission  F17.211 V15.82               PLAN:  Safety: No acute safety concerns at this time.  Therapy: Patient declines referral for psychotherapy.  Risk Assessment: Risk of self-harm acutely is moderate to severe.  Risk factors include anxiety disorder, mood disorder, intermittent SI (passive, no plan or intent, no present SI), h/o suicide attempt in the past, and recent psychosocial stressors (pandemic). Protective factors include no family history, denies access to guns/weapons, no history of self-harm in the past, minimal AODA, healthcare seeking, future orientation, willingness to engage in care.  Risk of self-harm chronically is also moderate to severe, but could be further elevated in the event of treatment noncompliance and/or AODA.  Labs/Diagnostics Ordered:   No orders of the defined types were placed in this encounter.    Medications:   New Medications Ordered This Visit   Medications    Cariprazine HCl (Vraylar) 1.5 MG capsule capsule     Sig: Take 1 capsule by mouth Daily.     Dispense:  30 capsule     Refill:  1    ALPRAZolam (XANAX) 1 MG tablet     Sig: Take 1 tablet by mouth 2 (Two) Times a Day As Needed for Anxiety. for anxiety     Dispense:  60 tablet     Refill:  0    gabapentin (Neurontin) 800 MG tablet     Sig: Take 1 tablet by mouth 3 (Three) Times a Day.     Dispense:  90 tablet     Refill:  1    mirtazapine (REMERON) 45 MG tablet     Sig: Take 1 tablet by mouth Every Night for 90 days.     Dispense:  90 tablet     Refill:  0     Discussed all risks, benefits, alternatives, and side effects of Xanax including but not limited to risks of abuse, misuse, and addiction, which can lead to overdose or death; risks of dependence and withdrawal reactions; drowsiness, sedation, fatigue, depression, dizziness, ataxia, weakness, confusion, forgetfulness, hypotension, falls risk, respiratory depression, anterograde amnesia, paradoxical reactions such as hyperactivity or aggressive behavior;  and hallucinations. Pt educated on the need to practice safe sex while taking this med. Instructed pt to avoid performing tasks that require mental alertness such as driving or operating machinery. Discussed the need for pt to immediately call the office for any new or worsening symptoms, such as changes in mood or behavior, and all other concerns. Pt educated on med compliance, including the proper use and monitoring for signs and symptoms of abuse, misuse, and addiction. Pt verbalized understanding and is agreeable to taking Xanax. CARLOS obtained and USD ordered. Controlled substances agreement verbally signed. Addressed all questions and concerns.     Discussed all risks, benefits, alternatives, and side effects of Gabapentin including but not limited to sedation, dizziness, GI upset, dry mouth, and weight gain. Pt instructed to avoid driving and doing other tasks or actions that require to be alert until knowing how the drug affects them.  Pt educated on the need to practice safe sex while taking this med. Discussed the need for pt to immediately call the office for any new or worsening symptoms, such as worsening depression; feeling nervous or restless; suicidal thoughts or actions; or other changes changes in mood or behavior, and all other concerns. Pt educated on med compliance and the risks of suddenly stopping this medication or missing doses. Pt verbalized understanding and is agreeable to taking Gabapentin. Addressed all questions and concerns.  Will order UDS and obtain CARLOS report. Pt verbally signed controlled substances agreement.    Discussed all risks, benefits, alternatives, and side effects of Mirtazapine including but not limited to GI upset, sexual dysfunction, weight gain, dizziness, bleeding risk, seizure risk, sedation, headache, activation of shakir or hypomania, drug-inducted movement disorders (akathisia, dystonia, restless leg syndrome), dyslipidemia, hematologic abnormalities,  orthostatic hypotension, sedation, withdrawal syndrome, serotonin syndrome, and activation of suicidal ideation and behavior.  Pt educated on the need to practice safe sex while taking this med. Discussed the need for pt to immediately call the office for any new or worsening symptoms, such as worsening depression; feeling nervous or restless; suicidal thoughts or actions; or other changes changes in mood or behavior, and all other concerns. Pt educated on med compliance and the risks of suddenly stopping this medication or missing doses. Pt verbalized understanding and is agreeable to taking Mirtazapine. Addressed all questions and concerns.     Vraylar, Risks, benefits, alternatives discussed with patient including nausea and vomiting, GI upset, sedation, akathisia, theoretical risk of tardive dyskinesia/dystonia, movement issues, and weight gain. Use care when operating vehicle, vessel, or machine. After discussion of these risks and benefits, the patient voiced understanding and agreed to proceed.    Follow up:   F/u in 1 month.    TREATMENT PLAN/GOALS: Continue supportive psychotherapy efforts and medications as indicated. Treatment and medication options discussed during today's visit. Patient ackowledged and verbally consented to continue with current treatment plan and was educated on the importance of compliance with treatment and follow-up appointments.    MEDICATION ISSUES:  CARLOS reviewed as expected.  Discussed medication options and treatment plan of prescribed medication as well as the risks, benefits, and side effects including potential falls, possible impaired driving and metabolic adversities among others. Patient is agreeable to call the office with any worsening of symptoms or onset of side effects. Patient is agreeable to call 911 or go to the nearest ER should he/she begin having SI/HI. No medication side effects or related complaints today.            This document has been electronically  signed by Page Lee PA-C  February 29, 2024 16:14 EST      Part of this note may be an electronic transcription/translation of spoken language to printed text using the Dragon Dictation System.

## 2024-03-11 RX ORDER — AMLODIPINE AND BENAZEPRIL HYDROCHLORIDE 10; 40 MG/1; MG/1
1 CAPSULE ORAL DAILY
Qty: 90 CAPSULE | Refills: 1 | OUTPATIENT
Start: 2024-03-11 | End: 2025-03-11

## 2024-03-15 RX ORDER — FLUTICASONE FUROATE, UMECLIDINIUM BROMIDE AND VILANTEROL TRIFENATATE 200; 62.5; 25 UG/1; UG/1; UG/1
1 POWDER RESPIRATORY (INHALATION) DAILY
Qty: 60 EACH | Refills: 5 | Status: SHIPPED | OUTPATIENT
Start: 2024-03-15

## 2024-03-15 NOTE — TELEPHONE ENCOUNTER
Caller: Betsy Asad    Relationship: Self    Best call back number: [phone]    Requested Prescriptions:   Requested Prescriptions     Pending Prescriptions Disp Refills    Fluticasone-Umeclidin-Vilant (Trelegy Ellipta) 200-62.5-25 MCG/ACT inhaler 60 each 5     Sig: Inhale 1 puff Daily.        Pharmacy where request should be sent: [facility] [place] - [address] [address] - [phone] [facility] [phone] FX     Last office visit with prescribing clinician: 12/15/2023   Last telemedicine visit with prescribing clinician: Visit date not found   Next office visit with prescribing clinician: 3/22/2024     Additional details provided by patient: OUT OF MEDICATION     Does the patient have less than a 3 day supply:  [x] Yes  [] No    Would you like a call back once the refill request has been completed: [] Yes [] No    If the office needs to give you a call back, can they leave a voicemail: [] Yes [] No    Frieda Henry Rep   03/15/24 12:33 EDT

## 2024-03-21 DIAGNOSIS — F41.0 PANIC DISORDER: ICD-10-CM

## 2024-03-21 RX ORDER — ALPRAZOLAM 1 MG/1
1 TABLET ORAL 2 TIMES DAILY PRN
Qty: 60 TABLET | Refills: 0 | Status: SHIPPED | OUTPATIENT
Start: 2024-03-30

## 2024-03-22 ENCOUNTER — OFFICE VISIT (OUTPATIENT)
Dept: INTERNAL MEDICINE | Age: 59
End: 2024-03-22
Payer: MEDICAID

## 2024-03-22 VITALS
RESPIRATION RATE: 18 BRPM | BODY MASS INDEX: 39.08 KG/M2 | OXYGEN SATURATION: 92 % | TEMPERATURE: 97.4 F | SYSTOLIC BLOOD PRESSURE: 116 MMHG | WEIGHT: 249 LBS | DIASTOLIC BLOOD PRESSURE: 72 MMHG | HEIGHT: 67 IN | HEART RATE: 68 BPM

## 2024-03-22 DIAGNOSIS — J44.9 CHRONIC OBSTRUCTIVE PULMONARY DISEASE, UNSPECIFIED COPD TYPE: ICD-10-CM

## 2024-03-22 DIAGNOSIS — I10 ESSENTIAL (PRIMARY) HYPERTENSION: Primary | ICD-10-CM

## 2024-03-22 DIAGNOSIS — N28.9 RENAL INSUFFICIENCY: ICD-10-CM

## 2024-03-22 DIAGNOSIS — E78.2 MIXED HYPERLIPIDEMIA: ICD-10-CM

## 2024-03-22 PROCEDURE — 1160F RVW MEDS BY RX/DR IN RCRD: CPT

## 2024-03-22 PROCEDURE — 1159F MED LIST DOCD IN RCRD: CPT

## 2024-03-22 PROCEDURE — 99214 OFFICE O/P EST MOD 30 MIN: CPT

## 2024-03-22 PROCEDURE — 3074F SYST BP LT 130 MM HG: CPT

## 2024-03-22 PROCEDURE — 3078F DIAST BP <80 MM HG: CPT

## 2024-03-22 RX ORDER — LISINOPRIL 40 MG/1
40 TABLET ORAL DAILY
Qty: 90 TABLET | Refills: 1 | Status: SHIPPED | OUTPATIENT
Start: 2024-03-22

## 2024-03-22 RX ORDER — ATORVASTATIN CALCIUM 10 MG/1
10 TABLET, FILM COATED ORAL DAILY
Qty: 90 TABLET | Refills: 0 | Status: SHIPPED | OUTPATIENT
Start: 2024-03-22

## 2024-03-22 RX ORDER — PREDNISONE 20 MG/1
TABLET ORAL
Qty: 11 TABLET | Refills: 0 | Status: SHIPPED | OUTPATIENT
Start: 2024-03-22

## 2024-03-22 RX ORDER — IPRATROPIUM BROMIDE AND ALBUTEROL SULFATE 2.5; .5 MG/3ML; MG/3ML
3 SOLUTION RESPIRATORY (INHALATION)
Qty: 360 ML | Refills: 2 | Status: SHIPPED | OUTPATIENT
Start: 2024-03-22

## 2024-03-22 RX ORDER — FUROSEMIDE 20 MG/1
20 TABLET ORAL DAILY
Qty: 90 TABLET | Refills: 1 | Status: SHIPPED | OUTPATIENT
Start: 2024-03-22

## 2024-03-22 NOTE — ASSESSMENT & PLAN NOTE
Asthma COPD overlap  Patient states that he had an episode of hypoxia earlier this week. He states that he was very short of breath.   Denies any fever, chills, nausea, vomiting, diarrhea, changes to bowel./bladder habits.   He states that he has home o2 PRN and he put that on and his sats came back up.  He states that he is feeling better.  His o2 sat in the office today is 92% on RA.  He continues with trelegy.   Will refill neb treatments as he is out. He should do those every 4-6 hours for the next few days.  Sample of airsupra given to patient. He may start this in a few days PRN instead of albuterol and nebs.  Patient instructed to f/u with pulmonologist as scheduled-monitor o2 saturation closely  If symptoms worsen or fail to improve, he should seek medical attn right away

## 2024-03-22 NOTE — ASSESSMENT & PLAN NOTE
Blood pressure is stable   He remains on lisinopril 40 mg daily, lasix 20 mg daily.   Continue current regimen.

## 2024-03-22 NOTE — ASSESSMENT & PLAN NOTE
Discussed with patient the importance it is to have labs completed as ordered.  Will recheck renal function.

## 2024-03-22 NOTE — PROGRESS NOTES
Chief Complaint  Follow-up (59 year old male here today for a 3 month follow up. States he had an episode a few days with his chest tightness and now had congestion and O2 level was 78. )    History of Present Illness  SUBJECTIVE  Asad Meyer presents to [facility] follow up on chronic disease management.         Past Medical History:   Diagnosis Date    Anxiety     Asthma     Asthma, extrinsic     Chronic pain disorder     Depression     Emphysema of lung     Essential (primary) hypertension 10/27/2022    Pneumonia of right lower lobe due to infectious organism 04/06/2023    PTSD (post-traumatic stress disorder)     Rheumatoid arthritis     Schizoaffective disorder     Self-injurious behavior     Suicide attempt       Family History   Problem Relation Age of Onset    Alcohol abuse Father     Asthma Father     Emphysema Father     Heart failure Father       Past Surgical History:   Procedure Laterality Date    ANKLE SURGERY Right     BRONCHOSCOPY N/A 4/11/2023    Procedure: BRONCHOSCOPY WITH BRONCHOALVEOLAR LAVAGE;  Surgeon: Taiwo Copeland DO;  Location: [facility] ENDOSCOPY;  Service: Pulmonary;  Laterality: N/A;  DIFFUSE BRONCHITIS        Current Outpatient Medications:     albuterol (ACCUNEB) 0.63 MG/3ML nebulizer solution, Take 3 mL by nebulization Every 6 (Six) Hours As Needed for Wheezing., Disp: 30 each, Rfl: 0    [START ON 3/30/2024] ALPRAZolam (XANAX) 1 MG tablet, Take 1 tablet by mouth 2 (Two) Times a Day As Needed for Anxiety. for anxiety, Disp: 60 tablet, Rfl: 0    atorvastatin (LIPITOR) 10 MG tablet, Take 1 tablet by mouth Daily., Disp: 90 tablet, Rfl: 0    Cariprazine HCl (Vraylar) 1.5 MG capsule capsule, Take 1 capsule by mouth Daily., Disp: 30 capsule, Rfl: 1    Fluticasone-Umeclidin-Vilant (Trelegy Ellipta) 200-62.5-25 MCG/ACT inhaler, Inhale 1 puff Daily., Disp: 60 each, Rfl: 5    furosemide (LASIX) 20 MG tablet, Take 1 tablet by mouth Daily., Disp: 90  "tablet, Rfl: 1    gabapentin (Neurontin) 800 MG tablet, Take 1 tablet by mouth 3 (Three) Times a Day., Disp: 90 tablet, Rfl: 1    ipratropium-albuterol (DUO-NEB) 0.5-2.5 mg/3 ml nebulizer, Take 3 mL by nebulization 4 (Four) Times a Day., Disp: 360 mL, Rfl: 2    lisinopril (PRINIVIL,ZESTRIL) 40 MG tablet, Take 1 tablet by mouth Daily., Disp: 90 tablet, Rfl: 1    nicotine (Nicoderm CQ) 21 MG/24HR patch, Place 1 patch on the skin as directed by provider Daily., Disp: 30 patch, Rfl: 1    predniSONE (DELTASONE) 20 MG tablet, Take 2 tablets daily for 3 days, then take 1 tablet daily for 3 days, then take 1/2 tablet daily for 4 days, Disp: 11 tablet, Rfl: 0    OBJECTIVE  Vital Signs:   /72 (BP Location: Left arm, Patient Position: Sitting)   Pulse 68   Temp 97.4 °F (36.3 °C) (Temporal)   Resp 18   Ht 170.2 cm (67.01\")   Wt 113 kg (249 lb)   SpO2 92%   BMI 38.99 kg/m²    Estimated body mass index is 38.99 kg/m² as calculated from the following:    Height as of this encounter: 170.2 cm (67.01\").    Weight as of this encounter: 113 kg (249 lb).     Wt Readings from Last 3 Encounters:   03/22/24 113 kg (249 lb)   02/29/24 109 kg (241 lb)   01/25/24 109 kg (241 lb)     BP Readings from Last 3 Encounters:   03/22/24 116/72   02/29/24 138/82   01/25/24 132/82       Physical Exam  Vitals and nursing note reviewed.   Constitutional:       Appearance: Normal appearance.   HENT:      Head: Normocephalic.   Eyes:      Extraocular Movements: Extraocular movements intact.      Conjunctiva/sclera: Conjunctivae normal.   Cardiovascular:      Rate and Rhythm: Normal rate and regular rhythm.      Heart sounds: Normal heart sounds. No murmur heard.  Pulmonary:      Effort: Pulmonary effort is normal.      Breath sounds: Normal breath sounds. Wheezing and rhonchi present.   Abdominal:      General: Bowel sounds are normal.      Palpations: Abdomen is soft.      Tenderness: There is no abdominal tenderness. There is no guarding. "   Musculoskeletal:         General: No swelling. Normal range of motion.   Skin:     General: Skin is warm and dry.   Neurological:      General: No focal deficit present.      Mental Status: He is alert. Mental status is at baseline.   Psychiatric:         Mood and Affect: Mood normal.         Thought Content: Thought content normal.          Result Review        No Images in the past 120 days found..     The above data has been reviewed by PONCE Valadez 03/22/2024 15:26 EDT.          Patient Care Team:  Jailyn Shepard APRN as PCP - General (Internal Medicine)            ASSESSMENT & PLAN    Diagnoses and all orders for this visit:    1. Essential (primary) hypertension (Primary)  Assessment & Plan:  Blood pressure is stable   He remains on lisinopril 40 mg daily, lasix 20 mg daily.   Continue current regimen.    Orders:  -     lisinopril (PRINIVIL,ZESTRIL) 40 MG tablet; Take 1 tablet by mouth Daily.  Dispense: 90 tablet; Refill: 1  -     furosemide (LASIX) 20 MG tablet; Take 1 tablet by mouth Daily.  Dispense: 90 tablet; Refill: 1    2. Mixed hyperlipidemia  Assessment & Plan:   Patient is taking lipitor 10 mg qhs  Continue current regimen.  Lipid panel ordered    Orders:  -     atorvastatin (LIPITOR) 10 MG tablet; Take 1 tablet by mouth Daily.  Dispense: 90 tablet; Refill: 0    3. Chronic obstructive pulmonary disease, unspecified COPD type  Assessment & Plan:  Asthma COPD overlap  Patient states that he had an episode of hypoxia earlier this week. He states that he was very short of breath.   Denies any fever, chills, nausea, vomiting, diarrhea, changes to bowel./bladder habits.   He states that he has home o2 PRN and he put that on and his sats came back up.  He states that he is feeling better.  His o2 sat in the office today is 92% on RA.  He continues with trelegy.   Will refill neb treatments as he is out. He should do those every 4-6 hours for the next few days.  Sample of airsupra given to patient. He  may start this in a few days PRN instead of albuterol and nebs.  Patient instructed to f/u with pulmonologist as scheduled-monitor o2 saturation closely  If symptoms worsen or fail to improve, he should seek medical attn right away    Orders:  -     ipratropium-albuterol (DUO-NEB) 0.5-2.5 mg/3 ml nebulizer; Take 3 mL by nebulization 4 (Four) Times a Day.  Dispense: 360 mL; Refill: 2  -     predniSONE (DELTASONE) 20 MG tablet; Take 2 tablets daily for 3 days, then take 1 tablet daily for 3 days, then take 1/2 tablet daily for 4 days  Dispense: 11 tablet; Refill: 0    4. Renal insufficiency  Assessment & Plan:  Discussed with patient the importance it is to have labs completed as ordered.  Will recheck renal function.              Tobacco Use: High Risk (3/22/2024)    Patient History     Smoking Tobacco Use: Some Days     Smokeless Tobacco Use: Former     Passive Exposure: Current       Follow Up     Return in about 10 days (around 4/1/2024).    Please note that portions of this note were completed with a voice recognition program.    Patient was given instructions and counseling regarding his condition or for health maintenance advice. Please see specific information pulled into the AVS if appropriate.   I have reviewed information obtained and documented by others and I have confirmed the accuracy of this documented note.    PONCE Valadez

## 2024-04-03 ENCOUNTER — OFFICE VISIT (OUTPATIENT)
Dept: PULMONOLOGY | Facility: CLINIC | Age: 59
End: 2024-04-03
Payer: MEDICAID

## 2024-04-03 VITALS
SYSTOLIC BLOOD PRESSURE: 144 MMHG | HEART RATE: 94 BPM | TEMPERATURE: 98.2 F | HEIGHT: 67 IN | BODY MASS INDEX: 38.92 KG/M2 | OXYGEN SATURATION: 90 % | RESPIRATION RATE: 16 BRPM | DIASTOLIC BLOOD PRESSURE: 109 MMHG | WEIGHT: 248 LBS

## 2024-04-03 DIAGNOSIS — J45.40 MODERATE PERSISTENT ASTHMA, UNSPECIFIED WHETHER COMPLICATED: ICD-10-CM

## 2024-04-03 DIAGNOSIS — J41.8 MIXED SIMPLE AND MUCOPURULENT CHRONIC BRONCHITIS: Primary | ICD-10-CM

## 2024-04-03 DIAGNOSIS — Z72.0 TOBACCO ABUSE: ICD-10-CM

## 2024-04-03 DIAGNOSIS — E66.01 MORBID (SEVERE) OBESITY DUE TO EXCESS CALORIES: ICD-10-CM

## 2024-04-03 RX ORDER — ALBUTEROL SULFATE AND BUDESONIDE 90; 80 UG/1; UG/1
2 AEROSOL, METERED RESPIRATORY (INHALATION) 4 TIMES DAILY PRN
Qty: 1 G | Refills: 3 | Status: SHIPPED | OUTPATIENT
Start: 2024-04-03

## 2024-04-03 NOTE — PROGRESS NOTES
Pulmonary Office Follow-up    Subjective     Asad Meyer is seen today at the office for   Chief Complaint   Patient presents with    COPD    Asthma    Follow-up     3 MONTH    Wheezing         HPI  Asad Meyer is a 59 y.o. male with a PMH significant for asthma and COPD presents for follow-up patient seems to be doing better with his current medications he continues to have wheezing with congestion off-and-on and is cutting back on his smoking patient denies any chest pain fever or hemoptysis      Tobacco use history:        Patient Active Problem List   Diagnosis    Asthma    Essential (primary) hypertension    Psychophysiological insomnia    GREG (generalized anxiety disorder)    Polyarthralgia    Chronic obstructive pulmonary disease    Mixed hyperlipidemia    DDD (degenerative disc disease), thoracolumbar    Osteoarthritis of multiple joints    Cigarette nicotine dependence without complication    Pneumonia of right lower lobe due to infectious organism    Ground glass opacity present on imaging of lung    Annual physical exam    Renal insufficiency    Acute pain of right knee    Severe sepsis    Baker cyst, right    Sprain of medial collateral ligament of right knee    Acute medial meniscus tear of right knee    Primary osteoarthritis of right knee       Review of Systems  Review of Systems   Respiratory:  Positive for cough, shortness of breath and wheezing.    All other systems reviewed and are negative.    As described in the HPI. Otherwise, remainder of ROS (14 systems) were negative.    Medications, Allergies, Social, and Family Histories reviewed as per EMR.    Result Review :            Objective     Vitals:    04/03/24 1346   BP: (!) 144/109   Pulse: 94   Resp: 16   Temp: 98.2 °F (36.8 °C)   SpO2: 90%         04/03/24  1346   Weight: 112 kg (248 lb)       Physical Exam  Vitals and nursing note reviewed.   Constitutional:       Appearance: Normal appearance.   HENT:      Head: Normocephalic and  atraumatic.      Nose: Nose normal.      Mouth/Throat:      Mouth: Mucous membranes are moist.      Pharynx: Oropharynx is clear.   Eyes:      Extraocular Movements: Extraocular movements intact.      Conjunctiva/sclera: Conjunctivae normal.      Pupils: Pupils are equal, round, and reactive to light.   Cardiovascular:      Rate and Rhythm: Normal rate and regular rhythm.      Pulses: Normal pulses.      Heart sounds: Normal heart sounds.   Pulmonary:      Effort: Pulmonary effort is normal.      Breath sounds: Wheezing and rhonchi present.   Abdominal:      General: Abdomen is flat. Bowel sounds are normal.      Palpations: Abdomen is soft.   Musculoskeletal:         General: Normal range of motion.      Cervical back: Normal range of motion and neck supple.   Skin:     General: Skin is warm.      Capillary Refill: Capillary refill takes 2 to 3 seconds.   Neurological:      General: No focal deficit present.      Mental Status: He is alert and oriented to person, place, and time.   Psychiatric:         Mood and Affect: Mood normal.         Behavior: Behavior normal.         No radiology results for the last 90 days.     Assessment & Plan     Diagnoses and all orders for this visit:    1. Mixed simple and mucopurulent chronic bronchitis (Primary)    2. Moderate persistent asthma, unspecified whether complicated    3. Morbid (severe) obesity due to excess calories    4. Tobacco abuse    Other orders  -     Albuterol-Budesonide (Airsupra) 90-80 MCG/ACT aerosol; Inhale 2 puffs 4 (Four) Times a Day As Needed (Wheezing).  Dispense: 1 g; Refill: 3         Discussion/ Recommendations:   Patient is advised to continue Trelegy  Advised to reduce weight his BMI is 38.83  Will prescribe air supra to use as needed in between  Strongly advised to stop smoking  Vaccinations discussed and recommended             Return in about 3 months (around 7/3/2024).          This document has been electronically signed by Michael Haile MD  on April 3, 2024 14:03 EDT

## 2024-04-04 ENCOUNTER — OFFICE VISIT (OUTPATIENT)
Dept: BEHAVIORAL HEALTH | Facility: CLINIC | Age: 59
End: 2024-04-04
Payer: MEDICAID

## 2024-04-04 VITALS
DIASTOLIC BLOOD PRESSURE: 86 MMHG | WEIGHT: 246 LBS | SYSTOLIC BLOOD PRESSURE: 132 MMHG | BODY MASS INDEX: 38.61 KG/M2 | HEIGHT: 67 IN

## 2024-04-04 DIAGNOSIS — F41.1 GENERALIZED ANXIETY DISORDER: ICD-10-CM

## 2024-04-04 DIAGNOSIS — G47.00 INSOMNIA, UNSPECIFIED TYPE: ICD-10-CM

## 2024-04-04 DIAGNOSIS — F17.211 CIGARETTE NICOTINE DEPENDENCE IN REMISSION: ICD-10-CM

## 2024-04-04 DIAGNOSIS — F33.2 SEVERE EPISODE OF RECURRENT MAJOR DEPRESSIVE DISORDER, WITHOUT PSYCHOTIC FEATURES: Primary | ICD-10-CM

## 2024-04-04 DIAGNOSIS — F41.0 PANIC DISORDER: ICD-10-CM

## 2024-04-04 RX ORDER — GABAPENTIN 800 MG/1
800 TABLET ORAL 3 TIMES DAILY
Qty: 90 TABLET | Refills: 0 | Status: SHIPPED | OUTPATIENT
Start: 2024-04-04

## 2024-04-04 RX ORDER — MIRTAZAPINE 45 MG/1
45 TABLET, FILM COATED ORAL NIGHTLY
Qty: 90 TABLET | Refills: 0 | Status: SHIPPED | OUTPATIENT
Start: 2024-04-04

## 2024-04-04 NOTE — PROGRESS NOTES
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    Serial Number: [id number]:  [id number]    Procalcitonin 07/29/2023 0.15  0.00 - 0.25 ng/mL Final    Glucose 07/30/2023 82  65 - 99 mg/dL Final    BUN 07/30/2023 31 (H)  6 - 20 mg/dL Final    Creatinine 07/30/2023 1.53 (H)  0.76 - 1.27 mg/dL Final    Sodium 07/30/2023 141  136 - 145 mmol/L Final    Potassium 07/30/2023 5.0  3.5 - 5.2 mmol/L Final    Slight hemolysis detected by analyzer. Results may be affected.    Chloride 07/30/2023 107  98 - 107 mmol/L Final    CO2 07/30/2023 26.2  22.0 - 29.0 mmol/L Final    Calcium 07/30/2023 8.7  8.6 - 10.5 mg/dL Final    BUN/Creatinine Ratio 07/30/2023 20.3  7.0 - 25.0 Final    Anion Gap 07/30/2023 7.8  5.0 - 15.0 mmol/L Final    eGFR 07/30/2023 53.0 (L)  >60.0 mL/min/1.73 Final    Lactate 07/29/2023 0.8  0.5 - 2.0 mmol/L Final    Glucose 07/30/2023 85  65 - 99 mg/dL Final    BUN 07/30/2023 25 (H)  6 - 20 mg/dL Final    Creatinine 07/30/2023 1.42 (H)  0.76 - 1.27 mg/dL Final    Sodium 07/30/2023 141  136 - 145 mmol/L Final    Potassium 07/30/2023 4.7  3.5 - 5.2 mmol/L Final    Chloride 07/30/2023 106  98 - 107 mmol/L Final    CO2 07/30/2023 30.9 (H)  22.0 - 29.0 mmol/L Final    Calcium 07/30/2023 8.9  8.6 - 10.5 mg/dL Final    BUN/Creatinine Ratio 07/30/2023 17.6  7.0 - 25.0 Final    Anion Gap 07/30/2023 4.1 (L)  5.0 - 15.0 mmol/L Final     eGFR 07/30/2023 58.0 (L)  >60.0 mL/min/1.73 Final    MRSA PCR 07/30/2023 No MRSA Detected  No MRSA Detected Final   Lab on 07/25/2023   Component Date Value Ref Range Status    Glucose 07/25/2023 81  65 - 99 mg/dL Final    BUN 07/25/2023 28 (H)  6 - 20 mg/dL Final    Creatinine 07/25/2023 1.50 (H)  0.76 - 1.27 mg/dL Final    Sodium 07/25/2023 146 (H)  136 - 145 mmol/L Final    Potassium 07/25/2023 3.9  3.5 - 5.2 mmol/L Final    Chloride 07/25/2023 103  98 - 107 mmol/L Final    CO2 07/25/2023 32.0 (H)  22.0 - 29.0 mmol/L Final    Calcium 07/25/2023 9.6  8.6 - 10.5 mg/dL Final    Total Protein 07/25/2023 6.6  6.0 - 8.5 g/dL Final    Albumin 07/25/2023 4.4  3.5 - 5.2 g/dL Final    ALT (SGPT) 07/25/2023 23  1 - 41 U/L Final    AST (SGOT) 07/25/2023 17  1 - 40 U/L Final    Alkaline Phosphatase 07/25/2023 106  39 - 117 U/L Final    Total Bilirubin 07/25/2023 0.2  0.0 - 1.2 mg/dL Final    Globulin 07/25/2023 2.2  gm/dL Final    A/G Ratio 07/25/2023 2.0  g/dL Final    BUN/Creatinine Ratio 07/25/2023 18.7  7.0 - 25.0 Final    Anion Gap 07/25/2023 11.0  5.0 - 15.0 mmol/L Final    eGFR 07/25/2023 54.3 (L)  >60.0 mL/min/1.73 Final   Admission on 07/07/2023, Discharged on 07/07/2023   Component Date Value Ref Range Status    Glucose 07/07/2023 116 (H)  65 - 99 mg/dL Final    BUN 07/07/2023 32 (H)  6 - 20 mg/dL Final    Creatinine 07/07/2023 1.77 (H)  0.76 - 1.27 mg/dL Final    Sodium 07/07/2023 141  136 - 145 mmol/L Final    Potassium 07/07/2023 4.3  3.5 - 5.2 mmol/L Final    Chloride 07/07/2023 102  98 - 107 mmol/L Final    CO2 07/07/2023 26.9  22.0 - 29.0 mmol/L Final    Calcium 07/07/2023 9.9  8.6 - 10.5 mg/dL Final    Total Protein 07/07/2023 7.6  6.0 - 8.5 g/dL Final    Albumin 07/07/2023 4.1  3.5 - 5.2 g/dL Final    ALT (SGPT) 07/07/2023 20  1 - 41 U/L Final    AST (SGOT) 07/07/2023 23  1 - 40 U/L Final    Alkaline Phosphatase 07/07/2023 118 (H)  39 - 117 U/L Final    Total Bilirubin 07/07/2023 0.3  0.0 - 1.2 mg/dL Final     Globulin 07/07/2023 3.5  gm/dL Final    A/G Ratio 07/07/2023 1.2  g/dL Final    BUN/Creatinine Ratio 07/07/2023 18.1  7.0 - 25.0 Final    Anion Gap 07/07/2023 12.1  5.0 - 15.0 mmol/L Final    eGFR 07/07/2023 44.5 (L)  >60.0 mL/min/1.73 Final    Lipase 07/07/2023 23  13 - 60 U/L Final    Color, UA 07/07/2023 Yellow  Yellow, Straw Final    Appearance, UA 07/07/2023 Clear  Clear Final    pH, UA 07/07/2023 5.5  5.0 - 8.0 Final    Specific Gravity, UA 07/07/2023 1.010  1.005 - 1.030 Final    Glucose, UA 07/07/2023 Negative  Negative Final    Ketones, UA 07/07/2023 Negative  Negative Final    Bilirubin, UA 07/07/2023 Negative  Negative Final    Blood, UA 07/07/2023 Negative  Negative Final    Protein, UA 07/07/2023 Negative  Negative Final    Leuk Esterase, UA 07/07/2023 Negative  Negative Final    Nitrite, UA 07/07/2023 Negative  Negative Final    Urobilinogen, UA 07/07/2023 0.2 E.U./dL  0.2 - 1.0 E.U./dL Final    WBC 07/07/2023 10.91 (H)  3.40 - 10.80 10*3/mm3 Final    RBC 07/07/2023 4.85  4.14 - 5.80 10*6/mm3 Final    Hemoglobin 07/07/2023 15.0  13.0 - 17.7 g/dL Final    Hematocrit 07/07/2023 45.1  37.5 - 51.0 % Final    MCV 07/07/2023 93.0  79.0 - 97.0 fL Final    MCH 07/07/2023 30.9  26.6 - 33.0 pg Final    MCHC 07/07/2023 33.3  31.5 - 35.7 g/dL Final    RDW 07/07/2023 16.1 (H)  12.3 - 15.4 % Final    RDW-SD 07/07/2023 53.9  37.0 - 54.0 fl Final    MPV 07/07/2023 10.8  6.0 - 12.0 fL Final    Platelets 07/07/2023 244  140 - 450 10*3/mm3 Final    Neutrophil % 07/07/2023 73.0  42.7 - 76.0 % Final    Lymphocyte % 07/07/2023 15.7 (L)  19.6 - 45.3 % Final    Monocyte % 07/07/2023 8.1  5.0 - 12.0 % Final    Eosinophil % 07/07/2023 2.1  0.3 - 6.2 % Final    Basophil % 07/07/2023 0.6  0.0 - 1.5 % Final    Immature Grans % 07/07/2023 0.5  0.0 - 0.5 % Final    Neutrophils, Absolute 07/07/2023 7.96 (H)  1.70 - 7.00 10*3/mm3 Final    Lymphocytes, Absolute 07/07/2023 1.71  0.70 - 3.10 10*3/mm3 Final    Monocytes, Absolute  07/07/2023 0.88  0.10 - 0.90 10*3/mm3 Final    Eosinophils, Absolute 07/07/2023 0.23  0.00 - 0.40 10*3/mm3 Final    Basophils, Absolute 07/07/2023 0.07  0.00 - 0.20 10*3/mm3 Final    Immature Grans, Absolute 07/07/2023 0.06 (H)  0.00 - 0.05 10*3/mm3 Final    nRBC 07/07/2023 0.0  0.0 - 0.2 /100 WBC Final    Extra Tube 07/07/2023 Hold for add-ons.   Final    Auto resulted.    Extra Tube 07/07/2023 hold for add-on   Final    Auto resulted    Extra Tube 07/07/2023 Hold for add-ons.   Final    Auto resulted.    Extra Tube 07/07/2023 Hold for add-ons.   Final    Auto resulted    proBNP 07/07/2023 117.0  0.0 - 900.0 pg/mL Final   Lab on 06/30/2023   Component Date Value Ref Range Status    Glucose 06/30/2023 97  65 - 99 mg/dL Final    BUN 06/30/2023 20  6 - 20 mg/dL Final    Creatinine 06/30/2023 1.28 (H)  0.76 - 1.27 mg/dL Final    Sodium 06/30/2023 139  136 - 145 mmol/L Final    Potassium 06/30/2023 4.4  3.5 - 5.2 mmol/L Final    Chloride 06/30/2023 102  98 - 107 mmol/L Final    CO2 06/30/2023 26.8  22.0 - 29.0 mmol/L Final    Calcium 06/30/2023 9.8  8.6 - 10.5 mg/dL Final    Total Protein 06/30/2023 7.4  6.0 - 8.5 g/dL Final    Albumin 06/30/2023 4.3  3.5 - 5.2 g/dL Final    ALT (SGPT) 06/30/2023 26  1 - 41 U/L Final    AST (SGOT) 06/30/2023 28  1 - 40 U/L Final    Alkaline Phosphatase 06/30/2023 113  39 - 117 U/L Final    Total Bilirubin 06/30/2023 0.4  0.0 - 1.2 mg/dL Final    Globulin 06/30/2023 3.1  gm/dL Final    A/G Ratio 06/30/2023 1.4  g/dL Final    BUN/Creatinine Ratio 06/30/2023 15.6  7.0 - 25.0 Final    Anion Gap 06/30/2023 10.2  5.0 - 15.0 mmol/L Final    eGFR 06/30/2023 65.7  >60.0 mL/min/1.73 Final    Total Cholesterol 06/30/2023 186  0 - 200 mg/dL Final    Triglycerides 06/30/2023 254 (H)  0 - 150 mg/dL Final    HDL Cholesterol 06/30/2023 38 (L)  40 - 60 mg/dL Final    LDL Cholesterol  06/30/2023 104 (H)  0 - 100 mg/dL Final    VLDL Cholesterol 06/30/2023 44 (H)  5 - 40 mg/dL Final    LDL/HDL Ratio  06/30/2023 2.56   Final    TSH 06/30/2023 1.750  0.270 - 4.200 uIU/mL Final    TSH 06/30/2023 1.750  0.270 - 4.200 uIU/mL Final    Free T4 06/30/2023 0.81 (L)  0.93 - 1.70 ng/dL Final    T4 results may be falsely increased if patient taking Biotin.    WBC 06/30/2023 7.91  3.40 - 10.80 10*3/mm3 Final    RBC 06/30/2023 4.74  4.14 - 5.80 10*6/mm3 Final    Hemoglobin 06/30/2023 14.3  13.0 - 17.7 g/dL Final    Hematocrit 06/30/2023 42.0  37.5 - 51.0 % Final    MCV 06/30/2023 88.6  79.0 - 97.0 fL Final    MCH 06/30/2023 30.2  26.6 - 33.0 pg Final    MCHC 06/30/2023 34.0  31.5 - 35.7 g/dL Final    RDW 06/30/2023 14.5  12.3 - 15.4 % Final    RDW-SD 06/30/2023 46.4  37.0 - 54.0 fl Final    MPV 06/30/2023 11.5  6.0 - 12.0 fL Final    Platelets 06/30/2023 221  140 - 450 10*3/mm3 Final    Neutrophil % 06/30/2023 64.2  42.7 - 76.0 % Final    Lymphocyte % 06/30/2023 22.4  19.6 - 45.3 % Final    Monocyte % 06/30/2023 7.2  5.0 - 12.0 % Final    Eosinophil % 06/30/2023 4.0  0.3 - 6.2 % Final    Basophil % 06/30/2023 1.1  0.0 - 1.5 % Final    Immature Grans % 06/30/2023 1.1 (H)  0.0 - 0.5 % Final    Neutrophils, Absolute 06/30/2023 5.07  1.70 - 7.00 10*3/mm3 Final    Lymphocytes, Absolute 06/30/2023 1.77  0.70 - 3.10 10*3/mm3 Final    Monocytes, Absolute 06/30/2023 0.57  0.10 - 0.90 10*3/mm3 Final    Eosinophils, Absolute 06/30/2023 0.32  0.00 - 0.40 10*3/mm3 Final    Basophils, Absolute 06/30/2023 0.09  0.00 - 0.20 10*3/mm3 Final    Immature Grans, Absolute 06/30/2023 0.09 (H)  0.00 - 0.05 10*3/mm3 Final    nRBC 06/30/2023 0.1  0.0 - 0.2 /100 WBC Final   Office Visit on 05/25/2023   Component Date Value Ref Range Status    Glucose 05/25/2023 97  65 - 99 mg/dL Final    BUN 05/25/2023 24 (H)  6 - 20 mg/dL Final    Creatinine 05/25/2023 1.34 (H)  0.76 - 1.27 mg/dL Final    Sodium 05/25/2023 140  136 - 145 mmol/L Final    Potassium 05/25/2023 4.4  3.5 - 5.2 mmol/L Final    Chloride 05/25/2023 102  98 - 107 mmol/L Final    CO2 05/25/2023  28.8  22.0 - 29.0 mmol/L Final    Calcium 05/25/2023 10.0  8.6 - 10.5 mg/dL Final    BUN/Creatinine Ratio 05/25/2023 17.9  7.0 - 25.0 Final    Anion Gap 05/25/2023 9.2  5.0 - 15.0 mmol/L Final    eGFR 05/25/2023 62.2  >60.0 mL/min/1.73 Final   Office Visit on 04/25/2023   Component Date Value Ref Range Status    Glucose 04/25/2023 102 (H)  65 - 99 mg/dL Final    BUN 04/25/2023 19  6 - 20 mg/dL Final    Creatinine 04/25/2023 1.38 (H)  0.76 - 1.27 mg/dL Final    Sodium 04/25/2023 136  136 - 145 mmol/L Final    Potassium 04/25/2023 4.4  3.5 - 5.2 mmol/L Final    Chloride 04/25/2023 101  98 - 107 mmol/L Final    CO2 04/25/2023 23.8  22.0 - 29.0 mmol/L Final    Calcium 04/25/2023 9.6  8.6 - 10.5 mg/dL Final    BUN/Creatinine Ratio 04/25/2023 13.8  7.0 - 25.0 Final    Anion Gap 04/25/2023 11.2  5.0 - 15.0 mmol/L Final    eGFR 04/25/2023 60.0 (L)  >60.0 mL/min/1.73 Final    proBNP 04/25/2023 35.1  0.0 - 900.0 pg/mL Final    WBC 04/25/2023 7.73  3.40 - 10.80 10*3/mm3 Final    RBC 04/25/2023 4.70  4.14 - 5.80 10*6/mm3 Final    Hemoglobin 04/25/2023 14.8  13.0 - 17.7 g/dL Final    Hematocrit 04/25/2023 42.9  37.5 - 51.0 % Final    MCV 04/25/2023 91.3  79.0 - 97.0 fL Final    MCH 04/25/2023 31.5  26.6 - 33.0 pg Final    MCHC 04/25/2023 34.5  31.5 - 35.7 g/dL Final    RDW 04/25/2023 13.2  12.3 - 15.4 % Final    RDW-SD 04/25/2023 44.2  37.0 - 54.0 fl Final    MPV 04/25/2023 11.3  6.0 - 12.0 fL Final    Platelets 04/25/2023 298  140 - 450 10*3/mm3 Final    Neutrophil % 04/25/2023 66.2  42.7 - 76.0 % Final    Lymphocyte % 04/25/2023 20.6  19.6 - 45.3 % Final    Monocyte % 04/25/2023 7.8  5.0 - 12.0 % Final    Eosinophil % 04/25/2023 4.1  0.3 - 6.2 % Final    Basophil % 04/25/2023 0.9  0.0 - 1.5 % Final    Immature Grans % 04/25/2023 0.4  0.0 - 0.5 % Final    Neutrophils, Absolute 04/25/2023 5.12  1.70 - 7.00 10*3/mm3 Final    Lymphocytes, Absolute 04/25/2023 1.59  0.70 - 3.10 10*3/mm3 Final    Monocytes, Absolute 04/25/2023 0.60   0.10 - 0.90 10*3/mm3 Final    Eosinophils, Absolute 04/25/2023 0.32  0.00 - 0.40 10*3/mm3 Final    Basophils, Absolute 04/25/2023 0.07  0.00 - 0.20 10*3/mm3 Final    Immature Grans, Absolute 04/25/2023 0.03  0.00 - 0.05 10*3/mm3 Final    nRBC 04/25/2023 0.0  0.0 - 0.2 /100 WBC Final   Admission on 04/11/2023, Discharged on 04/11/2023   Component Date Value Ref Range Status    Case Report 04/11/2023    Final                    Value:Medical Cytology Report                           Case: [id number]                                  Authorizing Provider:  Taiwo Copeland, Collected:           04/11/2023 09:40 AM                                 DO                                                                           Ordering Location:     [facility] Received:            04/12/2023 08:53 AM                                 SUITES                                                                       Pathologist:           Maria Teresa Borja DO                                                       Specimen:    Lung, Right Upper Lobe, RIGHT UPPER LOBE BAL                                               Final Diagnosis 04/11/2023    Final                    Value:This result contains rich text formatting which cannot be displayed here.    Clinical Information 04/11/2023    Final                    Value:This result contains rich text formatting which cannot be displayed here.    Comment 04/11/2023    Final                    Value:This result contains rich text formatting which cannot be displayed here.    Gross Description 04/11/2023    Final                    Value:This result contains rich text formatting which cannot be displayed here.    Microscopic Description 04/11/2023    Final    This result contains rich text formatting which cannot be displayed here.    Special Stains 04/11/2023    Final                    Value:This result contains rich text formatting which cannot be displayed here.     Fungus Culture 04/11/2023 Heavy growth (4+) Candida albicans (A)   Final    AFB Culture 04/11/2023 No AFB isolated at 6 weeks   Final    AFB Stain 04/11/2023 No acid fast bacilli seen on direct smear   Final    AFB Stain 04/11/2023 No acid fast bacilli seen on concentrated smear   Final    BAL Culture 04/11/2023 >100,000 CFU/mL Normal respiratory lee. No S. aureus or Pseudomonas aeruginosa detected. Final report.   Final    Gram Stain 04/11/2023 Moderate (3+) Gram positive cocci in pairs and chains   Final    Escherichia coli PCR 04/11/2023 Not Detected  Not Detected Final    Acinetobacter calcoaceticus-travon* 04/11/2023 Not Detected  Not Detected Final    Enterobacter cloacae PCR 04/11/2023 Not Detected  Not Detected Final    Klebsiella oxytoca PCR 04/11/2023 Not Detected  Not Detected Final    Klebsiella pneumoniae group PCR 04/11/2023 Not Detected  Not Detected Final    Klebsiella aerogenes PCR 04/11/2023 Not Detected  Not Detected Final    Moraxella catarrhalis PCR 04/11/2023 Not Detected  Not Detected Final    Proteus species PCR 04/11/2023 Not Detected  Not Detected Final    Pseudomonas aeroginosa PCR 04/11/2023 Not Detected  Not Detected Final    Serratia marcescens PCR 04/11/2023 Not Detected  Not Detected Final    Staphylococcus aureus PCR 04/11/2023 Not Detected  Not Detected Final    Streptococcus pyogenes PCR 04/11/2023 Not Detected  Not Detected Final    Haemophilus influenzae PCR 04/11/2023 Not Detected  Not Detected Final    Streptococcus agalactiae PCR 04/11/2023 Not Detected  Not Detected Final    Streptococcus pneumoniae PCR 04/11/2023 Not Detected  Not Detected Final    Chlamydophila pneumoniae PCR 04/11/2023 Not Detected  Not Detected Final    Legionella pneumophilia PCR 04/11/2023 Not Detected  Not Detected Final    Mycoplasma pneumo by PCR 04/11/2023 Not Detected  Not Detected Final    ADENOVIRUS, PCR 04/11/2023 Not Detected  Not Detected Final    CTX-M Gene 04/11/2023 N/A  Not Detected,  N/A Final    IMP Gene 04/11/2023 N/A  Not Detected, N/A Final    KPC Gene 04/11/2023 N/A  Not Detected, N/A Final    mecA/C and MREJ Gene 04/11/2023 N/A  Not Detected, N/A Final    NDM Gene 04/11/2023 N/A  Not Detected, N/A Final    OXA-48-like Gene 04/11/2023 N/A  Not Detected, N/A Final    VIM Gene 04/11/2023 N/A  Not Detected, N/A Final    Coronavirus 04/11/2023 Not Detected  Not Detected Final    Human Metapneumovirus 04/11/2023 Not Detected  Not Detected Final    Human Rhinovirus/Enterovirus 04/11/2023 Not Detected  Not Detected Final    Influenza A PCR 04/11/2023 Not Detected  Not Detected Final    Influenza B PCR 04/11/2023 Not Detected  Not Detected Final    RSV, PCR 04/11/2023 Not Detected  Not Detected Final    Parainfluenza virus PCR 04/11/2023 Not Detected  Not Detected Final    Neutrophils, Fluid 04/11/2023 100  % Final       EKG Results:  No orders to display       Imaging Results:  XR Chest 1 View    Result Date: 10/3/2022   No active disease is seen.       MIKAL HOPE MD       Electronically Signed and Approved By: MIKAL HOPE MD on 10/03/2022 at 12:47                 Assessment & Plan   Diagnoses and all orders for this visit:    1. Severe episode of recurrent major depressive disorder, without psychotic features (Primary)  -     Cariprazine HCl (Vraylar) 3 MG capsule capsule; Take 1 capsule by mouth Daily for 30 days.  Dispense: 30 capsule; Refill: 0  -     mirtazapine (REMERON) 45 MG tablet; Take 1 tablet by mouth Every Night.  Dispense: 90 tablet; Refill: 0    2. Panic disorder  -     mirtazapine (REMERON) 45 MG tablet; Take 1 tablet by mouth Every Night.  Dispense: 90 tablet; Refill: 0  -     gabapentin (Neurontin) 800 MG tablet; Take 1 tablet by mouth 3 (Three) Times a Day.  Dispense: 90 tablet; Refill: 0    3. Generalized anxiety disorder  -     mirtazapine (REMERON) 45 MG tablet; Take 1 tablet by mouth Every Night.  Dispense: 90 tablet; Refill: 0  -     gabapentin (Neurontin) 800 MG  tablet; Take 1 tablet by mouth 3 (Three) Times a Day.  Dispense: 90 tablet; Refill: 0    4. Insomnia, unspecified type  -     mirtazapine (REMERON) 45 MG tablet; Take 1 tablet by mouth Every Night.  Dispense: 90 tablet; Refill: 0    5. Cigarette nicotine dependence in remission          Patient screened positive for depression based on a PHQ-9 score of 23 on 12/21/2023. Follow-up recommendations include: Prescribed antidepressant medication treatment, Suicide Risk Assessment performed, and see assessment below .    Presentation seems most consistent with MDD, GREG, panic disorder, insomnia, nicotine dependence.  Will address PTSD at next visit. We will continue mirtazapine for management of depression, anxiety, and overall mood.  Discussed the need to refer to sleep medicine for insomnia.  Patient declines referral for sleep medicine. I extensively discussed my concerns about patient being on Xanax including but not limited to respiratory depression, coma, death.  Discussed risk factors including COPD.  patient would like to continue Xanax.  Discussed that I would not be increasing Xanax due to risk of patient having COPD.  We will continue Xanax at current dose for anxiety as needed.  Counseled the patient on the risks including addiction, dependence, and misuse.  Will continue gabapentin for management of anxiety and overall mood.  We will increase Vraylar for management of depression and overall mood.  Consider Spravato although patient has declined in the past.  Counseled on smoking cessation.  Instructed patient to contact the office for any new or worsening symptoms or any other concerns.  Follow-up in 1 month.  Addressed all questions and concerns.    Visit Diagnoses:    ICD-10-CM ICD-9-CM   1. Severe episode of recurrent major depressive disorder, without psychotic features  F33.2 296.33   2. Panic disorder  F41.0 300.01   3. Generalized anxiety disorder  F41.1 300.02   4. Insomnia, unspecified type  G47.00  780.52   5. Cigarette nicotine dependence in remission  F17.211 V15.82             PLAN:  Safety: No acute safety concerns at this time.  Therapy: Patient declines referral for psychotherapy.  Risk Assessment: Risk of self-harm acutely is moderate to severe.  Risk factors include anxiety disorder, mood disorder, intermittent SI (passive, no plan or intent, no present SI), h/o suicide attempt in the past, and recent psychosocial stressors (pandemic). Protective factors include no family history, denies access to guns/weapons, no history of self-harm in the past, minimal AODA, healthcare seeking, future orientation, willingness to engage in care.  Risk of self-harm chronically is also moderate to severe, but could be further elevated in the event of treatment noncompliance and/or AODA.  Labs/Diagnostics Ordered:   No orders of the defined types were placed in this encounter.    Medications:   New Medications Ordered This Visit   Medications    Cariprazine HCl (Vraylar) 3 MG capsule capsule     Sig: Take 1 capsule by mouth Daily for 30 days.     Dispense:  30 capsule     Refill:  0    mirtazapine (REMERON) 45 MG tablet     Sig: Take 1 tablet by mouth Every Night.     Dispense:  90 tablet     Refill:  0    gabapentin (Neurontin) 800 MG tablet     Sig: Take 1 tablet by mouth 3 (Three) Times a Day.     Dispense:  90 tablet     Refill:  0     Discussed all risks, benefits, alternatives, and side effects of Xanax including but not limited to risks of abuse, misuse, and addiction, which can lead to overdose or death; risks of dependence and withdrawal reactions; drowsiness, sedation, fatigue, depression, dizziness, ataxia, weakness, confusion, forgetfulness, hypotension, falls risk, respiratory depression, anterograde amnesia, paradoxical reactions such as hyperactivity or aggressive behavior; and hallucinations. Pt educated on the need to practice safe sex while taking this med. Instructed pt to avoid performing tasks that  require mental alertness such as driving or operating machinery. Discussed the need for pt to immediately call the office for any new or worsening symptoms, such as changes in mood or behavior, and all other concerns. Pt educated on med compliance, including the proper use and monitoring for signs and symptoms of abuse, misuse, and addiction. Pt verbalized understanding and is agreeable to taking Xanax. CARLOS obtained and USD ordered. Controlled substances agreement verbally signed. Addressed all questions and concerns.     Discussed all risks, benefits, alternatives, and side effects of Gabapentin including but not limited to sedation, dizziness, GI upset, dry mouth, and weight gain. Pt instructed to avoid driving and doing other tasks or actions that require to be alert until knowing how the drug affects them.  Pt educated on the need to practice safe sex while taking this med. Discussed the need for pt to immediately call the office for any new or worsening symptoms, such as worsening depression; feeling nervous or restless; suicidal thoughts or actions; or other changes changes in mood or behavior, and all other concerns. Pt educated on med compliance and the risks of suddenly stopping this medication or missing doses. Pt verbalized understanding and is agreeable to taking Gabapentin. Addressed all questions and concerns.  Will order UDS and obtain CARLOS report. Pt verbally signed controlled substances agreement.    Discussed all risks, benefits, alternatives, and side effects of Mirtazapine including but not limited to GI upset, sexual dysfunction, weight gain, dizziness, bleeding risk, seizure risk, sedation, headache, activation of shakir or hypomania, drug-inducted movement disorders (akathisia, dystonia, restless leg syndrome), dyslipidemia, hematologic abnormalities, orthostatic hypotension, sedation, withdrawal syndrome, serotonin syndrome, and activation of suicidal ideation and behavior.  Pt educated  on the need to practice safe sex while taking this med. Discussed the need for pt to immediately call the office for any new or worsening symptoms, such as worsening depression; feeling nervous or restless; suicidal thoughts or actions; or other changes changes in mood or behavior, and all other concerns. Pt educated on med compliance and the risks of suddenly stopping this medication or missing doses. Pt verbalized understanding and is agreeable to taking Mirtazapine. Addressed all questions and concerns.     Vraylar, Risks, benefits, alternatives discussed with patient including nausea and vomiting, GI upset, sedation, akathisia, theoretical risk of tardive dyskinesia/dystonia, movement issues, and weight gain. Use care when operating vehicle, vessel, or machine. After discussion of these risks and benefits, the patient voiced understanding and agreed to proceed.    Follow up:   F/u in 1 month.    TREATMENT PLAN/GOALS: Continue supportive psychotherapy efforts and medications as indicated. Treatment and medication options discussed during today's visit. Patient ackowledged and verbally consented to continue with current treatment plan and was educated on the importance of compliance with treatment and follow-up appointments.    MEDICATION ISSUES:  CARLOS reviewed as expected.  Discussed medication options and treatment plan of prescribed medication as well as the risks, benefits, and side effects including potential falls, possible impaired driving and metabolic adversities among others. Patient is agreeable to call the office with any worsening of symptoms or onset of side effects. Patient is agreeable to call 911 or go to the nearest ER should he/she begin having SI/HI. No medication side effects or related complaints today.            This document has been electronically signed by Page Lee PA-C  April 4, 2024 14:16 EDT      Part of this note may be an electronic transcription/translation of spoken  language to printed text using the Dragon Dictation System.

## 2024-04-15 ENCOUNTER — TELEPHONE (OUTPATIENT)
Dept: INTERNAL MEDICINE | Age: 59
End: 2024-04-15

## 2024-04-15 NOTE — TELEPHONE ENCOUNTER
Caller: LARON MAHAN    Relationship: Emergency Contact    Best call back number: [phone]    What is the best time to reach you: ANYTIME    Who are you requesting to speak with (clinical staff, provider,  specific staff member): NURSE     What was the call regarding: PATIENT'S EMERGENCY CONTACT CALLING ASKING IF PATIENT STILL NEEDS TO BE TAKEN AMLODIPINE-BENAZ 10/40 MG CAPSULE ONE PER DAY.  IF SO HE NEEDS A REFILL, BUT PATIENT WAS NOT SURE IF HE WAS TO STILL BE TAKEN IT OR NOT.   PLEASE ADVISE.

## 2024-04-25 ENCOUNTER — OFFICE VISIT (OUTPATIENT)
Dept: INTERNAL MEDICINE | Age: 59
End: 2024-04-25
Payer: MEDICAID

## 2024-04-25 VITALS
HEIGHT: 67 IN | DIASTOLIC BLOOD PRESSURE: 120 MMHG | HEART RATE: 108 BPM | TEMPERATURE: 98.2 F | WEIGHT: 247 LBS | OXYGEN SATURATION: 93 % | BODY MASS INDEX: 38.77 KG/M2 | SYSTOLIC BLOOD PRESSURE: 170 MMHG

## 2024-04-25 DIAGNOSIS — I10 ESSENTIAL (PRIMARY) HYPERTENSION: ICD-10-CM

## 2024-04-25 PROCEDURE — 3077F SYST BP >= 140 MM HG: CPT | Performed by: NURSE PRACTITIONER

## 2024-04-25 PROCEDURE — 99214 OFFICE O/P EST MOD 30 MIN: CPT | Performed by: NURSE PRACTITIONER

## 2024-04-25 PROCEDURE — 1160F RVW MEDS BY RX/DR IN RCRD: CPT | Performed by: NURSE PRACTITIONER

## 2024-04-25 PROCEDURE — 1159F MED LIST DOCD IN RCRD: CPT | Performed by: NURSE PRACTITIONER

## 2024-04-25 PROCEDURE — 3080F DIAST BP >= 90 MM HG: CPT | Performed by: NURSE PRACTITIONER

## 2024-04-25 RX ORDER — LISINOPRIL 40 MG/1
40 TABLET ORAL DAILY
Qty: 90 TABLET | Refills: 1 | Status: SHIPPED | OUTPATIENT
Start: 2024-04-25

## 2024-04-25 NOTE — PROGRESS NOTES
"Chief Complaint  Hypertension (4 month follow up ), Hyperlipidemia (4 month follow up), and Med Refill    Subjective      Asad Meyer is a 59 year old male that presents to [facility] for 4 month f/u of HTN. Blood pressure at home has been 130's over 90 per patient. Elevated in office today. Pt denies headaches, chest pain or dizziness. He denies any increased stress. He states he did have an extra cup of coffee today as well as a Dr. Pepper on the way here. He is still smoking a little over a 1/2 ppd.  States he feels well today.           History of Present Illness    Current Outpatient Medications   Medication Instructions    albuterol (ACCUNEB) 0.63 mg, Nebulization, Every 6 Hours PRN    Albuterol-Budesonide (Airsupra) 90-80 MCG/ACT aerosol 2 puffs, Inhalation, 4 Times Daily PRN    ALPRAZolam (XANAX) 1 mg, Oral, 2 Times Daily PRN, for anxiety    atorvastatin (LIPITOR) 10 mg, Oral, Daily    Cariprazine HCl (VRAYLAR) 3 mg, Oral, Daily    Fluticasone-Umeclidin-Vilant (Trelegy Ellipta) 200-62.5-25 MCG/ACT inhaler 1 puff, Inhalation, Daily    furosemide (LASIX) 20 mg, Oral, Daily    gabapentin (NEURONTIN) 800 mg, Oral, 3 Times Daily    ipratropium-albuterol (DUO-NEB) 0.5-2.5 mg/3 ml nebulizer 3 mL, Nebulization, 4 Times Daily - RT    lisinopril (PRINIVIL,ZESTRIL) 40 mg, Oral, Daily    mirtazapine (REMERON) 45 mg, Oral, Nightly    nicotine (Nicoderm CQ) 21 MG/24HR patch 1 patch, Transdermal, Every 24 Hours       The following portions of the patient's history were reviewed and updated as appropriate: allergies, current medications, past family history, past medical history, past social history, past surgical history, and problem list.    Objective   Vital Signs:   BP (!) 170/120 (BP Location: Left arm, Patient Position: Sitting)   Pulse 108   Temp 98.2 °F (36.8 °C) (Temporal)   Ht 170.2 cm (67.01\")   Wt 112 kg (247 lb)   SpO2 93%   BMI 38.67 kg/m²     Wt Readings from Last 3 " Encounters:   04/25/24 112 kg (247 lb)   04/04/24 112 kg (246 lb)   04/03/24 112 kg (248 lb)     BP Readings from Last 3 Encounters:   04/25/24 (!) 170/120   04/04/24 132/86   04/03/24 (!) 144/109     Physical Exam  Vitals and nursing note reviewed.   Constitutional:       Appearance: Normal appearance. He is not ill-appearing.   HENT:      Head: Normocephalic and atraumatic.      Right Ear: External ear normal.      Left Ear: External ear normal.   Eyes:      Extraocular Movements: Extraocular movements intact.      Conjunctiva/sclera: Conjunctivae normal.      Pupils: Pupils are equal, round, and reactive to light.   Cardiovascular:      Rate and Rhythm: Normal rate and regular rhythm.      Heart sounds: Normal heart sounds.   Pulmonary:      Effort: Pulmonary effort is normal.      Breath sounds: Wheezing (generalized) present.   Neurological:      Mental Status: He is alert and oriented to person, place, and time.   Psychiatric:         Mood and Affect: Mood normal.         Behavior: Behavior normal.          Result Review :  The following data was reviewed by: PONCE Matos on 04/25/2024:            Lab Results (last 72 hours)       ** No results found for the last 72 hours. **             No Images in the past 120 days found..    Lab Results   Component Value Date    COVID19 Not Detected 11/17/2023    RSV Not Detected 11/17/2023    BILIRUBINUR Negative 07/29/2023    POCGLU 80 07/29/2023       Procedures        Assessment and Plan   Diagnoses and all orders for this visit:    1. Essential (primary) hypertension  Assessment & Plan:  Hypertension is uncontrolled  Continue current treatment regimen.  Stop smoking.  Ambulatory blood pressure monitoring.  Pt advised to keep a blood pressure log over the next several days and send via [email or web address] to the office. May need medication adjustment at that time. Discussed that if he were to develop any symptoms such as chest pain, headache, dizziness etc, he is to  go directly to the ER.       Orders:  -     lisinopril (PRINIVIL,ZESTRIL) 40 MG tablet; Take 1 tablet by mouth Daily.  Dispense: 90 tablet; Refill: 1                 Medications Discontinued During This Encounter   Medication Reason    lisinopril (PRINIVIL,ZESTRIL) 40 MG tablet Reorder    predniSONE (DELTASONE) 20 MG tablet *Therapy completed          Follow Up   Return in about 3 months (around 7/25/2024) for Recheck HTN.  Patient was given instructions and counseling regarding his condition or for health maintenance advice. Please see specific information pulled into the AVS if appropriate.       Ryann Sweeney, PONCE  04/25/24  15:50 EDT

## 2024-04-25 NOTE — ASSESSMENT & PLAN NOTE
Hypertension is uncontrolled  Continue current treatment regimen.  Stop smoking.  Ambulatory blood pressure monitoring.  Pt advised to keep a blood pressure log over the next several days and send via [email or web address] to the office. May need medication adjustment at that time. Discussed that if he were to develop any symptoms such as chest pain, headache, dizziness etc, he is to go directly to the ER.

## 2024-04-26 ENCOUNTER — TELEPHONE (OUTPATIENT)
Dept: PULMONOLOGY | Facility: CLINIC | Age: 59
End: 2024-04-26
Payer: MEDICAID

## 2024-04-26 DIAGNOSIS — F33.2 SEVERE EPISODE OF RECURRENT MAJOR DEPRESSIVE DISORDER, WITHOUT PSYCHOTIC FEATURES: ICD-10-CM

## 2024-04-26 RX ORDER — CARIPRAZINE 1.5 MG/1
1.5 CAPSULE, GELATIN COATED ORAL DAILY
Qty: 30 CAPSULE | Refills: 1 | OUTPATIENT
Start: 2024-04-26

## 2024-04-26 NOTE — TELEPHONE ENCOUNTER
The original prescription was discontinued on 4/4/2024 by Page Lee PA-C. Renewing this prescription may not be appropriate.     NEXT APPT WITH PROVIDER   Appointment with Page Lee PA-C (05/03/2024)     PROVIDER PLEASE ADVISE

## 2024-04-26 NOTE — TELEPHONE ENCOUNTER
Patient returned call. Informed patient no PA was required for Airsupra. Patient verbalized understanding

## 2024-04-29 DIAGNOSIS — F33.2 SEVERE EPISODE OF RECURRENT MAJOR DEPRESSIVE DISORDER, WITHOUT PSYCHOTIC FEATURES: ICD-10-CM

## 2024-04-29 DIAGNOSIS — G47.00 INSOMNIA, UNSPECIFIED TYPE: ICD-10-CM

## 2024-04-29 DIAGNOSIS — F41.0 PANIC DISORDER: Primary | ICD-10-CM

## 2024-04-29 DIAGNOSIS — F41.1 GENERALIZED ANXIETY DISORDER: ICD-10-CM

## 2024-04-29 DIAGNOSIS — F41.0 PANIC DISORDER: ICD-10-CM

## 2024-04-29 RX ORDER — MIRTAZAPINE 45 MG/1
45 TABLET, FILM COATED ORAL NIGHTLY
Qty: 90 TABLET | Refills: 0 | OUTPATIENT
Start: 2024-04-29

## 2024-04-29 RX ORDER — ALPRAZOLAM 1 MG/1
1 TABLET ORAL 2 TIMES DAILY PRN
Qty: 60 TABLET | Refills: 0 | Status: SHIPPED | OUTPATIENT
Start: 2024-04-29

## 2024-04-29 NOTE — TELEPHONE ENCOUNTER
Patient needs a refill.  Order pended  PT DUE FOR UDS.  PT SAYS HE WILL HAVE IT DONE AT HIS APPOINTMENT ON THE THIRD

## 2024-04-29 NOTE — TELEPHONE ENCOUNTER
NEXT VISIT WITH PROVIDER   Appointment with Page Lee PA-C (05/03/2024)     LAST SEEN BY PROVIDER   Office Visit with Page Lee PA-C (04/04/2024)     LAST MED REFILL  mirtazapine (REMERON) 45 MG tablet (04/04/2024)   Dispense Quantity: 90 tablet Refills: 0          Sig: Take 1 tablet by mouth Every Night.     TOO SOON FOR REFILL     PROVIDER PLEASE ADVISE

## 2024-05-03 ENCOUNTER — OFFICE VISIT (OUTPATIENT)
Dept: BEHAVIORAL HEALTH | Facility: CLINIC | Age: 59
End: 2024-05-03
Payer: MEDICAID

## 2024-05-03 DIAGNOSIS — F41.0 PANIC DISORDER: ICD-10-CM

## 2024-05-03 DIAGNOSIS — G47.00 INSOMNIA, UNSPECIFIED TYPE: ICD-10-CM

## 2024-05-03 DIAGNOSIS — F41.1 GENERALIZED ANXIETY DISORDER: Primary | ICD-10-CM

## 2024-05-03 DIAGNOSIS — Z79.899 MEDICATION MANAGEMENT: ICD-10-CM

## 2024-05-03 DIAGNOSIS — F33.2 SEVERE EPISODE OF RECURRENT MAJOR DEPRESSIVE DISORDER, WITHOUT PSYCHOTIC FEATURES: ICD-10-CM

## 2024-05-03 LAB
AMPHET+METHAMPHET UR QL: NEGATIVE
AMPHETAMINES UR QL: NEGATIVE
BARBITURATES UR QL SCN: NEGATIVE
BENZODIAZ UR QL SCN: POSITIVE
BUPRENORPHINE SERPL-MCNC: NEGATIVE NG/ML
CANNABINOIDS SERPL QL: POSITIVE
COCAINE UR QL: NEGATIVE
EXPIRATION DATE: ABNORMAL
Lab: ABNORMAL
MDMA UR QL SCN: NEGATIVE
METHADONE UR QL SCN: NEGATIVE
MORPHINE/OPIATES SCREEN, URINE: NEGATIVE
OXYCODONE UR QL SCN: NEGATIVE
PCP UR QL SCN: NEGATIVE

## 2024-05-03 RX ORDER — GABAPENTIN 800 MG/1
800 TABLET ORAL 3 TIMES DAILY
Qty: 90 TABLET | Refills: 1 | Status: SHIPPED | OUTPATIENT
Start: 2024-05-03

## 2024-05-03 NOTE — PROGRESS NOTES
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    Oxyhemoglobin 07/29/2023 90.9 (L)  94 - 99 % Final    FHHB 07/29/2023 6.1 (H)  0.0 - 5.0 % Final    Dann's Test 07/29/2023 Positive   Final    Note 07/29/2023 16/6 RR14 28%   Final    Site 07/29/2023 Arterial: right radial   Final    Modality 07/29/2023 BiPap   Final    FIO2 07/29/2023 28  % Final    Sodium, Arterial 07/29/2023 140.6  136 - 146 mmol/L Final    Potassium, Arterial 07/29/2023 4.99  3.5 - 5 mmol/L Final    Ionized Calcium, Arterial 07/29/2023 1.21  1.13 - 1.32 mmol/L Final    Chloride, Arterial 07/29/2023 105  98 - 106 mmol/L Final    Glucose, Arterial 07/29/2023 109 (H)  70 - 99 mg/dL Final    Lactate, Arterial 07/29/2023 1.30  0.5 - 2 mmol/L Final    PO2/FIO2 07/29/2023 247  0 - 500 Final    Glucose 07/29/2023 75  70 - 99 mg/dL Final    Serial Number: [id number]:  [id number]    Glucose 07/29/2023 80  70 - 130 mg/dL Final    POC    Glucose 07/29/2023 80  70 - 99 mg/dL Final    Serial Number: [id number]:  [id number]    Procalcitonin 07/29/2023 0.15  0.00 - 0.25 ng/mL Final    Glucose 07/30/2023 82  65 - 99 mg/dL Final    BUN 07/30/2023 31 (H)  6 - 20 mg/dL Final    Creatinine 07/30/2023 1.53 (H)  0.76 - 1.27 mg/dL Final    Sodium 07/30/2023 141  136 - 145 mmol/L Final    Potassium 07/30/2023 5.0  3.5 - 5.2 mmol/L Final    Slight hemolysis detected by analyzer. Results may be affected.    Chloride  07/30/2023 107  98 - 107 mmol/L Final    CO2 07/30/2023 26.2  22.0 - 29.0 mmol/L Final    Calcium 07/30/2023 8.7  8.6 - 10.5 mg/dL Final    BUN/Creatinine Ratio 07/30/2023 20.3  7.0 - 25.0 Final    Anion Gap 07/30/2023 7.8  5.0 - 15.0 mmol/L Final    eGFR 07/30/2023 53.0 (L)  >60.0 mL/min/1.73 Final    Lactate 07/29/2023 0.8  0.5 - 2.0 mmol/L Final    Glucose 07/30/2023 85  65 - 99 mg/dL Final    BUN 07/30/2023 25 (H)  6 - 20 mg/dL Final    Creatinine 07/30/2023 1.42 (H)  0.76 - 1.27 mg/dL Final    Sodium 07/30/2023 141  136 - 145 mmol/L Final    Potassium 07/30/2023 4.7  3.5 - 5.2 mmol/L Final    Chloride 07/30/2023 106  98 - 107 mmol/L Final    CO2 07/30/2023 30.9 (H)  22.0 - 29.0 mmol/L Final    Calcium 07/30/2023 8.9  8.6 - 10.5 mg/dL Final    BUN/Creatinine Ratio 07/30/2023 17.6  7.0 - 25.0 Final    Anion Gap 07/30/2023 4.1 (L)  5.0 - 15.0 mmol/L Final    eGFR 07/30/2023 58.0 (L)  >60.0 mL/min/1.73 Final    MRSA PCR 07/30/2023 No MRSA Detected  No MRSA Detected Final   Lab on 07/25/2023   Component Date Value Ref Range Status    Glucose 07/25/2023 81  65 - 99 mg/dL Final    BUN 07/25/2023 28 (H)  6 - 20 mg/dL Final    Creatinine 07/25/2023 1.50 (H)  0.76 - 1.27 mg/dL Final    Sodium 07/25/2023 146 (H)  136 - 145 mmol/L Final    Potassium 07/25/2023 3.9  3.5 - 5.2 mmol/L Final    Chloride 07/25/2023 103  98 - 107 mmol/L Final    CO2 07/25/2023 32.0 (H)  22.0 - 29.0 mmol/L Final    Calcium 07/25/2023 9.6  8.6 - 10.5 mg/dL Final    Total Protein 07/25/2023 6.6  6.0 - 8.5 g/dL Final    Albumin 07/25/2023 4.4  3.5 - 5.2 g/dL Final    ALT (SGPT) 07/25/2023 23  1 - 41 U/L Final    AST (SGOT) 07/25/2023 17  1 - 40 U/L Final    Alkaline Phosphatase 07/25/2023 106  39 - 117 U/L Final    Total Bilirubin 07/25/2023 0.2  0.0 - 1.2 mg/dL Final    Globulin 07/25/2023 2.2  gm/dL Final    A/G Ratio 07/25/2023 2.0  g/dL Final    BUN/Creatinine Ratio 07/25/2023 18.7  7.0 - 25.0 Final    Anion Gap 07/25/2023 11.0  5.0 - 15.0  mmol/L Final    eGFR 07/25/2023 54.3 (L)  >60.0 mL/min/1.73 Final   Admission on 07/07/2023, Discharged on 07/07/2023   Component Date Value Ref Range Status    Glucose 07/07/2023 116 (H)  65 - 99 mg/dL Final    BUN 07/07/2023 32 (H)  6 - 20 mg/dL Final    Creatinine 07/07/2023 1.77 (H)  0.76 - 1.27 mg/dL Final    Sodium 07/07/2023 141  136 - 145 mmol/L Final    Potassium 07/07/2023 4.3  3.5 - 5.2 mmol/L Final    Chloride 07/07/2023 102  98 - 107 mmol/L Final    CO2 07/07/2023 26.9  22.0 - 29.0 mmol/L Final    Calcium 07/07/2023 9.9  8.6 - 10.5 mg/dL Final    Total Protein 07/07/2023 7.6  6.0 - 8.5 g/dL Final    Albumin 07/07/2023 4.1  3.5 - 5.2 g/dL Final    ALT (SGPT) 07/07/2023 20  1 - 41 U/L Final    AST (SGOT) 07/07/2023 23  1 - 40 U/L Final    Alkaline Phosphatase 07/07/2023 118 (H)  39 - 117 U/L Final    Total Bilirubin 07/07/2023 0.3  0.0 - 1.2 mg/dL Final    Globulin 07/07/2023 3.5  gm/dL Final    A/G Ratio 07/07/2023 1.2  g/dL Final    BUN/Creatinine Ratio 07/07/2023 18.1  7.0 - 25.0 Final    Anion Gap 07/07/2023 12.1  5.0 - 15.0 mmol/L Final    eGFR 07/07/2023 44.5 (L)  >60.0 mL/min/1.73 Final    Lipase 07/07/2023 23  13 - 60 U/L Final    Color, UA 07/07/2023 Yellow  Yellow, Straw Final    Appearance, UA 07/07/2023 Clear  Clear Final    pH, UA 07/07/2023 5.5  5.0 - 8.0 Final    Specific Gravity, UA 07/07/2023 1.010  1.005 - 1.030 Final    Glucose, UA 07/07/2023 Negative  Negative Final    Ketones, UA 07/07/2023 Negative  Negative Final    Bilirubin, UA 07/07/2023 Negative  Negative Final    Blood, UA 07/07/2023 Negative  Negative Final    Protein, UA 07/07/2023 Negative  Negative Final    Leuk Esterase, UA 07/07/2023 Negative  Negative Final    Nitrite, UA 07/07/2023 Negative  Negative Final    Urobilinogen, UA 07/07/2023 0.2 E.U./dL  0.2 - 1.0 E.U./dL Final    WBC 07/07/2023 10.91 (H)  3.40 - 10.80 10*3/mm3 Final    RBC 07/07/2023 4.85  4.14 - 5.80 10*6/mm3 Final    Hemoglobin 07/07/2023 15.0  13.0 -  17.7 g/dL Final    Hematocrit 07/07/2023 45.1  37.5 - 51.0 % Final    MCV 07/07/2023 93.0  79.0 - 97.0 fL Final    MCH 07/07/2023 30.9  26.6 - 33.0 pg Final    MCHC 07/07/2023 33.3  31.5 - 35.7 g/dL Final    RDW 07/07/2023 16.1 (H)  12.3 - 15.4 % Final    RDW-SD 07/07/2023 53.9  37.0 - 54.0 fl Final    MPV 07/07/2023 10.8  6.0 - 12.0 fL Final    Platelets 07/07/2023 244  140 - 450 10*3/mm3 Final    Neutrophil % 07/07/2023 73.0  42.7 - 76.0 % Final    Lymphocyte % 07/07/2023 15.7 (L)  19.6 - 45.3 % Final    Monocyte % 07/07/2023 8.1  5.0 - 12.0 % Final    Eosinophil % 07/07/2023 2.1  0.3 - 6.2 % Final    Basophil % 07/07/2023 0.6  0.0 - 1.5 % Final    Immature Grans % 07/07/2023 0.5  0.0 - 0.5 % Final    Neutrophils, Absolute 07/07/2023 7.96 (H)  1.70 - 7.00 10*3/mm3 Final    Lymphocytes, Absolute 07/07/2023 1.71  0.70 - 3.10 10*3/mm3 Final    Monocytes, Absolute 07/07/2023 0.88  0.10 - 0.90 10*3/mm3 Final    Eosinophils, Absolute 07/07/2023 0.23  0.00 - 0.40 10*3/mm3 Final    Basophils, Absolute 07/07/2023 0.07  0.00 - 0.20 10*3/mm3 Final    Immature Grans, Absolute 07/07/2023 0.06 (H)  0.00 - 0.05 10*3/mm3 Final    nRBC 07/07/2023 0.0  0.0 - 0.2 /100 WBC Final    Extra Tube 07/07/2023 Hold for add-ons.   Final    Auto resulted.    Extra Tube 07/07/2023 hold for add-on   Final    Auto resulted    Extra Tube 07/07/2023 Hold for add-ons.   Final    Auto resulted.    Extra Tube 07/07/2023 Hold for add-ons.   Final    Auto resulted    proBNP 07/07/2023 117.0  0.0 - 900.0 pg/mL Final   Lab on 06/30/2023   Component Date Value Ref Range Status    Glucose 06/30/2023 97  65 - 99 mg/dL Final    BUN 06/30/2023 20  6 - 20 mg/dL Final    Creatinine 06/30/2023 1.28 (H)  0.76 - 1.27 mg/dL Final    Sodium 06/30/2023 139  136 - 145 mmol/L Final    Potassium 06/30/2023 4.4  3.5 - 5.2 mmol/L Final    Chloride 06/30/2023 102  98 - 107 mmol/L Final    CO2 06/30/2023 26.8  22.0 - 29.0 mmol/L Final    Calcium 06/30/2023 9.8  8.6 - 10.5  mg/dL Final    Total Protein 06/30/2023 7.4  6.0 - 8.5 g/dL Final    Albumin 06/30/2023 4.3  3.5 - 5.2 g/dL Final    ALT (SGPT) 06/30/2023 26  1 - 41 U/L Final    AST (SGOT) 06/30/2023 28  1 - 40 U/L Final    Alkaline Phosphatase 06/30/2023 113  39 - 117 U/L Final    Total Bilirubin 06/30/2023 0.4  0.0 - 1.2 mg/dL Final    Globulin 06/30/2023 3.1  gm/dL Final    A/G Ratio 06/30/2023 1.4  g/dL Final    BUN/Creatinine Ratio 06/30/2023 15.6  7.0 - 25.0 Final    Anion Gap 06/30/2023 10.2  5.0 - 15.0 mmol/L Final    eGFR 06/30/2023 65.7  >60.0 mL/min/1.73 Final    Total Cholesterol 06/30/2023 186  0 - 200 mg/dL Final    Triglycerides 06/30/2023 254 (H)  0 - 150 mg/dL Final    HDL Cholesterol 06/30/2023 38 (L)  40 - 60 mg/dL Final    LDL Cholesterol  06/30/2023 104 (H)  0 - 100 mg/dL Final    VLDL Cholesterol 06/30/2023 44 (H)  5 - 40 mg/dL Final    LDL/HDL Ratio 06/30/2023 2.56   Final    TSH 06/30/2023 1.750  0.270 - 4.200 uIU/mL Final    TSH 06/30/2023 1.750  0.270 - 4.200 uIU/mL Final    Free T4 06/30/2023 0.81 (L)  0.93 - 1.70 ng/dL Final    T4 results may be falsely increased if patient taking Biotin.    WBC 06/30/2023 7.91  3.40 - 10.80 10*3/mm3 Final    RBC 06/30/2023 4.74  4.14 - 5.80 10*6/mm3 Final    Hemoglobin 06/30/2023 14.3  13.0 - 17.7 g/dL Final    Hematocrit 06/30/2023 42.0  37.5 - 51.0 % Final    MCV 06/30/2023 88.6  79.0 - 97.0 fL Final    MCH 06/30/2023 30.2  26.6 - 33.0 pg Final    MCHC 06/30/2023 34.0  31.5 - 35.7 g/dL Final    RDW 06/30/2023 14.5  12.3 - 15.4 % Final    RDW-SD 06/30/2023 46.4  37.0 - 54.0 fl Final    MPV 06/30/2023 11.5  6.0 - 12.0 fL Final    Platelets 06/30/2023 221  140 - 450 10*3/mm3 Final    Neutrophil % 06/30/2023 64.2  42.7 - 76.0 % Final    Lymphocyte % 06/30/2023 22.4  19.6 - 45.3 % Final    Monocyte % 06/30/2023 7.2  5.0 - 12.0 % Final    Eosinophil % 06/30/2023 4.0  0.3 - 6.2 % Final    Basophil % 06/30/2023 1.1  0.0 - 1.5 % Final    Immature Grans % 06/30/2023 1.1 (H)   0.0 - 0.5 % Final    Neutrophils, Absolute 06/30/2023 5.07  1.70 - 7.00 10*3/mm3 Final    Lymphocytes, Absolute 06/30/2023 1.77  0.70 - 3.10 10*3/mm3 Final    Monocytes, Absolute 06/30/2023 0.57  0.10 - 0.90 10*3/mm3 Final    Eosinophils, Absolute 06/30/2023 0.32  0.00 - 0.40 10*3/mm3 Final    Basophils, Absolute 06/30/2023 0.09  0.00 - 0.20 10*3/mm3 Final    Immature Grans, Absolute 06/30/2023 0.09 (H)  0.00 - 0.05 10*3/mm3 Final    nRBC 06/30/2023 0.1  0.0 - 0.2 /100 WBC Final   Office Visit on 05/25/2023   Component Date Value Ref Range Status    Glucose 05/25/2023 97  65 - 99 mg/dL Final    BUN 05/25/2023 24 (H)  6 - 20 mg/dL Final    Creatinine 05/25/2023 1.34 (H)  0.76 - 1.27 mg/dL Final    Sodium 05/25/2023 140  136 - 145 mmol/L Final    Potassium 05/25/2023 4.4  3.5 - 5.2 mmol/L Final    Chloride 05/25/2023 102  98 - 107 mmol/L Final    CO2 05/25/2023 28.8  22.0 - 29.0 mmol/L Final    Calcium 05/25/2023 10.0  8.6 - 10.5 mg/dL Final    BUN/Creatinine Ratio 05/25/2023 17.9  7.0 - 25.0 Final    Anion Gap 05/25/2023 9.2  5.0 - 15.0 mmol/L Final    eGFR 05/25/2023 62.2  >60.0 mL/min/1.73 Final       EKG Results:  No orders to display       Imaging Results:  XR Chest 1 View    Result Date: 10/3/2022   No active disease is seen.       MIKAL HOPE MD       Electronically Signed and Approved By: MIKAL HOPE MD on 10/03/2022 at 12:47                 Assessment & Plan   Diagnoses and all orders for this visit:    1. Generalized anxiety disorder (Primary)  -     gabapentin (Neurontin) 800 MG tablet; Take 1 tablet by mouth 3 (Three) Times a Day.  Dispense: 90 tablet; Refill: 1    2. Panic disorder  -     gabapentin (Neurontin) 800 MG tablet; Take 1 tablet by mouth 3 (Three) Times a Day.  Dispense: 90 tablet; Refill: 1    3. Severe episode of recurrent major depressive disorder, without psychotic features  -     Cariprazine HCl (Vraylar) 3 MG capsule capsule; Take 1 capsule by mouth Daily.  Dispense: 30 capsule;  Refill: 1    4. Insomnia, unspecified type    5. Medication management  -     POC Medline 12 Panel Urine Drug Screen            Patient screened positive for depression based on a PHQ-9 score of 23 on 12/21/2023. Follow-up recommendations include: Prescribed antidepressant medication treatment, Suicide Risk Assessment performed, and see assessment below .    Presentation seems most consistent with MDD, GREG, panic disorder, insomnia, nicotine dependence.  We will continue mirtazapine for management of depression, anxiety, and overall mood.  Discussed the need to refer to sleep medicine for insomnia.  Patient declines referral for sleep medicine. I extensively discussed my concerns about patient being on Xanax including but not limited to respiratory depression, coma, death.  Discussed risk factors including COPD.  patient would like to continue Xanax.  We will continue Xanax at current dose for anxiety as needed.  Counseled the patient on the risks including addiction, dependence, and misuse.  Will continue gabapentin for management of anxiety and overall mood.  We will continue Vraylar for management of depression and overall mood.  Consider Spravato although patient has declined in the past.  Counseled on smoking cessation.  Instructed patient to contact the office for any new or worsening symptoms or any other concerns.  Follow-up in 1 to 2 months.  Addressed all questions and concerns.    Visit Diagnoses:    ICD-10-CM ICD-9-CM   1. Generalized anxiety disorder  F41.1 300.02   2. Panic disorder  F41.0 300.01   3. Severe episode of recurrent major depressive disorder, without psychotic features  F33.2 296.33   4. Insomnia, unspecified type  G47.00 780.52   5. Medication management  Z79.899 V58.69               PLAN:  Safety: No acute safety concerns at this time.  Therapy: Patient declines referral for psychotherapy.  Risk Assessment: Risk of self-harm acutely is moderate to severe.  Risk factors include anxiety  disorder, mood disorder, intermittent SI (passive, no plan or intent, no present SI), h/o suicide attempt in the past, and recent psychosocial stressors (pandemic). Protective factors include no family history, denies access to guns/weapons, no history of self-harm in the past, minimal AODA, healthcare seeking, future orientation, willingness to engage in care.  Risk of self-harm chronically is also moderate to severe, but could be further elevated in the event of treatment noncompliance and/or AODA.  Labs/Diagnostics Ordered:   Orders Placed This Encounter   Procedures    POC Medline 12 Panel Urine Drug Screen     Medications:   New Medications Ordered This Visit   Medications    Cariprazine HCl (Vraylar) 3 MG capsule capsule     Sig: Take 1 capsule by mouth Daily.     Dispense:  30 capsule     Refill:  1    gabapentin (Neurontin) 800 MG tablet     Sig: Take 1 tablet by mouth 3 (Three) Times a Day.     Dispense:  90 tablet     Refill:  1     Discussed all risks, benefits, alternatives, and side effects of Xanax including but not limited to risks of abuse, misuse, and addiction, which can lead to overdose or death; risks of dependence and withdrawal reactions; drowsiness, sedation, fatigue, depression, dizziness, ataxia, weakness, confusion, forgetfulness, hypotension, falls risk, respiratory depression, anterograde amnesia, paradoxical reactions such as hyperactivity or aggressive behavior; and hallucinations. Pt educated on the need to practice safe sex while taking this med. Instructed pt to avoid performing tasks that require mental alertness such as driving or operating machinery. Discussed the need for pt to immediately call the office for any new or worsening symptoms, such as changes in mood or behavior, and all other concerns. Pt educated on med compliance, including the proper use and monitoring for signs and symptoms of abuse, misuse, and addiction. Pt verbalized understanding and is agreeable to taking  Xanax. CARLOS obtained and USD ordered. Controlled substances agreement verbally signed. Addressed all questions and concerns.     Discussed all risks, benefits, alternatives, and side effects of Gabapentin including but not limited to sedation, dizziness, GI upset, dry mouth, and weight gain. Pt instructed to avoid driving and doing other tasks or actions that require to be alert until knowing how the drug affects them.  Pt educated on the need to practice safe sex while taking this med. Discussed the need for pt to immediately call the office for any new or worsening symptoms, such as worsening depression; feeling nervous or restless; suicidal thoughts or actions; or other changes changes in mood or behavior, and all other concerns. Pt educated on med compliance and the risks of suddenly stopping this medication or missing doses. Pt verbalized understanding and is agreeable to taking Gabapentin. Addressed all questions and concerns.  Will order UDS and obtain CARLOS report. Pt verbally signed controlled substances agreement.    Discussed all risks, benefits, alternatives, and side effects of Mirtazapine including but not limited to GI upset, sexual dysfunction, weight gain, dizziness, bleeding risk, seizure risk, sedation, headache, activation of shakir or hypomania, drug-inducted movement disorders (akathisia, dystonia, restless leg syndrome), dyslipidemia, hematologic abnormalities, orthostatic hypotension, sedation, withdrawal syndrome, serotonin syndrome, and activation of suicidal ideation and behavior.  Pt educated on the need to practice safe sex while taking this med. Discussed the need for pt to immediately call the office for any new or worsening symptoms, such as worsening depression; feeling nervous or restless; suicidal thoughts or actions; or other changes changes in mood or behavior, and all other concerns. Pt educated on med compliance and the risks of suddenly stopping this medication or missing  doses. Pt verbalized understanding and is agreeable to taking Mirtazapine. Addressed all questions and concerns.     Vraylar, Risks, benefits, alternatives discussed with patient including nausea and vomiting, GI upset, sedation, akathisia, theoretical risk of tardive dyskinesia/dystonia, movement issues, and weight gain. Use care when operating vehicle, vessel, or machine. After discussion of these risks and benefits, the patient voiced understanding and agreed to proceed.    Follow up:   F/u in 1 to 2 months    TREATMENT PLAN/GOALS: Continue supportive psychotherapy efforts and medications as indicated. Treatment and medication options discussed during today's visit. Patient ackowledged and verbally consented to continue with current treatment plan and was educated on the importance of compliance with treatment and follow-up appointments.    MEDICATION ISSUES:  CARLOS reviewed as expected.  Discussed medication options and treatment plan of prescribed medication as well as the risks, benefits, and side effects including potential falls, possible impaired driving and metabolic adversities among others. Patient is agreeable to call the office with any worsening of symptoms or onset of side effects. Patient is agreeable to call 911 or go to the nearest ER should he/she begin having SI/HI. No medication side effects or related complaints today.            This document has been electronically signed by Page Lee PA-C  May 3, 2024 11:41 EDT      Part of this note may be an electronic transcription/translation of spoken language to printed text using the Dragon Dictation System.

## 2024-06-03 DIAGNOSIS — J44.9 CHRONIC OBSTRUCTIVE PULMONARY DISEASE, UNSPECIFIED COPD TYPE: Primary | ICD-10-CM

## 2024-06-03 DIAGNOSIS — I10 ESSENTIAL (PRIMARY) HYPERTENSION: ICD-10-CM

## 2024-06-03 DIAGNOSIS — F41.0 PANIC DISORDER: ICD-10-CM

## 2024-06-03 DIAGNOSIS — E78.2 MIXED HYPERLIPIDEMIA: ICD-10-CM

## 2024-06-03 RX ORDER — ATORVASTATIN CALCIUM 10 MG/1
10 TABLET, FILM COATED ORAL DAILY
Qty: 90 TABLET | Refills: 0 | Status: SHIPPED | OUTPATIENT
Start: 2024-06-03

## 2024-06-03 RX ORDER — LISINOPRIL 40 MG/1
40 TABLET ORAL DAILY
Qty: 90 TABLET | Refills: 1 | Status: SHIPPED | OUTPATIENT
Start: 2024-06-03

## 2024-06-03 RX ORDER — ALBUTEROL SULFATE AND BUDESONIDE 90; 80 UG/1; UG/1
2 AEROSOL, METERED RESPIRATORY (INHALATION) 4 TIMES DAILY PRN
Qty: 1 G | Refills: 3 | Status: SHIPPED | OUTPATIENT
Start: 2024-06-03

## 2024-06-03 RX ORDER — ALPRAZOLAM 1 MG/1
1 TABLET ORAL 2 TIMES DAILY PRN
Qty: 60 TABLET | Refills: 0 | Status: SHIPPED | OUTPATIENT
Start: 2024-06-03

## 2024-06-03 NOTE — TELEPHONE ENCOUNTER
Rx Refill Note  Requested Prescriptions     Pending Prescriptions Disp Refills    atorvastatin (LIPITOR) 10 MG tablet 90 tablet 0     Sig: Take 1 tablet by mouth Daily.    lisinopril (PRINIVIL,ZESTRIL) 40 MG tablet 90 tablet 1     Sig: Take 1 tablet by mouth Daily.    Albuterol-Budesonide (Airsupra) 90-80 MCG/ACT aerosol 1 g 3     Sig: Inhale 2 puffs 4 (Four) Times a Day As Needed (Wheezing).      Last office visit with prescribing clinician: 3/22/2024   Last telemedicine visit with prescribing clinician: Visit date not found   Next office visit with prescribing clinician: 8/1/2024                         Would you like a call back once the refill request has been completed: [] Yes [] No    If the office needs to give you a call back, can they leave a voicemail: [] Yes [] No    Jie Trimble  06/03/24, 12:08 EDT

## 2024-06-03 NOTE — TELEPHONE ENCOUNTER
CONTROLLED MEDICATION REFILL REQUEST    STATE REGULATION APPT EVERY 3 MONTHS     UDS(URINE DRUG SCREEN) EVERY 6 MONTHS OR UP TO PROVIDER PREFERENCE      NEW NARC CONSENT EVERY YEAR      MEDICATION: ALPRAZolam (XANAX) 1 MG tablet (04/29/2024)     NEXT OFFICE VISIT: Appointment with Page Lee PA-C (06/28/2024)     LAST OFFICE VISIT: Office Visit with Page Lee PA-C (05/03/2024)     NARC CONSENT: CONTROLLED SUBSTANCE AGREEMENT - SCAN - CSA TAJ, JETT, 2023 (07/21/2023)     URINE DRUG SCREEN(STANDING ORDER): POC Medline 12 Panel Urine Drug Screen (05/03/2024 10:34)     PROVIDER PLEASE ADVISE

## 2024-06-03 NOTE — TELEPHONE ENCOUNTER
Caller: Asad Meyer    Relationship: Self    Best call back number: [phone]     Requested Prescriptions:   ATORVASTATIN 10 MG  LISINOPRIL 40 MG  ALBUTEROL INHALER     Pharmacy where request should be sent: [facility] [place] [address] [facility] [address] - [phone]  - [phone] FX     Last office visit with prescribing clinician: 3/22/2024   Last telemedicine visit with prescribing clinician: Visit date not found   Next office visit with prescribing clinician: 8/1/2024     Does the patient have less than a 3 day supply:  [x] Yes  [] No    Frieda Minor Rep   06/03/24 10:58 EDT

## 2024-06-28 ENCOUNTER — OFFICE VISIT (OUTPATIENT)
Dept: BEHAVIORAL HEALTH | Facility: CLINIC | Age: 59
End: 2024-06-28
Payer: MEDICAID

## 2024-06-28 VITALS
HEIGHT: 67 IN | HEART RATE: 105 BPM | WEIGHT: 229 LBS | SYSTOLIC BLOOD PRESSURE: 150 MMHG | BODY MASS INDEX: 35.94 KG/M2 | DIASTOLIC BLOOD PRESSURE: 118 MMHG

## 2024-06-28 DIAGNOSIS — G47.00 INSOMNIA, UNSPECIFIED TYPE: ICD-10-CM

## 2024-06-28 DIAGNOSIS — F33.1 MODERATE EPISODE OF RECURRENT MAJOR DEPRESSIVE DISORDER: Primary | ICD-10-CM

## 2024-06-28 DIAGNOSIS — F41.1 GENERALIZED ANXIETY DISORDER: ICD-10-CM

## 2024-06-28 DIAGNOSIS — F41.0 PANIC DISORDER: ICD-10-CM

## 2024-06-28 RX ORDER — ALPRAZOLAM 1 MG/1
1 TABLET ORAL 2 TIMES DAILY PRN
Qty: 60 TABLET | Refills: 0 | Status: SHIPPED | OUTPATIENT
Start: 2024-07-17

## 2024-06-28 RX ORDER — MIRTAZAPINE 45 MG/1
45 TABLET, FILM COATED ORAL NIGHTLY
Qty: 90 TABLET | Refills: 0 | Status: SHIPPED | OUTPATIENT
Start: 2024-06-28

## 2024-06-28 RX ORDER — GABAPENTIN 800 MG/1
800 TABLET ORAL 3 TIMES DAILY
Qty: 90 TABLET | Refills: 2 | Status: SHIPPED | OUTPATIENT
Start: 2024-06-28

## 2024-06-28 NOTE — PROGRESS NOTES
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  80.0 - 100.0 mm Hg Final    HCO3, Arterial 07/29/2023 27.7 (H)  22.0 - 26.0 mmol/L Final    Base Excess, Arterial 07/29/2023 1.1  -2.0 - 2.0 mmol/L Final    O2 Saturation, Arterial 07/29/2023 93.7 (L)  95.0 - 99.0 % Final    Hemoglobin, Blood Gas 07/29/2023 13.6 (L)  13.8 - 16.4 g/dL Final    Carboxyhemoglobin 07/29/2023 3.0 (H)  0 - 1.5 % Final    Methemoglobin 07/29/2023 0.00  0.00 - 1.50 % Final    Oxyhemoglobin 07/29/2023 90.9 (L)  94 - 99 % Final    FHHB 07/29/2023 6.1 (H)  0.0 - 5.0 % Final    Dann's Test 07/29/2023 Positive   Final    Note 07/29/2023 16/6 RR14 28%   Final    Site 07/29/2023 Arterial: right radial   Final    Modality 07/29/2023 BiPap   Final    FIO2 07/29/2023 28  % Final    Sodium, Arterial 07/29/2023 140.6  136 - 146 mmol/L Final    Potassium, Arterial 07/29/2023 4.99  3.5 - 5 mmol/L Final    Ionized Calcium, Arterial 07/29/2023 1.21  1.13 - 1.32 mmol/L Final    Chloride, Arterial 07/29/2023 105  98 - 106 mmol/L Final    Glucose, Arterial 07/29/2023 109 (H)  70 - 99 mg/dL Final    Lactate, Arterial 07/29/2023 1.30  0.5 - 2 mmol/L Final    PO2/FIO2 07/29/2023 247  0 - 500 Final    Glucose 07/29/2023 75  70 - 99 mg/dL Final    Serial Number: [id number]  [id number]    Glucose 07/29/2023 80  70 - 130 mg/dL Final    POC    Glucose 07/29/2023 80  70 - 99 mg/dL Final    Serial Number:  [id number]  [id number]    Procalcitonin 07/29/2023 0.15  0.00 - 0.25 ng/mL Final    Glucose 07/30/2023 82  65 - 99 mg/dL Final    BUN 07/30/2023 31 (H)  6 - 20 mg/dL Final    Creatinine 07/30/2023 1.53 (H)  0.76 - 1.27 mg/dL Final    Sodium 07/30/2023 141  136 - 145 mmol/L Final    Potassium 07/30/2023 5.0  3.5 - 5.2 mmol/L Final    Slight hemolysis detected by analyzer. Results may be affected.    Chloride 07/30/2023 107  98 - 107 mmol/L Final    CO2 07/30/2023 26.2  22.0 - 29.0 mmol/L Final    Calcium 07/30/2023 8.7  8.6 - 10.5 mg/dL Final    BUN/Creatinine Ratio 07/30/2023 20.3  7.0 - 25.0 Final    Anion Gap 07/30/2023 7.8  5.0 - 15.0 mmol/L Final    eGFR 07/30/2023 53.0 (L)  >60.0 mL/min/1.73 Final    Lactate 07/29/2023 0.8  0.5 - 2.0 mmol/L Final    Glucose 07/30/2023 85  65 - 99 mg/dL Final    BUN 07/30/2023 25 (H)  6 - 20 mg/dL Final    Creatinine 07/30/2023 1.42 (H)  0.76 - 1.27 mg/dL Final    Sodium 07/30/2023 141  136 - 145 mmol/L Final    Potassium 07/30/2023 4.7  3.5 - 5.2 mmol/L Final    Chloride 07/30/2023 106  98 - 107 mmol/L Final    CO2 07/30/2023 30.9 (H)  22.0 - 29.0 mmol/L Final    Calcium 07/30/2023 8.9  8.6 - 10.5 mg/dL Final    BUN/Creatinine Ratio 07/30/2023 17.6  7.0 - 25.0 Final    Anion Gap 07/30/2023 4.1 (L)  5.0 - 15.0 mmol/L Final    eGFR 07/30/2023 58.0 (L)  >60.0 mL/min/1.73 Final    MRSA PCR 07/30/2023 No MRSA Detected  No MRSA Detected Final   Lab on 07/25/2023   Component Date Value Ref Range Status    Glucose 07/25/2023 81  65 - 99 mg/dL Final    BUN 07/25/2023 28 (H)  6 - 20 mg/dL Final    Creatinine 07/25/2023 1.50 (H)  0.76 - 1.27 mg/dL Final    Sodium 07/25/2023 146 (H)  136 - 145 mmol/L Final    Potassium 07/25/2023 3.9  3.5 - 5.2 mmol/L Final    Chloride 07/25/2023 103  98 - 107 mmol/L Final    CO2 07/25/2023 32.0 (H)  22.0 - 29.0 mmol/L Final    Calcium 07/25/2023 9.6  8.6 - 10.5 mg/dL Final    Total Protein 07/25/2023 6.6  6.0 - 8.5 g/dL Final    Albumin 07/25/2023  4.4  3.5 - 5.2 g/dL Final    ALT (SGPT) 07/25/2023 23  1 - 41 U/L Final    AST (SGOT) 07/25/2023 17  1 - 40 U/L Final    Alkaline Phosphatase 07/25/2023 106  39 - 117 U/L Final    Total Bilirubin 07/25/2023 0.2  0.0 - 1.2 mg/dL Final    Globulin 07/25/2023 2.2  gm/dL Final    A/G Ratio 07/25/2023 2.0  g/dL Final    BUN/Creatinine Ratio 07/25/2023 18.7  7.0 - 25.0 Final    Anion Gap 07/25/2023 11.0  5.0 - 15.0 mmol/L Final    eGFR 07/25/2023 54.3 (L)  >60.0 mL/min/1.73 Final   Admission on 07/07/2023, Discharged on 07/07/2023   Component Date Value Ref Range Status    Glucose 07/07/2023 116 (H)  65 - 99 mg/dL Final    BUN 07/07/2023 32 (H)  6 - 20 mg/dL Final    Creatinine 07/07/2023 1.77 (H)  0.76 - 1.27 mg/dL Final    Sodium 07/07/2023 141  136 - 145 mmol/L Final    Potassium 07/07/2023 4.3  3.5 - 5.2 mmol/L Final    Chloride 07/07/2023 102  98 - 107 mmol/L Final    CO2 07/07/2023 26.9  22.0 - 29.0 mmol/L Final    Calcium 07/07/2023 9.9  8.6 - 10.5 mg/dL Final    Total Protein 07/07/2023 7.6  6.0 - 8.5 g/dL Final    Albumin 07/07/2023 4.1  3.5 - 5.2 g/dL Final    ALT (SGPT) 07/07/2023 20  1 - 41 U/L Final    AST (SGOT) 07/07/2023 23  1 - 40 U/L Final    Alkaline Phosphatase 07/07/2023 118 (H)  39 - 117 U/L Final    Total Bilirubin 07/07/2023 0.3  0.0 - 1.2 mg/dL Final    Globulin 07/07/2023 3.5  gm/dL Final    A/G Ratio 07/07/2023 1.2  g/dL Final    BUN/Creatinine Ratio 07/07/2023 18.1  7.0 - 25.0 Final    Anion Gap 07/07/2023 12.1  5.0 - 15.0 mmol/L Final    eGFR 07/07/2023 44.5 (L)  >60.0 mL/min/1.73 Final    Lipase 07/07/2023 23  13 - 60 U/L Final    Color, UA 07/07/2023 Yellow  Yellow, Straw Final    Appearance, UA 07/07/2023 Clear  Clear Final    pH, UA 07/07/2023 5.5  5.0 - 8.0 Final    Specific Gravity, UA 07/07/2023 1.010  1.005 - 1.030 Final    Glucose, UA 07/07/2023 Negative  Negative Final    Ketones, UA 07/07/2023 Negative  Negative Final    Bilirubin, UA 07/07/2023 Negative  Negative Final    Blood, UA  07/07/2023 Negative  Negative Final    Protein, UA 07/07/2023 Negative  Negative Final    Leuk Esterase, UA 07/07/2023 Negative  Negative Final    Nitrite, UA 07/07/2023 Negative  Negative Final    Urobilinogen, UA 07/07/2023 0.2 E.U./dL  0.2 - 1.0 E.U./dL Final    WBC 07/07/2023 10.91 (H)  3.40 - 10.80 10*3/mm3 Final    RBC 07/07/2023 4.85  4.14 - 5.80 10*6/mm3 Final    Hemoglobin 07/07/2023 15.0  13.0 - 17.7 g/dL Final    Hematocrit 07/07/2023 45.1  37.5 - 51.0 % Final    MCV 07/07/2023 93.0  79.0 - 97.0 fL Final    MCH 07/07/2023 30.9  26.6 - 33.0 pg Final    MCHC 07/07/2023 33.3  31.5 - 35.7 g/dL Final    RDW 07/07/2023 16.1 (H)  12.3 - 15.4 % Final    RDW-SD 07/07/2023 53.9  37.0 - 54.0 fl Final    MPV 07/07/2023 10.8  6.0 - 12.0 fL Final    Platelets 07/07/2023 244  140 - 450 10*3/mm3 Final    Neutrophil % 07/07/2023 73.0  42.7 - 76.0 % Final    Lymphocyte % 07/07/2023 15.7 (L)  19.6 - 45.3 % Final    Monocyte % 07/07/2023 8.1  5.0 - 12.0 % Final    Eosinophil % 07/07/2023 2.1  0.3 - 6.2 % Final    Basophil % 07/07/2023 0.6  0.0 - 1.5 % Final    Immature Grans % 07/07/2023 0.5  0.0 - 0.5 % Final    Neutrophils, Absolute 07/07/2023 7.96 (H)  1.70 - 7.00 10*3/mm3 Final    Lymphocytes, Absolute 07/07/2023 1.71  0.70 - 3.10 10*3/mm3 Final    Monocytes, Absolute 07/07/2023 0.88  0.10 - 0.90 10*3/mm3 Final    Eosinophils, Absolute 07/07/2023 0.23  0.00 - 0.40 10*3/mm3 Final    Basophils, Absolute 07/07/2023 0.07  0.00 - 0.20 10*3/mm3 Final    Immature Grans, Absolute 07/07/2023 0.06 (H)  0.00 - 0.05 10*3/mm3 Final    nRBC 07/07/2023 0.0  0.0 - 0.2 /100 WBC Final    Extra Tube 07/07/2023 Hold for add-ons.   Final    Auto resulted.    Extra Tube 07/07/2023 hold for add-on   Final    Auto resulted    Extra Tube 07/07/2023 Hold for add-ons.   Final    Auto resulted.    Extra Tube 07/07/2023 Hold for add-ons.   Final    Auto resulted    proBNP 07/07/2023 117.0  0.0 - 900.0 pg/mL Final   Lab on 06/30/2023   Component Date  Value Ref Range Status    Glucose 06/30/2023 97  65 - 99 mg/dL Final    BUN 06/30/2023 20  6 - 20 mg/dL Final    Creatinine 06/30/2023 1.28 (H)  0.76 - 1.27 mg/dL Final    Sodium 06/30/2023 139  136 - 145 mmol/L Final    Potassium 06/30/2023 4.4  3.5 - 5.2 mmol/L Final    Chloride 06/30/2023 102  98 - 107 mmol/L Final    CO2 06/30/2023 26.8  22.0 - 29.0 mmol/L Final    Calcium 06/30/2023 9.8  8.6 - 10.5 mg/dL Final    Total Protein 06/30/2023 7.4  6.0 - 8.5 g/dL Final    Albumin 06/30/2023 4.3  3.5 - 5.2 g/dL Final    ALT (SGPT) 06/30/2023 26  1 - 41 U/L Final    AST (SGOT) 06/30/2023 28  1 - 40 U/L Final    Alkaline Phosphatase 06/30/2023 113  39 - 117 U/L Final    Total Bilirubin 06/30/2023 0.4  0.0 - 1.2 mg/dL Final    Globulin 06/30/2023 3.1  gm/dL Final    A/G Ratio 06/30/2023 1.4  g/dL Final    BUN/Creatinine Ratio 06/30/2023 15.6  7.0 - 25.0 Final    Anion Gap 06/30/2023 10.2  5.0 - 15.0 mmol/L Final    eGFR 06/30/2023 65.7  >60.0 mL/min/1.73 Final    Total Cholesterol 06/30/2023 186  0 - 200 mg/dL Final    Triglycerides 06/30/2023 254 (H)  0 - 150 mg/dL Final    HDL Cholesterol 06/30/2023 38 (L)  40 - 60 mg/dL Final    LDL Cholesterol  06/30/2023 104 (H)  0 - 100 mg/dL Final    VLDL Cholesterol 06/30/2023 44 (H)  5 - 40 mg/dL Final    LDL/HDL Ratio 06/30/2023 2.56   Final    TSH 06/30/2023 1.750  0.270 - 4.200 uIU/mL Final    TSH 06/30/2023 1.750  0.270 - 4.200 uIU/mL Final    Free T4 06/30/2023 0.81 (L)  0.93 - 1.70 ng/dL Final    T4 results may be falsely increased if patient taking Biotin.    WBC 06/30/2023 7.91  3.40 - 10.80 10*3/mm3 Final    RBC 06/30/2023 4.74  4.14 - 5.80 10*6/mm3 Final    Hemoglobin 06/30/2023 14.3  13.0 - 17.7 g/dL Final    Hematocrit 06/30/2023 42.0  37.5 - 51.0 % Final    MCV 06/30/2023 88.6  79.0 - 97.0 fL Final    MCH 06/30/2023 30.2  26.6 - 33.0 pg Final    MCHC 06/30/2023 34.0  31.5 - 35.7 g/dL Final    RDW 06/30/2023 14.5  12.3 - 15.4 % Final    RDW-SD 06/30/2023 46.4  37.0 -  54.0 fl Final    MPV 06/30/2023 11.5  6.0 - 12.0 fL Final    Platelets 06/30/2023 221  140 - 450 10*3/mm3 Final    Neutrophil % 06/30/2023 64.2  42.7 - 76.0 % Final    Lymphocyte % 06/30/2023 22.4  19.6 - 45.3 % Final    Monocyte % 06/30/2023 7.2  5.0 - 12.0 % Final    Eosinophil % 06/30/2023 4.0  0.3 - 6.2 % Final    Basophil % 06/30/2023 1.1  0.0 - 1.5 % Final    Immature Grans % 06/30/2023 1.1 (H)  0.0 - 0.5 % Final    Neutrophils, Absolute 06/30/2023 5.07  1.70 - 7.00 10*3/mm3 Final    Lymphocytes, Absolute 06/30/2023 1.77  0.70 - 3.10 10*3/mm3 Final    Monocytes, Absolute 06/30/2023 0.57  0.10 - 0.90 10*3/mm3 Final    Eosinophils, Absolute 06/30/2023 0.32  0.00 - 0.40 10*3/mm3 Final    Basophils, Absolute 06/30/2023 0.09  0.00 - 0.20 10*3/mm3 Final    Immature Grans, Absolute 06/30/2023 0.09 (H)  0.00 - 0.05 10*3/mm3 Final    nRBC 06/30/2023 0.1  0.0 - 0.2 /100 WBC Final       EKG Results:  No orders to display       Imaging Results:  XR Chest 1 View    Result Date: 10/3/2022   No active disease is seen.       MIKAL HOPE MD       Electronically Signed and Approved By: MIKAL HOPE MD on 10/03/2022 at 12:47                 Assessment & Plan   Diagnoses and all orders for this visit:    1. Moderate episode of recurrent major depressive disorder (Primary)  -     Cariprazine HCl (Vraylar) 3 MG capsule capsule; Take 1 capsule by mouth Daily.  Dispense: 30 capsule; Refill: 2  -     mirtazapine (REMERON) 45 MG tablet; Take 1 tablet by mouth Every Night.  Dispense: 90 tablet; Refill: 0    2. Panic disorder  -     ALPRAZolam (XANAX) 1 MG tablet; Take 1 tablet by mouth 2 (Two) Times a Day As Needed for Anxiety. for anxiety  Dispense: 60 tablet; Refill: 0  -     gabapentin (Neurontin) 800 MG tablet; Take 1 tablet by mouth 3 (Three) Times a Day.  Dispense: 90 tablet; Refill: 2  -     mirtazapine (REMERON) 45 MG tablet; Take 1 tablet by mouth Every Night.  Dispense: 90 tablet; Refill: 0    3. Generalized anxiety  disorder  -     gabapentin (Neurontin) 800 MG tablet; Take 1 tablet by mouth 3 (Three) Times a Day.  Dispense: 90 tablet; Refill: 2  -     mirtazapine (REMERON) 45 MG tablet; Take 1 tablet by mouth Every Night.  Dispense: 90 tablet; Refill: 0    4. Insomnia, unspecified type  -     mirtazapine (REMERON) 45 MG tablet; Take 1 tablet by mouth Every Night.  Dispense: 90 tablet; Refill: 0        Patient screened positive for depression based on a PHQ-9 score of 10 on 6/28/2024. Follow-up recommendations include: Prescribed antidepressant medication treatment, Suicide Risk Assessment performed, and see assessment below .    Presentation seems most consistent with MDD, GREG, panic disorder, insomnia, nicotine dependence.  We will continue mirtazapine for management of depression, anxiety, and overall mood.  Patient declines referral for sleep medicine. I extensively discussed my concerns about patient being on Xanax including but not limited to respiratory depression, coma, death.  Discussed risk factors including COPD.  patient would like to continue Xanax.  We will continue Xanax at current dose for anxiety as needed.  Patient has been on this long-term.  Counseled the patient on the risks including addiction, dependence, and misuse.  Will continue gabapentin for management of anxiety and overall mood.  We will continue Vraylar for management of depression and overall mood.  Consider Spravato although patient has declined in the past.  Counseled on smoking cessation.  Instructed patient to contact the office for any new or worsening symptoms or any other concerns.  Follow-up in 2 months.  Addressed all questions and concerns.    Visit Diagnoses:    ICD-10-CM ICD-9-CM   1. Moderate episode of recurrent major depressive disorder  F33.1 296.32   2. Panic disorder  F41.0 300.01   3. Generalized anxiety disorder  F41.1 300.02   4. Insomnia, unspecified type  G47.00 780.52           PLAN:  Safety: No acute safety concerns at  this time.  Therapy: Patient declines referral for psychotherapy.  Risk Assessment: Risk of self-harm acutely is moderate to severe.  Risk factors include anxiety disorder, mood disorder, intermittent SI (passive, no plan or intent, no present SI), h/o suicide attempt in the past, and recent psychosocial stressors (pandemic). Protective factors include no family history, denies access to guns/weapons, no history of self-harm in the past, minimal AODA, healthcare seeking, future orientation, willingness to engage in care.  Risk of self-harm chronically is also moderate to severe, but could be further elevated in the event of treatment noncompliance and/or AODA.  Labs/Diagnostics Ordered:   No orders of the defined types were placed in this encounter.    Medications:   New Medications Ordered This Visit   Medications    ALPRAZolam (XANAX) 1 MG tablet     Sig: Take 1 tablet by mouth 2 (Two) Times a Day As Needed for Anxiety. for anxiety     Dispense:  60 tablet     Refill:  0    Cariprazine HCl (Vraylar) 3 MG capsule capsule     Sig: Take 1 capsule by mouth Daily.     Dispense:  30 capsule     Refill:  2    gabapentin (Neurontin) 800 MG tablet     Sig: Take 1 tablet by mouth 3 (Three) Times a Day.     Dispense:  90 tablet     Refill:  2    mirtazapine (REMERON) 45 MG tablet     Sig: Take 1 tablet by mouth Every Night.     Dispense:  90 tablet     Refill:  0     Discussed all risks, benefits, alternatives, and side effects of Xanax including but not limited to risks of abuse, misuse, and addiction, which can lead to overdose or death; risks of dependence and withdrawal reactions; drowsiness, sedation, fatigue, depression, dizziness, ataxia, weakness, confusion, forgetfulness, hypotension, falls risk, respiratory depression, anterograde amnesia, paradoxical reactions such as hyperactivity or aggressive behavior; and hallucinations. Pt educated on the need to practice safe sex while taking this med. Instructed pt to avoid  performing tasks that require mental alertness such as driving or operating machinery. Discussed the need for pt to immediately call the office for any new or worsening symptoms, such as changes in mood or behavior, and all other concerns. Pt educated on med compliance, including the proper use and monitoring for signs and symptoms of abuse, misuse, and addiction. Pt verbalized understanding and is agreeable to taking Xanax. CARLOS obtained and USD ordered. Controlled substances agreement verbally signed. Addressed all questions and concerns.     Discussed all risks, benefits, alternatives, and side effects of Gabapentin including but not limited to sedation, dizziness, GI upset, dry mouth, and weight gain. Pt instructed to avoid driving and doing other tasks or actions that require to be alert until knowing how the drug affects them.  Pt educated on the need to practice safe sex while taking this med. Discussed the need for pt to immediately call the office for any new or worsening symptoms, such as worsening depression; feeling nervous or restless; suicidal thoughts or actions; or other changes changes in mood or behavior, and all other concerns. Pt educated on med compliance and the risks of suddenly stopping this medication or missing doses. Pt verbalized understanding and is agreeable to taking Gabapentin. Addressed all questions and concerns.  Will order UDS and obtain CARLOS report. Pt verbally signed controlled substances agreement.    Discussed all risks, benefits, alternatives, and side effects of Mirtazapine including but not limited to GI upset, sexual dysfunction, weight gain, dizziness, bleeding risk, seizure risk, sedation, headache, activation of shakir or hypomania, drug-inducted movement disorders (akathisia, dystonia, restless leg syndrome), dyslipidemia, hematologic abnormalities, orthostatic hypotension, sedation, withdrawal syndrome, serotonin syndrome, and activation of suicidal ideation and  behavior.  Pt educated on the need to practice safe sex while taking this med. Discussed the need for pt to immediately call the office for any new or worsening symptoms, such as worsening depression; feeling nervous or restless; suicidal thoughts or actions; or other changes changes in mood or behavior, and all other concerns. Pt educated on med compliance and the risks of suddenly stopping this medication or missing doses. Pt verbalized understanding and is agreeable to taking Mirtazapine. Addressed all questions and concerns.     Vraylar, Risks, benefits, alternatives discussed with patient including nausea and vomiting, GI upset, sedation, akathisia, theoretical risk of tardive dyskinesia/dystonia, movement issues, and weight gain. Use care when operating vehicle, vessel, or machine. After discussion of these risks and benefits, the patient voiced understanding and agreed to proceed.    Follow up:   F/u in 2 months    TREATMENT PLAN/GOALS: Continue supportive psychotherapy efforts and medications as indicated. Treatment and medication options discussed during today's visit. Patient ackowledged and verbally consented to continue with current treatment plan and was educated on the importance of compliance with treatment and follow-up appointments.    MEDICATION ISSUES:  CARLOS reviewed as expected.  Discussed medication options and treatment plan of prescribed medication as well as the risks, benefits, and side effects including potential falls, possible impaired driving and metabolic adversities among others. Patient is agreeable to call the office with any worsening of symptoms or onset of side effects. Patient is agreeable to call 911 or go to the nearest ER should he/she begin having SI/HI. No medication side effects or related complaints today.            This document has been electronically signed by Page Lee PA-C  June 28, 2024 11:02 EDT      Part of this note may be an electronic  transcription/translation of spoken language to printed text using the Dragon Dictation System.

## 2024-07-09 ENCOUNTER — OFFICE VISIT (OUTPATIENT)
Dept: INTERNAL MEDICINE | Age: 59
End: 2024-07-09
Payer: MEDICAID

## 2024-07-09 ENCOUNTER — TELEPHONE (OUTPATIENT)
Dept: INTERNAL MEDICINE | Age: 59
End: 2024-07-09

## 2024-07-09 VITALS
HEART RATE: 128 BPM | SYSTOLIC BLOOD PRESSURE: 142 MMHG | HEIGHT: 67 IN | OXYGEN SATURATION: 94 % | RESPIRATION RATE: 18 BRPM | WEIGHT: 225 LBS | BODY MASS INDEX: 35.31 KG/M2 | TEMPERATURE: 97.7 F | DIASTOLIC BLOOD PRESSURE: 94 MMHG

## 2024-07-09 DIAGNOSIS — U07.1 COVID-19: Primary | ICD-10-CM

## 2024-07-09 DIAGNOSIS — J44.9 CHRONIC OBSTRUCTIVE PULMONARY DISEASE, UNSPECIFIED COPD TYPE: ICD-10-CM

## 2024-07-09 LAB
EXPIRATION DATE: ABNORMAL
FLUAV AG UPPER RESP QL IA.RAPID: NOT DETECTED
FLUBV AG UPPER RESP QL IA.RAPID: NOT DETECTED
INTERNAL CONTROL: ABNORMAL
Lab: ABNORMAL
SARS-COV-2 AG UPPER RESP QL IA.RAPID: DETECTED

## 2024-07-09 PROCEDURE — 3080F DIAST BP >= 90 MM HG: CPT | Performed by: NURSE PRACTITIONER

## 2024-07-09 PROCEDURE — 3077F SYST BP >= 140 MM HG: CPT | Performed by: NURSE PRACTITIONER

## 2024-07-09 PROCEDURE — 1126F AMNT PAIN NOTED NONE PRSNT: CPT | Performed by: NURSE PRACTITIONER

## 2024-07-09 PROCEDURE — 1160F RVW MEDS BY RX/DR IN RCRD: CPT | Performed by: NURSE PRACTITIONER

## 2024-07-09 PROCEDURE — 87428 SARSCOV & INF VIR A&B AG IA: CPT | Performed by: NURSE PRACTITIONER

## 2024-07-09 PROCEDURE — 1159F MED LIST DOCD IN RCRD: CPT | Performed by: NURSE PRACTITIONER

## 2024-07-09 PROCEDURE — 99214 OFFICE O/P EST MOD 30 MIN: CPT | Performed by: NURSE PRACTITIONER

## 2024-07-09 RX ORDER — NIRMATRELVIR AND RITONAVIR 150-100 MG
2 KIT ORAL 2 TIMES DAILY
Qty: 20 TABLET | Refills: 0 | Status: SHIPPED | OUTPATIENT
Start: 2024-07-09 | End: 2024-07-14

## 2024-07-09 RX ORDER — PREDNISONE 50 MG/1
50 TABLET ORAL DAILY
Qty: 5 TABLET | Refills: 0 | Status: SHIPPED | OUTPATIENT
Start: 2024-07-09 | End: 2024-07-14

## 2024-07-09 RX ORDER — IPRATROPIUM BROMIDE AND ALBUTEROL SULFATE 2.5; .5 MG/3ML; MG/3ML
3 SOLUTION RESPIRATORY (INHALATION)
Qty: 360 ML | Refills: 2 | Status: SHIPPED | OUTPATIENT
Start: 2024-07-09

## 2024-07-09 NOTE — PATIENT INSTRUCTIONS
Your COVID test was positive in office today.  You should increase fluids, rest, take Tylenol and ibuprofen if needed.  Continue your inhalers and nebulizer.  I have sent medication to your pharmacy.  While you are taking Paxlovid you should stop your Lipitor and resume after completion.  You should quarantine through Friday this week.  Should you develop any new or worsening symptoms including but not limited to chest pain, difficulty breathing etc. please go to the ER.    If You Test Positive for COVID-19 (Isolate)  Everyone, regardless of vaccination status.    Stay home for a minimum of 5 days.  If you have no symptoms or your symptoms are resolving after 5 days, you can leave your house.  Continue to wear a mask around others for 5 additional days.  If you have a fever, continue to stay home until your fever resolves.    If You Were Exposed to Someone with COVID-19 (Quarantine)  If you:    Have been boosted  OR  Completed the primary series of Pfizer or Moderna vaccine within the last 6 months  OR  Completed the primary series of J&J vaccine within the last 2 months    Wear a mask around others for 10 days.  Test on day 5, if possible.  If you develop symptoms get a test and stay home.    If you:    Completed the primary series of Pfizer or Moderna vaccine over 6 months ago and are not boosted  OR  Completed the primary series of J&J over 2 months ago and are not boosted  OR  Are unvaccinated    Stay home for 5 days. After that continue to wear a mask around others for 5 additional days.  If you can´t quarantine you must wear a mask for 10 days.  Test on day 5 if possible.  If you develop symptoms get a test and stay home

## 2024-07-09 NOTE — TELEPHONE ENCOUNTER
Caller: Asad Meyer    Relationship: Self    Best call back number: [phone]     What medication are you requesting: ANTIBIOTIC    What are your current symptoms: CONGESTION, MUCUS BUILD UP    How long have you been experiencing symptoms: TWO DAYS    Have you had these symptoms before:    [] Yes  [x] No    Have you been treated for these symptoms before:   [] Yes  [x] No    If a prescription is needed, what is your preferred pharmacy and phone number: [facility] [place] [address] - [phone]  - [phone]

## 2024-07-09 NOTE — PROGRESS NOTES
Chief Complaint  URI (59 year old male here today complaining of congestion. States he is not coughing but did have a fever at home a few days ago. States he has felt short of air and has needed to use his oxygen more )    Subjective      Asad Meyer is a 59-year-old male that presents to [facility] with c/o shortness of breath, congestion, fever, body aches and chills. Symptoms have been going on for 3 days. Has been taking cold/flu medication that has helped some. Using trelegy daily.  No known sick contacts or exposures.     History of Present Illness    Current Outpatient Medications   Medication Instructions    albuterol (ACCUNEB) 0.63 mg, Nebulization, Every 6 Hours PRN    Albuterol-Budesonide (Airsupra) 90-80 MCG/ACT aerosol 2 puffs, Inhalation, 4 Times Daily PRN    [START ON 7/17/2024] ALPRAZolam (XANAX) 1 mg, Oral, 2 Times Daily PRN, for anxiety    atorvastatin (LIPITOR) 10 mg, Oral, Daily    Cariprazine HCl (VRAYLAR) 3 mg, Oral, Daily    Fluticasone-Umeclidin-Vilant (Trelegy Ellipta) 200-62.5-25 MCG/ACT inhaler 1 puff, Inhalation, Daily    furosemide (LASIX) 20 mg, Oral, Daily    gabapentin (NEURONTIN) 800 mg, Oral, 3 Times Daily    ipratropium-albuterol (DUO-NEB) 0.5-2.5 mg/3 ml nebulizer 3 mL, Nebulization, 4 Times Daily - RT    lisinopril (PRINIVIL,ZESTRIL) 40 mg, Oral, Daily    mirtazapine (REMERON) 45 mg, Oral, Nightly    nicotine (Nicoderm CQ) 21 MG/24HR patch 1 patch, Transdermal, Every 24 Hours    Nirmatrelvir&Ritonavir 150/100 (Paxlovid, 150/100,) 10 x 150 MG & 10 x 100MG tablet therapy pack tablet (for renal adjustment) 2 tablets, Oral, 2 Times Daily    predniSONE (DELTASONE) 50 mg, Oral, Daily       The following portions of the patient's history were reviewed and updated as appropriate: allergies, current medications, past family history, past medical history, past social history, past surgical history, and problem list.    Objective   Vital Signs:   BP  "142/94 (BP Location: Left arm, Patient Position: Sitting)   Pulse (!) 128   Temp 97.7 °F (36.5 °C) (Temporal)   Resp 18   Ht 170.2 cm (67.01\")   Wt 102 kg (225 lb)   SpO2 94%   BMI 35.23 kg/m²     Wt Readings from Last 3 Encounters:   07/09/24 102 kg (225 lb)   06/28/24 104 kg (229 lb)   04/25/24 112 kg (247 lb)     BP Readings from Last 3 Encounters:   07/09/24 142/94   06/28/24 (!) 150/118   04/25/24 (!) 170/120     Physical Exam  Vitals and nursing note reviewed.   Constitutional:       Appearance: Normal appearance. He is not ill-appearing.   HENT:      Head: Normocephalic and atraumatic.   Eyes:      Extraocular Movements: Extraocular movements intact.      Conjunctiva/sclera: Conjunctivae normal.      Pupils: Pupils are equal, round, and reactive to light.   Cardiovascular:      Rate and Rhythm: Normal rate and regular rhythm.      Heart sounds: Normal heart sounds.   Pulmonary:      Effort: Pulmonary effort is normal.      Breath sounds: Wheezing (Diffuse) present.   Skin:     General: Skin is warm and dry.   Neurological:      Mental Status: He is alert and oriented to person, place, and time.   Psychiatric:         Mood and Affect: Mood normal.         Behavior: Behavior normal.          Result Review :  The following data was reviewed by: PONCE Matos on 07/09/2024:      Common labs          7/29/2023    15:55 7/29/2023    16:44 7/29/2023    18:37 7/30/2023    00:02 7/30/2023    05:49 11/17/2023    16:07   Common Labs   Glucose 102  94  109  82  85  133    BUN 35    31  25  18    Creatinine 2.03    1.53  1.42  1.61    Sodium 141  140.2  140.6  141  141  137    Potassium 6.8  7.0   5.0  4.7  3.9    Chloride 105  103   107  106  100    Calcium 9.2    8.7  8.9  9.3    Albumin 4.1      4.1    Total Bilirubin 0.3      0.3    Alkaline Phosphatase 124      142    AST (SGOT) 18      35    ALT (SGPT) 23      40    WBC 16.29      9.88    Hemoglobin 14.2      15.3    Hematocrit 45.1      46.4  "   Platelets 223      227        Lab Results (last 72 hours)       Procedure Component Value Units Date/Time    POCT SARS-CoV-2 Antigen LEANNA + Flu [[id number]]  (Abnormal) Collected: 07/09/24 1529    Specimen: Swab Updated: 07/09/24 1530     SARS Antigen Detected     Influenza A Antigen LEANNA Not Detected     Influenza B Antigen LEANNA Not Detected     Internal Control Passed     Lot Number [id number]     Expiration Date 2,[id number]             No Images in the past 120 days found..    Lab Results   Component Value Date    SARSANTIGEN Detected (A) 07/09/2024    COVID19 Not Detected 11/17/2023    FLUAAG Not Detected 07/09/2024    FLUBAG Not Detected 07/09/2024    RSV Not Detected 11/17/2023    BILIRUBINUR Negative 07/29/2023    POCGLU 80 07/29/2023       Procedures        Assessment and Plan   Diagnoses and all orders for this visit:    1. COVID-19 (Primary)  Comments:  Paxlovid prescribed due to history of COPD.  Most recent GFR of 49.9.  Patient advised to hold his Lipitor while taking this medication.  Orders:  -     POCT SARS-CoV-2 Antigen LEANNA + Flu  -     ipratropium-albuterol (DUO-NEB) 0.5-2.5 mg/3 ml nebulizer; Take 3 mL by nebulization 4 (Four) Times a Day.  Dispense: 360 mL; Refill: 2  -     predniSONE (DELTASONE) 50 MG tablet; Take 1 tablet by mouth Daily for 5 days.  Dispense: 5 tablet; Refill: 0  -     Nirmatrelvir&Ritonavir 150/100 (Paxlovid, 150/100,) 10 x 150 MG & 10 x 100MG tablet therapy pack tablet (for renal adjustment); Take 2 tablets by mouth 2 (Two) Times a Day for 5 days.  Dispense: 20 tablet; Refill: 0    2. Chronic obstructive pulmonary disease, unspecified COPD type  -     ipratropium-albuterol (DUO-NEB) 0.5-2.5 mg/3 ml nebulizer; Take 3 mL by nebulization 4 (Four) Times a Day.  Dispense: 360 mL; Refill: 2  -     predniSONE (DELTASONE) 50 MG tablet; Take 1 tablet by mouth Daily for 5 days.  Dispense: 5 tablet; Refill: 0                 Medications Discontinued During This Encounter   Medication Reason     ipratropium-albuterol (DUO-NEB) 0.5-2.5 mg/3 ml nebulizer Reorder          Follow Up   No follow-ups on file.  Patient was given instructions and counseling regarding his condition or for health maintenance advice. Please see specific information pulled into the AVS if appropriate.       PONCE Matos  07/09/24  16:14 EDT

## 2024-07-17 RX ORDER — FLUTICASONE FUROATE, UMECLIDINIUM BROMIDE AND VILANTEROL TRIFENATATE 200; 62.5; 25 UG/1; UG/1; UG/1
1 POWDER RESPIRATORY (INHALATION) DAILY
Qty: 60 EACH | Refills: 5 | Status: SHIPPED | OUTPATIENT
Start: 2024-07-17

## 2024-07-17 NOTE — TELEPHONE ENCOUNTER
Caller: BetsyTiffanyAsad    Relationship: Self    Best call back number: [phone]     Requested Prescriptions:   Requested Prescriptions     Pending Prescriptions Disp Refills    Fluticasone-Umeclidin-Vilant (Trelegy Ellipta) 200-62.5-25 MCG/ACT inhaler 60 each 5     Sig: Inhale 1 puff Daily.        Pharmacy where request should be sent: [facility] [place] - [address] [address] - [phone] [facility] [phone] FX     Last office visit with prescribing clinician: 3/22/2024   Last telemedicine visit with prescribing clinician: Visit date not found   Next office visit with prescribing clinician: 8/1/2024     Additional details provided by patient:     Does the patient have less than a 3 day supply:  [] Yes  [x] No    Would you like a call back once the refill request has been completed: [] Yes [] No    If the office needs to give you a call back, can they leave a voicemail: [] Yes [] No    Barbara Mccarty, PCT   07/17/24 10:47 EDT

## 2024-07-29 DIAGNOSIS — F41.0 PANIC DISORDER: ICD-10-CM

## 2024-07-29 DIAGNOSIS — F41.1 GENERALIZED ANXIETY DISORDER: ICD-10-CM

## 2024-07-29 DIAGNOSIS — F33.1 MODERATE EPISODE OF RECURRENT MAJOR DEPRESSIVE DISORDER: ICD-10-CM

## 2024-07-29 DIAGNOSIS — G47.00 INSOMNIA, UNSPECIFIED TYPE: ICD-10-CM

## 2024-07-29 RX ORDER — MIRTAZAPINE 45 MG/1
45 TABLET, FILM COATED ORAL NIGHTLY
Qty: 90 TABLET | Refills: 0 | Status: SHIPPED | OUTPATIENT
Start: 2024-07-29 | End: 2024-08-01 | Stop reason: SDUPTHER

## 2024-07-29 NOTE — TELEPHONE ENCOUNTER
Caller: BetsyTiffanyAsad    Relationship: Self    Best call back number: [phone]     Requested Prescriptions:   Requested Prescriptions     Pending Prescriptions Disp Refills    mirtazapine (REMERON) 45 MG tablet 90 tablet 0     Sig: Take 1 tablet by mouth Every Night.        Pharmacy where request should be sent: [facility] [place] - [address] [address] - [phone] [facility] [phone] FX     Last office visit with prescribing clinician: 3/22/2024   Last telemedicine visit with prescribing clinician: Visit date not found   Next office visit with prescribing clinician: 8/1/2024     Additional details provided by patient: PATIENT IS NEEDING A REFILL. HE IS CURRENTLY OUT    Does the patient have less than a 3 day supply:  [x] Yes  [] No    Would you like a call back once the refill request has been completed: [x] Yes [] No    If the office needs to give you a call back, can they leave a voicemail: [x] Yes [] No    Frieda Kim Rep   07/29/24 11:54 EDT

## 2024-08-01 ENCOUNTER — OFFICE VISIT (OUTPATIENT)
Dept: INTERNAL MEDICINE | Age: 59
End: 2024-08-01
Payer: MEDICAID

## 2024-08-01 VITALS
BODY MASS INDEX: 34.59 KG/M2 | HEIGHT: 67 IN | HEART RATE: 84 BPM | OXYGEN SATURATION: 97 % | RESPIRATION RATE: 18 BRPM | TEMPERATURE: 97.4 F | DIASTOLIC BLOOD PRESSURE: 104 MMHG | SYSTOLIC BLOOD PRESSURE: 182 MMHG | WEIGHT: 220.4 LBS

## 2024-08-01 DIAGNOSIS — E66.09 CLASS 1 OBESITY DUE TO EXCESS CALORIES WITH SERIOUS COMORBIDITY AND BODY MASS INDEX (BMI) OF 34.0 TO 34.9 IN ADULT: ICD-10-CM

## 2024-08-01 DIAGNOSIS — J44.9 CHRONIC OBSTRUCTIVE PULMONARY DISEASE, UNSPECIFIED COPD TYPE: ICD-10-CM

## 2024-08-01 DIAGNOSIS — U07.1 COVID-19: ICD-10-CM

## 2024-08-01 DIAGNOSIS — E78.2 MIXED HYPERLIPIDEMIA: ICD-10-CM

## 2024-08-01 DIAGNOSIS — F51.04 PSYCHOPHYSIOLOGICAL INSOMNIA: ICD-10-CM

## 2024-08-01 DIAGNOSIS — I10 ESSENTIAL (PRIMARY) HYPERTENSION: Primary | ICD-10-CM

## 2024-08-01 DIAGNOSIS — F33.1 MODERATE EPISODE OF RECURRENT MAJOR DEPRESSIVE DISORDER: ICD-10-CM

## 2024-08-01 DIAGNOSIS — N28.9 RENAL INSUFFICIENCY: ICD-10-CM

## 2024-08-01 DIAGNOSIS — Z23 NEED FOR SHINGLES VACCINE: ICD-10-CM

## 2024-08-01 DIAGNOSIS — F41.0 PANIC DISORDER: ICD-10-CM

## 2024-08-01 DIAGNOSIS — G47.00 INSOMNIA, UNSPECIFIED TYPE: ICD-10-CM

## 2024-08-01 DIAGNOSIS — F17.210 CIGARETTE NICOTINE DEPENDENCE WITHOUT COMPLICATION: ICD-10-CM

## 2024-08-01 DIAGNOSIS — F41.1 GENERALIZED ANXIETY DISORDER: ICD-10-CM

## 2024-08-01 PROBLEM — E66.811 CLASS 1 OBESITY DUE TO EXCESS CALORIES WITH SERIOUS COMORBIDITY AND BODY MASS INDEX (BMI) OF 34.0 TO 34.9 IN ADULT: Status: ACTIVE | Noted: 2024-08-01

## 2024-08-01 LAB
ALBUMIN SERPL-MCNC: 4.2 G/DL (ref 3.5–5.2)
ALBUMIN/GLOB SERPL: 1.4 G/DL
ALP SERPL-CCNC: 117 U/L (ref 39–117)
ALT SERPL W P-5'-P-CCNC: 25 U/L (ref 1–41)
ANION GAP SERPL CALCULATED.3IONS-SCNC: 12.8 MMOL/L (ref 5–15)
AST SERPL-CCNC: 22 U/L (ref 1–40)
BASOPHILS # BLD AUTO: 0.07 10*3/MM3 (ref 0–0.2)
BASOPHILS NFR BLD AUTO: 1 % (ref 0–1.5)
BILIRUB SERPL-MCNC: 0.4 MG/DL (ref 0–1.2)
BUN SERPL-MCNC: 16 MG/DL (ref 6–20)
BUN/CREAT SERPL: 13.2 (ref 7–25)
CALCIUM SPEC-SCNC: 9.2 MG/DL (ref 8.6–10.5)
CHLORIDE SERPL-SCNC: 104 MMOL/L (ref 98–107)
CHOLEST SERPL-MCNC: 181 MG/DL (ref 0–200)
CO2 SERPL-SCNC: 22.2 MMOL/L (ref 22–29)
CREAT SERPL-MCNC: 1.21 MG/DL (ref 0.76–1.27)
DEPRECATED RDW RBC AUTO: 45.3 FL (ref 37–54)
EGFRCR SERPLBLD CKD-EPI 2021: 69.8 ML/MIN/1.73
EOSINOPHIL # BLD AUTO: 0.26 10*3/MM3 (ref 0–0.4)
EOSINOPHIL NFR BLD AUTO: 3.8 % (ref 0.3–6.2)
ERYTHROCYTE [DISTWIDTH] IN BLOOD BY AUTOMATED COUNT: 14 % (ref 12.3–15.4)
GLOBULIN UR ELPH-MCNC: 3.1 GM/DL
GLUCOSE SERPL-MCNC: 95 MG/DL (ref 65–99)
HCT VFR BLD AUTO: 49.6 % (ref 37.5–51)
HDLC SERPL-MCNC: 35 MG/DL (ref 40–60)
HGB BLD-MCNC: 16.7 G/DL (ref 13–17.7)
IMM GRANULOCYTES # BLD AUTO: 0.02 10*3/MM3 (ref 0–0.05)
IMM GRANULOCYTES NFR BLD AUTO: 0.3 % (ref 0–0.5)
LDLC SERPL CALC-MCNC: 112 MG/DL (ref 0–100)
LDLC/HDLC SERPL: 3.06 {RATIO}
LYMPHOCYTES # BLD AUTO: 2 10*3/MM3 (ref 0.7–3.1)
LYMPHOCYTES NFR BLD AUTO: 29.5 % (ref 19.6–45.3)
MCH RBC QN AUTO: 30.1 PG (ref 26.6–33)
MCHC RBC AUTO-ENTMCNC: 33.7 G/DL (ref 31.5–35.7)
MCV RBC AUTO: 89.4 FL (ref 79–97)
MONOCYTES # BLD AUTO: 0.47 10*3/MM3 (ref 0.1–0.9)
MONOCYTES NFR BLD AUTO: 6.9 % (ref 5–12)
NEUTROPHILS NFR BLD AUTO: 3.96 10*3/MM3 (ref 1.7–7)
NEUTROPHILS NFR BLD AUTO: 58.5 % (ref 42.7–76)
NRBC BLD AUTO-RTO: 0 /100 WBC (ref 0–0.2)
NT-PROBNP SERPL-MCNC: 122 PG/ML (ref 0–900)
PLATELET # BLD AUTO: 231 10*3/MM3 (ref 140–450)
PMV BLD AUTO: 11.1 FL (ref 6–12)
POTASSIUM SERPL-SCNC: 4.1 MMOL/L (ref 3.5–5.2)
PROT SERPL-MCNC: 7.3 G/DL (ref 6–8.5)
RBC # BLD AUTO: 5.55 10*6/MM3 (ref 4.14–5.8)
SODIUM SERPL-SCNC: 139 MMOL/L (ref 136–145)
T4 FREE SERPL-MCNC: 0.95 NG/DL (ref 0.92–1.68)
TRIGL SERPL-MCNC: 194 MG/DL (ref 0–150)
TSH SERPL DL<=0.05 MIU/L-ACNC: 1.91 UIU/ML (ref 0.27–4.2)
TSH SERPL DL<=0.05 MIU/L-ACNC: 1.91 UIU/ML (ref 0.27–4.2)
VLDLC SERPL-MCNC: 34 MG/DL (ref 5–40)
WBC NRBC COR # BLD AUTO: 6.78 10*3/MM3 (ref 3.4–10.8)

## 2024-08-01 PROCEDURE — 1160F RVW MEDS BY RX/DR IN RCRD: CPT

## 2024-08-01 PROCEDURE — 83880 ASSAY OF NATRIURETIC PEPTIDE: CPT

## 2024-08-01 PROCEDURE — 84443 ASSAY THYROID STIM HORMONE: CPT

## 2024-08-01 PROCEDURE — 90750 HZV VACC RECOMBINANT IM: CPT

## 2024-08-01 PROCEDURE — 85025 COMPLETE CBC W/AUTO DIFF WBC: CPT

## 2024-08-01 PROCEDURE — 1126F AMNT PAIN NOTED NONE PRSNT: CPT

## 2024-08-01 PROCEDURE — 80053 COMPREHEN METABOLIC PANEL: CPT

## 2024-08-01 PROCEDURE — 90471 IMMUNIZATION ADMIN: CPT

## 2024-08-01 PROCEDURE — 1159F MED LIST DOCD IN RCRD: CPT

## 2024-08-01 PROCEDURE — 3080F DIAST BP >= 90 MM HG: CPT

## 2024-08-01 PROCEDURE — 99214 OFFICE O/P EST MOD 30 MIN: CPT

## 2024-08-01 PROCEDURE — 84439 ASSAY OF FREE THYROXINE: CPT

## 2024-08-01 PROCEDURE — 80061 LIPID PANEL: CPT

## 2024-08-01 PROCEDURE — 3077F SYST BP >= 140 MM HG: CPT

## 2024-08-01 RX ORDER — ALBUTEROL SULFATE 0.63 MG/3ML
1 SOLUTION RESPIRATORY (INHALATION) EVERY 6 HOURS PRN
Qty: 270 EACH | Refills: 1 | Status: SHIPPED | OUTPATIENT
Start: 2024-08-01

## 2024-08-01 RX ORDER — IPRATROPIUM BROMIDE AND ALBUTEROL SULFATE 2.5; .5 MG/3ML; MG/3ML
3 SOLUTION RESPIRATORY (INHALATION)
Qty: 360 ML | Refills: 2 | Status: SHIPPED | OUTPATIENT
Start: 2024-08-01

## 2024-08-01 RX ORDER — AMLODIPINE BESYLATE 5 MG/1
5 TABLET ORAL DAILY
Qty: 30 TABLET | Refills: 5 | Status: SHIPPED | OUTPATIENT
Start: 2024-08-01

## 2024-08-01 RX ORDER — MIRTAZAPINE 45 MG/1
45 TABLET, FILM COATED ORAL NIGHTLY
Qty: 30 TABLET | Refills: 0 | Status: SHIPPED | OUTPATIENT
Start: 2024-08-01

## 2024-08-01 NOTE — ASSESSMENT & PLAN NOTE
Hypertension is uncontrolled  Blood pressure has been running elevated at home-runs about 140/90-95  Patient does get anxious at the office visit.   He denies any symptoms.   He is currently on lisinopril 40 mg daily, lasix 20 mg daily-  will add in amlodipine 5 mg daily.   Blood pressure will be reassessedat the next regular appointment.

## 2024-08-01 NOTE — ASSESSMENT & PLAN NOTE
Patient is currently on lipitor 20 mg qhs.  He is trying to lose weight  He is eating healthier and he is trying to increase his activity.

## 2024-08-01 NOTE — PROGRESS NOTES
Chief Complaint  Hypertension (59 year old male here today for a 4 month follow up on elevated blood pressure. States he has been doing well. He has been dieting and has lost 32 pounds. )    History of Present Illness  SUBJECTIVE  Asad Meyer presents to [facility] follow up on chronic disease management.     Patient has not had any recent labs.    Patient states that he is trying to lose weight. His breathing is improving.     He is followed by psych.    He denies any chest pain, palpitations, nausea, vomiting, diarrhea, change in bowel or bladder habits.  He does have some shortness of breath with his COPD.      Past Medical History:   Diagnosis Date    Anxiety     Asthma     Asthma, extrinsic     Chronic pain disorder     Depression     Emphysema of lung     Essential (primary) hypertension 10/27/2022    Pneumonia of right lower lobe due to infectious organism 04/06/2023    PTSD (post-traumatic stress disorder)     Rheumatoid arthritis     Schizoaffective disorder     Self-injurious behavior     Suicide attempt       Family History   Problem Relation Age of Onset    Alcohol abuse Father     Asthma Father     Emphysema Father     Heart failure Father       Past Surgical History:   Procedure Laterality Date    ANKLE SURGERY Right     BRONCHOSCOPY N/A 4/11/2023    Procedure: BRONCHOSCOPY WITH BRONCHOALVEOLAR LAVAGE;  Surgeon: Taiwo Copeland DO;  Location: [facility];  Service: Pulmonary;  Laterality: N/A;  DIFFUSE BRONCHITIS        Current Outpatient Medications:     albuterol (ACCUNEB) 0.63 MG/3ML nebulizer solution, Take 3 mL by nebulization Every 6 (Six) Hours As Needed for Wheezing., Disp: 270 each, Rfl: 1    Albuterol-Budesonide (Airsupra) 90-80 MCG/ACT aerosol, Inhale 2 puffs 4 (Four) Times a Day As Needed (Wheezing)., Disp: 1 g, Rfl: 3    ALPRAZolam (XANAX) 1 MG tablet, Take 1 tablet by mouth 2 (Two) Times a Day As Needed for Anxiety. for anxiety, Disp:  "60 tablet, Rfl: 0    atorvastatin (LIPITOR) 10 MG tablet, Take 1 tablet by mouth Daily., Disp: 90 tablet, Rfl: 0    Cariprazine HCl (Vraylar) 3 MG capsule capsule, Take 1 capsule by mouth Daily., Disp: 30 capsule, Rfl: 2    Fluticasone-Umeclidin-Vilant (Trelegy Ellipta) 200-62.5-25 MCG/ACT inhaler, Inhale 1 puff Daily., Disp: 60 each, Rfl: 5    furosemide (LASIX) 20 MG tablet, Take 1 tablet by mouth Daily., Disp: 90 tablet, Rfl: 1    gabapentin (Neurontin) 800 MG tablet, Take 1 tablet by mouth 3 (Three) Times a Day., Disp: 90 tablet, Rfl: 2    ipratropium-albuterol (DUO-NEB) 0.5-2.5 mg/3 ml nebulizer, Take 3 mL by nebulization 4 (Four) Times a Day., Disp: 360 mL, Rfl: 2    lisinopril (PRINIVIL,ZESTRIL) 40 MG tablet, Take 1 tablet by mouth Daily., Disp: 90 tablet, Rfl: 1    mirtazapine (REMERON) 45 MG tablet, Take 1 tablet by mouth Every Night., Disp: 30 tablet, Rfl: 0    nicotine (Nicoderm CQ) 21 MG/24HR patch, Place 1 patch on the skin as directed by provider Daily., Disp: 30 patch, Rfl: 1    amLODIPine (NORVASC) 5 MG tablet, Take 1 tablet by mouth Daily., Disp: 30 tablet, Rfl: 5    OBJECTIVE  Vital Signs:   BP (!) 182/104 (BP Location: Left arm, Patient Position: Sitting)   Pulse 84   Temp 97.4 °F (36.3 °C) (Temporal)   Resp 18   Ht 170.2 cm (67.01\")   Wt 100 kg (220 lb 6.4 oz)   SpO2 97%   BMI 34.51 kg/m²    Estimated body mass index is 34.51 kg/m² as calculated from the following:    Height as of this encounter: 170.2 cm (67.01\").    Weight as of this encounter: 100 kg (220 lb 6.4 oz).     Wt Readings from Last 3 Encounters:   08/01/24 100 kg (220 lb 6.4 oz)   07/09/24 102 kg (225 lb)   06/28/24 104 kg (229 lb)     BP Readings from Last 3 Encounters:   08/01/24 (!) 182/104   07/09/24 142/94   06/28/24 (!) 150/118       Physical Exam  Vitals and nursing note reviewed.   Constitutional:       Appearance: Normal appearance.   HENT:      Head: Normocephalic.   Eyes:      Extraocular Movements: Extraocular " movements intact.      Conjunctiva/sclera: Conjunctivae normal.   Cardiovascular:      Rate and Rhythm: Normal rate and regular rhythm.      Heart sounds: Normal heart sounds. No murmur heard.  Pulmonary:      Effort: Pulmonary effort is normal. No respiratory distress.      Breath sounds: No stridor. Wheezing and rhonchi present.   Abdominal:      General: Bowel sounds are normal.      Palpations: Abdomen is soft.      Tenderness: There is no abdominal tenderness. There is no guarding.   Musculoskeletal:         General: No swelling. Normal range of motion.      Cervical back: Normal range of motion.   Skin:     General: Skin is warm and dry.   Neurological:      General: No focal deficit present.      Mental Status: He is alert and oriented to person, place, and time. Mental status is at baseline.   Psychiatric:         Mood and Affect: Mood normal.         Behavior: Behavior normal.         Thought Content: Thought content normal.         Judgment: Judgment normal.          Result Review        No Images in the past 120 days found..     The above data has been reviewed by PONCE Valadez 08/01/2024 09:14 EDT.          Patient Care Team:  Jailyn Shepard APRN as PCP - General (Internal Medicine)  Michael Haile MD as Consulting Physician (Pulmonary Disease)           ASSESSMENT & PLAN    Diagnoses and all orders for this visit:    1. Essential (primary) hypertension (Primary)  Assessment & Plan:  Hypertension is uncontrolled  Blood pressure has been running elevated at home-runs about 140/90-95  Patient does get anxious at the office visit.   He denies any symptoms.   He is currently on lisinopril 40 mg daily, lasix 20 mg daily-  will add in amlodipine 5 mg daily.   Blood pressure will be reassessedat the next regular appointment.    Orders:  -     CBC w AUTO Differential; Future  -     Comprehensive metabolic panel; Future  -     TSH+Free T4; Future  -     Lipid panel; Future  -     Lipid panel  -     TSH+Free  T4  -     Comprehensive metabolic panel  -     CBC w AUTO Differential  -     proBNP  -     TSH Rfx On Abnormal To Free T4  -     Cancel: Lipid Panel  -     Cancel: Comprehensive Metabolic Panel    2. Mixed hyperlipidemia  Assessment & Plan:  Patient is currently on lipitor 20 mg qhs.  He is trying to lose weight  He is eating healthier and he is trying to increase his activity.     Orders:  -     CBC w AUTO Differential; Future  -     Comprehensive metabolic panel; Future  -     TSH+Free T4; Future  -     Lipid panel; Future  -     Lipid panel  -     TSH+Free T4  -     Comprehensive metabolic panel  -     CBC w AUTO Differential  -     TSH Rfx On Abnormal To Free T4  -     Cancel: Lipid Panel  -     Cancel: Comprehensive Metabolic Panel    3. COVID-19  Comments:  Paxlovid prescribed due to history of COPD.  Most recent GFR of 49.9.  Patient advised to hold his Lipitor while taking this medication.    4. Class 1 obesity due to excess calories with serious comorbidity and body mass index (BMI) of 34.0 to 34.9 in adult  Assessment & Plan:  Patient's (Body mass index is 34.51 kg/m².) indicates that they are obese (BMI >30) with health conditions that include hypertension and dyslipidemias . Weight is improving with lifestyle modifications. BMI  is above average; BMI management plan is completed. We discussed low calorie, low carb based diet program, portion control, increasing exercise, and an abimael-based approach such as Patient Home Monitoring Pal or Lose It.       5. Renal insufficiency  Assessment & Plan:  Will check with labs      Orders:  -     proBNP  -     TSH Rfx On Abnormal To Free T4  -     Cancel: Lipid Panel  -     Cancel: Comprehensive Metabolic Panel    6. Psychophysiological insomnia  Assessment & Plan:  Stable at this time  He is followed by psych  Continue with remeron 45 mg qhs.      7. Chronic obstructive pulmonary disease, unspecified COPD type  Assessment & Plan:  Stable at this time  O2 sat is 97%  Patient  continues to smoke  Due for low dose CT-order placed today.  He remains on trelegy daily, uses duonebs PRN  He is followed by pulmonology.  Continue current regimen  Recommend smoking cessation      Orders:  -     ipratropium-albuterol (DUO-NEB) 0.5-2.5 mg/3 ml nebulizer; Take 3 mL by nebulization 4 (Four) Times a Day.  Dispense: 360 mL; Refill: 2    8. Cigarette nicotine dependence without complication  -      CT Chest Low Dose Cancer Screening WO; Future    9. Panic disorder  -     mirtazapine (REMERON) 45 MG tablet; Take 1 tablet by mouth Every Night.  Dispense: 30 tablet; Refill: 0    10. Moderate episode of recurrent major depressive disorder  -     mirtazapine (REMERON) 45 MG tablet; Take 1 tablet by mouth Every Night.  Dispense: 30 tablet; Refill: 0    11. Generalized anxiety disorder  -     mirtazapine (REMERON) 45 MG tablet; Take 1 tablet by mouth Every Night.  Dispense: 30 tablet; Refill: 0    12. Insomnia, unspecified type  -     mirtazapine (REMERON) 45 MG tablet; Take 1 tablet by mouth Every Night.  Dispense: 30 tablet; Refill: 0    13. Need for shingles vaccine  -     Shingrix Vaccine    Other orders  -     albuterol (ACCUNEB) 0.63 MG/3ML nebulizer solution; Take 3 mL by nebulization Every 6 (Six) Hours As Needed for Wheezing.  Dispense: 270 each; Refill: 1  -     amLODIPine (NORVASC) 5 MG tablet; Take 1 tablet by mouth Daily.  Dispense: 30 tablet; Refill: 5         Tobacco Use: High Risk (8/1/2024)    Patient History     Smoking Tobacco Use: Some Days     Smokeless Tobacco Use: Former     Passive Exposure: Current       Follow Up     Return in about 2 weeks (around 8/15/2024) for Annual physical.    Please note that portions of this note were completed with a voice recognition program.    Patient was given instructions and counseling regarding his condition or for health maintenance advice. Please see specific information pulled into the AVS if appropriate.   I have reviewed information obtained and  documented by others and I have confirmed the accuracy of this documented note.    Jailyn Shepard, APRN

## 2024-08-01 NOTE — ASSESSMENT & PLAN NOTE
Stable at this time  O2 sat is 97%  Patient continues to smoke  Due for low dose CT-order placed today.  He remains on trelegy daily, uses duonebs PRN  He is followed by pulmonology.  Continue current regimen  Recommend smoking cessation

## 2024-08-02 DIAGNOSIS — F41.0 PANIC DISORDER: ICD-10-CM

## 2024-08-02 RX ORDER — ATORVASTATIN CALCIUM 20 MG/1
20 TABLET, FILM COATED ORAL DAILY
Qty: 90 TABLET | Refills: 1 | Status: SHIPPED | OUTPATIENT
Start: 2024-08-02

## 2024-08-02 RX ORDER — ALPRAZOLAM 1 MG/1
1 TABLET ORAL 2 TIMES DAILY PRN
Qty: 60 TABLET | Refills: 0 | Status: SHIPPED | OUTPATIENT
Start: 2024-08-02

## 2024-08-02 NOTE — TELEPHONE ENCOUNTER
Caller: MeyerAsad    Relationship: Self    Best call back number: [phone]     Requested Prescriptions:   Requested Prescriptions     Pending Prescriptions Disp Refills    ALPRAZolam (XANAX) 1 MG tablet 60 tablet 0     Sig: Take 1 tablet by mouth 2 (Two) Times a Day As Needed for Anxiety. for anxiety        Pharmacy where request should be sent: [facility] [place] - [address] [address] - [phone] [facility] [phone] FX     Last office visit with prescribing clinician: 8/1/2024   Last telemedicine visit with prescribing clinician: Visit date not found   Next office visit with prescribing clinician: 8/15/2024     Additional details provided by patient: LESS THAN THREE DAY SUPPLY.     Does the patient have less than a 3 day supply:  [x] Yes  [] No      Frieda Alcala Rep   08/02/24 10:08 EDT

## 2024-08-14 ENCOUNTER — HOSPITAL ENCOUNTER (OUTPATIENT)
Dept: CT IMAGING | Facility: HOSPITAL | Age: 59
Discharge: HOME | End: 2024-08-14
Payer: MEDICAID

## 2024-08-14 DIAGNOSIS — F17.210 CIGARETTE NICOTINE DEPENDENCE WITHOUT COMPLICATION: ICD-10-CM

## 2024-08-14 PROCEDURE — 71271 CT THORAX LUNG CANCER SCR C-: CPT

## 2024-08-15 ENCOUNTER — OFFICE VISIT (OUTPATIENT)
Dept: INTERNAL MEDICINE | Age: 59
End: 2024-08-15
Payer: MEDICAID

## 2024-08-15 VITALS
WEIGHT: 219.6 LBS | HEIGHT: 67 IN | DIASTOLIC BLOOD PRESSURE: 106 MMHG | SYSTOLIC BLOOD PRESSURE: 182 MMHG | HEART RATE: 106 BPM | BODY MASS INDEX: 34.47 KG/M2 | OXYGEN SATURATION: 93 % | RESPIRATION RATE: 20 BRPM | TEMPERATURE: 98.6 F

## 2024-08-15 DIAGNOSIS — Z00.00 ANNUAL PHYSICAL EXAM: Primary | ICD-10-CM

## 2024-08-15 DIAGNOSIS — I10 UNCONTROLLED HYPERTENSION: ICD-10-CM

## 2024-08-15 DIAGNOSIS — J44.9 CHRONIC OBSTRUCTIVE PULMONARY DISEASE, UNSPECIFIED COPD TYPE: ICD-10-CM

## 2024-08-15 DIAGNOSIS — F17.210 CIGARETTE NICOTINE DEPENDENCE WITHOUT COMPLICATION: ICD-10-CM

## 2024-08-15 DIAGNOSIS — E78.2 MIXED HYPERLIPIDEMIA: ICD-10-CM

## 2024-08-15 DIAGNOSIS — Z12.5 SCREENING FOR PROSTATE CANCER: ICD-10-CM

## 2024-08-15 RX ORDER — BISOPROLOL FUMARATE AND HYDROCHLOROTHIAZIDE 5; 6.25 MG/1; MG/1
1 TABLET ORAL DAILY
Qty: 30 TABLET | Refills: 2 | Status: SHIPPED | OUTPATIENT
Start: 2024-08-15

## 2024-08-15 NOTE — ASSESSMENT & PLAN NOTE
Blood pressure is not well-controlled.  Patient continues to smoke.  He states that he is compliant with his medications.  He is currently on lisinopril 40 mg daily and amlodipine 5 mg daily.  He has also been taking Lasix 20 mg daily.  He denies any symptoms.  Medication adjustments per orders.  He has had a renal artery ultrasound previously in the past.  Keep blood pressure log and follow-up in 2 weeks.  Will go ahead and set him up with cardiology as well.

## 2024-08-15 NOTE — ASSESSMENT & PLAN NOTE
Patient was started on Airsupra previously and did not take a dose this morning.  We were able to give him a sample today and it has helped prove his breathing.  He also is taking Trelegy daily.  He is followed by pulmonology.  He denies any chest pain or difficulty breathing.  Continue to follow-up with pulmonology.  I also highly encouraged smoking cessation.

## 2024-08-15 NOTE — ASSESSMENT & PLAN NOTE
Lipids are stable.  He is currently on Lipitor 20 mg nightly.  Recommend weight loss, increase activity  Continue current regimen.

## 2024-08-15 NOTE — ASSESSMENT & PLAN NOTE
PSA-ordered  Colon Cancer screening-refuses  Flu vaccine-counseled  COVID-19 vaccine-declines  PNA-UTD  Shingrex-completed  TDAP-counseling provided  Patient does continue to smoke.  I did recommend smoking and alcohol cessation.  Recommend regular dental/eye exams, regular exercise, healthy diet.

## 2024-08-15 NOTE — PROGRESS NOTES
Chief Complaint  Annual Exam (59 year old male here today for his annual CPE and lab review )    History of Present Illness  SUBJECTIVE  Asad Meyer presents to [facility] for his annual physical exam.        Past Medical History:   Diagnosis Date    Anxiety     Asthma     Asthma, extrinsic     Chronic pain disorder     Depression     Emphysema of lung     Essential (primary) hypertension 10/27/2022    Pneumonia of right lower lobe due to infectious organism 04/06/2023    PTSD (post-traumatic stress disorder)     Rheumatoid arthritis     Schizoaffective disorder     Self-injurious behavior     Suicide attempt       Family History   Problem Relation Age of Onset    Alcohol abuse Father     Asthma Father     Emphysema Father     Heart failure Father       Past Surgical History:   Procedure Laterality Date    ANKLE SURGERY Right     BRONCHOSCOPY N/A 4/11/2023    Procedure: BRONCHOSCOPY WITH BRONCHOALVEOLAR LAVAGE;  Surgeon: Tiawo Copeland DO;  Location: [facility] ENDOSCOPY;  Service: Pulmonary;  Laterality: N/A;  DIFFUSE BRONCHITIS        Current Outpatient Medications:     albuterol (ACCUNEB) 0.63 MG/3ML nebulizer solution, Take 3 mL by nebulization Every 6 (Six) Hours As Needed for Wheezing., Disp: 270 each, Rfl: 1    Albuterol-Budesonide (Airsupra) 90-80 MCG/ACT aerosol, Inhale 2 puffs 4 (Four) Times a Day As Needed (Wheezing)., Disp: 1 g, Rfl: 3    ALPRAZolam (XANAX) 1 MG tablet, Take 1 tablet by mouth 2 (Two) Times a Day As Needed for Anxiety. for anxiety, Disp: 60 tablet, Rfl: 0    amLODIPine (NORVASC) 5 MG tablet, Take 1 tablet by mouth Daily., Disp: 30 tablet, Rfl: 5    atorvastatin (Lipitor) 20 MG tablet, Take 1 tablet by mouth Daily., Disp: 90 tablet, Rfl: 1    Cariprazine HCl (Vraylar) 3 MG capsule capsule, Take 1 capsule by mouth Daily., Disp: 30 capsule, Rfl: 2    Fluticasone-Umeclidin-Vilant (Trelegy Ellipta) 200-62.5-25 MCG/ACT inhaler, Inhale 1 puff Daily.,  "Disp: 60 each, Rfl: 5    gabapentin (Neurontin) 800 MG tablet, Take 1 tablet by mouth 3 (Three) Times a Day., Disp: 90 tablet, Rfl: 2    ipratropium-albuterol (DUO-NEB) 0.5-2.5 mg/3 ml nebulizer, Take 3 mL by nebulization 4 (Four) Times a Day., Disp: 360 mL, Rfl: 2    lisinopril (PRINIVIL,ZESTRIL) 40 MG tablet, Take 1 tablet by mouth Daily., Disp: 90 tablet, Rfl: 1    mirtazapine (REMERON) 45 MG tablet, Take 1 tablet by mouth Every Night., Disp: 30 tablet, Rfl: 0    nicotine (Nicoderm CQ) 21 MG/24HR patch, Place 1 patch on the skin as directed by provider Daily., Disp: 30 patch, Rfl: 1    bisoprolol-hydrochlorothiazide (ZIAC) 5-6.25 MG per tablet, Take 1 tablet by mouth Daily., Disp: 30 tablet, Rfl: 2    OBJECTIVE  Vital Signs:   BP (!) 182/106 (BP Location: Left arm, Patient Position: Sitting)   Pulse 106   Temp 98.6 °F (37 °C) (Temporal)   Resp 20   Ht 170.2 cm (67.01\")   Wt 99.6 kg (219 lb 9.6 oz)   SpO2 93%   BMI 34.38 kg/m²    Estimated body mass index is 34.38 kg/m² as calculated from the following:    Height as of this encounter: 170.2 cm (67.01\").    Weight as of this encounter: 99.6 kg (219 lb 9.6 oz).     Wt Readings from Last 3 Encounters:   08/15/24 99.6 kg (219 lb 9.6 oz)   08/01/24 100 kg (220 lb 6.4 oz)   07/09/24 102 kg (225 lb)     BP Readings from Last 3 Encounters:   08/15/24 (!) 182/106   08/01/24 (!) 182/104   07/09/24 142/94       Physical Exam  Vitals and nursing note reviewed.   Constitutional:       Appearance: Normal appearance.   HENT:      Head: Normocephalic.   Eyes:      Extraocular Movements: Extraocular movements intact.      Conjunctiva/sclera: Conjunctivae normal.   Cardiovascular:      Rate and Rhythm: Normal rate and regular rhythm.      Heart sounds: Normal heart sounds. No murmur heard.  Pulmonary:      Effort: Pulmonary effort is normal.      Breath sounds: Wheezing and rhonchi present. No rales.   Abdominal:      General: Bowel sounds are normal.      Palpations: " Abdomen is soft.      Tenderness: There is no abdominal tenderness. There is no guarding.   Musculoskeletal:         General: No swelling. Normal range of motion.   Skin:     General: Skin is warm and dry.   Neurological:      General: No focal deficit present.      Mental Status: He is alert and oriented to person, place, and time. Mental status is at baseline.   Psychiatric:         Mood and Affect: Mood normal.         Behavior: Behavior normal.         Thought Content: Thought content normal.         Judgment: Judgment normal.          Result Review    CMP          11/17/2023    16:07 8/1/2024    11:45   CMP   Glucose 133  95    BUN 18  16    Creatinine 1.61  1.21    EGFR 49.9  69.8    Sodium 137  139    Potassium 3.9  4.1    Chloride 100  104    Calcium 9.3  9.2    Total Protein 7.1  7.3    Albumin 4.1  4.2    Globulin 3.0  3.1    Total Bilirubin 0.3  0.4    Alkaline Phosphatase 142  117    AST (SGOT) 35  22    ALT (SGPT) 40  25    Albumin/Globulin Ratio 1.4  1.4    BUN/Creatinine Ratio 11.2  13.2    Anion Gap 12.9  12.8      CBC w/diff          11/17/2023    16:07 8/1/2024    11:45   CBC w/Diff   WBC 9.88  6.78    RBC 5.03  5.55    Hemoglobin 15.3  16.7    Hematocrit 46.4  49.6    MCV 92.2  89.4    MCH 30.4  30.1    MCHC 33.0  33.7    RDW 13.7  14.0    Platelets 227  231    Neutrophil Rel % 69.2  58.5    Immature Granulocyte Rel % 0.6  0.3    Lymphocyte Rel % 20.5  29.5    Monocyte Rel % 6.0  6.9    Eosinophil Rel % 2.8  3.8    Basophil Rel % 0.9  1.0      Lipid Panel          8/1/2024    11:45   Lipid Panel   Total Cholesterol 181    Triglycerides 194    HDL Cholesterol 35    VLDL Cholesterol 34    LDL Cholesterol  112    LDL/HDL Ratio 3.06      TSH          8/1/2024    11:45   TSH   TSH 1.910     1.910               Lab Results   Component Value Date    FREET4 0.95 08/01/2024          No Images in the past 120 days found..     The above data has been reviewed by PONCE Valadez 08/15/2024 09:46 EDT.           Patient Care Team:  Jailyn Shepard APRN as PCP - General (Internal Medicine)  Michael Haile MD as Consulting Physician (Pulmonary Disease)            ASSESSMENT & PLAN    Diagnoses and all orders for this visit:    1. Annual physical exam (Primary)  Assessment & Plan:  PSA-ordered  Colon Cancer screening-refuses  Flu vaccine-counseled  COVID-19 vaccine-declines  PNA-UTD  Shingrex-completed  TDAP-counseling provided  Patient does continue to smoke.  I did recommend smoking and alcohol cessation.  Recommend regular dental/eye exams, regular exercise, healthy diet.      2. Uncontrolled hypertension  Assessment & Plan:  Blood pressure is not well-controlled.  Patient continues to smoke.  He states that he is compliant with his medications.  He is currently on lisinopril 40 mg daily and amlodipine 5 mg daily.  He has also been taking Lasix 20 mg daily.  He denies any symptoms.  Medication adjustments per orders.  He has had a renal artery ultrasound previously in the past.  Keep blood pressure log and follow-up in 2 weeks.  Will go ahead and set him up with cardiology as well.    Orders:  -     bisoprolol-hydrochlorothiazide (ZIAC) 5-6.25 MG per tablet; Take 1 tablet by mouth Daily.  Dispense: 30 tablet; Refill: 2  -     Ambulatory Referral to Cardiology  -     Adult Transthoracic Echo Complete W/ Cont if Necessary Per Protocol; Future    3. Screening for prostate cancer  -     PSA SCREENING; Future    4. Mixed hyperlipidemia  Assessment & Plan:  Lipids are stable.  He is currently on Lipitor 20 mg nightly.  Recommend weight loss, increase activity  Continue current regimen.        5. Chronic obstructive pulmonary disease, unspecified COPD type  Assessment & Plan:  Patient was started on Airsupra previously and did not take a dose this morning.  We were able to give him a sample today and it has helped prove his breathing.  He also is taking Trelegy daily.  He is followed by pulmonology.  He denies any chest pain  or difficulty breathing.  Continue to follow-up with pulmonology.  I also highly encouraged smoking cessation.        6. Cigarette nicotine dependence without complication         Tobacco Use: High Risk (8/15/2024)    Patient History     Smoking Tobacco Use: Some Days     Smokeless Tobacco Use: Former     Passive Exposure: Current       Follow Up     Return in about 2 weeks (around 8/29/2024).    Please note that portions of this note were completed with a voice recognition program.    Patient was given instructions and counseling regarding his condition or for health maintenance advice. Please see specific information pulled into the AVS if appropriate.   I have reviewed information obtained and documented by others and I have confirmed the accuracy of this documented note.    PONCE Valadez

## 2024-08-23 ENCOUNTER — OFFICE VISIT (OUTPATIENT)
Dept: BEHAVIORAL HEALTH | Facility: CLINIC | Age: 59
End: 2024-08-23
Payer: MEDICAID

## 2024-08-23 VITALS
HEIGHT: 67 IN | DIASTOLIC BLOOD PRESSURE: 86 MMHG | BODY MASS INDEX: 35.31 KG/M2 | WEIGHT: 225 LBS | HEART RATE: 79 BPM | SYSTOLIC BLOOD PRESSURE: 142 MMHG

## 2024-08-23 DIAGNOSIS — F41.0 PANIC DISORDER: ICD-10-CM

## 2024-08-23 DIAGNOSIS — F17.211 CIGARETTE NICOTINE DEPENDENCE IN REMISSION: ICD-10-CM

## 2024-08-23 DIAGNOSIS — F41.1 GENERALIZED ANXIETY DISORDER: Primary | ICD-10-CM

## 2024-08-23 DIAGNOSIS — F33.0 MILD EPISODE OF RECURRENT MAJOR DEPRESSIVE DISORDER: ICD-10-CM

## 2024-08-23 DIAGNOSIS — G47.00 INSOMNIA, UNSPECIFIED TYPE: ICD-10-CM

## 2024-08-23 RX ORDER — ALPRAZOLAM 1 MG/1
1 TABLET ORAL 3 TIMES DAILY PRN
Qty: 90 TABLET | Refills: 0 | Status: SHIPPED | OUTPATIENT
Start: 2024-08-26

## 2024-08-23 RX ORDER — MIRTAZAPINE 45 MG/1
45 TABLET, FILM COATED ORAL NIGHTLY
Qty: 30 TABLET | Refills: 1 | Status: SHIPPED | OUTPATIENT
Start: 2024-08-23

## 2024-08-23 RX ORDER — GABAPENTIN 800 MG/1
800 TABLET ORAL 3 TIMES DAILY
Qty: 90 TABLET | Refills: 1 | Status: SHIPPED | OUTPATIENT
Start: 2024-08-23

## 2024-08-23 NOTE — PROGRESS NOTES
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 severe.  Risk factors include anxiety disorder, mood disorder, intermittent SI (passive, no plan or intent, no present SI), h/o suicide attempt in the past, and recent psychosocial stressors (pandemic). Protective factors include no family history, denies access to guns/weapons, no history of self-harm in the past, minimal AODA, healthcare seeking, future orientation, willingness to engage in care.  Risk of self-harm chronically is also moderate to severe, but could be further elevated in the event of treatment noncompliance and/or AODA.  Labs/Diagnostics Ordered:   No orders of the defined types were placed in this encounter.    Medications:   New Medications Ordered This Visit   Medications    Cariprazine HCl (Vraylar) 3 MG capsule capsule     Sig: Take 1 capsule by mouth Daily.     Dispense:  30 capsule     Refill:  1    gabapentin (Neurontin) 800 MG tablet     Sig: Take 1 tablet by mouth 3 (Three) Times a Day.     Dispense:  90 tablet     Refill:  1    ALPRAZolam (XANAX) 1 MG tablet     Sig: Take 1 tablet by mouth 3 (Three) Times a Day As Needed for Anxiety. for anxiety     Dispense:  90 tablet     Refill:  0    mirtazapine (REMERON) 45 MG tablet     Sig: Take 1 tablet by mouth Every Night.     Dispense:  30 tablet     Refill:  1     Discussed all risks, benefits, alternatives, and side effects of Xanax  including but not limited to risks of abuse, misuse, and addiction, which can lead to overdose or death; risks of dependence and withdrawal reactions; drowsiness, sedation, fatigue, depression, dizziness, ataxia, weakness, confusion, forgetfulness, hypotension, falls risk, respiratory depression, anterograde amnesia, paradoxical reactions such as hyperactivity or aggressive behavior; and hallucinations. Pt educated on the need to practice safe sex while taking this med. Instructed pt to avoid performing tasks that require mental alertness such as driving or operating machinery. Discussed the need for pt to immediately call the office for any new or worsening symptoms, such as changes in mood or behavior, and all other concerns. Pt educated on med compliance, including the proper use and monitoring for signs and symptoms of abuse, misuse, and addiction. Pt verbalized understanding and is agreeable to taking Xanax. CARLOS obtained and USD ordered. Controlled substances agreement verbally signed. Addressed all questions and concerns.     Discussed all risks, benefits, alternatives, and side effects of Gabapentin including but not limited to sedation, dizziness, GI upset, dry mouth, and weight gain. Pt instructed to avoid driving and doing other tasks or actions that require to be alert until knowing how the drug affects them.  Pt educated on the need to practice safe sex while taking this med. Discussed the need for pt to immediately call the office for any new or worsening symptoms, such as worsening depression; feeling nervous or restless; suicidal thoughts or actions; or other changes changes in mood or behavior, and all other concerns. Pt educated on med compliance and the risks of suddenly stopping this medication or missing doses. Pt verbalized understanding and is agreeable to taking Gabapentin. Addressed all questions and concerns.  Will order UDS and obtain CARLOS report. Pt verbally signed controlled  substances agreement.    Discussed all risks, benefits, alternatives, and side effects of Mirtazapine including but not limited to GI upset, sexual dysfunction, weight gain, dizziness, bleeding risk, seizure risk, sedation, headache, activation of shakir or hypomania, drug-inducted movement disorders (akathisia, dystonia, restless leg syndrome), dyslipidemia, hematologic abnormalities, orthostatic hypotension, sedation, withdrawal syndrome, serotonin syndrome, and activation of suicidal ideation and behavior.  Pt educated on the need to practice safe sex while taking this med. Discussed the need for pt to immediately call the office for any new or worsening symptoms, such as worsening depression; feeling nervous or restless; suicidal thoughts or actions; or other changes changes in mood or behavior, and all other concerns. Pt educated on med compliance and the risks of suddenly stopping this medication or missing doses. Pt verbalized understanding and is agreeable to taking Mirtazapine. Addressed all questions and concerns.     Vraylar, Risks, benefits, alternatives discussed with patient including nausea and vomiting, GI upset, sedation, akathisia, theoretical risk of tardive dyskinesia/dystonia, movement issues, and weight gain. Use care when operating vehicle, vessel, or machine. After discussion of these risks and benefits, the patient voiced understanding and agreed to proceed.    Follow up:   F/u in 2 months    TREATMENT PLAN/GOALS: Continue supportive psychotherapy efforts and medications as indicated. Treatment and medication options discussed during today's visit. Patient ackowledged and verbally consented to continue with current treatment plan and was educated on the importance of compliance with treatment and follow-up appointments.    MEDICATION ISSUES:  CARLOS reviewed as expected.  Discussed medication options and treatment plan of prescribed medication as well as the risks, benefits, and side effects  including potential falls, possible impaired driving and metabolic adversities among others. Patient is agreeable to call the office with any worsening of symptoms or onset of side effects. Patient is agreeable to call 911 or go to the nearest ER should he/she begin having SI/HI. No medication side effects or related complaints today.            This document has been electronically signed by Page Lee PA-C  August 23, 2024 12:10 EDT      Part of this note may be an electronic transcription/translation of spoken language to printed text using the Dragon Dictation System.

## 2024-08-30 ENCOUNTER — OFFICE VISIT (OUTPATIENT)
Dept: INTERNAL MEDICINE | Age: 59
End: 2024-08-30
Payer: MEDICAID

## 2024-08-30 VITALS
OXYGEN SATURATION: 92 % | DIASTOLIC BLOOD PRESSURE: 88 MMHG | TEMPERATURE: 98.6 F | HEIGHT: 67 IN | HEART RATE: 68 BPM | BODY MASS INDEX: 34.62 KG/M2 | RESPIRATION RATE: 18 BRPM | SYSTOLIC BLOOD PRESSURE: 130 MMHG | WEIGHT: 220.6 LBS

## 2024-08-30 DIAGNOSIS — I10 ESSENTIAL (PRIMARY) HYPERTENSION: Primary | ICD-10-CM

## 2024-08-30 PROCEDURE — 1159F MED LIST DOCD IN RCRD: CPT

## 2024-08-30 PROCEDURE — 99213 OFFICE O/P EST LOW 20 MIN: CPT

## 2024-08-30 PROCEDURE — 80048 BASIC METABOLIC PNL TOTAL CA: CPT

## 2024-08-30 PROCEDURE — 1126F AMNT PAIN NOTED NONE PRSNT: CPT

## 2024-08-30 PROCEDURE — 1160F RVW MEDS BY RX/DR IN RCRD: CPT

## 2024-08-30 PROCEDURE — 3079F DIAST BP 80-89 MM HG: CPT

## 2024-08-30 PROCEDURE — 3075F SYST BP GE 130 - 139MM HG: CPT

## 2024-08-30 NOTE — PROGRESS NOTES
Chief Complaint  Hypertension (59 year old male here today for a 2 week follow up on his blood pressure. States he has checked it off and on at home and it has been running okay with the exception a few times where it was really low in 90's over 60's. )    History of Present Illness  SUBJECTIVE  Asad Meyer presents to [facility] follow up on hypertension.      Past Medical History:   Diagnosis Date    Anxiety     Asthma     Asthma, extrinsic     Chronic pain disorder     Depression     Emphysema of lung     Essential (primary) hypertension 10/27/2022    Pneumonia of right lower lobe due to infectious organism 04/06/2023    PTSD (post-traumatic stress disorder)     Rheumatoid arthritis     Schizoaffective disorder     Self-injurious behavior     Suicide attempt       Family History   Problem Relation Age of Onset    Alcohol abuse Father     Asthma Father     Emphysema Father     Heart failure Father       Past Surgical History:   Procedure Laterality Date    ANKLE SURGERY Right     BRONCHOSCOPY N/A 4/11/2023    Procedure: BRONCHOSCOPY WITH BRONCHOALVEOLAR LAVAGE;  Surgeon: Taiwo Copeland DO;  Location: [facility] ENDOSCOPY;  Service: Pulmonary;  Laterality: N/A;  DIFFUSE BRONCHITIS        Current Outpatient Medications:     albuterol (ACCUNEB) 0.63 MG/3ML nebulizer solution, Take 3 mL by nebulization Every 6 (Six) Hours As Needed for Wheezing., Disp: 270 each, Rfl: 1    Albuterol-Budesonide (Airsupra) 90-80 MCG/ACT aerosol, Inhale 2 puffs 4 (Four) Times a Day As Needed (Wheezing)., Disp: 1 g, Rfl: 3    ALPRAZolam (XANAX) 1 MG tablet, Take 1 tablet by mouth 3 (Three) Times a Day As Needed for Anxiety. for anxiety, Disp: 90 tablet, Rfl: 0    amLODIPine (NORVASC) 5 MG tablet, Take 1 tablet by mouth Daily., Disp: 30 tablet, Rfl: 5    atorvastatin (Lipitor) 20 MG tablet, Take 1 tablet by mouth Daily., Disp: 90 tablet, Rfl: 1    bisoprolol-hydrochlorothiazide (ZIAC) 5-6.25 MG  "per tablet, Take 1 tablet by mouth Daily., Disp: 30 tablet, Rfl: 2    Cariprazine HCl (Vraylar) 3 MG capsule capsule, Take 1 capsule by mouth Daily., Disp: 30 capsule, Rfl: 1    Fluticasone-Umeclidin-Vilant (Trelegy Ellipta) 200-62.5-25 MCG/ACT inhaler, Inhale 1 puff Daily., Disp: 60 each, Rfl: 5    gabapentin (Neurontin) 800 MG tablet, Take 1 tablet by mouth 3 (Three) Times a Day., Disp: 90 tablet, Rfl: 1    ipratropium-albuterol (DUO-NEB) 0.5-2.5 mg/3 ml nebulizer, Take 3 mL by nebulization 4 (Four) Times a Day., Disp: 360 mL, Rfl: 2    lisinopril (PRINIVIL,ZESTRIL) 40 MG tablet, Take 1 tablet by mouth Daily., Disp: 90 tablet, Rfl: 1    mirtazapine (REMERON) 45 MG tablet, Take 1 tablet by mouth Every Night., Disp: 30 tablet, Rfl: 1    nicotine (Nicoderm CQ) 21 MG/24HR patch, Place 1 patch on the skin as directed by provider Daily., Disp: 30 patch, Rfl: 1    OBJECTIVE  Vital Signs:   /88 (BP Location: Left arm, Patient Position: Sitting)   Pulse 68   Temp 98.6 °F (37 °C) (Temporal)   Resp 18   Ht 170.2 cm (67.01\")   Wt 100 kg (220 lb 9.6 oz)   SpO2 92%   BMI 34.54 kg/m²    Estimated body mass index is 34.54 kg/m² as calculated from the following:    Height as of this encounter: 170.2 cm (67.01\").    Weight as of this encounter: 100 kg (220 lb 9.6 oz).     Wt Readings from Last 3 Encounters:   08/30/24 100 kg (220 lb 9.6 oz)   08/23/24 102 kg (225 lb)   08/15/24 99.6 kg (219 lb 9.6 oz)     BP Readings from Last 3 Encounters:   08/30/24 130/88   08/23/24 142/86   08/15/24 (!) 182/106       Physical Exam  Vitals and nursing note reviewed.   Constitutional:       Appearance: Normal appearance.   HENT:      Head: Normocephalic.   Eyes:      Extraocular Movements: Extraocular movements intact.      Conjunctiva/sclera: Conjunctivae normal.   Cardiovascular:      Rate and Rhythm: Normal rate and regular rhythm.      Heart sounds: Normal heart sounds. No murmur heard.  Pulmonary:      Effort: Pulmonary effort " is normal.      Breath sounds: No stridor. Wheezing and rhonchi present.   Abdominal:      General: Bowel sounds are normal.      Palpations: Abdomen is soft.   Musculoskeletal:         General: No swelling. Normal range of motion.      Cervical back: Normal range of motion and neck supple.   Skin:     General: Skin is warm and dry.   Neurological:      General: No focal deficit present.      Mental Status: He is alert and oriented to person, place, and time. Mental status is at baseline.   Psychiatric:         Mood and Affect: Mood normal.         Behavior: Behavior normal.         Thought Content: Thought content normal.         Judgment: Judgment normal.          Result Review        CT Chest Low Dose Cancer Screening WO    Result Date: 8/16/2024  Impression: Low-dose screening CT scan of the chest demonstrating no new or suspicious lung nodule. Further improvement in reticular nodular infiltrates in the right middle lobe and right lower lobe in comparison to 7/7/2023 is evident. Recommendation: Continue annual screening with LDCT Lung Rads Assessment: Lung-RADS L2 - Benign appearance or <1% chance of malignancy. Electronically Signed: Rustam Laura MD  8/16/2024 4:09 PM EDT  Workstation ID: [id number]       The above data has been reviewed by PONCE Valadez 08/30/2024 11:49 EDT.          Patient Care Team:  Jailyn Shepard APRN as PCP - General (Internal Medicine)  Michael Haile MD as Consulting Physician (Pulmonary Disease)            ASSESSMENT & PLAN    Diagnoses and all orders for this visit:    1. Essential (primary) hypertension (Primary)  Assessment & Plan:  8/30/2024-blood pressure is improving  He remains on ziac 5-6.25 daily, lisinopril 40 mg daily, and amlodipine 5 mg daily.  Continue current medication.  BMP ordered as well.   If blood pressure drops below 100/60-hold amlodipine. Pt acknowledges understanding    8/15/2024-Blood pressure is not well-controlled.  Patient continues to smoke.  He  states that he is compliant with his medications.  He is currently on lisinopril 40 mg daily and amlodipine 5 mg daily.  He has also been taking Lasix 20 mg daily.  He denies any symptoms.  Medication adjustments per orders.  He has had a renal artery ultrasound previously in the past.  Keep blood pressure log and follow-up in 2 weeks.  Will go ahead and set him up with cardiology as well.    Orders:  -     Basic metabolic panel; Future  -     Basic metabolic panel         Tobacco Use: High Risk (8/30/2024)    Patient History     Smoking Tobacco Use: Some Days     Smokeless Tobacco Use: Former     Passive Exposure: Current       Follow Up     Return in about 3 months (around 11/30/2024) for Recheck.    Please note that portions of this note were completed with a voice recognition program.    Patient was given instructions and counseling regarding his condition or for health maintenance advice. Please see specific information pulled into the AVS if appropriate.   I have reviewed information obtained and documented by others and I have confirmed the accuracy of this documented note.    PONCE Valadez

## 2024-08-30 NOTE — ASSESSMENT & PLAN NOTE
8/30/2024-blood pressure is improving  He remains on ziac 5-6.25 daily, lisinopril 40 mg daily, and amlodipine 5 mg daily.  Continue current medication.  BMP ordered as well.   If blood pressure drops below 100/60-hold amlodipine. Pt acknowledges understanding    8/15/2024-Blood pressure is not well-controlled.  Patient continues to smoke.  He states that he is compliant with his medications.  He is currently on lisinopril 40 mg daily and amlodipine 5 mg daily.  He has also been taking Lasix 20 mg daily.  He denies any symptoms.  Medication adjustments per orders.  He has had a renal artery ultrasound previously in the past.  Keep blood pressure log and follow-up in 2 weeks.  Will go ahead and set him up with cardiology as well.

## 2024-08-30 NOTE — PROGRESS NOTES
Chief Complaint  Hypertension (59 year old male here today for a 2 week follow up on his blood pressure. )    History of Present Illness  SUBJECTIVE  Asad Meyer presents to [facility] ***      Past Medical History:   Diagnosis Date    Anxiety     Asthma     Asthma, extrinsic     Chronic pain disorder     Depression     Emphysema of lung     Essential (primary) hypertension 10/27/2022    Pneumonia of right lower lobe due to infectious organism 04/06/2023    PTSD (post-traumatic stress disorder)     Rheumatoid arthritis     Schizoaffective disorder     Self-injurious behavior     Suicide attempt       Family History   Problem Relation Age of Onset    Alcohol abuse Father     Asthma Father     Emphysema Father     Heart failure Father       Past Surgical History:   Procedure Laterality Date    ANKLE SURGERY Right     BRONCHOSCOPY N/A 4/11/2023    Procedure: BRONCHOSCOPY WITH BRONCHOALVEOLAR LAVAGE;  Surgeon: Taiwo Copeland DO;  Location: [facility] ENDOSCOPY;  Service: Pulmonary;  Laterality: N/A;  DIFFUSE BRONCHITIS        Current Outpatient Medications:     albuterol (ACCUNEB) 0.63 MG/3ML nebulizer solution, Take 3 mL by nebulization Every 6 (Six) Hours As Needed for Wheezing., Disp: 270 each, Rfl: 1    Albuterol-Budesonide (Airsupra) 90-80 MCG/ACT aerosol, Inhale 2 puffs 4 (Four) Times a Day As Needed (Wheezing)., Disp: 1 g, Rfl: 3    ALPRAZolam (XANAX) 1 MG tablet, Take 1 tablet by mouth 3 (Three) Times a Day As Needed for Anxiety. for anxiety, Disp: 90 tablet, Rfl: 0    amLODIPine (NORVASC) 5 MG tablet, Take 1 tablet by mouth Daily., Disp: 30 tablet, Rfl: 5    atorvastatin (Lipitor) 20 MG tablet, Take 1 tablet by mouth Daily., Disp: 90 tablet, Rfl: 1    bisoprolol-hydrochlorothiazide (ZIAC) 5-6.25 MG per tablet, Take 1 tablet by mouth Daily., Disp: 30 tablet, Rfl: 2    Cariprazine HCl (Vraylar) 3 MG capsule capsule, Take 1 capsule by mouth Daily., Disp: 30 capsule,  "Rfl: 1    Fluticasone-Umeclidin-Vilant (Trelegy Ellipta) 200-62.5-25 MCG/ACT inhaler, Inhale 1 puff Daily., Disp: 60 each, Rfl: 5    gabapentin (Neurontin) 800 MG tablet, Take 1 tablet by mouth 3 (Three) Times a Day., Disp: 90 tablet, Rfl: 1    ipratropium-albuterol (DUO-NEB) 0.5-2.5 mg/3 ml nebulizer, Take 3 mL by nebulization 4 (Four) Times a Day., Disp: 360 mL, Rfl: 2    lisinopril (PRINIVIL,ZESTRIL) 40 MG tablet, Take 1 tablet by mouth Daily., Disp: 90 tablet, Rfl: 1    mirtazapine (REMERON) 45 MG tablet, Take 1 tablet by mouth Every Night., Disp: 30 tablet, Rfl: 1    nicotine (Nicoderm CQ) 21 MG/24HR patch, Place 1 patch on the skin as directed by provider Daily., Disp: 30 patch, Rfl: 1    OBJECTIVE  Vital Signs:   There were no vitals taken for this visit.   Estimated body mass index is 35.23 kg/m² as calculated from the following:    Height as of 8/23/24: 170.2 cm (67.01\").    Weight as of 8/23/24: 102 kg (225 lb).     Wt Readings from Last 3 Encounters:   08/15/24 99.6 kg (219 lb 9.6 oz)   08/01/24 100 kg (220 lb 6.4 oz)   07/09/24 102 kg (225 lb)     BP Readings from Last 3 Encounters:   08/15/24 (!) 182/106   08/01/24 (!) 182/104   07/09/24 142/94       Physical Exam     Result Review    {[facility] Labs (Optional):56269}    CT Chest Low Dose Cancer Screening WO    Result Date: 8/16/2024  Impression: Low-dose screening CT scan of the chest demonstrating no new or suspicious lung nodule. Further improvement in reticular nodular infiltrates in the right middle lobe and right lower lobe in comparison to 7/7/2023 is evident. Recommendation: Continue annual screening with LDCT Lung Rads Assessment: Lung-RADS L2 - Benign appearance or <1% chance of malignancy. Electronically Signed: Rustam Laura MD  8/16/2024 4:09 PM EDT  Workstation ID: [id number]       The above data has been reviewed by Stephy Diehl MA 08/30/2024 11:16 EDT.          Patient Care Team:  Jailyn Shepard APRN as PCP - General " (Internal Medicine)  Michael Haile MD as Consulting Physician (Pulmonary Disease)            ASSESSMENT & PLAN    There are no diagnoses linked to this encounter.     Tobacco Use: High Risk (8/30/2024)    Patient History     Smoking Tobacco Use: Some Days     Smokeless Tobacco Use: Former     Passive Exposure: Current       Follow Up     No follow-ups on file.    Please note that portions of this note were completed with a voice recognition program.    Patient was given instructions and counseling regarding his condition or for health maintenance advice. Please see specific information pulled into the AVS if appropriate.   I have reviewed information obtained and documented by others and I have confirmed the accuracy of this documented note.    Stephy Diehl MA

## 2024-08-31 LAB
ANION GAP SERPL CALCULATED.3IONS-SCNC: 16 MMOL/L (ref 5–15)
BUN SERPL-MCNC: 15 MG/DL (ref 6–20)
BUN/CREAT SERPL: 13.4 (ref 7–25)
CALCIUM SPEC-SCNC: 10.2 MG/DL (ref 8.6–10.5)
CHLORIDE SERPL-SCNC: 98 MMOL/L (ref 98–107)
CO2 SERPL-SCNC: 25 MMOL/L (ref 22–29)
CREAT SERPL-MCNC: 1.12 MG/DL (ref 0.76–1.27)
EGFRCR SERPLBLD CKD-EPI 2021: 76.6 ML/MIN/1.73
GLUCOSE SERPL-MCNC: 86 MG/DL (ref 65–99)
POTASSIUM SERPL-SCNC: 4.4 MMOL/L (ref 3.5–5.2)
SODIUM SERPL-SCNC: 139 MMOL/L (ref 136–145)

## 2024-09-04 ENCOUNTER — HOSPITAL ENCOUNTER (OUTPATIENT)
Dept: CARDIOLOGY | Facility: HOSPITAL | Age: 59
Discharge: HOME | End: 2024-09-04
Payer: MEDICAID

## 2024-09-04 DIAGNOSIS — I10 UNCONTROLLED HYPERTENSION: ICD-10-CM

## 2024-09-04 LAB
ASCENDING AORTA: 3.5 CM
BH CV ECHO MEAS - AO MAX PG: 4.1 MMHG
BH CV ECHO MEAS - AO MEAN PG: 2.6 MMHG
BH CV ECHO MEAS - AO ROOT DIAM: 3.3 CM
BH CV ECHO MEAS - AO V2 MAX: 101.6 CM/SEC
BH CV ECHO MEAS - AO V2 VTI: 19.5 CM
BH CV ECHO MEAS - AVA(I,D): 3.8 CM2
BH CV ECHO MEAS - EDV(MOD-SP2): 76.9 ML
BH CV ECHO MEAS - EDV(MOD-SP4): 58 ML
BH CV ECHO MEAS - EF(MOD-BP): 66 %
BH CV ECHO MEAS - EF(MOD-SP2): 61 %
BH CV ECHO MEAS - EF(MOD-SP4): 72.2 %
BH CV ECHO MEAS - ESV(MOD-SP2): 30 ML
BH CV ECHO MEAS - ESV(MOD-SP4): 16.1 ML
BH CV ECHO MEAS - IVS/LVPW: 0.91 CM
BH CV ECHO MEAS - IVSD: 1 CM
BH CV ECHO MEAS - LA DIMENSION: 4.3 CM
BH CV ECHO MEAS - LAT PEAK E' VEL: 7.9 CM/SEC
BH CV ECHO MEAS - LV DIASTOLIC VOL/BSA (35-75): 27.8 CM2
BH CV ECHO MEAS - LV MAX PG: 3.2 MMHG
BH CV ECHO MEAS - LV MEAN PG: 1.6 MMHG
BH CV ECHO MEAS - LV SYSTOLIC VOL/BSA (12-30): 7.7 CM2
BH CV ECHO MEAS - LV V1 MAX: 90 CM/SEC
BH CV ECHO MEAS - LV V1 VTI: 21.5 CM
BH CV ECHO MEAS - LVIDD: 4.2 CM
BH CV ECHO MEAS - LVIDS: 2.8 CM
BH CV ECHO MEAS - LVOT DIAM: 2.1 CM
BH CV ECHO MEAS - LVPWD: 1.1 CM
BH CV ECHO MEAS - MED PEAK E' VEL: 7.2 CM/SEC
BH CV ECHO MEAS - MV A MAX VEL: 67.8 CM/SEC
BH CV ECHO MEAS - MV DEC SLOPE: 245 CM/SEC2
BH CV ECHO MEAS - MV DEC TIME: 239 SEC
BH CV ECHO MEAS - MV E MAX VEL: 53.4 CM/SEC
BH CV ECHO MEAS - MV E/A: 0.79
BH CV ECHO MEAS - MV MAX PG: 1.94 MMHG
BH CV ECHO MEAS - MV MEAN PG: 0.7 MMHG
BH CV ECHO MEAS - MV P1/2T: 62 MSEC
BH CV ECHO MEAS - MV V2 VTI: 20.2 CM
BH CV ECHO MEAS - MVA(P1/2T): 3.6 CM2
BH CV ECHO MEAS - RVDD: 3 CM
BH CV ECHO MEAS - SV(MOD-SP2): 46.9 ML
BH CV ECHO MEAS - SV(MOD-SP4): 41.9 ML
BH CV ECHO MEAS - SVI(MOD-SP2): 22.5 ML/M2
BH CV ECHO MEAS - SVI(MOD-SP4): 20.1 ML/M2
BH CV ECHO MEASUREMENTS AVERAGE E/E' RATIO: 7.07

## 2024-09-04 PROCEDURE — 93306 TTE W/DOPPLER COMPLETE: CPT

## 2024-09-05 ENCOUNTER — OFFICE VISIT (OUTPATIENT)
Dept: CARDIOLOGY | Facility: CLINIC | Age: 59
End: 2024-09-05
Payer: MEDICAID

## 2024-09-05 VITALS
DIASTOLIC BLOOD PRESSURE: 116 MMHG | WEIGHT: 220 LBS | HEART RATE: 68 BPM | HEIGHT: 67 IN | BODY MASS INDEX: 34.53 KG/M2 | SYSTOLIC BLOOD PRESSURE: 158 MMHG

## 2024-09-05 DIAGNOSIS — I10 HYPERTENSION, ESSENTIAL: Primary | ICD-10-CM

## 2024-09-05 DIAGNOSIS — I10 UNCONTROLLED HYPERTENSION: ICD-10-CM

## 2024-09-05 DIAGNOSIS — E78.2 HYPERLIPEMIA, MIXED: ICD-10-CM

## 2024-09-05 DIAGNOSIS — R06.02 SHORTNESS OF BREATH: ICD-10-CM

## 2024-09-05 PROCEDURE — 1159F MED LIST DOCD IN RCRD: CPT | Performed by: SPECIALIST

## 2024-09-05 PROCEDURE — 1160F RVW MEDS BY RX/DR IN RCRD: CPT | Performed by: SPECIALIST

## 2024-09-05 PROCEDURE — 99203 OFFICE O/P NEW LOW 30 MIN: CPT | Performed by: SPECIALIST

## 2024-09-05 PROCEDURE — 3080F DIAST BP >= 90 MM HG: CPT | Performed by: SPECIALIST

## 2024-09-05 PROCEDURE — 3077F SYST BP >= 140 MM HG: CPT | Performed by: SPECIALIST

## 2024-09-05 RX ORDER — AMLODIPINE BESYLATE 10 MG/1
10 TABLET ORAL DAILY
Qty: 90 TABLET | Refills: 6 | Status: SHIPPED | OUTPATIENT
Start: 2024-09-05

## 2024-09-05 RX ORDER — BISOPROLOL FUMARATE AND HYDROCHLOROTHIAZIDE 10; 6.25 MG/1; MG/1
1 TABLET ORAL DAILY
Qty: 90 TABLET | Refills: 6 | Status: SHIPPED | OUTPATIENT
Start: 2024-09-05

## 2024-09-05 NOTE — PROGRESS NOTES
[facility]   Cardiology Consult Note    Patient Name: Asad Meyer  : 1965  Referring Physician: PONCE Valadez  Subjective   Subjective     Reason for Consult/ Chief Complaint:   Chief Complaint   Patient presents with    Hypertension       HPI:  Asad Meyer is a 59 y.o. male with history of uncontrolled hypertension.  And shortness of breath.  Denies any chest pain.    Review of Systems:    Constitutional no fever,  no weight loss   Skin no rash   Otolaryngeal no difficulty swallowing   Cardiovascular See HPI   Pulmonary no cough, no sputum production   Gastrointestinal no constipation, no diarrhea   Genitourinary no dysuria, no hematuria   Hematologic no easy bruisability, no abnormal bleeding   Musculoskeletal no muscle pain   Neurologic no dizziness, no falls       Personal History     Past Medical History:  Past Medical History:   Diagnosis Date    Anxiety     Asthma     Asthma, extrinsic     Chronic pain disorder     Depression     Emphysema of lung     Essential (primary) hypertension 10/27/2022    Pneumonia of right lower lobe due to infectious organism 2023    PTSD (post-traumatic stress disorder)     Rheumatoid arthritis     Schizoaffective disorder     Self-injurious behavior     Suicide attempt        Family History:   Family History   Problem Relation Age of Onset    Alcohol abuse Father     Asthma Father     Emphysema Father     Heart failure Father        Social History:  reports that he has been smoking cigarettes. He started smoking about 31 years ago. He has a 15.8 pack-year smoking history. He has been exposed to tobacco smoke. He has quit using smokeless tobacco.  His smokeless tobacco use included snuff. He reports current alcohol use of about 9.0 standard drinks of alcohol per week. He reports that he does not currently use drugs.    Home Medications:  ALPRAZolam, Albuterol-Budesonide, Cariprazine HCl, Fluticasone-Umeclidin-Vilant, albuterol, amLODIPine, atorvastatin,  bisoprolol-hydrochlorothiazide, gabapentin, ipratropium-albuterol, lisinopril, mirtazapine, and nicotine    Allergies:  No Known Allergies    Objective    Objective     Vitals:   Heart Rate:  [68] 68  BP: (158)/(116) 158/116  Body mass index is 34.46 kg/m².  PHYSICAL EXAM:    General Appearance:   well developed  well nourished  HENT:   oropharynx moist  lips not cyanotic  Neck:  thyroid not enlarged  supple  Respiratory:  no respiratory distress  normal breath sounds  no rales  Cardiovascular:  no jugular venous distention  regular rhythm  apical impulse normal  S1 normal, S2 normal  no S3, no S4   no murmur  no rub, no thrill  carotid pulses normal; no bruit  pedal pulses normal  lower extremity edema: none    Skin:   warm, dry  Psychiatric:  judgement and insight appropriate  normal mood and affect    RESULTS:    EKG reviewed by me and shows sinus rhythm       Result Review    Result Review:  I have personally reviewed the available results:  [x]  Laboratory  [x]  EKG/Telemetry   [x]  Cardiology/Vascular   [x] Medications  [x]  Old records  Lab Results   Component Value Date    CHOL 181 08/01/2024    CHOL 186 06/30/2023    CHOL 243 (H) 12/06/2022     Lab Results   Component Value Date    TRIG 194 (H) 08/01/2024    TRIG 254 (H) 06/30/2023    TRIG 268 (H) 12/06/2022     Lab Results   Component Value Date    HDL 35 (L) 08/01/2024    HDL 38 (L) 06/30/2023    HDL 44 12/06/2022     Lab Results   Component Value Date     (H) 08/01/2024     (H) 06/30/2023     (H) 12/06/2022     Lab Results   Component Value Date    VLDL 34 08/01/2024    VLDL 44 (H) 06/30/2023    VLDL 49 (H) 12/06/2022      @RESUFAST(ALT:3)  Results for orders placed during the hospital encounter of 09/04/24    Adult Transthoracic Echo Complete W/ Cont if Necessary Per Protocol    Interpretation Summary    Left ventricular ejection fraction appears to be 61 - 65%.    Left ventricular wall thickness is consistent with septal asymmetric  hypertrophy.    Left ventricular diastolic function is consistent with (grade I) impaired relaxation and age.    The left atrial cavity is mildly dilated.    No significant valvular disease.      Procedures     Impression/Plan    1. Uncontrolled hypertension/hypertensive cardiovascular disease: Increase Ziac to 10/6.25 mg once a day.  Increase amlodipine to 10 mg once a day.  Continue lisinopril 40 mg once a day.  Monitor blood pressure regularly.  If blood pressure remains high add clonidine 0.1 mg twice a day.  Recent echo shows normal left ventricular systolic function with left ventricular hypertrophy.  2.  Mixed hyperlipidemia: Continue Lipitor 20 mg once a day.  Monitor lipid and hepatic profile.  3.  COPD: Continue current bronchodilators.  4.  Positive nicotine use: Smoking cessation discussed with patient.        Electronically signed by Escobar Salgado MD, 09/05/24, 1:11 PM EDT.

## 2024-09-26 ENCOUNTER — TELEPHONE (OUTPATIENT)
Dept: INTERNAL MEDICINE | Age: 59
End: 2024-09-26
Payer: MEDICAID

## 2024-10-02 DIAGNOSIS — F41.0 PANIC DISORDER: ICD-10-CM

## 2024-10-02 RX ORDER — ALPRAZOLAM 1 MG
1 TABLET ORAL 3 TIMES DAILY PRN
Qty: 90 TABLET | Refills: 0 | Status: SHIPPED | OUTPATIENT
Start: 2024-10-02

## 2024-10-02 NOTE — TELEPHONE ENCOUNTER
REFILL REQUEST FROM THE PT AND OR PHARMACY FOR PTS:     ALPRAZolam (XANAX) 1 MG tablet (08/26/2024)     FOLLOW UP APPT- (PT HAS A APPT CARD STATINGHIS F/UP IS ON 10/24/2024 AT 11:20 AM)   I DON'T SEE THAT THIS HAS BEEN SCHEDULED THOUGH AND PROVIDER SCHEDULE APPEARS TO BE BOOKED UP AT THAT TIME.    PT LAST SEEN ON- 08/23/2024.    LAST UDS RESULTS:  POC Medline 12 Panel Urine Drug Screen (05/03/2024 10:34)

## 2024-10-24 ENCOUNTER — OFFICE VISIT (OUTPATIENT)
Dept: BEHAVIORAL HEALTH | Facility: CLINIC | Age: 59
End: 2024-10-24
Payer: MEDICAID

## 2024-10-24 VITALS
HEART RATE: 105 BPM | WEIGHT: 216 LBS | SYSTOLIC BLOOD PRESSURE: 118 MMHG | BODY MASS INDEX: 33.9 KG/M2 | HEIGHT: 67 IN | DIASTOLIC BLOOD PRESSURE: 70 MMHG

## 2024-10-24 DIAGNOSIS — F41.0 PANIC DISORDER: ICD-10-CM

## 2024-10-24 DIAGNOSIS — F33.0 MILD EPISODE OF RECURRENT MAJOR DEPRESSIVE DISORDER: ICD-10-CM

## 2024-10-24 DIAGNOSIS — F17.210 CIGARETTE NICOTINE DEPENDENCE WITHOUT COMPLICATION: ICD-10-CM

## 2024-10-24 DIAGNOSIS — F41.1 GENERALIZED ANXIETY DISORDER: Primary | ICD-10-CM

## 2024-10-24 DIAGNOSIS — G47.00 INSOMNIA, UNSPECIFIED TYPE: ICD-10-CM

## 2024-10-24 RX ORDER — CLONIDINE HYDROCHLORIDE 0.1 MG/1
0.1 TABLET ORAL NIGHTLY
Qty: 90 TABLET | Refills: 0 | Status: SHIPPED | OUTPATIENT
Start: 2024-10-24

## 2024-10-24 RX ORDER — MIRTAZAPINE 45 MG/1
45 TABLET, FILM COATED ORAL NIGHTLY
Qty: 90 TABLET | Refills: 0 | Status: SHIPPED | OUTPATIENT
Start: 2024-10-24

## 2024-10-24 RX ORDER — ALPRAZOLAM 1 MG
1 TABLET ORAL 3 TIMES DAILY PRN
Qty: 90 TABLET | Refills: 0 | Status: SHIPPED | OUTPATIENT
Start: 2024-11-01

## 2024-10-24 RX ORDER — FUROSEMIDE 20 MG
1 TABLET ORAL DAILY
COMMUNITY
Start: 2024-09-30

## 2024-10-24 NOTE — PROGRESS NOTES
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 tablet by mouth Every Night.  Dispense: 90 tablet; Refill: 0  -     mirtazapine (REMERON) 45 MG tablet; Take 1 tablet by mouth Every Night.  Dispense: 90 tablet; Refill: 0  -     ALPRAZolam (XANAX) 1 MG tablet; Take 1 tablet by mouth 3 (Three) Times a Day As Needed for Anxiety. for anxiety  Dispense: 90 tablet; Refill: 0    4. Insomnia, unspecified type  -     cloNIDine (Catapres) 0.1 MG tablet; Take 1 tablet by mouth Every Night.  Dispense: 90 tablet; Refill: 0  -     mirtazapine (REMERON) 45 MG tablet; Take 1 tablet by mouth Every Night.  Dispense: 90 tablet; Refill: 0    5. Cigarette nicotine dependence without complication            Patient screened positive for depression based on a PHQ-9 score of  on . Follow-up recommendations include: Prescribed antidepressant medication treatment, Suicide Risk Assessment performed, and see assessment below .    Presentation seems most consistent with MDD, GREG, panic disorder, insomnia, nicotine dependence.  We will continue mirtazapine for management of depression, anxiety, and overall mood.  Reiterated need for sleep medicine, patient declines referral for sleep medicine. Will continue Xanax 3 times daily as needed for severe anxiety.  Discussed the risks including but not limited to, respiratory depression, coma, death, along with risk factors including COPD.  Patient verbalized  understanding would like to continue with Xanax as this has improved his functionality and has helped with his anxiety. Counseled the patient on the risks including addiction, dependence, and misuse.  We will discontinue gabapentin as patient does not feel like this has been helping and has already stopped it. We will continue Vraylar for management of depression and overall mood.  We will start on clonidine for management of anxiety, insomnia, and overall mood.  Consider Spravato although patient has declined in the past.  Counseled on smoking cessation.  Instructed patient to contact the office for any new or worsening symptoms or any other concerns.  Follow-up in 2 months.  Addressed all questions and concerns.    Visit Diagnoses:    ICD-10-CM ICD-9-CM   1. Generalized anxiety disorder  F41.1 300.02   2. Mild episode of recurrent major depressive disorder  F33.0 296.31   3. Panic disorder  F41.0 300.01   4. Insomnia, unspecified type  G47.00 780.52   5. Cigarette nicotine dependence without complication  F17.210 305.1               PLAN:  Safety: No acute safety concerns at this time.  Therapy: Patient declines referral for psychotherapy.  Risk Assessment: Risk of self-harm acutely is moderate to severe.  Risk factors include anxiety disorder, mood disorder, intermittent SI (passive, no plan or intent, no present SI), h/o suicide attempt in the past, and recent psychosocial stressors (pandemic). Protective factors include no family history, denies access to guns/weapons, no history of self-harm in the past, minimal AODA, healthcare seeking, future orientation, willingness to engage in care.  Risk of self-harm chronically is also moderate to severe, but could be further elevated in the event of treatment noncompliance and/or AODA.  Labs/Diagnostics Ordered:   No orders of the defined types were placed in this encounter.    Medications:   New Medications Ordered This Visit   Medications    cloNIDine (Catapres) 0.1  MG tablet     Sig: Take 1 tablet by mouth Every Night.     Dispense:  90 tablet     Refill:  0    mirtazapine (REMERON) 45 MG tablet     Sig: Take 1 tablet by mouth Every Night.     Dispense:  90 tablet     Refill:  0    ALPRAZolam (XANAX) 1 MG tablet     Sig: Take 1 tablet by mouth 3 (Three) Times a Day As Needed for Anxiety. for anxiety     Dispense:  90 tablet     Refill:  0    Cariprazine HCl (Vraylar) 3 MG capsule capsule     Sig: Take 1 capsule by mouth Daily.     Dispense:  30 capsule     Refill:  1     Discussed all risks, benefits, alternatives, and side effects of Xanax including but not limited to risks of abuse, misuse, and addiction, which can lead to overdose or death; risks of dependence and withdrawal reactions; drowsiness, sedation, fatigue, depression, dizziness, ataxia, weakness, confusion, forgetfulness, hypotension, falls risk, respiratory depression, anterograde amnesia, paradoxical reactions such as hyperactivity or aggressive behavior; and hallucinations. Pt educated on the need to practice safe sex while taking this med. Instructed pt to avoid performing tasks that require mental alertness such as driving or operating machinery. Discussed the need for pt to immediately call the office for any new or worsening symptoms, such as changes in mood or behavior, and all other concerns. Pt educated on med compliance, including the proper use and monitoring for signs and symptoms of abuse, misuse, and addiction. Pt verbalized understanding and is agreeable to taking Xanax. CARLOS obtained and USD ordered. Controlled substances agreement verbally signed. Addressed all questions and concerns.     Clonidine, Risks, benefits, alternatives discussed with patient including dizziness, sedation, falls, low blood pressure, GI upset.  Use care when operating vehicle, vessel, or machine. After discussion of these risks and benefits, the patient voiced understanding and agreed to proceed.    Discussed all risks,  benefits, alternatives, and side effects of Mirtazapine including but not limited to GI upset, sexual dysfunction, weight gain, dizziness, bleeding risk, seizure risk, sedation, headache, activation of shakir or hypomania, drug-inducted movement disorders (akathisia, dystonia, restless leg syndrome), dyslipidemia, hematologic abnormalities, orthostatic hypotension, sedation, withdrawal syndrome, serotonin syndrome, and activation of suicidal ideation and behavior.  Pt educated on the need to practice safe sex while taking this med. Discussed the need for pt to immediately call the office for any new or worsening symptoms, such as worsening depression; feeling nervous or restless; suicidal thoughts or actions; or other changes changes in mood or behavior, and all other concerns. Pt educated on med compliance and the risks of suddenly stopping this medication or missing doses. Pt verbalized understanding and is agreeable to taking Mirtazapine. Addressed all questions and concerns.     Vraylar, Risks, benefits, alternatives discussed with patient including nausea and vomiting, GI upset, sedation, akathisia, theoretical risk of tardive dyskinesia/dystonia, movement issues, and weight gain. Use care when operating vehicle, vessel, or machine. After discussion of these risks and benefits, the patient voiced understanding and agreed to proceed.    Follow up:   F/u in 2 months    TREATMENT PLAN/GOALS: Continue supportive psychotherapy efforts and medications as indicated. Treatment and medication options discussed during today's visit. Patient ackowledged and verbally consented to continue with current treatment plan and was educated on the importance of compliance with treatment and follow-up appointments.    MEDICATION ISSUES:  CARLOS reviewed as expected.  Discussed medication options and treatment plan of prescribed medication as well as the risks, benefits, and side effects including potential falls, possible impaired  driving and metabolic adversities among others. Patient is agreeable to call the office with any worsening of symptoms or onset of side effects. Patient is agreeable to call 911 or go to the nearest ER should he/she begin having SI/HI. No medication side effects or related complaints today.            This document has been electronically signed by Page Lee PA-C  October 24, 2024 10:17 EDT      Part of this note may be an electronic transcription/translation of spoken language to printed text using the Dragon Dictation System.

## 2024-12-02 ENCOUNTER — OFFICE VISIT (OUTPATIENT)
Dept: INTERNAL MEDICINE | Age: 59
End: 2024-12-02
Payer: MEDICAID

## 2024-12-02 VITALS
HEIGHT: 67 IN | RESPIRATION RATE: 18 BRPM | OXYGEN SATURATION: 93 % | WEIGHT: 226 LBS | DIASTOLIC BLOOD PRESSURE: 90 MMHG | TEMPERATURE: 97.6 F | BODY MASS INDEX: 35.47 KG/M2 | HEART RATE: 100 BPM | SYSTOLIC BLOOD PRESSURE: 132 MMHG

## 2024-12-02 DIAGNOSIS — F41.0 PANIC DISORDER: ICD-10-CM

## 2024-12-02 DIAGNOSIS — F41.1 GAD (GENERALIZED ANXIETY DISORDER): ICD-10-CM

## 2024-12-02 DIAGNOSIS — J44.9 CHRONIC OBSTRUCTIVE PULMONARY DISEASE, UNSPECIFIED COPD TYPE: ICD-10-CM

## 2024-12-02 DIAGNOSIS — Z23 NEED FOR SHINGLES VACCINE: ICD-10-CM

## 2024-12-02 DIAGNOSIS — Z23 NEEDS FLU SHOT: ICD-10-CM

## 2024-12-02 DIAGNOSIS — E78.2 MIXED HYPERLIPIDEMIA: ICD-10-CM

## 2024-12-02 DIAGNOSIS — I10 ESSENTIAL (PRIMARY) HYPERTENSION: Primary | ICD-10-CM

## 2024-12-02 PROCEDURE — 1159F MED LIST DOCD IN RCRD: CPT

## 2024-12-02 PROCEDURE — 1160F RVW MEDS BY RX/DR IN RCRD: CPT

## 2024-12-02 PROCEDURE — 90656 IIV3 VACC NO PRSV 0.5 ML IM: CPT

## 2024-12-02 PROCEDURE — 1126F AMNT PAIN NOTED NONE PRSNT: CPT

## 2024-12-02 PROCEDURE — 90471 IMMUNIZATION ADMIN: CPT

## 2024-12-02 PROCEDURE — 99214 OFFICE O/P EST MOD 30 MIN: CPT

## 2024-12-02 PROCEDURE — 3080F DIAST BP >= 90 MM HG: CPT

## 2024-12-02 PROCEDURE — 90750 HZV VACC RECOMBINANT IM: CPT

## 2024-12-02 PROCEDURE — 3075F SYST BP GE 130 - 139MM HG: CPT

## 2024-12-02 PROCEDURE — 90472 IMMUNIZATION ADMIN EACH ADD: CPT

## 2024-12-02 RX ORDER — FLUTICASONE FUROATE, UMECLIDINIUM BROMIDE AND VILANTEROL TRIFENATATE 200; 62.5; 25 UG/1; UG/1; UG/1
1 POWDER RESPIRATORY (INHALATION) DAILY
Qty: 60 EACH | Refills: 5 | Status: SHIPPED | OUTPATIENT
Start: 2024-12-02

## 2024-12-02 NOTE — PROGRESS NOTES
Chief Complaint  Hypertension (59 year old male here today for a 3 month follow up on blood pressure. States he has been doing well since his last visit and his numbers have been really good. )    History of Present Illness  SUBJECTIVE  Asad Meyer presents to [facility] follow up on chronic disease management.   He is doing well overall.  He denies any issues or concerns today.    Past Medical History:   Diagnosis Date    Anxiety     Asthma     Asthma, extrinsic     Chronic pain disorder     Depression     Emphysema of lung     Essential (primary) hypertension 10/27/2022    Pneumonia of right lower lobe due to infectious organism 04/06/2023    PTSD (post-traumatic stress disorder)     Rheumatoid arthritis     Schizoaffective disorder     Self-injurious behavior     Suicide attempt       Family History   Problem Relation Age of Onset    Alcohol abuse Father     Asthma Father     Emphysema Father     Heart failure Father       Past Surgical History:   Procedure Laterality Date    ANKLE SURGERY Right     BRONCHOSCOPY N/A 4/11/2023    Procedure: BRONCHOSCOPY WITH BRONCHOALVEOLAR LAVAGE;  Surgeon: Taiwo Copeland DO;  Location: [facility] ENDOSCOPY;  Service: Pulmonary;  Laterality: N/A;  DIFFUSE BRONCHITIS        Current Outpatient Medications:     albuterol (ACCUNEB) 0.63 MG/3ML nebulizer solution, Take 3 mL by nebulization Every 6 (Six) Hours As Needed for Wheezing., Disp: 270 each, Rfl: 1    Albuterol-Budesonide (Airsupra) 90-80 MCG/ACT aerosol, Inhale 2 puffs 4 (Four) Times a Day As Needed (Wheezing)., Disp: 1 g, Rfl: 3    ALPRAZolam (XANAX) 1 MG tablet, Take 1 tablet by mouth 3 (Three) Times a Day As Needed for Anxiety. for anxiety, Disp: 90 tablet, Rfl: 0    amLODIPine (NORVASC) 10 MG tablet, Take 1 tablet by mouth Daily., Disp: 90 tablet, Rfl: 6    atorvastatin (Lipitor) 20 MG tablet, Take 1 tablet by mouth Daily., Disp: 90 tablet, Rfl: 1     "bisoprolol-hydrochlorothiazide (Ziac) 10-6.25 MG per tablet, Take 1 tablet by mouth Daily., Disp: 90 tablet, Rfl: 6    Cariprazine HCl (Vraylar) 3 MG capsule capsule, Take 1 capsule by mouth Daily., Disp: 30 capsule, Rfl: 1    cloNIDine (Catapres) 0.1 MG tablet, Take 1 tablet by mouth Every Night., Disp: 90 tablet, Rfl: 0    Fluticasone-Umeclidin-Vilant (Trelegy Ellipta) 200-62.5-25 MCG/ACT inhaler, Inhale 1 puff Daily., Disp: 60 each, Rfl: 5    furosemide (LASIX) 20 MG tablet, Take 1 tablet by mouth Daily., Disp: , Rfl:     ipratropium-albuterol (DUO-NEB) 0.5-2.5 mg/3 ml nebulizer, Take 3 mL by nebulization 4 (Four) Times a Day., Disp: 360 mL, Rfl: 2    lisinopril (PRINIVIL,ZESTRIL) 40 MG tablet, Take 1 tablet by mouth Daily., Disp: 90 tablet, Rfl: 1    mirtazapine (REMERON) 45 MG tablet, Take 1 tablet by mouth Every Night., Disp: 90 tablet, Rfl: 0    nicotine (Nicoderm CQ) 21 MG/24HR patch, Place 1 patch on the skin as directed by provider Daily., Disp: 30 patch, Rfl: 1    OBJECTIVE  Vital Signs:   /90 (BP Location: Left arm, Patient Position: Sitting)   Pulse 100   Temp 97.6 °F (36.4 °C) (Temporal)   Resp 18   Ht 170.2 cm (67.01\")   Wt 103 kg (226 lb)   SpO2 93%   BMI 35.39 kg/m²    Estimated body mass index is 35.39 kg/m² as calculated from the following:    Height as of this encounter: 170.2 cm (67.01\").    Weight as of this encounter: 103 kg (226 lb).     Wt Readings from Last 3 Encounters:   12/02/24 103 kg (226 lb)   10/24/24 98 kg (216 lb)   09/05/24 99.8 kg (220 lb)     BP Readings from Last 3 Encounters:   12/02/24 132/90   10/24/24 118/70   09/05/24 (!) 158/116       Physical Exam  Vitals and nursing note reviewed.   Constitutional:       Appearance: Normal appearance.   HENT:      Head: Normocephalic.   Eyes:      Extraocular Movements: Extraocular movements intact.      Conjunctiva/sclera: Conjunctivae normal.   Cardiovascular:      Rate and Rhythm: Normal rate and regular rhythm.      " Heart sounds: Normal heart sounds. No murmur heard.  Pulmonary:      Effort: Pulmonary effort is normal.      Breath sounds: Wheezing present. No rales.   Abdominal:      General: Bowel sounds are normal.      Palpations: Abdomen is soft.      Tenderness: There is no abdominal tenderness. There is no guarding.   Musculoskeletal:         General: No swelling. Normal range of motion.      Cervical back: Normal range of motion and neck supple.   Skin:     General: Skin is warm and dry.   Neurological:      General: No focal deficit present.      Mental Status: He is alert and oriented to person, place, and time. Mental status is at baseline.   Psychiatric:         Mood and Affect: Mood normal.         Behavior: Behavior normal.         Thought Content: Thought content normal.         Judgment: Judgment normal.          Result Review        CT Chest Low Dose Cancer Screening WO    Result Date: 8/16/2024  Impression: Low-dose screening CT scan of the chest demonstrating no new or suspicious lung nodule. Further improvement in reticular nodular infiltrates in the right middle lobe and right lower lobe in comparison to 7/7/2023 is evident. Recommendation: Continue annual screening with LDCT Lung Rads Assessment: Lung-RADS L2 - Benign appearance or <1% chance of malignancy. Electronically Signed: Rustam Laura MD  8/16/2024 4:09 PM EDT  Workstation ID: [id number]       The above data has been reviewed by PONCE Valadez 12/02/2024 08:37 EST.          Patient Care Team:  Jailyn Shepard APRN as PCP - General (Internal Medicine)  Michael Haile MD as Consulting Physician (Pulmonary Disease)            ASSESSMENT & PLAN    Diagnoses and all orders for this visit:    1. Essential (primary) hypertension (Primary)  Assessment & Plan:  Blood pressure is stable for patient.  He is doing well with current medication regimen.  He will continue with Ziac, clonidine, and lisinopril, amlodipine, Lasix as prescribed.    Orders:  -      CBC & Differential; Future  -     Comprehensive Metabolic Panel; Future  -     Lipid Panel; Future  -     TSH Rfx On Abnormal To Free T4; Future  -     Vitamin B12 & Folate; Future    2. Need for shingles vaccine  -     Shingrix Vaccine    3. Needs flu shot  -     Fluzone >6mos ([id number])    4. Mixed hyperlipidemia  Assessment & Plan:   No recent labs to review.  He remains on Lipitor 20 mg nightly.  Lipid panel ordered.    Orders:  -     CBC & Differential; Future  -     Comprehensive Metabolic Panel; Future  -     Lipid Panel; Future  -     TSH Rfx On Abnormal To Free T4; Future  -     Vitamin B12 & Folate; Future    5. Chronic obstructive pulmonary disease, unspecified COPD type  Assessment & Plan:  Stable.  He states that he ran out of his Trelegy so he is a little more short of breath today.  We will give him some samples and refill his Trelegy.  He is followed by pulmonology.  Continue with pulmonology.    Orders:  -     Fluticasone-Umeclidin-Vilant (Trelegy Ellipta) 200-62.5-25 MCG/ACT inhaler; Inhale 1 puff Daily.  Dispense: 60 each; Refill: 5    6. GREG (generalized anxiety disorder)  Assessment & Plan:  Patient with a history of anxiety, PTSD.  He remains on Vraylar 3 mg daily, Remeron 45 mg nightly.  Continue with psychiatry.             Tobacco Use: High Risk (12/2/2024)    Patient History     Smoking Tobacco Use: Some Days     Smokeless Tobacco Use: Former     Passive Exposure: Current       Follow Up     Return in about 3 months (around 3/2/2025).    Please note that portions of this note were completed with a voice recognition program.    Patient was given instructions and counseling regarding his condition or for health maintenance advice. Please see specific information pulled into the AVS if appropriate.   I have reviewed information obtained and documented by others and I have confirmed the accuracy of this documented note.    PONCE Valadez

## 2024-12-02 NOTE — TELEPHONE ENCOUNTER
CONTROLLED MEDICATION REFILL REQUEST    STATE REGULATION APPT EVERY 3 MONTHS     UDS(URINE DRUG SCREEN) EVERY 6 MONTHS OR UP TO PROVIDER PREFERENCE   (DE YVETTE & CAMPOS 1 PER YEAR)     NEW NARC CONSENT EVERY YEAR      MEDICATION: ALPRAZolam (XANAX) 1 MG tablet (11/01/2024)      NEXT OFFICE VISIT: Appointment with Page Lee PA-C (12/19/2024)     LAST OFFICE VISIT: Office Visit with Page Lee PA-C (10/24/2024)     NARC CONSENT: CONTROLLED SUBSTANCE AGREEMENT - SCAN - CSA/XANAX, MGCHAR, 08/23/2024 (08/23/2024)     URINE DRUG SCREEN(STANDING ORDER)   (DE YVETTE & CAMPOS 1 PER YEAR): POC Medline 12 Panel Urine Drug Screen (05/03/2024 10:34)     PROVIDER PLEASE ADVISE

## 2024-12-02 NOTE — ASSESSMENT & PLAN NOTE
Stable.  He states that he ran out of his Trelegy so he is a little more short of breath today.  We will give him some samples and refill his Trelegy.  He is followed by pulmonology.  Continue with pulmonology.

## 2024-12-02 NOTE — ASSESSMENT & PLAN NOTE
Blood pressure is stable for patient.  He is doing well with current medication regimen.  He will continue with Ziac, clonidine, and lisinopril, amlodipine, Lasix as prescribed.

## 2024-12-02 NOTE — ASSESSMENT & PLAN NOTE
Patient with a history of anxiety, PTSD.  He remains on Vraylar 3 mg daily, Remeron 45 mg nightly.  Continue with psychiatry.

## 2024-12-03 ENCOUNTER — OFFICE VISIT (OUTPATIENT)
Dept: PULMONOLOGY | Facility: CLINIC | Age: 59
End: 2024-12-03
Payer: MEDICAID

## 2024-12-03 VITALS
DIASTOLIC BLOOD PRESSURE: 85 MMHG | OXYGEN SATURATION: 92 % | RESPIRATION RATE: 14 BRPM | HEIGHT: 67 IN | WEIGHT: 230.2 LBS | TEMPERATURE: 98.6 F | BODY MASS INDEX: 36.13 KG/M2 | SYSTOLIC BLOOD PRESSURE: 125 MMHG | HEART RATE: 70 BPM

## 2024-12-03 DIAGNOSIS — J41.8 MIXED SIMPLE AND MUCOPURULENT CHRONIC BRONCHITIS: Primary | ICD-10-CM

## 2024-12-03 DIAGNOSIS — J45.40 MODERATE PERSISTENT ASTHMA, UNSPECIFIED WHETHER COMPLICATED: ICD-10-CM

## 2024-12-03 PROCEDURE — 3079F DIAST BP 80-89 MM HG: CPT | Performed by: INTERNAL MEDICINE

## 2024-12-03 PROCEDURE — 1160F RVW MEDS BY RX/DR IN RCRD: CPT | Performed by: INTERNAL MEDICINE

## 2024-12-03 PROCEDURE — 99213 OFFICE O/P EST LOW 20 MIN: CPT | Performed by: INTERNAL MEDICINE

## 2024-12-03 PROCEDURE — 3074F SYST BP LT 130 MM HG: CPT | Performed by: INTERNAL MEDICINE

## 2024-12-03 PROCEDURE — 1159F MED LIST DOCD IN RCRD: CPT | Performed by: INTERNAL MEDICINE

## 2024-12-03 RX ORDER — ALPRAZOLAM 1 MG/1
1 TABLET ORAL 3 TIMES DAILY PRN
Qty: 90 TABLET | Refills: 0 | Status: SHIPPED | OUTPATIENT
Start: 2024-12-03

## 2024-12-03 NOTE — PROGRESS NOTES
Pulmonary Office Follow-up    Subjective     Asad Meyer is seen today at the office for   Chief Complaint   Patient presents with    Follow-up     3 month    Asthma     COPD overlap    COPD    tobacco use    Wheezing         HPI  Asad Meyer is a 59 y.o. male with a PMH significant for asthma and COPD presents for follow-up patient has been doing well on his medications and is using his inhalers he denies any significant cough or expectoration and his wheezing is much reduced      Tobacco use history:        Patient Active Problem List   Diagnosis    Asthma    Essential (primary) hypertension    Psychophysiological insomnia    GREG (generalized anxiety disorder)    Polyarthralgia    Chronic obstructive pulmonary disease    Mixed hyperlipidemia    DDD (degenerative disc disease), thoracolumbar    Osteoarthritis of multiple joints    Cigarette nicotine dependence without complication    Pneumonia of right lower lobe due to infectious organism    Ground glass opacity present on imaging of lung    Annual physical exam    Renal insufficiency    Acute pain of right knee    Severe sepsis    Baker cyst, right    Sprain of medial collateral ligament of right knee    Acute medial meniscus tear of right knee    Primary osteoarthritis of right knee    Class 1 obesity due to excess calories with serious comorbidity and body mass index (BMI) of 34.0 to 34.9 in adult       Review of Systems  Review of Systems   Respiratory:  Positive for shortness of breath.    All other systems reviewed and are negative.    As described in the HPI. Otherwise, remainder of ROS (14 systems) were negative.    Medications, Allergies, Social, and Family Histories reviewed as per EMR.    Result Review :            Objective     Vitals:    12/03/24 1126   BP: 125/85   Pulse: 70   Resp: 14   Temp: 98.6 °F (37 °C)   SpO2: 92%         12/03/24  1126   Weight: 104 kg (230 lb 3.2 oz)       Physical Exam  Vitals and nursing note reviewed.    Constitutional:       Appearance: Normal appearance.   HENT:      Head: Normocephalic and atraumatic.      Nose: Nose normal.      Mouth/Throat:      Mouth: Mucous membranes are moist.      Pharynx: Oropharynx is clear.   Eyes:      Extraocular Movements: Extraocular movements intact.      Conjunctiva/sclera: Conjunctivae normal.      Pupils: Pupils are equal, round, and reactive to light.   Cardiovascular:      Rate and Rhythm: Normal rate and regular rhythm.      Pulses: Normal pulses.      Heart sounds: Normal heart sounds.   Pulmonary:      Effort: Pulmonary effort is normal.      Breath sounds: Rhonchi present.   Abdominal:      General: Abdomen is flat. Bowel sounds are normal.      Palpations: Abdomen is soft.   Musculoskeletal:         General: Normal range of motion.      Cervical back: Normal range of motion and neck supple.   Skin:     General: Skin is warm.      Capillary Refill: Capillary refill takes 2 to 3 seconds.   Neurological:      General: No focal deficit present.      Mental Status: He is alert and oriented to person, place, and time.   Psychiatric:         Mood and Affect: Mood normal.         Behavior: Behavior normal.         No radiology results for the last 90 days.     Assessment & Plan     Diagnoses and all orders for this visit:    1. Mixed simple and mucopurulent chronic bronchitis (Primary)    2. Moderate persistent asthma, unspecified whether complicated         Discussion/ Recommendations:   Patient is advised to stop smoking  Advised to reduce weight his BMI is 36.04  Continue Airsupra as needed  Trelegy daily  Vaccinations discussed and recommended             Return in about 4 months (around 4/3/2025).          This document has been electronically signed by Michael Haile MD on December 3, 2024 11:36 EST

## 2024-12-13 DIAGNOSIS — F33.0 MILD EPISODE OF RECURRENT MAJOR DEPRESSIVE DISORDER: ICD-10-CM

## 2024-12-13 RX ORDER — CARIPRAZINE 3 MG/1
3 CAPSULE, GELATIN COATED ORAL DAILY
Qty: 30 CAPSULE | Refills: 0 | Status: SHIPPED | OUTPATIENT
Start: 2024-12-13

## 2024-12-13 NOTE — TELEPHONE ENCOUNTER
NEXT VISIT WITH PROVIDER   Appointment with Page Lee PA-C (12/19/2024)     LAST SEEN BY PROVIDER   Office Visit with Page Lee PA-C (10/24/2024)     LAST MED REFILL  Cariprazine HCl (Vraylar) 3 MG capsule capsule (10/24/2024)     PROVIDER PLEASE ADVISE

## 2024-12-19 ENCOUNTER — OFFICE VISIT (OUTPATIENT)
Dept: BEHAVIORAL HEALTH | Facility: CLINIC | Age: 59
End: 2024-12-19
Payer: MEDICAID

## 2024-12-19 VITALS
SYSTOLIC BLOOD PRESSURE: 138 MMHG | WEIGHT: 229.4 LBS | HEART RATE: 78 BPM | HEIGHT: 67 IN | BODY MASS INDEX: 36 KG/M2 | DIASTOLIC BLOOD PRESSURE: 86 MMHG

## 2024-12-19 DIAGNOSIS — F33.42 RECURRENT MAJOR DEPRESSIVE DISORDER, IN FULL REMISSION: ICD-10-CM

## 2024-12-19 DIAGNOSIS — F17.210 CIGARETTE NICOTINE DEPENDENCE WITHOUT COMPLICATION: ICD-10-CM

## 2024-12-19 DIAGNOSIS — G47.00 INSOMNIA, UNSPECIFIED TYPE: ICD-10-CM

## 2024-12-19 DIAGNOSIS — F41.0 PANIC DISORDER: ICD-10-CM

## 2024-12-19 DIAGNOSIS — F41.1 GENERALIZED ANXIETY DISORDER: Primary | ICD-10-CM

## 2024-12-19 RX ORDER — MIRTAZAPINE 45 MG/1
45 TABLET, FILM COATED ORAL NIGHTLY
Qty: 90 TABLET | Refills: 0 | Status: SHIPPED | OUTPATIENT
Start: 2024-12-19

## 2024-12-19 RX ORDER — ALPRAZOLAM 1 MG/1
1 TABLET ORAL 3 TIMES DAILY PRN
Qty: 90 TABLET | Refills: 0 | Status: SHIPPED | OUTPATIENT
Start: 2025-01-02

## 2024-12-19 RX ORDER — CLONIDINE HYDROCHLORIDE 0.2 MG/1
0.2 TABLET ORAL NIGHTLY
Qty: 90 TABLET | Refills: 0 | Status: SHIPPED | OUTPATIENT
Start: 2024-12-19

## 2024-12-19 NOTE — PROGRESS NOTES
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 11/20/2024. Any refills authorized will be placed on file.    mirtazapine (REMERON) 45 MG tablet     Sig: Take 1 tablet by mouth Every Night.     Dispense:  90 tablet     Refill:  0     Discussed all risks, benefits, alternatives, and side effects of Xanax including but not limited to risks of abuse, misuse, and addiction, which can lead to overdose or death; risks of dependence and withdrawal reactions; drowsiness, sedation, fatigue, depression, dizziness, ataxia, weakness, confusion, forgetfulness, hypotension, falls risk, respiratory depression, anterograde amnesia, paradoxical reactions such as hyperactivity or aggressive behavior; and hallucinations. Pt educated on the need to practice safe sex while taking this med. Instructed pt to avoid performing tasks that require mental alertness such as driving or operating machinery. Discussed the need for pt to immediately call the office for any new or worsening symptoms, such as changes in mood or behavior, and all other concerns. Pt educated on med compliance, including the proper use and monitoring for signs and symptoms of abuse, misuse, and addiction. Pt verbalized understanding and is agreeable to taking Xanax. CARLOS obtained and USD ordered. Controlled substances agreement verbally signed. Addressed all questions and concerns.     Clonidine, Risks,  benefits, alternatives discussed with patient including dizziness, sedation, falls, low blood pressure, GI upset.  Use care when operating vehicle, vessel, or machine. After discussion of these risks and benefits, the patient voiced understanding and agreed to proceed.    Discussed all risks, benefits, alternatives, and side effects of Mirtazapine including but not limited to GI upset, sexual dysfunction, weight gain, dizziness, bleeding risk, seizure risk, sedation, headache, activation of shakir or hypomania, drug-inducted movement disorders (akathisia, dystonia, restless leg syndrome), dyslipidemia, hematologic abnormalities, orthostatic hypotension, sedation, withdrawal syndrome, serotonin syndrome, and activation of suicidal ideation and behavior.  Pt educated on the need to practice safe sex while taking this med. Discussed the need for pt to immediately call the office for any new or worsening symptoms, such as worsening depression; feeling nervous or restless; suicidal thoughts or actions; or other changes changes in mood or behavior, and all other concerns. Pt educated on med compliance and the risks of suddenly stopping this medication or missing doses. Pt verbalized understanding and is agreeable to taking Mirtazapine. Addressed all questions and concerns.     Vraylar, Risks, benefits, alternatives discussed with patient including nausea and vomiting, GI upset, sedation, akathisia, theoretical risk of tardive dyskinesia/dystonia, movement issues, and weight gain. Use care when operating vehicle, vessel, or machine. After discussion of these risks and benefits, the patient voiced understanding and agreed to proceed.    Follow up:   F/u in 3 months    TREATMENT PLAN/GOALS: Continue supportive psychotherapy efforts and medications as indicated. Treatment and medication options discussed during today's visit. Patient ackowledged and verbally consented to continue with current treatment plan and was educated on  the importance of compliance with treatment and follow-up appointments.    MEDICATION ISSUES:  CARLOS reviewed as expected.  Discussed medication options and treatment plan of prescribed medication as well as the risks, benefits, and side effects including potential falls, possible impaired driving and metabolic adversities among others. Patient is agreeable to call the office with any worsening of symptoms or onset of side effects. Patient is agreeable to call 911 or go to the nearest ER should he/she begin having SI/HI. No medication side effects or related complaints today.            This document has been electronically signed by Page Lee PA-C  December 19, 2024 10:29 EST      Part of this note may be an electronic transcription/translation of spoken language to printed text using the Dragon Dictation System.

## 2025-01-29 NOTE — PROGRESS NOTES
[facility]  Cardiology progress Note    Patient Name: Asad Meyer  : 1965    CHIEF COMPLAINT  Hypertension        Subjective   Subjective     HISTORY OF PRESENT ILLNESS    Asad Meyer is a 59 y.o. male history of hypertension.  No chest pain.    REVIEW OF SYSTEMS    Constitutional:    No fever, no weight loss  Skin:     No rash  Otolaryngeal:    No difficulty swallowing  Cardiovascular: See HPI.  Pulmonary:    No cough, no sputum production    Personal History     Social History:    reports that he has been smoking cigarettes. He started smoking about 32 years ago. He has a 16 pack-year smoking history. He has been exposed to tobacco smoke. He has quit using smokeless tobacco.  His smokeless tobacco use included snuff. He reports that he does not currently use alcohol after a past usage of about 9.0 standard drinks of alcohol per week. He reports that he does not currently use drugs.    Home Medications:  Current Outpatient Medications on File Prior to Visit   Medication Sig    albuterol (ACCUNEB) 0.63 MG/3ML nebulizer solution Take 3 mL by nebulization Every 6 (Six) Hours As Needed for Wheezing.    Albuterol-Budesonide (Airsupra) 90-80 MCG/ACT aerosol Inhale 2 puffs 4 (Four) Times a Day As Needed (Wheezing).    ALPRAZolam (XANAX) 1 MG tablet Take 1 tablet by mouth 3 (Three) Times a Day As Needed for Anxiety. for anxiety    amLODIPine (NORVASC) 10 MG tablet Take 1 tablet by mouth Daily.    atorvastatin (Lipitor) 20 MG tablet Take 1 tablet by mouth Daily.    bisoprolol-hydrochlorothiazide (Ziac) 10-6.25 MG per tablet Take 1 tablet by mouth Daily.    Cariprazine HCl (Vraylar) 3 MG capsule capsule Take 1 capsule by mouth Daily.    cloNIDine (Catapres) 0.2 MG tablet Take 1 tablet by mouth Every Night.    Fluticasone-Umeclidin-Vilant (Trelegy Ellipta) 200-62.5-25 MCG/ACT inhaler Inhale 1 puff Daily.    furosemide (LASIX) 20 MG tablet Take 1 tablet by mouth Daily.    ipratropium-albuterol (DUO-NEB)  0.5-2.5 mg/3 ml nebulizer Take 3 mL by nebulization 4 (Four) Times a Day.    lisinopril (PRINIVIL,ZESTRIL) 40 MG tablet Take 1 tablet by mouth Daily.    mirtazapine (REMERON) 45 MG tablet Take 1 tablet by mouth Every Night.    nicotine (Nicoderm CQ) 21 MG/24HR patch Place 1 patch on the skin as directed by provider Daily.     No current facility-administered medications on file prior to visit.       Past Medical History:   Diagnosis Date    Anxiety     Asthma     Asthma, extrinsic     Chronic pain disorder     Depression     Emphysema of lung     Essential (primary) hypertension 10/27/2022    Pneumonia of right lower lobe due to infectious organism 04/06/2023    PTSD (post-traumatic stress disorder)     Rheumatoid arthritis     Schizoaffective disorder     Self-injurious behavior     Suicide attempt        Allergies:  No Known Allergies    Objective    Objective       Vitals:   Heart Rate:  [89] 89  BP: (133)/(92) 133/92  Body mass index is 34.14 kg/m².     PHYSICAL EXAM:    General Appearance:   well developed  well nourished  HENT:   oropharynx moist  lips not cyanotic  Neck:  thyroid not enlarged  supple  Respiratory:  no respiratory distress  normal breath sounds  no rales  Cardiovascular:  no jugular venous distention  regular rhythm  apical impulse normal  S1 normal, S2 normal  no S3, no S4   no murmur  no rub, no thrill  carotid pulses normal; no bruit  pedal pulses normal  lower extremity edema: none    Skin:   warm, dry  Psychiatric:  judgement and insight appropriate  normal mood and affect        Result Review:  I have personally reviewed the available results from  [x]  Laboratory  [x]  EKG  [x]  Cardiology  [x]  Medications  [x]  Old records  []  Other:     Procedures  Lab Results   Component Value Date    CHOL 181 08/01/2024    CHOL 186 06/30/2023    CHOL 243 (H) 12/06/2022     Lab Results   Component Value Date    TRIG 194 (H) 08/01/2024    TRIG 254 (H) 06/30/2023    TRIG 268 (H) 12/06/2022     Lab  Results   Component Value Date    HDL 35 (L) 08/01/2024    HDL 38 (L) 06/30/2023    HDL 44 12/06/2022     Lab Results   Component Value Date     (H) 08/01/2024     (H) 06/30/2023     (H) 12/06/2022     Lab Results   Component Value Date    VLDL 34 08/01/2024    VLDL 44 (H) 06/30/2023    VLDL 49 (H) 12/06/2022     Results for orders placed during the hospital encounter of 09/04/24    Adult Transthoracic Echo Complete W/ Cont if Necessary Per Protocol    Interpretation Summary    Left ventricular ejection fraction appears to be 61 - 65%.    Left ventricular wall thickness is consistent with septal asymmetric hypertrophy.    Left ventricular diastolic function is consistent with (grade I) impaired relaxation and age.    The left atrial cavity is mildly dilated.    No significant valvular disease.     Impression/Plan:  1. Controlled hypertension/hypertensive cardiovascular disease: Continue Ziac to 10/6.25 mg once a day.  Continue amlodipine 10 mg once a day.  Continue lisinopril 40 mg once a day.  Monitor blood pressure regularly.  Recent echo shows normal left ventricular systolic function with left ventricular hypertrophy.  2.  Mixed hyperlipidemia: Continue Lipitor 20 mg once a day.  Monitor lipid and hepatic profile.  3.  COPD: Continue current bronchodilators.  4.  Positive nicotine use: Continue nicotine transdermal patch.  Smoking cessation discussed with patient.              Escobar Salgado MD   01/30/25   12:53 EST

## 2025-01-30 ENCOUNTER — OFFICE VISIT (OUTPATIENT)
Dept: CARDIOLOGY | Facility: CLINIC | Age: 60
End: 2025-01-30
Payer: MEDICAID

## 2025-01-30 VITALS
DIASTOLIC BLOOD PRESSURE: 92 MMHG | BODY MASS INDEX: 34.21 KG/M2 | SYSTOLIC BLOOD PRESSURE: 133 MMHG | WEIGHT: 218 LBS | HEART RATE: 89 BPM | HEIGHT: 67 IN

## 2025-01-30 DIAGNOSIS — Z72.0 NICOTINE USE: ICD-10-CM

## 2025-01-30 DIAGNOSIS — I10 HYPERTENSION, ESSENTIAL: Primary | ICD-10-CM

## 2025-01-30 DIAGNOSIS — E78.2 HYPERLIPEMIA, MIXED: ICD-10-CM

## 2025-01-30 DIAGNOSIS — R06.02 SHORTNESS OF BREATH: ICD-10-CM

## 2025-01-30 PROCEDURE — 3080F DIAST BP >= 90 MM HG: CPT | Performed by: SPECIALIST

## 2025-01-30 PROCEDURE — 1160F RVW MEDS BY RX/DR IN RCRD: CPT | Performed by: SPECIALIST

## 2025-01-30 PROCEDURE — 1159F MED LIST DOCD IN RCRD: CPT | Performed by: SPECIALIST

## 2025-01-30 PROCEDURE — 99214 OFFICE O/P EST MOD 30 MIN: CPT | Performed by: SPECIALIST

## 2025-01-30 PROCEDURE — 3075F SYST BP GE 130 - 139MM HG: CPT | Performed by: SPECIALIST

## 2025-02-03 RX ORDER — ATORVASTATIN CALCIUM 20 MG/1
20 TABLET, FILM COATED ORAL DAILY
Qty: 90 TABLET | Refills: 1 | Status: SHIPPED | OUTPATIENT
Start: 2025-02-03

## 2025-02-03 NOTE — TELEPHONE ENCOUNTER
Rx Refill Note  Requested Prescriptions     Pending Prescriptions Disp Refills    atorvastatin (LIPITOR) 20 MG tablet [Pharmacy Med Name: atorvastatin 20 mg tablet] 90 tablet 1     Sig: TAKE 1 TABLET BY MOUTH EVERY DAY      Last office visit with prescribing clinician: 12/2/2024   Last telemedicine visit with prescribing clinician: Visit date not found   Next office visit with prescribing clinician: 3/3/2025                         Would you like a call back once the refill request has been completed: [] Yes [] No    If the office needs to give you a call back, can they leave a voicemail: [] Yes [] No    Stephy Cohn MA  02/03/25, 16:39 EST

## 2025-02-10 DIAGNOSIS — I10 ESSENTIAL (PRIMARY) HYPERTENSION: ICD-10-CM

## 2025-02-10 DIAGNOSIS — F41.0 PANIC DISORDER: ICD-10-CM

## 2025-02-10 RX ORDER — ALPRAZOLAM 1 MG/1
1 TABLET ORAL 3 TIMES DAILY PRN
Qty: 90 TABLET | Refills: 0 | Status: SHIPPED | OUTPATIENT
Start: 2025-02-10

## 2025-02-10 RX ORDER — FUROSEMIDE 20 MG/1
20 TABLET ORAL DAILY
Qty: 90 TABLET | Refills: 1 | Status: SHIPPED | OUTPATIENT
Start: 2025-02-10

## 2025-02-10 NOTE — TELEPHONE ENCOUNTER
REFILL REQUEST:     ALPRAZolam (XANAX) 1 MG tablet (01/02/2025)     F/UP- 03/13/2025.  LOV: 10/24/2024.    LAST UDS:  POC Medline 12 Panel Urine Drug Screen (05/03/2024 10:34)

## 2025-02-14 DIAGNOSIS — J44.9 CHRONIC OBSTRUCTIVE PULMONARY DISEASE, UNSPECIFIED COPD TYPE: ICD-10-CM

## 2025-02-14 RX ORDER — ALBUTEROL SULFATE AND BUDESONIDE 90; 80 UG/1; UG/1
AEROSOL, METERED RESPIRATORY (INHALATION)
Qty: 10.7 G | Refills: 3 | Status: SHIPPED | OUTPATIENT
Start: 2025-02-14

## 2025-03-03 ENCOUNTER — OFFICE VISIT (OUTPATIENT)
Dept: INTERNAL MEDICINE | Age: 60
End: 2025-03-03
Payer: MEDICAID

## 2025-03-03 VITALS
DIASTOLIC BLOOD PRESSURE: 84 MMHG | HEIGHT: 67 IN | HEART RATE: 76 BPM | RESPIRATION RATE: 18 BRPM | TEMPERATURE: 98.7 F | SYSTOLIC BLOOD PRESSURE: 116 MMHG | BODY MASS INDEX: 34.84 KG/M2 | WEIGHT: 222 LBS | OXYGEN SATURATION: 95 %

## 2025-03-03 DIAGNOSIS — F41.1 GAD (GENERALIZED ANXIETY DISORDER): ICD-10-CM

## 2025-03-03 DIAGNOSIS — E78.2 MIXED HYPERLIPIDEMIA: ICD-10-CM

## 2025-03-03 DIAGNOSIS — E66.811 CLASS 1 OBESITY DUE TO EXCESS CALORIES WITH SERIOUS COMORBIDITY AND BODY MASS INDEX (BMI) OF 34.0 TO 34.9 IN ADULT: ICD-10-CM

## 2025-03-03 DIAGNOSIS — E66.09 CLASS 1 OBESITY DUE TO EXCESS CALORIES WITH SERIOUS COMORBIDITY AND BODY MASS INDEX (BMI) OF 34.0 TO 34.9 IN ADULT: ICD-10-CM

## 2025-03-03 DIAGNOSIS — I10 ESSENTIAL (PRIMARY) HYPERTENSION: Primary | ICD-10-CM

## 2025-03-03 PROCEDURE — 1126F AMNT PAIN NOTED NONE PRSNT: CPT

## 2025-03-03 PROCEDURE — 3074F SYST BP LT 130 MM HG: CPT

## 2025-03-03 PROCEDURE — 1160F RVW MEDS BY RX/DR IN RCRD: CPT

## 2025-03-03 PROCEDURE — 3079F DIAST BP 80-89 MM HG: CPT

## 2025-03-03 PROCEDURE — 1159F MED LIST DOCD IN RCRD: CPT

## 2025-03-03 PROCEDURE — 99214 OFFICE O/P EST MOD 30 MIN: CPT

## 2025-03-03 NOTE — ASSESSMENT & PLAN NOTE
Stable for patient.  Currently taking Trelegy, Airsupra, albuterol.  Continue current medication regimen

## 2025-03-03 NOTE — ASSESSMENT & PLAN NOTE
Anxiety controlled.    Currently taking alprazolam 1 mg tablet 3 times daily as needed  Continue current medication regimen

## 2025-03-03 NOTE — ASSESSMENT & PLAN NOTE
Blood pressure is stable for patient.    Currently taking bisoprolol hydrochlorothiazide 10-6.25 mg tablet daily.    Continue current medication regimen

## 2025-03-03 NOTE — ASSESSMENT & PLAN NOTE
No recent labs completed.  Currently taking atorvastatin 20 mg tablet daily.  Lipid panel ordered.

## 2025-03-03 NOTE — PROGRESS NOTES
Chief Complaint  Primary Care Follow-Up (60 year old male her today for a 3 month follow up. He is due for labs and has orders pended./He denies any issues or concerns at this time)    Primary Care Follow-Up    SUBJECTIVE  Asad Meyer presents to [facility]     History of Present Illness  The patient presents for a follow-up visit on chronic conditions.     He reports no current concerns or questions. He has not yet completed his lab work. He is not experiencing any chest pain, shortness of breath, or palpitations.    His hypertension appears to be well-managed with clonidine, hydrochlorothiazide, and bisoprolol. He is no longer taking lisinopril, as it was discontinued by Dr. Gutiérrez.    He is on atorvastatin for cholesterol management and reports no issues with this medication.    His anxiety levels have been fluctuating, which he attributes to being confined indoors due to snow. He is currently taking Xanax as needed for anxiety.    He reports satisfactory sleep quality and is currently on mirtazapine for depression.    He reports satisfactory respiratory function, with a slight wheeze that he considers normal for him. He is currently under the care of a pulmonologist. He is using Trelegy and Airsupra twice daily, and albuterol as needed. He also uses a nebulizer as required but has not felt congested recently and therefore has not needed to use it frequently.        Past Medical History:   Diagnosis Date    Anxiety     Asthma     Asthma, extrinsic     Chronic pain disorder     Depression     Emphysema of lung     Essential (primary) hypertension 10/27/2022    Pneumonia of right lower lobe due to infectious organism 04/06/2023    PTSD (post-traumatic stress disorder)     Rheumatoid arthritis     Schizoaffective disorder     Self-injurious behavior     Suicide attempt       Family History   Problem Relation Age of Onset    Alcohol abuse Father     Asthma Father     Emphysema  Father     Heart failure Father       Past Surgical History:   Procedure Laterality Date    ANKLE SURGERY Right     BRONCHOSCOPY N/A 4/11/2023    Procedure: BRONCHOSCOPY WITH BRONCHOALVEOLAR LAVAGE;  Surgeon: Taiwo Copeland DO;  Location: [facility];  Service: Pulmonary;  Laterality: N/A;  DIFFUSE BRONCHITIS        Current Outpatient Medications:     Airsupra 90-80 MCG/ACT aerosol, inhale 2 puffs FOUR TIMES DAILY AS NEEDED FOR wheezing, Disp: 10.7 g, Rfl: 3    albuterol (ACCUNEB) 0.63 MG/3ML nebulizer solution, Take 3 mL by nebulization Every 6 (Six) Hours As Needed for Wheezing., Disp: 270 each, Rfl: 1    ALPRAZolam (XANAX) 1 MG tablet, TAKE 1 TABLET BY MOUTH THREE TIMES DAILY AS NEEDED FOR ANXIETY, Disp: 90 tablet, Rfl: 0    amLODIPine (NORVASC) 10 MG tablet, Take 1 tablet by mouth Daily., Disp: 90 tablet, Rfl: 6    atorvastatin (LIPITOR) 20 MG tablet, TAKE 1 TABLET BY MOUTH EVERY DAY, Disp: 90 tablet, Rfl: 1    bisoprolol-hydrochlorothiazide (Ziac) 10-6.25 MG per tablet, Take 1 tablet by mouth Daily., Disp: 90 tablet, Rfl: 6    Cariprazine HCl (Vraylar) 3 MG capsule capsule, Take 1 capsule by mouth Daily., Disp: 30 capsule, Rfl: 2    cloNIDine (Catapres) 0.2 MG tablet, Take 1 tablet by mouth Every Night., Disp: 90 tablet, Rfl: 0    Fluticasone-Umeclidin-Vilant (Trelegy Ellipta) 200-62.5-25 MCG/ACT inhaler, Inhale 1 puff Daily., Disp: 60 each, Rfl: 5    furosemide (LASIX) 20 MG tablet, TAKE 1 TABLET BY MOUTH EVERY DAY (Patient not taking: Reported on 3/3/2025), Disp: 90 tablet, Rfl: 1    mirtazapine (REMERON) 45 MG tablet, Take 1 tablet by mouth Every Night., Disp: 90 tablet, Rfl: 0    nicotine (Nicoderm CQ) 21 MG/24HR patch, Place 1 patch on the skin as directed by provider Daily., Disp: 30 patch, Rfl: 1    ipratropium-albuterol (DUO-NEB) 0.5-2.5 mg/3 ml nebulizer, Take 3 mL by nebulization 4 (Four) Times a Day., Disp: 360 mL, Rfl: 2    OBJECTIVE  Vital Signs:   /84 (BP Location: Left arm,  "Patient Position: Sitting)   Pulse 76   Temp 98.7 °F (37.1 °C) (Temporal)   Resp 18   Ht 170.2 cm (67\")   Wt 101 kg (222 lb)   SpO2 95%   BMI 34.77 kg/m²    Estimated body mass index is 34.77 kg/m² as calculated from the following:    Height as of this encounter: 170.2 cm (67\").    Weight as of this encounter: 101 kg (222 lb).     Wt Readings from Last 3 Encounters:   03/03/25 101 kg (222 lb)   01/30/25 98.9 kg (218 lb)   12/19/24 104 kg (229 lb 6.4 oz)     BP Readings from Last 3 Encounters:   03/03/25 116/84   01/30/25 133/92   12/19/24 138/86       Physical Exam  Vitals and nursing note reviewed.   Constitutional:       Appearance: Normal appearance.   HENT:      Head: Normocephalic and atraumatic.   Cardiovascular:      Rate and Rhythm: Normal rate and regular rhythm.      Heart sounds: Normal heart sounds.   Pulmonary:      Effort: Pulmonary effort is normal.      Breath sounds: Examination of the right-upper field reveals decreased breath sounds, wheezing and rhonchi. Examination of the left-upper field reveals decreased breath sounds, wheezing and rhonchi. Examination of the right-middle field reveals decreased breath sounds, wheezing and rhonchi. Examination of the left-middle field reveals decreased breath sounds, wheezing and rhonchi. Examination of the right-lower field reveals decreased breath sounds and wheezing. Examination of the left-lower field reveals decreased breath sounds and wheezing. Wheezing and rhonchi present.   Abdominal:      General: Abdomen is flat. Bowel sounds are normal.      Palpations: Abdomen is soft.   Musculoskeletal:         General: Normal range of motion.      Cervical back: Normal range of motion.   Skin:     General: Skin is warm and dry.   Neurological:      General: No focal deficit present.      Mental Status: He is alert and oriented to person, place, and time. Mental status is at baseline.   Psychiatric:         Mood and Affect: Mood normal.         Behavior: " Behavior normal.         Thought Content: Thought content normal.         Judgment: Judgment normal.          Result Review        No Images in the past 120 days found..     The above data has been reviewed by PONCE Valadez 03/03/2025 12:00 EST.          Patient Care Team:  Jailyn Shepard APRN as PCP - General (Internal Medicine)  Michael Haile MD as Consulting Physician (Pulmonary Disease)            ASSESSMENT & PLAN    Diagnoses and all orders for this visit:    1. Essential (primary) hypertension (Primary)  Assessment & Plan:  Blood pressure is stable for patient.    Currently taking bisoprolol hydrochlorothiazide 10-6.25 mg tablet daily.    Continue current medication regimen       2. Class 1 obesity due to excess calories with serious comorbidity and body mass index (BMI) of 34.0 to 34.9 in adult    3. Mixed hyperlipidemia  Assessment & Plan:  No recent labs completed.  Currently taking atorvastatin 20 mg tablet daily.  Lipid panel ordered.        4. GREG (generalized anxiety disorder)  Assessment & Plan:     Anxiety controlled.    Currently taking alprazolam 1 mg tablet 3 times daily as needed  Continue current medication regimen           Assessment & Plan  1. Hypertension.  His blood pressure is well-controlled at 116/84 mmHg on the current regimen of clonidine, hydrochlorothiazide, and bisoprolol. Lisinopril has been discontinued by Dr. Valdivia. He should continue his current medications, weight loss, low sodium diet.    2. Cholesterol management.  He is currently on atorvastatin and reports managing well. Fasting labs have been ordered to assess cholesterol levels and kidney function. He should complete these labs during his next visit to [facility]. Adjustments to his medication will be considered based on the lab results.    3. Anxiety.  He reports fluctuating anxiety levels, exacerbated by being confined indoors due to snow. He uses Xanax as needed for anxiety management.    4. Respiratory issues.  He  reports a slight wheeze but no congestion. He uses Trelegy. He also utilizes Airsupra twice a day. He also has neb treatments he can use PRN. He should continue using his medications as prescribed and follow up with pulmonology as needed.    5. Depression.  He is currently taking mirtazapine and reports sleeping well.    6. Health maintenance.  He is due for colon cancer screening but declines the Cologuard test.    Follow-up  The patient will follow up in 3 months.      Tobacco Use: High Risk (3/3/2025)    Patient History     Smoking Tobacco Use: Some Days     Smokeless Tobacco Use: Former     Passive Exposure: Current       Follow Up     Return in about 3 months (around 6/3/2025).    Please note that portions of this note were completed with a voice recognition program.    Patient was given instructions and counseling regarding his condition or for health maintenance advice. Please see specific information pulled into the AVS if appropriate.   I have reviewed information obtained and documented by others and I have confirmed the accuracy of this documented note.    PONCE Valadez    Patient or patient representative verbalized consent for the use of Ambient Listening during the visit with  PONCE Valadez for chart documentation. 3/3/2025  12:01 EST

## 2025-03-13 ENCOUNTER — OFFICE VISIT (OUTPATIENT)
Dept: BEHAVIORAL HEALTH | Facility: CLINIC | Age: 60
End: 2025-03-13
Payer: MEDICAID

## 2025-03-13 VITALS
BODY MASS INDEX: 34.61 KG/M2 | HEART RATE: 73 BPM | HEIGHT: 67 IN | DIASTOLIC BLOOD PRESSURE: 92 MMHG | SYSTOLIC BLOOD PRESSURE: 132 MMHG | WEIGHT: 220.5 LBS

## 2025-03-13 DIAGNOSIS — Z79.899 MEDICATION MANAGEMENT: ICD-10-CM

## 2025-03-13 DIAGNOSIS — F33.0 MILD EPISODE OF RECURRENT MAJOR DEPRESSIVE DISORDER: ICD-10-CM

## 2025-03-13 DIAGNOSIS — F41.0 PANIC DISORDER: ICD-10-CM

## 2025-03-13 DIAGNOSIS — G47.00 INSOMNIA, UNSPECIFIED TYPE: ICD-10-CM

## 2025-03-13 DIAGNOSIS — F41.1 GENERALIZED ANXIETY DISORDER: Primary | ICD-10-CM

## 2025-03-13 RX ORDER — ALPRAZOLAM 1 MG/1
1 TABLET ORAL 3 TIMES DAILY PRN
Qty: 90 TABLET | Refills: 0 | Status: SHIPPED | OUTPATIENT
Start: 2025-03-13

## 2025-03-13 RX ORDER — MIRTAZAPINE 45 MG/1
45 TABLET, FILM COATED ORAL NIGHTLY
Qty: 90 TABLET | Refills: 0 | Status: SHIPPED | OUTPATIENT
Start: 2025-03-13

## 2025-03-13 RX ORDER — CLONIDINE HYDROCHLORIDE 0.2 MG/1
0.2 TABLET ORAL NIGHTLY
Qty: 90 TABLET | Refills: 0 | Status: SHIPPED | OUTPATIENT
Start: 2025-03-13

## 2025-03-13 NOTE — PROGRESS NOTES
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 moderate to severe, but could be further elevated in the event of treatment noncompliance and/or AODA.  Labs/Diagnostics Ordered:   Orders Placed This Encounter   Procedures    Urine Drug Screen - Urine, Clean Catch     Medications:   New Medications Ordered This Visit   Medications    cloNIDine (Catapres) 0.2 MG tablet     Sig: Take 1 tablet by mouth Every Night.     Dispense:  90 tablet     Refill:  0    ALPRAZolam (XANAX) 1 MG tablet     Sig: Take 1 tablet by mouth 3 (Three) Times a Day As Needed for Anxiety. for anxiety     Dispense:  90 tablet     Refill:  0    Cariprazine HCl (Vraylar) 3 MG capsule capsule     Sig: Take 1 capsule by mouth Daily.     Dispense:  30 capsule     Refill:  2     This prescription was filled on 11/20/2024. Any refills authorized will be placed on file.    mirtazapine (REMERON) 45 MG tablet     Sig: Take 1 tablet by mouth Every Night.     Dispense:  90 tablet     Refill:  0     Discussed all risks, benefits, alternatives, and side effects of Xanax including but not limited to risks of abuse, misuse, and addiction, which can lead to overdose or death; risks of dependence and withdrawal reactions; drowsiness, sedation, fatigue, depression, dizziness, ataxia, weakness, confusion, forgetfulness, hypotension, falls risk, respiratory depression, anterograde amnesia, paradoxical reactions such as hyperactivity  or aggressive behavior; and hallucinations. Pt educated on the need to practice safe sex while taking this med. Instructed pt to avoid performing tasks that require mental alertness such as driving or operating machinery. Discussed the need for pt to immediately call the office for any new or worsening symptoms, such as changes in mood or behavior, and all other concerns. Pt educated on med compliance, including the proper use and monitoring for signs and symptoms of abuse, misuse, and addiction. Pt verbalized understanding and is agreeable to taking Xanax. CARLOS obtained and USD ordered. Controlled substances agreement verbally signed. Addressed all questions and concerns.     Clonidine, Risks, benefits, alternatives discussed with patient including dizziness, sedation, falls, low blood pressure, GI upset.  Use care when operating vehicle, vessel, or machine. After discussion of these risks and benefits, the patient voiced understanding and agreed to proceed.    Discussed all risks, benefits, alternatives, and side effects of Mirtazapine including but not limited to GI upset, sexual dysfunction, weight gain, dizziness, bleeding risk, seizure risk, sedation, headache, activation of shakir or hypomania, drug-inducted movement disorders (akathisia, dystonia, restless leg syndrome), dyslipidemia, hematologic abnormalities, orthostatic hypotension, sedation, withdrawal syndrome, serotonin syndrome, and activation of suicidal ideation and behavior.  Pt educated on the need to practice safe sex while taking this med. Discussed the need for pt to immediately call the office for any new or worsening symptoms, such as worsening depression; feeling nervous or restless; suicidal thoughts or actions; or other changes changes in mood or behavior, and all other concerns. Pt educated on med compliance and the risks of suddenly stopping this medication or missing doses. Pt verbalized understanding and is agreeable to taking  Mirtazapine. Addressed all questions and concerns.     Vraylar, Risks, benefits, alternatives discussed with patient including nausea and vomiting, GI upset, sedation, akathisia, theoretical risk of tardive dyskinesia/dystonia, movement issues, and weight gain. Use care when operating vehicle, vessel, or machine. After discussion of these risks and benefits, the patient voiced understanding and agreed to proceed.    Follow up:   F/u in 3 months    TREATMENT PLAN/GOALS: Continue supportive psychotherapy efforts and medications as indicated. Treatment and medication options discussed during today's visit. Patient ackowledged and verbally consented to continue with current treatment plan and was educated on the importance of compliance with treatment and follow-up appointments.    MEDICATION ISSUES:  CARLOS reviewed as expected.  Discussed medication options and treatment plan of prescribed medication as well as the risks, benefits, and side effects including potential falls, possible impaired driving and metabolic adversities among others. Patient is agreeable to call the office with any worsening of symptoms or onset of side effects. Patient is agreeable to call 911 or go to the nearest ER should he/she begin having SI/HI. No medication side effects or related complaints today.            This document has been electronically signed by Page Lee PA-C  March 13, 2025 11:30 EDT      Part of this note may be an electronic transcription/translation of spoken language to printed text using the Dragon Dictation System.

## 2025-04-09 ENCOUNTER — OFFICE VISIT (OUTPATIENT)
Dept: PULMONOLOGY | Facility: CLINIC | Age: 60
End: 2025-04-09
Payer: MEDICAID

## 2025-04-09 VITALS
SYSTOLIC BLOOD PRESSURE: 173 MMHG | DIASTOLIC BLOOD PRESSURE: 123 MMHG | HEIGHT: 67 IN | OXYGEN SATURATION: 96 % | BODY MASS INDEX: 34.65 KG/M2 | TEMPERATURE: 98.4 F | RESPIRATION RATE: 16 BRPM | WEIGHT: 220.8 LBS | HEART RATE: 66 BPM

## 2025-04-09 DIAGNOSIS — E66.01 MORBID (SEVERE) OBESITY DUE TO EXCESS CALORIES: ICD-10-CM

## 2025-04-09 DIAGNOSIS — Z72.0 TOBACCO ABUSE: ICD-10-CM

## 2025-04-09 DIAGNOSIS — J44.9 CHRONIC OBSTRUCTIVE PULMONARY DISEASE, UNSPECIFIED COPD TYPE: ICD-10-CM

## 2025-04-09 DIAGNOSIS — J41.8 MIXED SIMPLE AND MUCOPURULENT CHRONIC BRONCHITIS: Primary | ICD-10-CM

## 2025-04-09 RX ORDER — ALBUTEROL SULFATE 0.63 MG/3ML
1 SOLUTION RESPIRATORY (INHALATION) EVERY 6 HOURS PRN
Qty: 270 EACH | Refills: 1 | Status: SHIPPED | OUTPATIENT
Start: 2025-04-09

## 2025-04-09 RX ORDER — FLUTICASONE FUROATE, UMECLIDINIUM BROMIDE AND VILANTEROL TRIFENATATE 200; 62.5; 25 UG/1; UG/1; UG/1
1 POWDER RESPIRATORY (INHALATION) DAILY
Qty: 60 EACH | Refills: 5 | Status: SHIPPED | OUTPATIENT
Start: 2025-04-09

## 2025-04-09 NOTE — PROGRESS NOTES
Pulmonary Office Follow-up    Subjective     Asad Meyer is seen today at the office for   Chief Complaint   Patient presents with    Follow-up     4 month    oxygen    Bronchitis    COPD    tobacco use    Shortness of Breath    Wheezing         HPI  Asad Meyer is a 60 y.o. male with a PMH significant for COPD and tobacco abuse presents for follow-up patient continues to smoke but is cutting back  Patient denies any significant cough or expectoration and has breathing has improved he is on Trelegy daily      Tobacco use history:        Patient Active Problem List   Diagnosis    Asthma    Essential (primary) hypertension    Psychophysiological insomnia    GREG (generalized anxiety disorder)    Polyarthralgia    Chronic obstructive pulmonary disease    Mixed hyperlipidemia    DDD (degenerative disc disease), thoracolumbar    Osteoarthritis of multiple joints    Cigarette nicotine dependence without complication    Pneumonia of right lower lobe due to infectious organism    Ground glass opacity present on imaging of lung    Annual physical exam    Renal insufficiency    Acute pain of right knee    Severe sepsis    Baker cyst, right    Sprain of medial collateral ligament of right knee    Acute medial meniscus tear of right knee    Primary osteoarthritis of right knee    Class 1 obesity due to excess calories with serious comorbidity and body mass index (BMI) of 34.0 to 34.9 in adult       Review of Systems  Review of Systems   Respiratory:  Positive for shortness of breath.    All other systems reviewed and are negative.    As described in the HPI. Otherwise, remainder of ROS (14 systems) were negative.    Medications, Allergies, Social, and Family Histories reviewed as per EMR.    Result Review :            Objective     Vitals:    04/09/25 1120   BP: (!) 173/123   Pulse: 66   Resp: 16   Temp: 98.4 °F (36.9 °C)   SpO2: 96%         04/09/25  1120   Weight: 100 kg (220 lb 12.8 oz)       Physical Exam  Vitals and  nursing note reviewed.   Constitutional:       Appearance: Normal appearance.   HENT:      Head: Normocephalic and atraumatic.      Nose: Nose normal.      Mouth/Throat:      Pharynx: Oropharynx is clear.   Eyes:      Extraocular Movements: Extraocular movements intact.      Conjunctiva/sclera: Conjunctivae normal.   Cardiovascular:      Rate and Rhythm: Normal rate and regular rhythm.      Pulses: Normal pulses.      Heart sounds: Normal heart sounds.   Pulmonary:      Effort: Pulmonary effort is normal.      Breath sounds: Normal breath sounds.   Musculoskeletal:         General: Normal range of motion.      Cervical back: Normal range of motion and neck supple.   Skin:     General: Skin is warm.      Capillary Refill: Capillary refill takes 2 to 3 seconds.   Neurological:      Mental Status: He is alert and oriented to person, place, and time.   Psychiatric:         Mood and Affect: Mood normal.         No radiology results for the last 90 days.     Assessment & Plan     Diagnoses and all orders for this visit:    1. Mixed simple and mucopurulent chronic bronchitis (Primary)    2. Tobacco abuse    3. Morbid (severe) obesity due to excess calories    4. Chronic obstructive pulmonary disease, unspecified COPD type  -     Fluticasone-Umeclidin-Vilant (Trelegy Ellipta) 200-62.5-25 MCG/ACT inhaler; Inhale 1 puff Daily.  Dispense: 60 each; Refill: 5    Other orders  -     albuterol (ACCUNEB) 0.63 MG/3ML nebulizer solution; Take 3 mL by nebulization Every 6 (Six) Hours As Needed for Wheezing.  Dispense: 270 each; Refill: 1         Discussion/ Recommendations:   Encouraged to quit smoking  Continue Trelegy daily  Albuterol inhaler as needed  Albuterol nebs as needed  CT scan reviewed no nodules or masses noted  Continue yearly screening  Vaccinations discussed and recommended             Return in about 4 months (around 8/9/2025).          This document has been electronically signed by Michael Haile MD on April 9,  2025 11:28 EDT

## 2025-04-17 ENCOUNTER — LAB (OUTPATIENT)
Dept: LAB | Facility: HOSPITAL | Age: 60
End: 2025-04-17
Payer: MEDICAID

## 2025-04-17 DIAGNOSIS — I10 ESSENTIAL (PRIMARY) HYPERTENSION: ICD-10-CM

## 2025-04-17 DIAGNOSIS — Z79.899 MEDICATION MANAGEMENT: ICD-10-CM

## 2025-04-17 DIAGNOSIS — Z12.5 SCREENING FOR PROSTATE CANCER: ICD-10-CM

## 2025-04-17 DIAGNOSIS — E78.2 MIXED HYPERLIPIDEMIA: ICD-10-CM

## 2025-04-17 LAB
ALBUMIN SERPL-MCNC: 3.7 G/DL (ref 3.5–5.2)
ALBUMIN/GLOB SERPL: 1 G/DL
ALP SERPL-CCNC: 154 U/L (ref 39–117)
ALT SERPL W P-5'-P-CCNC: 28 U/L (ref 1–41)
AMPHET+METHAMPHET UR QL: NEGATIVE
AMPHETAMINES UR QL: NEGATIVE
ANION GAP SERPL CALCULATED.3IONS-SCNC: 11.2 MMOL/L (ref 5–15)
AST SERPL-CCNC: 24 U/L (ref 1–40)
BARBITURATES UR QL SCN: NEGATIVE
BASOPHILS # BLD AUTO: 0.07 10*3/MM3 (ref 0–0.2)
BASOPHILS NFR BLD AUTO: 0.8 % (ref 0–1.5)
BENZODIAZ UR QL SCN: POSITIVE
BILIRUB SERPL-MCNC: 0.5 MG/DL (ref 0–1.2)
BUN SERPL-MCNC: 18 MG/DL (ref 6–20)
BUN/CREAT SERPL: 14.5 (ref 7–25)
BUPRENORPHINE SERPL-MCNC: NEGATIVE NG/ML
CALCIUM SPEC-SCNC: 9.7 MG/DL (ref 8.6–10.5)
CANNABINOIDS SERPL QL: POSITIVE
CHLORIDE SERPL-SCNC: 105 MMOL/L (ref 98–107)
CHOLEST SERPL-MCNC: 190 MG/DL (ref 0–200)
CO2 SERPL-SCNC: 23.8 MMOL/L (ref 22–29)
COCAINE UR QL: NEGATIVE
CREAT SERPL-MCNC: 1.24 MG/DL (ref 0.76–1.27)
DEPRECATED RDW RBC AUTO: 46.2 FL (ref 37–54)
EGFRCR SERPLBLD CKD-EPI 2021: 67.4 ML/MIN/1.73
EOSINOPHIL # BLD AUTO: 0.29 10*3/MM3 (ref 0–0.4)
EOSINOPHIL NFR BLD AUTO: 3.2 % (ref 0.3–6.2)
ERYTHROCYTE [DISTWIDTH] IN BLOOD BY AUTOMATED COUNT: 14 % (ref 12.3–15.4)
FENTANYL UR-MCNC: NEGATIVE NG/ML
FOLATE SERPL-MCNC: 6.49 NG/ML (ref 4.78–24.2)
GLOBULIN UR ELPH-MCNC: 3.8 GM/DL
GLUCOSE SERPL-MCNC: 98 MG/DL (ref 65–99)
HCT VFR BLD AUTO: 49.3 % (ref 37.5–51)
HDLC SERPL-MCNC: 32 MG/DL (ref 40–60)
HGB BLD-MCNC: 16.5 G/DL (ref 13–17.7)
IMM GRANULOCYTES # BLD AUTO: 0.04 10*3/MM3 (ref 0–0.05)
IMM GRANULOCYTES NFR BLD AUTO: 0.4 % (ref 0–0.5)
LDLC SERPL CALC-MCNC: 129 MG/DL (ref 0–100)
LDLC/HDLC SERPL: 3.93 {RATIO}
LYMPHOCYTES # BLD AUTO: 2.67 10*3/MM3 (ref 0.7–3.1)
LYMPHOCYTES NFR BLD AUTO: 29.8 % (ref 19.6–45.3)
MCH RBC QN AUTO: 30.4 PG (ref 26.6–33)
MCHC RBC AUTO-ENTMCNC: 33.5 G/DL (ref 31.5–35.7)
MCV RBC AUTO: 91 FL (ref 79–97)
METHADONE UR QL SCN: NEGATIVE
MONOCYTES # BLD AUTO: 0.52 10*3/MM3 (ref 0.1–0.9)
MONOCYTES NFR BLD AUTO: 5.8 % (ref 5–12)
NEUTROPHILS NFR BLD AUTO: 5.37 10*3/MM3 (ref 1.7–7)
NEUTROPHILS NFR BLD AUTO: 60 % (ref 42.7–76)
NRBC BLD AUTO-RTO: 0 /100 WBC (ref 0–0.2)
OPIATES UR QL: NEGATIVE
OXYCODONE UR QL SCN: NEGATIVE
PCP UR QL SCN: NEGATIVE
PLATELET # BLD AUTO: 218 10*3/MM3 (ref 140–450)
PMV BLD AUTO: 12 FL (ref 6–12)
POTASSIUM SERPL-SCNC: 3.9 MMOL/L (ref 3.5–5.2)
PROT SERPL-MCNC: 7.5 G/DL (ref 6–8.5)
PSA SERPL-MCNC: 1.01 NG/ML (ref 0–4)
RBC # BLD AUTO: 5.42 10*6/MM3 (ref 4.14–5.8)
SODIUM SERPL-SCNC: 140 MMOL/L (ref 136–145)
TRICYCLICS UR QL SCN: NEGATIVE
TRIGL SERPL-MCNC: 161 MG/DL (ref 0–150)
TSH SERPL DL<=0.05 MIU/L-ACNC: 3.6 UIU/ML (ref 0.27–4.2)
VIT B12 BLD-MCNC: 395 PG/ML (ref 211–946)
VLDLC SERPL-MCNC: 29 MG/DL (ref 5–40)
WBC NRBC COR # BLD AUTO: 8.96 10*3/MM3 (ref 3.4–10.8)

## 2025-04-17 PROCEDURE — 36415 COLL VENOUS BLD VENIPUNCTURE: CPT

## 2025-04-17 PROCEDURE — 80053 COMPREHEN METABOLIC PANEL: CPT

## 2025-04-17 PROCEDURE — 85025 COMPLETE CBC W/AUTO DIFF WBC: CPT

## 2025-04-17 PROCEDURE — 82746 ASSAY OF FOLIC ACID SERUM: CPT

## 2025-04-17 PROCEDURE — G0103 PSA SCREENING: HCPCS

## 2025-04-17 PROCEDURE — 80307 DRUG TEST PRSMV CHEM ANLYZR: CPT

## 2025-04-17 PROCEDURE — 84443 ASSAY THYROID STIM HORMONE: CPT

## 2025-04-17 PROCEDURE — 80061 LIPID PANEL: CPT

## 2025-04-17 PROCEDURE — 82607 VITAMIN B-12: CPT

## 2025-04-21 DIAGNOSIS — F41.0 PANIC DISORDER: ICD-10-CM

## 2025-04-21 RX ORDER — ALPRAZOLAM 1 MG/1
1 TABLET ORAL 3 TIMES DAILY PRN
Qty: 90 TABLET | Refills: 0 | Status: SHIPPED | OUTPATIENT
Start: 2025-04-21

## 2025-04-21 NOTE — TELEPHONE ENCOUNTER
CONTROLLED MEDICATION REFILL REQUEST    STATE REGULATION APPT EVERY 3 MONTHS     UDS(URINE DRUG SCREEN) EVERY 6 MONTHS OR UP TO PROVIDER PREFERENCE   ([facility]RA[facility] & [facility] 1 PER YEAR)     NEW NARC CONSENT EVERY YEAR      MEDICATION: ALPRAZolam (XANAX) 1 MG tablet (03/13/2025)     PT(PATIENT) CONFIRMED PHARMACY: [facility]      NEXT OFFICE VISIT: Appointment with Page Lee PA-C (06/05/2025)     LAST OFFICE VISIT: Office Visit with Page Lee PA-C (03/13/2025)     NARC CONSENT: CONTROLLED SUBSTANCE AGREEMENT - SCAN - CSA/XANAX, MGCHAR, 08/23/2024 (08/23/2024)     URINE DRUG SCREEN(STANDING ORDER)   (ASPEN CRAWFORD & BETH 1 PER YEAR): Urine Drug Screen - Urine, Clean Catch (04/17/2025 08:52)  Fentanyl, Urine - Urine, Clean Catch (04/17/2025 08:52)    PROVIDER PLEASE ADVISE

## (undated) DEVICE — Device

## (undated) DEVICE — LINER SURG CANSTR SXN S/RIGD 1500CC

## (undated) DEVICE — SINGLE USE SUCTION VALVE MAJ-209: Brand: SINGLE USE SUCTION VALVE (STERILE)

## (undated) DEVICE — CONN JET HYDRA H20 AUXILIARY DISP

## (undated) DEVICE — SOLIDIFIER LIQLOC PLS 1500CC BT

## (undated) DEVICE — TRAP,MUCUS SPECIMEN,40CC: Brand: MEDLINE

## (undated) DEVICE — SINGLE USE BIOPSY VALVE MAJ-210: Brand: SINGLE USE BIOPSY VALVE (STERILE)

## (undated) DEVICE — CUP SPECI 4OZ LF STRL

## (undated) DEVICE — DEV ATOMIZATION MUCOSAL/NASALTRACH

## (undated) DEVICE — BLCK/BITE BLOX WO/DENTL/RIM W/STRAP 54F

## (undated) DEVICE — SYR LUER SLPTP 50ML